# Patient Record
Sex: FEMALE | Race: BLACK OR AFRICAN AMERICAN | NOT HISPANIC OR LATINO | Employment: OTHER | RURAL
[De-identification: names, ages, dates, MRNs, and addresses within clinical notes are randomized per-mention and may not be internally consistent; named-entity substitution may affect disease eponyms.]

---

## 2017-01-10 ENCOUNTER — HISTORICAL (OUTPATIENT)
Dept: ADMINISTRATIVE | Facility: HOSPITAL | Age: 57
End: 2017-01-10

## 2017-01-16 LAB
LAB AP CLINICAL INFORMATION: NORMAL
LAB AP DIAGNOSIS - HISTORICAL: NORMAL
LAB AP GROSS PATHOLOGY - HISTORICAL: NORMAL
LAB AP SPECIMEN SUBMITTED - HISTORICAL: NORMAL

## 2017-07-02 ENCOUNTER — HISTORICAL (OUTPATIENT)
Dept: ADMINISTRATIVE | Facility: HOSPITAL | Age: 57
End: 2017-07-02

## 2017-11-02 ENCOUNTER — HISTORICAL (OUTPATIENT)
Dept: ADMINISTRATIVE | Facility: HOSPITAL | Age: 57
End: 2017-11-02

## 2019-07-15 ENCOUNTER — HISTORICAL (OUTPATIENT)
Dept: ADMINISTRATIVE | Facility: HOSPITAL | Age: 59
End: 2019-07-15

## 2020-08-04 ENCOUNTER — HISTORICAL (OUTPATIENT)
Dept: ADMINISTRATIVE | Facility: HOSPITAL | Age: 60
End: 2020-08-04

## 2020-08-12 ENCOUNTER — HISTORICAL (OUTPATIENT)
Dept: ADMINISTRATIVE | Facility: HOSPITAL | Age: 60
End: 2020-08-12

## 2020-08-20 ENCOUNTER — HISTORICAL (OUTPATIENT)
Dept: ADMINISTRATIVE | Facility: HOSPITAL | Age: 60
End: 2020-08-20

## 2020-11-17 ENCOUNTER — HISTORICAL (OUTPATIENT)
Dept: ADMINISTRATIVE | Facility: HOSPITAL | Age: 60
End: 2020-11-17

## 2020-11-19 LAB — ANA SER QL: NEGATIVE

## 2020-11-30 ENCOUNTER — HISTORICAL (OUTPATIENT)
Dept: ADMINISTRATIVE | Facility: HOSPITAL | Age: 60
End: 2020-11-30

## 2020-12-05 ENCOUNTER — HISTORICAL (OUTPATIENT)
Dept: ADMINISTRATIVE | Facility: HOSPITAL | Age: 60
End: 2020-12-05

## 2020-12-05 LAB
ANION GAP SERPL CALCULATED.3IONS-SCNC: 16 MMOL/L
ANISOCYTOSIS BLD QL SMEAR: ABNORMAL
BACTERIA #/AREA URNS HPF: ABNORMAL /HPF
BASOPHILS # BLD AUTO: 0.02 X10E3/UL (ref 0–0.2)
BASOPHILS NFR BLD AUTO: 0.2 % (ref 0–1)
BILIRUB UR QL STRIP: NEGATIVE MG/DL
BUN SERPL-MCNC: 29 MG/DL (ref 7–18)
CALCIUM SERPL-MCNC: 8.7 MG/DL (ref 8.5–10.1)
CHLORIDE SERPL-SCNC: 101 MMOL/L (ref 98–107)
CLARITY UR: ABNORMAL
CLARITY UR: ABNORMAL
CO2 SERPL-SCNC: 22 MMOL/L (ref 21–32)
COLOR UR: ABNORMAL
COLOR UR: ABNORMAL
CREAT SERPL-MCNC: 2.16 MG/DL (ref 0.55–1.02)
EOSINOPHIL # BLD AUTO: 0.07 X10E3/UL (ref 0–0.5)
EOSINOPHIL NFR BLD AUTO: 0.8 % (ref 1–4)
ERYTHROCYTE [DISTWIDTH] IN BLOOD BY AUTOMATED COUNT: 14.8 % (ref 11.5–14.5)
FLUAV AG UPPER RESP QL IA.RAPID: NEGATIVE
FLUBV AG UPPER RESP QL IA.RAPID: NEGATIVE
GLUCOSE SERPL-MCNC: 401 MG/DL (ref 74–106)
GLUCOSE SERPL-MCNC: 416 MG/DL (ref 70–105)
GLUCOSE SERPL-MCNC: 426 MG/DL (ref 70–105)
GLUCOSE UR STRIP-MCNC: 500 MG/DL
HCT VFR BLD AUTO: 30.8 % (ref 38–47)
HGB BLD-MCNC: 10.8 G/DL (ref 12–16)
HYALINE CASTS #/AREA URNS LPF: ABNORMAL /LPF (ref 0–2)
IMM GRANULOCYTES # BLD AUTO: 0.06 X10E3/UL (ref 0–0.04)
IMM GRANULOCYTES NFR BLD: 0.7 % (ref 0–0.4)
KETONES UR STRIP-SCNC: NEGATIVE MG/DL
LACTATE SERPL-SCNC: 1.5 MMOL/L (ref 0.4–2)
LACTATE SERPL-SCNC: 2.4 MMOL/L (ref 0.4–2)
LEUKOCYTE ESTERASE UR QL STRIP: ABNORMAL LEU/UL
LYMPHOCYTES # BLD AUTO: 0.91 X10E3/UL (ref 1–4.8)
LYMPHOCYTES NFR BLD AUTO: 10.9 % (ref 27–41)
MCH RBC QN AUTO: 25.9 PG (ref 27–31)
MCHC RBC AUTO-ENTMCNC: 35.1 G/DL (ref 32–36)
MCV RBC AUTO: 73.9 FL (ref 80–96)
MICROCYTES BLD QL SMEAR: ABNORMAL
MONOCYTES # BLD AUTO: 0.33 X10E3/UL (ref 0–0.8)
MONOCYTES NFR BLD AUTO: 3.9 % (ref 2–6)
MPC BLD CALC-MCNC: 10.9 FL (ref 9.4–12.4)
MUCOUS THREADS #/AREA URNS HPF: ABNORMAL /HPF
NEUTROPHILS # BLD AUTO: 6.97 X10E3/UL (ref 1.8–7.7)
NEUTROPHILS NFR BLD AUTO: 83.5 % (ref 53–65)
NITRITE UR QL STRIP: NEGATIVE
NRBC # BLD AUTO: 0 X10E3/UL (ref 0–0)
NRBC, AUTO (.00): 0 /100 (ref 0–0)
OVALOCYTES BLD QL SMEAR: ABNORMAL
PH UR STRIP: 5.5 PH UNITS (ref 5–8)
PLATELET # BLD AUTO: 134 X10E3/UL (ref 150–400)
PLATELET MORPHOLOGY: ABNORMAL
POLYCHROMASIA BLD QL SMEAR: ABNORMAL
POTASSIUM SERPL-SCNC: 4.6 MMOL/L (ref 3.5–5.1)
PROT UR QL STRIP: 100 MG/DL
RBC # BLD AUTO: 4.17 X10E6/UL (ref 4.2–5.4)
RBC # UR STRIP: ABNORMAL ERY/UL
RBC #/AREA URNS HPF: ABNORMAL /HPF (ref 0–3)
SARS-COV+SARS-COV-2 AG RESP QL IA.RAPID: NEGATIVE
SODIUM SERPL-SCNC: 134 MMOL/L (ref 136–145)
SP GR UR STRIP: 1.02 (ref 1–1.03)
SQUAMOUS #/AREA URNS LPF: ABNORMAL /LPF
TRICHOMONAS #/AREA URNS HPF: ABNORMAL /HPF
UROBILINOGEN UR STRIP-ACNC: 0.2 EU/DL
WBC # BLD AUTO: 8.36 X10E3/UL (ref 4.5–11)
WBC #/AREA URNS HPF: ABNORMAL /HPF (ref 0–5)
YEAST #/AREA URNS HPF: ABNORMAL /HPF

## 2020-12-07 LAB
REPORT: 38
REPORT: NORMAL

## 2020-12-08 LAB
REPORT: 38
REPORT: NORMAL

## 2020-12-11 LAB
REPORT: NORMAL

## 2020-12-15 ENCOUNTER — HISTORICAL (OUTPATIENT)
Dept: ADMINISTRATIVE | Facility: HOSPITAL | Age: 60
End: 2020-12-15

## 2020-12-15 LAB
CRC RECOMMENDATION EXT: NORMAL
GLUCOSE SERPL-MCNC: 388 MG/DL (ref 70–105)

## 2021-02-12 ENCOUNTER — HISTORICAL (OUTPATIENT)
Dept: ADMINISTRATIVE | Facility: HOSPITAL | Age: 61
End: 2021-02-12

## 2021-02-12 ENCOUNTER — HOSPITAL ENCOUNTER (OUTPATIENT)
Dept: TELEMEDICINE | Facility: HOSPITAL | Age: 61
Discharge: HOME | End: 2021-02-12

## 2021-02-12 DIAGNOSIS — I63.412 EMBOLIC STROKE INVOLVING LEFT MIDDLE CEREBRAL ARTERY: ICD-10-CM

## 2021-02-12 LAB
AMPHET UR QL SCN: NEGATIVE NG/ML
ANION GAP SERPL CALCULATED.3IONS-SCNC: 13 MMOL/L
ANISOCYTOSIS BLD QL SMEAR: ABNORMAL
APTT PPP: 26.1 SECONDS (ref 25.2–37.3)
BARBITURATES UR QL SCN: NEGATIVE NG/ML
BASOPHILS # BLD AUTO: 0.02 X10E3/UL (ref 0–0.2)
BASOPHILS NFR BLD AUTO: 0.4 % (ref 0–1)
BENZODIAZ METAB UR QL SCN: NEGATIVE NG/ML
BILIRUB UR QL STRIP: NEGATIVE MG/DL
BUN SERPL-MCNC: 28 MG/DL (ref 7–18)
CALCIUM SERPL-MCNC: 9.1 MG/DL (ref 8.5–10.1)
CANNABINOIDS UR QL SCN: NEGATIVE NG/ML
CHLORIDE SERPL-SCNC: 104 MMOL/L (ref 98–107)
CK MB SERPL-MCNC: 1 NG/ML (ref 1–3.6)
CK SERPL-CCNC: 94 U/L (ref 26–192)
CLARITY UR: CLEAR
CO2 SERPL-SCNC: 25 MMOL/L (ref 21–32)
COCAINE UR QL SCN: POSITIVE NG/ML
COLOR UR: ABNORMAL
CREAT SERPL-MCNC: 2.02 MG/DL (ref 0.55–1.02)
EOSINOPHIL # BLD AUTO: 0.22 X10E3/UL (ref 0–0.5)
EOSINOPHIL NFR BLD AUTO: 4.6 % (ref 1–4)
ERYTHROCYTE [DISTWIDTH] IN BLOOD BY AUTOMATED COUNT: 14.8 % (ref 11.5–14.5)
ETHANOL SERPL-MCNC: NORMAL MG/DL (ref 0–10)
FLUAV AG UPPER RESP QL IA.RAPID: NEGATIVE
FLUBV AG UPPER RESP QL IA.RAPID: NEGATIVE
GLUCOSE SERPL-MCNC: 285 MG/DL (ref 74–106)
GLUCOSE UR STRIP-MCNC: 500 MG/DL
HCT VFR BLD AUTO: 40.5 % (ref 38–47)
HGB BLD-MCNC: 13.8 G/DL (ref 12–16)
HYPOCHROMIA BLD QL SMEAR: SLIGHT
IMM GRANULOCYTES # BLD AUTO: 0.01 X10E3/UL (ref 0–0.04)
IMM GRANULOCYTES NFR BLD: 0.2 % (ref 0–0.4)
INR BLD: 1.11 (ref 0–3.3)
KETONES UR STRIP-SCNC: NEGATIVE MG/DL
LEUKOCYTE ESTERASE UR QL STRIP: NEGATIVE LEU/UL
LYMPHOCYTES # BLD AUTO: 1.71 X10E3/UL (ref 1–4.8)
LYMPHOCYTES NFR BLD AUTO: 35.4 % (ref 27–41)
MAGNESIUM SERPL-MCNC: 1.6 MG/DL (ref 1.7–2.3)
MCH RBC QN AUTO: 25.5 PG (ref 27–31)
MCHC RBC AUTO-ENTMCNC: 34.1 G/DL (ref 32–36)
MCV RBC AUTO: 74.9 FL (ref 80–96)
MICROCYTES BLD QL SMEAR: ABNORMAL
MONOCYTES # BLD AUTO: 0.34 X10E3/UL (ref 0–0.8)
MONOCYTES NFR BLD AUTO: 7 % (ref 2–6)
MPC BLD CALC-MCNC: 10.8 FL (ref 9.4–12.4)
MYOGLOBIN SERPL-MCNC: 103 NG/ML (ref 13–71)
NEUTROPHILS # BLD AUTO: 2.53 X10E3/UL (ref 1.8–7.7)
NEUTROPHILS NFR BLD AUTO: 52.4 % (ref 53–65)
NITRITE UR QL STRIP: NEGATIVE
NRBC # BLD AUTO: 0 X10E3/UL (ref 0–0)
NRBC, AUTO (.00): 0 /100 (ref 0–0)
OPIATES UR QL SCN: NEGATIVE NG/ML
PCP UR QL SCN: NEGATIVE NG/ML
PH UR STRIP: 6 PH UNITS (ref 5–8)
PLATELET # BLD AUTO: 153 X10E3/UL (ref 150–400)
PLATELET MORPHOLOGY: ABNORMAL
POLYCHROMASIA BLD QL SMEAR: ABNORMAL
POTASSIUM SERPL-SCNC: 5.1 MMOL/L (ref 3.5–5.1)
PROT UR QL STRIP: NEGATIVE MG/DL
PROTHROMBIN TIME: 13.8 SECONDS (ref 11.7–14.7)
RBC # BLD AUTO: 5.41 X10E6/UL (ref 4.2–5.4)
RBC # UR STRIP: NEGATIVE ERY/UL
SARS-COV+SARS-COV-2 AG RESP QL IA.RAPID: NEGATIVE
SODIUM SERPL-SCNC: 137 MMOL/L (ref 136–145)
SP GR UR STRIP: 1.02 (ref 1–1.03)
TROPONIN I SERPL-MCNC: <0.017 NG/ML (ref 0–0.06)
UROBILINOGEN UR STRIP-ACNC: 0.2 EU/DL
WBC # BLD AUTO: 4.83 X10E3/UL (ref 4.5–11)

## 2021-02-12 PROCEDURE — G0508 CRIT CARE TELEHEA CONSULT 60: HCPCS | Mod: GT,,, | Performed by: PSYCHIATRY & NEUROLOGY

## 2021-02-12 PROCEDURE — G0508 PR CRITICAL CARE TELEHLTH INITIAL CONSULT 60MIN: ICD-10-PCS | Mod: GT,,, | Performed by: PSYCHIATRY & NEUROLOGY

## 2021-02-23 ENCOUNTER — HISTORICAL (OUTPATIENT)
Dept: ADMINISTRATIVE | Facility: HOSPITAL | Age: 61
End: 2021-02-23

## 2021-03-01 LAB
LAB AP CLINICAL INFORMATION: NORMAL
LAB AP GENERAL CAT - HISTORICAL: NORMAL
LAB AP INTERPRETATION/RESULT - HISTORICAL: NEGATIVE
LAB AP SPECIMEN ADEQUACY - HISTORICAL: NORMAL
LAB AP SPECIMEN SUBMITTED - HISTORICAL: NORMAL

## 2021-03-02 LAB
INSULIN SERPL-ACNC: NORMAL U[IU]/ML

## 2021-03-04 ENCOUNTER — HISTORICAL (OUTPATIENT)
Dept: ADMINISTRATIVE | Facility: HOSPITAL | Age: 61
End: 2021-03-04

## 2021-03-18 ENCOUNTER — OFFICE VISIT (OUTPATIENT)
Dept: WOUND CARE | Facility: HOSPITAL | Age: 61
End: 2021-03-18
Attending: NURSE PRACTITIONER
Payer: MEDICAID

## 2021-03-18 VITALS
HEIGHT: 58 IN | WEIGHT: 280 LBS | DIASTOLIC BLOOD PRESSURE: 69 MMHG | HEART RATE: 95 BPM | BODY MASS INDEX: 58.78 KG/M2 | SYSTOLIC BLOOD PRESSURE: 100 MMHG | TEMPERATURE: 97 F

## 2021-03-18 DIAGNOSIS — E11.622 DIABETES WITH SKIN ULCER: ICD-10-CM

## 2021-03-18 DIAGNOSIS — I10 BENIGN HYPERTENSION: ICD-10-CM

## 2021-03-18 DIAGNOSIS — L97.513 ULCER OF RIGHT FOOT WITH NECROSIS OF MUSCLE: Primary | ICD-10-CM

## 2021-03-18 DIAGNOSIS — E78.49 OTHER HYPERLIPIDEMIA: ICD-10-CM

## 2021-03-18 DIAGNOSIS — R60.0 LOWER EXTREMITY EDEMA: ICD-10-CM

## 2021-03-18 DIAGNOSIS — I63.412 EMBOLIC STROKE INVOLVING LEFT MIDDLE CEREBRAL ARTERY: ICD-10-CM

## 2021-03-18 DIAGNOSIS — L98.499 DIABETES WITH SKIN ULCER: ICD-10-CM

## 2021-03-18 DIAGNOSIS — L97.525 ULCER OF LEFT FOOT WITH MUSCLE INVOLVEMENT WITHOUT EVIDENCE OF NECROSIS: ICD-10-CM

## 2021-03-18 PROCEDURE — 11042 DBRDMT SUBQ TIS 1ST 20SQCM/<: CPT

## 2021-03-18 PROCEDURE — 99214 OFFICE O/P EST MOD 30 MIN: CPT

## 2021-03-18 PROCEDURE — 99214 OFFICE O/P EST MOD 30 MIN: CPT | Mod: ,,, | Performed by: NURSE PRACTITIONER

## 2021-03-18 PROCEDURE — 99214 PR OFFICE/OUTPT VISIT, EST, LEVL IV, 30-39 MIN: ICD-10-PCS | Mod: ,,, | Performed by: NURSE PRACTITIONER

## 2021-03-19 PROBLEM — I10 BENIGN HYPERTENSION: Status: ACTIVE | Noted: 2021-03-19

## 2021-03-19 PROBLEM — R60.0 LOWER EXTREMITY EDEMA: Status: ACTIVE | Noted: 2021-03-19

## 2021-03-19 PROBLEM — E11.622 DIABETES WITH SKIN ULCER: Status: ACTIVE | Noted: 2021-03-19

## 2021-03-19 PROBLEM — E78.49 OTHER HYPERLIPIDEMIA: Status: ACTIVE | Noted: 2021-03-19

## 2021-03-19 PROBLEM — L97.525 ULCER OF LEFT FOOT WITH MUSCLE INVOLVEMENT WITHOUT EVIDENCE OF NECROSIS: Status: ACTIVE | Noted: 2021-03-19

## 2021-03-19 PROBLEM — L98.499 DIABETES WITH SKIN ULCER: Status: ACTIVE | Noted: 2021-03-19

## 2021-05-18 ENCOUNTER — OFFICE VISIT (OUTPATIENT)
Dept: WOUND CARE | Facility: CLINIC | Age: 61
End: 2021-05-18
Attending: NURSE PRACTITIONER
Payer: MEDICAID

## 2021-05-18 VITALS
WEIGHT: 280 LBS | RESPIRATION RATE: 20 BRPM | TEMPERATURE: 97 F | SYSTOLIC BLOOD PRESSURE: 148 MMHG | DIASTOLIC BLOOD PRESSURE: 80 MMHG | HEIGHT: 68 IN | BODY MASS INDEX: 42.44 KG/M2 | HEART RATE: 97 BPM

## 2021-05-18 DIAGNOSIS — L97.512 ULCER OF RIGHT FOOT WITH FAT LAYER EXPOSED: Primary | ICD-10-CM

## 2021-05-18 DIAGNOSIS — I10 BENIGN HYPERTENSION: ICD-10-CM

## 2021-05-18 DIAGNOSIS — E11.622 DIABETES WITH SKIN ULCER: ICD-10-CM

## 2021-05-18 DIAGNOSIS — L98.499 DIABETES WITH SKIN ULCER: ICD-10-CM

## 2021-05-18 DIAGNOSIS — E78.49 OTHER HYPERLIPIDEMIA: ICD-10-CM

## 2021-05-18 DIAGNOSIS — R60.0 LOWER EXTREMITY EDEMA: ICD-10-CM

## 2021-05-18 PROCEDURE — 11042 DBRDMT SUBQ TIS 1ST 20SQCM/<: CPT

## 2021-05-18 PROCEDURE — 99215 OFFICE O/P EST HI 40 MIN: CPT | Mod: PBBFAC,25 | Performed by: NURSE PRACTITIONER

## 2021-05-18 PROCEDURE — 11042 PR DEBRIDEMENT, SKIN, SUB-Q TISSUE,=<20 SQ CM: ICD-10-PCS | Mod: S$PBB,,, | Performed by: NURSE PRACTITIONER

## 2021-05-18 PROCEDURE — 11042 DBRDMT SUBQ TIS 1ST 20SQCM/<: CPT | Mod: PBBFAC | Performed by: NURSE PRACTITIONER

## 2021-05-18 PROCEDURE — 11042 DBRDMT SUBQ TIS 1ST 20SQCM/<: CPT | Mod: S$PBB,,, | Performed by: NURSE PRACTITIONER

## 2021-05-18 RX ORDER — AMLODIPINE BESYLATE 5 MG/1
5 TABLET ORAL DAILY PRN
COMMUNITY
Start: 2021-04-23 | End: 2021-11-09 | Stop reason: SDUPTHER

## 2021-05-18 RX ORDER — LOSARTAN POTASSIUM 100 MG/1
100 TABLET ORAL NIGHTLY
COMMUNITY
Start: 2021-04-26 | End: 2021-11-09 | Stop reason: SDUPTHER

## 2021-06-23 ENCOUNTER — OFFICE VISIT (OUTPATIENT)
Dept: WOUND CARE | Facility: CLINIC | Age: 61
End: 2021-06-23
Attending: NURSE PRACTITIONER
Payer: MEDICAID

## 2021-06-23 VITALS
DIASTOLIC BLOOD PRESSURE: 80 MMHG | HEIGHT: 68 IN | SYSTOLIC BLOOD PRESSURE: 140 MMHG | TEMPERATURE: 97 F | HEART RATE: 78 BPM | BODY MASS INDEX: 42.44 KG/M2 | WEIGHT: 280 LBS | RESPIRATION RATE: 20 BRPM

## 2021-06-23 DIAGNOSIS — L97.512 ULCER OF RIGHT FOOT WITH FAT LAYER EXPOSED: Primary | ICD-10-CM

## 2021-06-23 DIAGNOSIS — E11.622 DIABETES WITH SKIN ULCER: ICD-10-CM

## 2021-06-23 DIAGNOSIS — L98.499 DIABETES WITH SKIN ULCER: ICD-10-CM

## 2021-06-23 DIAGNOSIS — R60.0 LOWER EXTREMITY EDEMA: ICD-10-CM

## 2021-06-23 PROCEDURE — 99214 OFFICE O/P EST MOD 30 MIN: CPT | Mod: PBBFAC | Performed by: NURSE PRACTITIONER

## 2021-06-23 PROCEDURE — 99214 OFFICE O/P EST MOD 30 MIN: CPT | Mod: S$PBB,,, | Performed by: NURSE PRACTITIONER

## 2021-06-23 PROCEDURE — 99214 PR OFFICE/OUTPT VISIT, EST, LEVL IV, 30-39 MIN: ICD-10-PCS | Mod: S$PBB,,, | Performed by: NURSE PRACTITIONER

## 2021-06-28 PROBLEM — L97.512 ULCER OF RIGHT FOOT WITH FAT LAYER EXPOSED: Status: ACTIVE | Noted: 2021-06-28

## 2021-07-23 ENCOUNTER — OFFICE VISIT (OUTPATIENT)
Dept: WOUND CARE | Facility: CLINIC | Age: 61
End: 2021-07-23
Attending: FAMILY MEDICINE
Payer: MEDICAID

## 2021-07-23 VITALS
BODY MASS INDEX: 42.44 KG/M2 | HEART RATE: 86 BPM | WEIGHT: 280 LBS | RESPIRATION RATE: 20 BRPM | HEIGHT: 68 IN | DIASTOLIC BLOOD PRESSURE: 87 MMHG | SYSTOLIC BLOOD PRESSURE: 154 MMHG

## 2021-07-23 DIAGNOSIS — L97.512 ULCER OF RIGHT FOOT WITH FAT LAYER EXPOSED: Primary | ICD-10-CM

## 2021-07-23 DIAGNOSIS — L97.525 ULCER OF LEFT FOOT WITH MUSCLE INVOLVEMENT WITHOUT EVIDENCE OF NECROSIS: ICD-10-CM

## 2021-07-23 DIAGNOSIS — R60.0 LOWER EXTREMITY EDEMA: ICD-10-CM

## 2021-07-23 DIAGNOSIS — I10 BENIGN HYPERTENSION: ICD-10-CM

## 2021-07-23 DIAGNOSIS — L98.499 DIABETES WITH SKIN ULCER: ICD-10-CM

## 2021-07-23 DIAGNOSIS — E11.622 DIABETES WITH SKIN ULCER: ICD-10-CM

## 2021-07-23 DIAGNOSIS — E78.49 OTHER HYPERLIPIDEMIA: ICD-10-CM

## 2021-07-23 DIAGNOSIS — L97.513 ULCER OF RIGHT FOOT WITH NECROSIS OF MUSCLE: ICD-10-CM

## 2021-07-23 DIAGNOSIS — I63.412 EMBOLIC STROKE INVOLVING LEFT MIDDLE CEREBRAL ARTERY: ICD-10-CM

## 2021-07-23 PROCEDURE — 99213 OFFICE O/P EST LOW 20 MIN: CPT | Mod: PBBFAC | Performed by: FAMILY MEDICINE

## 2021-07-23 PROCEDURE — 99214 OFFICE O/P EST MOD 30 MIN: CPT | Mod: S$PBB,,, | Performed by: FAMILY MEDICINE

## 2021-07-23 PROCEDURE — 99214 PR OFFICE/OUTPT VISIT, EST, LEVL IV, 30-39 MIN: ICD-10-PCS | Mod: S$PBB,,, | Performed by: FAMILY MEDICINE

## 2021-08-13 ENCOUNTER — OFFICE VISIT (OUTPATIENT)
Dept: WOUND CARE | Facility: CLINIC | Age: 61
End: 2021-08-13
Attending: SURGERY
Payer: MEDICAID

## 2021-08-13 VITALS — SYSTOLIC BLOOD PRESSURE: 130 MMHG | RESPIRATION RATE: 18 BRPM | HEART RATE: 95 BPM | DIASTOLIC BLOOD PRESSURE: 87 MMHG

## 2021-08-13 DIAGNOSIS — E11.622 DIABETES WITH SKIN ULCER: ICD-10-CM

## 2021-08-13 DIAGNOSIS — I63.412 EMBOLIC STROKE INVOLVING LEFT MIDDLE CEREBRAL ARTERY: ICD-10-CM

## 2021-08-13 DIAGNOSIS — L97.512 ULCER OF RIGHT FOOT WITH FAT LAYER EXPOSED: Primary | ICD-10-CM

## 2021-08-13 DIAGNOSIS — I10 BENIGN HYPERTENSION: ICD-10-CM

## 2021-08-13 DIAGNOSIS — E78.49 OTHER HYPERLIPIDEMIA: ICD-10-CM

## 2021-08-13 DIAGNOSIS — L97.525 ULCER OF LEFT FOOT WITH MUSCLE INVOLVEMENT WITHOUT EVIDENCE OF NECROSIS: ICD-10-CM

## 2021-08-13 DIAGNOSIS — R60.0 LOWER EXTREMITY EDEMA: ICD-10-CM

## 2021-08-13 DIAGNOSIS — L98.499 DIABETES WITH SKIN ULCER: ICD-10-CM

## 2021-08-13 DIAGNOSIS — L97.513 ULCER OF RIGHT FOOT WITH NECROSIS OF MUSCLE: ICD-10-CM

## 2021-08-13 PROCEDURE — 99214 OFFICE O/P EST MOD 30 MIN: CPT | Mod: PBBFAC | Performed by: SURGERY

## 2021-08-13 PROCEDURE — 11042 DBRDMT SUBQ TIS 1ST 20SQCM/<: CPT | Mod: S$PBB,,, | Performed by: SURGERY

## 2021-08-13 PROCEDURE — 97597 DBRDMT OPN WND 1ST 20 CM/<: CPT | Mod: PBBFAC | Performed by: SURGERY

## 2021-08-13 PROCEDURE — 11042 PR DEBRIDEMENT, SKIN, SUB-Q TISSUE,=<20 SQ CM: ICD-10-PCS | Mod: S$PBB,,, | Performed by: SURGERY

## 2021-09-30 ENCOUNTER — CLINICAL SUPPORT (OUTPATIENT)
Dept: WOUND CARE | Facility: CLINIC | Age: 61
End: 2021-09-30
Attending: FAMILY MEDICINE
Payer: MEDICAID

## 2021-09-30 VITALS
DIASTOLIC BLOOD PRESSURE: 77 MMHG | TEMPERATURE: 98 F | SYSTOLIC BLOOD PRESSURE: 123 MMHG | RESPIRATION RATE: 20 BRPM | HEART RATE: 105 BPM

## 2021-09-30 DIAGNOSIS — L97.512 ULCER OF RIGHT FOOT WITH FAT LAYER EXPOSED: Primary | ICD-10-CM

## 2021-09-30 DIAGNOSIS — E78.49 OTHER HYPERLIPIDEMIA: ICD-10-CM

## 2021-09-30 DIAGNOSIS — I10 BENIGN HYPERTENSION: ICD-10-CM

## 2021-09-30 DIAGNOSIS — L98.499 DIABETES WITH SKIN ULCER: ICD-10-CM

## 2021-09-30 DIAGNOSIS — R60.0 LOWER EXTREMITY EDEMA: ICD-10-CM

## 2021-09-30 DIAGNOSIS — I63.412 EMBOLIC STROKE INVOLVING LEFT MIDDLE CEREBRAL ARTERY: ICD-10-CM

## 2021-09-30 DIAGNOSIS — L97.525 ULCER OF LEFT FOOT WITH MUSCLE INVOLVEMENT WITHOUT EVIDENCE OF NECROSIS: ICD-10-CM

## 2021-09-30 DIAGNOSIS — L97.513 ULCER OF RIGHT FOOT WITH NECROSIS OF MUSCLE: ICD-10-CM

## 2021-09-30 DIAGNOSIS — E11.622 DIABETES WITH SKIN ULCER: ICD-10-CM

## 2021-09-30 PROCEDURE — 99214 OFFICE O/P EST MOD 30 MIN: CPT | Mod: PBBFAC | Performed by: FAMILY MEDICINE

## 2021-10-14 ENCOUNTER — CLINICAL SUPPORT (OUTPATIENT)
Dept: WOUND CARE | Facility: CLINIC | Age: 61
End: 2021-10-14
Attending: FAMILY MEDICINE
Payer: MEDICAID

## 2021-10-14 VITALS
HEART RATE: 85 BPM | TEMPERATURE: 97 F | RESPIRATION RATE: 18 BRPM | SYSTOLIC BLOOD PRESSURE: 142 MMHG | DIASTOLIC BLOOD PRESSURE: 78 MMHG

## 2021-10-14 DIAGNOSIS — L97.512 ULCER OF RIGHT FOOT WITH FAT LAYER EXPOSED: Primary | ICD-10-CM

## 2021-10-14 DIAGNOSIS — E11.622 DIABETES WITH SKIN ULCER: ICD-10-CM

## 2021-10-14 DIAGNOSIS — I10 BENIGN HYPERTENSION: ICD-10-CM

## 2021-10-14 DIAGNOSIS — E78.49 OTHER HYPERLIPIDEMIA: ICD-10-CM

## 2021-10-14 DIAGNOSIS — L98.499 DIABETES WITH SKIN ULCER: ICD-10-CM

## 2021-10-14 DIAGNOSIS — L97.513 ULCER OF RIGHT FOOT WITH NECROSIS OF MUSCLE: ICD-10-CM

## 2021-10-14 DIAGNOSIS — R60.0 LOWER EXTREMITY EDEMA: ICD-10-CM

## 2021-10-14 DIAGNOSIS — I63.412 EMBOLIC STROKE INVOLVING LEFT MIDDLE CEREBRAL ARTERY: ICD-10-CM

## 2021-10-14 PROCEDURE — 99214 OFFICE O/P EST MOD 30 MIN: CPT | Mod: S$PBB,,, | Performed by: FAMILY MEDICINE

## 2021-10-14 PROCEDURE — 99214 PR OFFICE/OUTPT VISIT, EST, LEVL IV, 30-39 MIN: ICD-10-PCS | Mod: S$PBB,,, | Performed by: FAMILY MEDICINE

## 2021-10-14 PROCEDURE — 99214 OFFICE O/P EST MOD 30 MIN: CPT | Mod: PBBFAC | Performed by: FAMILY MEDICINE

## 2021-11-04 ENCOUNTER — OFFICE VISIT (OUTPATIENT)
Dept: WOUND CARE | Facility: CLINIC | Age: 61
End: 2021-11-04
Attending: FAMILY MEDICINE
Payer: MEDICAID

## 2021-11-04 VITALS
RESPIRATION RATE: 20 BRPM | DIASTOLIC BLOOD PRESSURE: 91 MMHG | HEART RATE: 95 BPM | SYSTOLIC BLOOD PRESSURE: 149 MMHG | TEMPERATURE: 98 F

## 2021-11-04 DIAGNOSIS — L97.512 ULCER OF RIGHT FOOT WITH FAT LAYER EXPOSED: Primary | ICD-10-CM

## 2021-11-04 DIAGNOSIS — R60.0 LOWER EXTREMITY EDEMA: ICD-10-CM

## 2021-11-04 DIAGNOSIS — L97.513 ULCER OF RIGHT FOOT WITH NECROSIS OF MUSCLE: ICD-10-CM

## 2021-11-04 DIAGNOSIS — L98.499 DIABETES WITH SKIN ULCER: ICD-10-CM

## 2021-11-04 DIAGNOSIS — I63.412 EMBOLIC STROKE INVOLVING LEFT MIDDLE CEREBRAL ARTERY: ICD-10-CM

## 2021-11-04 DIAGNOSIS — E11.622 DIABETES WITH SKIN ULCER: ICD-10-CM

## 2021-11-04 DIAGNOSIS — E78.49 OTHER HYPERLIPIDEMIA: ICD-10-CM

## 2021-11-04 DIAGNOSIS — L97.525 ULCER OF LEFT FOOT WITH MUSCLE INVOLVEMENT WITHOUT EVIDENCE OF NECROSIS: ICD-10-CM

## 2021-11-04 DIAGNOSIS — I10 BENIGN HYPERTENSION: ICD-10-CM

## 2021-11-04 PROCEDURE — 99214 OFFICE O/P EST MOD 30 MIN: CPT | Mod: PBBFAC | Performed by: FAMILY MEDICINE

## 2021-11-04 PROCEDURE — 99214 OFFICE O/P EST MOD 30 MIN: CPT | Mod: S$PBB,,, | Performed by: FAMILY MEDICINE

## 2021-11-04 PROCEDURE — 99214 PR OFFICE/OUTPT VISIT, EST, LEVL IV, 30-39 MIN: ICD-10-PCS | Mod: S$PBB,,, | Performed by: FAMILY MEDICINE

## 2021-11-09 ENCOUNTER — OFFICE VISIT (OUTPATIENT)
Dept: FAMILY MEDICINE | Facility: CLINIC | Age: 61
End: 2021-11-09
Payer: MEDICAID

## 2021-11-09 VITALS
HEART RATE: 101 BPM | DIASTOLIC BLOOD PRESSURE: 82 MMHG | OXYGEN SATURATION: 93 % | WEIGHT: 293 LBS | SYSTOLIC BLOOD PRESSURE: 134 MMHG | TEMPERATURE: 98 F | BODY MASS INDEX: 46.47 KG/M2

## 2021-11-09 DIAGNOSIS — Z23 NEEDS FLU SHOT: ICD-10-CM

## 2021-11-09 DIAGNOSIS — Z79.4 TYPE 2 DIABETES MELLITUS WITH OTHER NEUROLOGIC COMPLICATION, WITH LONG-TERM CURRENT USE OF INSULIN: ICD-10-CM

## 2021-11-09 DIAGNOSIS — Z13.29 SCREENING FOR THYROID DISORDER: ICD-10-CM

## 2021-11-09 DIAGNOSIS — I10 HYPERTENSION, UNSPECIFIED TYPE: ICD-10-CM

## 2021-11-09 DIAGNOSIS — K21.9 GASTROESOPHAGEAL REFLUX DISEASE, UNSPECIFIED WHETHER ESOPHAGITIS PRESENT: ICD-10-CM

## 2021-11-09 DIAGNOSIS — E78.5 HYPERLIPIDEMIA, UNSPECIFIED HYPERLIPIDEMIA TYPE: Primary | ICD-10-CM

## 2021-11-09 DIAGNOSIS — E11.49 TYPE 2 DIABETES MELLITUS WITH OTHER NEUROLOGIC COMPLICATION, WITH LONG-TERM CURRENT USE OF INSULIN: ICD-10-CM

## 2021-11-09 DIAGNOSIS — Z12.39 ENCOUNTER FOR SCREENING FOR MALIGNANT NEOPLASM OF BREAST, UNSPECIFIED SCREENING MODALITY: ICD-10-CM

## 2021-11-09 LAB
ALBUMIN SERPL BCP-MCNC: 3.1 G/DL (ref 3.5–5)
ALBUMIN/GLOB SERPL: 0.7 {RATIO}
ALP SERPL-CCNC: 139 U/L (ref 50–130)
ALT SERPL W P-5'-P-CCNC: 21 U/L (ref 13–56)
ANION GAP SERPL CALCULATED.3IONS-SCNC: 13 MMOL/L (ref 7–16)
ANISOCYTOSIS BLD QL SMEAR: NORMAL
AST SERPL W P-5'-P-CCNC: 13 U/L (ref 15–37)
BASOPHILS # BLD AUTO: 0.04 K/UL (ref 0–0.2)
BASOPHILS NFR BLD AUTO: 0.6 % (ref 0–1)
BILIRUB SERPL-MCNC: 0.4 MG/DL (ref 0–1.2)
BUN SERPL-MCNC: 18 MG/DL (ref 7–18)
BUN/CREAT SERPL: 15 (ref 6–20)
CALCIUM SERPL-MCNC: 9.4 MG/DL (ref 8.5–10.1)
CHLORIDE SERPL-SCNC: 109 MMOL/L (ref 98–107)
CHOLEST SERPL-MCNC: 173 MG/DL (ref 0–200)
CHOLEST/HDLC SERPL: 3.9 {RATIO}
CO2 SERPL-SCNC: 23 MMOL/L (ref 21–32)
CREAT SERPL-MCNC: 1.18 MG/DL (ref 0.55–1.02)
DIFFERENTIAL METHOD BLD: ABNORMAL
EOSINOPHIL # BLD AUTO: 0.36 K/UL (ref 0–0.5)
EOSINOPHIL NFR BLD AUTO: 5.2 % (ref 1–4)
ERYTHROCYTE [DISTWIDTH] IN BLOOD BY AUTOMATED COUNT: 15.1 % (ref 11.5–14.5)
EST. AVERAGE GLUCOSE BLD GHB EST-MCNC: 264 MG/DL
GLOBULIN SER-MCNC: 4.5 G/DL (ref 2–4)
GLUCOSE SERPL-MCNC: 204 MG/DL (ref 74–106)
HBA1C MFR BLD HPLC: 10.5 % (ref 4.5–6.6)
HCT VFR BLD AUTO: 33.6 % (ref 38–47)
HDLC SERPL-MCNC: 44 MG/DL (ref 40–60)
HGB BLD-MCNC: 11.4 G/DL (ref 12–16)
IMM GRANULOCYTES # BLD AUTO: 0.03 K/UL (ref 0–0.04)
IMM GRANULOCYTES NFR BLD: 0.4 % (ref 0–0.4)
LDLC SERPL CALC-MCNC: 107 MG/DL
LDLC/HDLC SERPL: 2.4 {RATIO}
LYMPHOCYTES # BLD AUTO: 1.92 K/UL (ref 1–4.8)
LYMPHOCYTES NFR BLD AUTO: 27.7 % (ref 27–41)
MCH RBC QN AUTO: 25.3 PG (ref 27–31)
MCHC RBC AUTO-ENTMCNC: 33.9 G/DL (ref 32–36)
MCV RBC AUTO: 74.7 FL (ref 80–96)
MICROCYTES BLD QL SMEAR: NORMAL
MONOCYTES # BLD AUTO: 0.46 K/UL (ref 0–0.8)
MONOCYTES NFR BLD AUTO: 6.6 % (ref 2–6)
MPC BLD CALC-MCNC: 12.1 FL (ref 9.4–12.4)
NEUTROPHILS # BLD AUTO: 4.13 K/UL (ref 1.8–7.7)
NEUTROPHILS NFR BLD AUTO: 59.5 % (ref 53–65)
NONHDLC SERPL-MCNC: 129 MG/DL
NRBC # BLD AUTO: 0 X10E3/UL
NRBC, AUTO (.00): 0 %
PLATELET # BLD AUTO: 208 K/UL (ref 150–400)
PLATELET MORPHOLOGY: NORMAL
POTASSIUM SERPL-SCNC: 4.7 MMOL/L (ref 3.5–5.1)
PROT SERPL-MCNC: 7.6 G/DL (ref 6.4–8.2)
RBC # BLD AUTO: 4.5 M/UL (ref 4.2–5.4)
SODIUM SERPL-SCNC: 140 MMOL/L (ref 136–145)
TRIGL SERPL-MCNC: 111 MG/DL (ref 35–150)
TSH SERPL DL<=0.005 MIU/L-ACNC: 1.96 UIU/ML (ref 0.36–3.74)
VLDLC SERPL-MCNC: 22 MG/DL
WBC # BLD AUTO: 6.94 K/UL (ref 4.5–11)

## 2021-11-09 PROCEDURE — 84443 ASSAY THYROID STIM HORMONE: CPT | Mod: ,,, | Performed by: CLINICAL MEDICAL LABORATORY

## 2021-11-09 PROCEDURE — 80053 COMPREHENSIVE METABOLIC PANEL: ICD-10-PCS | Mod: ,,, | Performed by: CLINICAL MEDICAL LABORATORY

## 2021-11-09 PROCEDURE — 80061 LIPID PANEL: CPT | Mod: ,,, | Performed by: CLINICAL MEDICAL LABORATORY

## 2021-11-09 PROCEDURE — 83036 HEMOGLOBIN A1C: ICD-10-PCS | Mod: ,,, | Performed by: CLINICAL MEDICAL LABORATORY

## 2021-11-09 PROCEDURE — 99214 PR OFFICE/OUTPT VISIT, EST, LEVL IV, 30-39 MIN: ICD-10-PCS | Mod: 25,GC,, | Performed by: FAMILY MEDICINE

## 2021-11-09 PROCEDURE — 84443 TSH: ICD-10-PCS | Mod: ,,, | Performed by: CLINICAL MEDICAL LABORATORY

## 2021-11-09 PROCEDURE — 80061 LIPID PANEL: ICD-10-PCS | Mod: ,,, | Performed by: CLINICAL MEDICAL LABORATORY

## 2021-11-09 PROCEDURE — 85025 CBC WITH DIFFERENTIAL: ICD-10-PCS | Mod: ,,, | Performed by: CLINICAL MEDICAL LABORATORY

## 2021-11-09 PROCEDURE — 85025 COMPLETE CBC W/AUTO DIFF WBC: CPT | Mod: ,,, | Performed by: CLINICAL MEDICAL LABORATORY

## 2021-11-09 PROCEDURE — 80053 COMPREHEN METABOLIC PANEL: CPT | Mod: ,,, | Performed by: CLINICAL MEDICAL LABORATORY

## 2021-11-09 PROCEDURE — 83036 HEMOGLOBIN GLYCOSYLATED A1C: CPT | Mod: ,,, | Performed by: CLINICAL MEDICAL LABORATORY

## 2021-11-09 PROCEDURE — 99214 OFFICE O/P EST MOD 30 MIN: CPT | Mod: 25,GC,, | Performed by: FAMILY MEDICINE

## 2021-11-09 RX ORDER — INSULIN ASPART 100 [IU]/ML
INJECTION, SUSPENSION SUBCUTANEOUS
Qty: 30 ML | Refills: 5 | Status: SHIPPED | OUTPATIENT
Start: 2021-11-09 | End: 2022-06-07 | Stop reason: SDUPTHER

## 2021-11-09 RX ORDER — LOSARTAN POTASSIUM 100 MG/1
100 TABLET ORAL NIGHTLY
Qty: 30 TABLET | Refills: 2 | Status: SHIPPED | OUTPATIENT
Start: 2021-11-09 | End: 2022-06-07 | Stop reason: SDUPTHER

## 2021-11-09 RX ORDER — ATORVASTATIN CALCIUM 80 MG/1
TABLET, FILM COATED ORAL
Qty: 30 TABLET | Refills: 2 | Status: SHIPPED | OUTPATIENT
Start: 2021-11-09 | End: 2022-04-13

## 2021-11-09 RX ORDER — OMEPRAZOLE 20 MG/1
20 CAPSULE, DELAYED RELEASE ORAL DAILY
Qty: 30 CAPSULE | Refills: 2 | Status: SHIPPED | OUTPATIENT
Start: 2021-11-09 | End: 2022-06-07 | Stop reason: SDUPTHER

## 2021-11-09 RX ORDER — AMLODIPINE BESYLATE 5 MG/1
5 TABLET ORAL DAILY
Qty: 30 TABLET | Refills: 2 | Status: SHIPPED | OUTPATIENT
Start: 2021-11-09 | End: 2022-06-07 | Stop reason: SDUPTHER

## 2021-11-09 RX ORDER — INSULIN GLARGINE 100 [IU]/ML
20 INJECTION, SOLUTION SUBCUTANEOUS DAILY
Qty: 6 ML | Refills: 2 | Status: SHIPPED | OUTPATIENT
Start: 2021-11-09 | End: 2022-06-07 | Stop reason: SDUPTHER

## 2021-11-10 PROCEDURE — 90686 FLU VACCINE (QUAD) GREATER THAN OR EQUAL TO 3YO PRESERVATIVE FREE IM: ICD-10-PCS | Mod: GC,,, | Performed by: FAMILY MEDICINE

## 2021-11-10 PROCEDURE — 90471 FLU VACCINE (QUAD) GREATER THAN OR EQUAL TO 3YO PRESERVATIVE FREE IM: ICD-10-PCS | Mod: GC,,, | Performed by: FAMILY MEDICINE

## 2021-11-10 PROCEDURE — 90471 IMMUNIZATION ADMIN: CPT | Mod: GC,,, | Performed by: FAMILY MEDICINE

## 2021-11-10 PROCEDURE — 90686 IIV4 VACC NO PRSV 0.5 ML IM: CPT | Mod: GC,,, | Performed by: FAMILY MEDICINE

## 2021-12-02 ENCOUNTER — OFFICE VISIT (OUTPATIENT)
Dept: WOUND CARE | Facility: CLINIC | Age: 61
End: 2021-12-02
Attending: FAMILY MEDICINE
Payer: MEDICAID

## 2021-12-02 DIAGNOSIS — E11.44 TYPE 2 DIABETES MELLITUS WITH DIABETIC AMYOTROPHY, WITH LONG-TERM CURRENT USE OF INSULIN: ICD-10-CM

## 2021-12-02 DIAGNOSIS — E78.5 HYPERLIPIDEMIA, UNSPECIFIED HYPERLIPIDEMIA TYPE: ICD-10-CM

## 2021-12-02 DIAGNOSIS — L97.512 ULCER OF RIGHT FOOT WITH FAT LAYER EXPOSED: ICD-10-CM

## 2021-12-02 DIAGNOSIS — I10 HYPERTENSION, UNSPECIFIED TYPE: ICD-10-CM

## 2021-12-02 DIAGNOSIS — Z23 NEEDS FLU SHOT: ICD-10-CM

## 2021-12-02 DIAGNOSIS — Z79.4 TYPE 2 DIABETES MELLITUS WITH DIABETIC AMYOTROPHY, WITH LONG-TERM CURRENT USE OF INSULIN: ICD-10-CM

## 2021-12-02 DIAGNOSIS — I63.412 EMBOLIC STROKE INVOLVING LEFT MIDDLE CEREBRAL ARTERY: Primary | ICD-10-CM

## 2021-12-02 PROCEDURE — 4010F ACE/ARB THERAPY RXD/TAKEN: CPT | Mod: CPTII,,, | Performed by: FAMILY MEDICINE

## 2021-12-02 PROCEDURE — 99214 OFFICE O/P EST MOD 30 MIN: CPT | Mod: S$PBB,,, | Performed by: FAMILY MEDICINE

## 2021-12-02 PROCEDURE — 99213 OFFICE O/P EST LOW 20 MIN: CPT | Mod: PBBFAC | Performed by: FAMILY MEDICINE

## 2021-12-02 PROCEDURE — 4010F PR ACE/ARB THEARPY RXD/TAKEN: ICD-10-PCS | Mod: CPTII,,, | Performed by: FAMILY MEDICINE

## 2021-12-02 PROCEDURE — 99214 PR OFFICE/OUTPT VISIT, EST, LEVL IV, 30-39 MIN: ICD-10-PCS | Mod: S$PBB,,, | Performed by: FAMILY MEDICINE

## 2021-12-06 ENCOUNTER — HOSPITAL ENCOUNTER (OUTPATIENT)
Dept: RADIOLOGY | Facility: HOSPITAL | Age: 61
Discharge: HOME | End: 2021-12-06
Payer: MEDICAID

## 2021-12-06 DIAGNOSIS — Z12.39 ENCOUNTER FOR SCREENING FOR MALIGNANT NEOPLASM OF BREAST, UNSPECIFIED SCREENING MODALITY: ICD-10-CM

## 2021-12-06 PROCEDURE — 77067 SCR MAMMO BI INCL CAD: CPT | Mod: TC

## 2022-01-07 ENCOUNTER — OFFICE VISIT (OUTPATIENT)
Dept: WOUND CARE | Facility: CLINIC | Age: 62
End: 2022-01-07
Attending: FAMILY MEDICINE
Payer: MEDICAID

## 2022-01-07 VITALS
DIASTOLIC BLOOD PRESSURE: 79 MMHG | TEMPERATURE: 98 F | SYSTOLIC BLOOD PRESSURE: 140 MMHG | HEART RATE: 78 BPM | RESPIRATION RATE: 20 BRPM

## 2022-01-07 DIAGNOSIS — I10 BENIGN HYPERTENSION: ICD-10-CM

## 2022-01-07 DIAGNOSIS — Z23 NEEDS FLU SHOT: ICD-10-CM

## 2022-01-07 DIAGNOSIS — R60.0 LOWER EXTREMITY EDEMA: ICD-10-CM

## 2022-01-07 DIAGNOSIS — L97.513 ULCER OF RIGHT FOOT WITH NECROSIS OF MUSCLE: ICD-10-CM

## 2022-01-07 DIAGNOSIS — E11.44 TYPE 2 DIABETES MELLITUS WITH DIABETIC AMYOTROPHY, WITH LONG-TERM CURRENT USE OF INSULIN: ICD-10-CM

## 2022-01-07 DIAGNOSIS — E11.622 DIABETES WITH SKIN ULCER: ICD-10-CM

## 2022-01-07 DIAGNOSIS — L98.499 DIABETES WITH SKIN ULCER: ICD-10-CM

## 2022-01-07 DIAGNOSIS — I10 HYPERTENSION, UNSPECIFIED TYPE: Primary | ICD-10-CM

## 2022-01-07 DIAGNOSIS — Z79.4 TYPE 2 DIABETES MELLITUS WITH DIABETIC AMYOTROPHY, WITH LONG-TERM CURRENT USE OF INSULIN: ICD-10-CM

## 2022-01-07 PROCEDURE — 99499 NO LOS: ICD-10-PCS | Mod: S$PBB,,, | Performed by: FAMILY MEDICINE

## 2022-01-07 PROCEDURE — 11042 DBRDMT SUBQ TIS 1ST 20SQCM/<: CPT | Mod: PBBFAC | Performed by: FAMILY MEDICINE

## 2022-01-07 PROCEDURE — 99213 OFFICE O/P EST LOW 20 MIN: CPT | Mod: PBBFAC | Performed by: FAMILY MEDICINE

## 2022-01-07 PROCEDURE — 11042 DBRDMT SUBQ TIS 1ST 20SQCM/<: CPT | Mod: S$PBB,,, | Performed by: FAMILY MEDICINE

## 2022-01-07 PROCEDURE — 11042 PR DEBRIDEMENT, SKIN, SUB-Q TISSUE,=<20 SQ CM: ICD-10-PCS | Mod: S$PBB,,, | Performed by: FAMILY MEDICINE

## 2022-01-07 PROCEDURE — 99499 UNLISTED E&M SERVICE: CPT | Mod: S$PBB,,, | Performed by: FAMILY MEDICINE

## 2022-01-07 NOTE — PROGRESS NOTES
Debridement Performed for Assessment: Wound# 2  Performed By: Provider; Dr. Tinajero,III  Assistant:    Debridement: Surgical    Photo taken post procedure:    Time-Out Taken: Yes  Level: Skin/Subcutaneous Tissue  Post Debridement Measurements  Length: (cm) 1.7   Width: (cm) 1.6   Depth: (cm) 0.2      Area: (cm²) 2.72  Percent Debrided: 100  Total Area Debrided: (sq cm)     Tissue and other material debrided:  Adipose, Dermis, Epidermis, Subcutaneous  Devitalized Tissue Debrided:Biofilm, callus  Instrument: Scissors and nippers  Bleeding: Moderate  Hemostasis Achieved: Pressure  Procedural Pain: Insensate  Post Procedural Pain: Insensate  Response to Treatment: Procedure was tolerated well    Devitalized materials/tissue Removed  the following was removed during debridement  subcutaneous      Post Debridement Diagnosis  Right diabetic foot ulcer  Post debridement diagnosis  Same as Pre-operative debridement diagnosis, No Complications noted.      Grafts or implants applied  Was a graft or implant applied?  No      Procedure assistant  Cici Duvall LPN  Procedure assisted by:  Assistant is the same as nurse listed above      Complications related to procedure  Did any complication occure during procedure?  No complications noted during or after procedure.      Specimen  Specimen collect during procedure?  No specimen collected      Anaesthesia:  Anesthesia used  None      Blood Loss:  Blood loss during procedure  less than 5 cc

## 2022-01-07 NOTE — PATIENT INSTRUCTIONS
Clean with baby shampoo and water    Apply gentamicicn cream to wound    Cover with dry gauze,wrap with meng,paper tape    Change daily and as needed    Elevate feet on pillows    No prolong standing    Wear offloading shoe    cipro 500 mg po 2 x a day    Cleocin 300 mg by mouth 2 x a day

## 2022-01-07 NOTE — PROGRESS NOTES
See wound assessment. See debridement note. Gentamicin cream/dry dressing daily. RTC 3 weeks. Start cipro 500 mg po BID,#60 and cleocin 300 mg po BID,#60 called into [facility].

## 2022-01-10 NOTE — PROGRESS NOTES
Subjective:      Patient ID: Deborah Sotelo is a 61 y.o. female.    Chief Complaint: Diabetic Foot Ulcer (Right foot)    Deborah Sotelo a 61 y.o. female presents for follow up on all regular problems which are reviewed and discussed.     Problem List Items Addressed This Visit        Cardiac/Vascular    Hypertension - Primary       ID    Needs flu shot       Endocrine    Type 2 diabetes mellitus with neurologic complication, with long-term current use of insulin       Other    Lower extremity edema      Other Visit Diagnoses     Diabetes with skin ulcer        Ulcer of right foot with necrosis of muscle        Benign hypertension              Past Medical History:  Past Medical History:   Diagnosis Date    Diabetes mellitus, type 2     Hyperlipidemia     Hypertension     Obesity     Stroke      Past Surgical History:   Procedure Laterality Date    AMPUTATION      APPENDECTOMY      EYE SURGERY       Review of patient's allergies indicates:   Allergen Reactions    Aspirin      Other reaction(s): UPSET STOMACH     Bactrim ds [sulfamethoxazole-trimethoprim] Itching     Current Outpatient Medications on File Prior to Visit   Medication Sig Dispense Refill    amLODIPine (NORVASC) 5 MG tablet Take 1 tablet (5 mg total) by mouth once daily. 30 tablet 2    atorvastatin (LIPITOR) 80 MG tablet TAKE 1 TABLET BY MOUTH AT BEDTIME ~~DO NOT EAT GRAPEFRUIT OR DRINK GRAPEFRUIT JUICE WHILE TAKING THIS MEDICATION~~ 30 tablet 2    insulin asp prt-insulin aspart, NovoLOG 70/30, (NOVOLOG 70/30) 100 unit/mL (70-30) Soln INJECT 50 UNITS SUB-Q EACH MORNING AND 30 UNITS SUB-Q EACH EVENING 30 mL 5    LANTUS U-100 INSULIN 100 unit/mL injection Inject 20 Units into the skin once daily. Take at night 6 mL 2    losartan (COZAAR) 100 MG tablet Take 1 tablet (100 mg total) by mouth every evening. 30 tablet 2    omeprazole (PRILOSEC) 20 MG capsule Take 1 capsule (20 mg total) by mouth once daily. 30 capsule 2    silver sulfADIAZINE 1%  (SILVADENE) 1 % cream Apply 1 application topically once daily. Right foot       No current facility-administered medications on file prior to visit.     Social History     Socioeconomic History    Marital status:    Occupational History    Occupation: disabled   Tobacco Use    Smoking status: Never Smoker    Smokeless tobacco: Never Used   Substance and Sexual Activity    Alcohol use: Yes    Drug use: Never    Sexual activity: Never     Family History   Problem Relation Age of Onset    Cancer Mother     Appendicitis Father     Asthma Sister     Diabetes Brother        Review of Systems    Objective:     BP (!) 140/79   Pulse 78   Temp 97.8 °F (36.6 °C)   Resp 20     Physical Exam  Assessment:     1. Hypertension, unspecified type    2. Needs flu shot    3. Diabetes with skin ulcer    4. Ulcer of right foot with necrosis of muscle    5. Benign hypertension    6. 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 Lower extremity edema        Plan:     Problem List Items Addressed This Visit        Cardiac/Vascular    Hypertension - Primary       ID    Needs flu shot       Endocrine    Type 2 diabetes mellitus with neurologic complication, with long-term current use of insulin       Other    Lower extremity edema      Other Visit Diagnoses     Diabetes with skin ulcer        Ulcer of right foot with necrosis of muscle        Benign hypertension            No follow-ups on file.  Ed. On dm given    I have discontinued Deborah Sotelo's sulfamethoxazole-trimethoprim 800-160mg. I am also having her maintain her silver sulfADIAZINE 1%, amLODIPine, atorvastatin, insulin asp prt-insulin aspart (NovoLOG 70/30), LANTUS U-100 INSULIN, losartan, and omeprazole.    Deborah was seen today for diabetic foot ulcer.    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

## 2022-01-10 NOTE — PROGRESS NOTES
Subjective:      Patient ID: Deborah Sotelo is a 61 y.o. female.    Chief Complaint: Diabetic Foot Ulcer (Right foot)    Deborah Sotelo a 61 y.o. female presents for follow up on all regular problems which are reviewed and discussed.     Problem List Items Addressed This Visit        Cardiac/Vascular    Hypertension - Primary       ID    Needs flu shot       Endocrine    Type 2 diabetes mellitus with neurologic complication, with long-term current use of insulin       Other    Lower extremity edema      Other Visit Diagnoses     Diabetes with skin ulcer        Ulcer of right foot with necrosis of muscle        Benign hypertension              Past Medical History:  Past Medical History:   Diagnosis Date    Diabetes mellitus, type 2     Hyperlipidemia     Hypertension     Obesity     Stroke      Past Surgical History:   Procedure Laterality Date    AMPUTATION      APPENDECTOMY      EYE SURGERY       Review of patient's allergies indicates:   Allergen Reactions    Aspirin      Other reaction(s): UPSET STOMACH     Bactrim ds [sulfamethoxazole-trimethoprim] Itching     Current Outpatient Medications on File Prior to Visit   Medication Sig Dispense Refill    amLODIPine (NORVASC) 5 MG tablet Take 1 tablet (5 mg total) by mouth once daily. 30 tablet 2    atorvastatin (LIPITOR) 80 MG tablet TAKE 1 TABLET BY MOUTH AT BEDTIME ~~DO NOT EAT GRAPEFRUIT OR DRINK GRAPEFRUIT JUICE WHILE TAKING THIS MEDICATION~~ 30 tablet 2    insulin asp prt-insulin aspart, NovoLOG 70/30, (NOVOLOG 70/30) 100 unit/mL (70-30) Soln INJECT 50 UNITS SUB-Q EACH MORNING AND 30 UNITS SUB-Q EACH EVENING 30 mL 5    LANTUS U-100 INSULIN 100 unit/mL injection Inject 20 Units into the skin once daily. Take at night 6 mL 2    losartan (COZAAR) 100 MG tablet Take 1 tablet (100 mg total) by mouth every evening. 30 tablet 2    omeprazole (PRILOSEC) 20 MG capsule Take 1 capsule (20 mg total) by mouth once daily. 30 capsule 2    silver sulfADIAZINE 1%  (SILVADENE) 1 % cream Apply 1 application topically once daily. Right foot       No current facility-administered medications on file prior to visit.     Social History     Socioeconomic History    Marital status:    Occupational History    Occupation: disabled   Tobacco Use    Smoking status: Never Smoker    Smokeless tobacco: Never Used   Substance and Sexual Activity    Alcohol use: Yes    Drug use: Never    Sexual activity: Never     Family History   Problem Relation Age of Onset    Cancer Mother     Appendicitis Father     Asthma Sister     Diabetes Brother        Review of Systems   Constitutional: Negative.  Negative for activity change, appetite change, chills and diaphoresis.   HENT: Negative.  Negative for congestion, ear pain, hearing loss and postnasal drip.    Eyes: Negative for itching.   Respiratory: Negative for chest tightness and shortness of breath.    Cardiovascular: Negative for chest pain.   Gastrointestinal: Negative for abdominal pain.   Endocrine: Negative for polydipsia.   Genitourinary: Negative for frequency.   Musculoskeletal: Negative for back pain.   Skin: Positive for wound.   Neurological: Negative for headaches.       Objective:     BP (!) 140/79   Pulse 78   Temp 97.8 °F (36.6 °C)   Resp 20     Physical Exam  Constitutional:       Appearance: Normal appearance. She is obese.   HENT:      Head: Normocephalic and atraumatic.      Right Ear: External ear normal.      Left Ear: External ear normal.      Nose: Nose normal.      Mouth/Throat:      Mouth: Mucous membranes are moist.      Pharynx: Oropharynx is clear.   Eyes:      Pupils: Pupils are equal, round, and reactive to light.   Cardiovascular:      Rate and Rhythm: Normal rate and regular rhythm.      Heart sounds: Normal heart sounds.   Pulmonary:      Effort: Pulmonary effort is normal.      Breath sounds: Normal breath sounds.   Abdominal:      Palpations: Abdomen is soft.   Musculoskeletal:      Cervical  back: Normal range of motion and neck supple.   Skin:     General: Skin is warm and dry.      Findings: Lesion present.   Neurological:      General: No focal deficit present.      Mental Status: She is alert and oriented to person, place, and time. Mental status is at baseline.   Psychiatric:         Mood and Affect: Mood normal.         Behavior: Behavior normal.         Thought Content: Thought content normal.         Judgment: Judgment normal.       Assessment:     1. Hypertension, unspecified type    2. Needs flu shot    3. Diabetes with skin ulcer    4. Ulcer of right foot with necrosis of muscle    5. Benign hypertension    6. 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 Lower extremity edema        Plan:     Problem List Items Addressed This Visit        Cardiac/Vascular    Hypertension - Primary       ID    Needs flu shot       Endocrine    Type 2 diabetes mellitus with neurologic complication, with long-term current use of insulin       Other    Lower extremity edema      Other Visit Diagnoses     Diabetes with skin ulcer        Ulcer of right foot with necrosis of muscle        Benign hypertension            No follow-ups on file.      I have discontinued Deborah Sotelo's sulfamethoxazole-trimethoprim 800-160mg. I am also having her maintain her silver sulfADIAZINE 1%, amLODIPine, atorvastatin, insulin asp prt-insulin aspart (NovoLOG 70/30), LANTUS U-100 INSULIN, losartan, and omeprazole.    Deborah was seen today for diabetic foot ulcer.    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

## 2022-01-18 ENCOUNTER — OFFICE VISIT (OUTPATIENT)
Dept: FAMILY MEDICINE | Facility: CLINIC | Age: 62
End: 2022-01-18
Payer: MEDICAID

## 2022-01-18 VITALS
TEMPERATURE: 98 F | BODY MASS INDEX: 44.41 KG/M2 | HEIGHT: 68 IN | DIASTOLIC BLOOD PRESSURE: 82 MMHG | HEART RATE: 106 BPM | WEIGHT: 293 LBS | RESPIRATION RATE: 20 BRPM | OXYGEN SATURATION: 94 % | SYSTOLIC BLOOD PRESSURE: 152 MMHG

## 2022-01-18 DIAGNOSIS — I10 HYPERTENSION, UNSPECIFIED TYPE: ICD-10-CM

## 2022-01-18 DIAGNOSIS — Z79.4 TYPE 2 DIABETES MELLITUS WITH DIABETIC AMYOTROPHY, WITH LONG-TERM CURRENT USE OF INSULIN: ICD-10-CM

## 2022-01-18 DIAGNOSIS — M62.838 MUSCLE SPASM OF LEFT SHOULDER: Primary | ICD-10-CM

## 2022-01-18 DIAGNOSIS — L97.512 ULCER OF RIGHT FOOT WITH FAT LAYER EXPOSED: ICD-10-CM

## 2022-01-18 DIAGNOSIS — E11.65 UNCONTROLLED TYPE 2 DIABETES MELLITUS WITH HYPERGLYCEMIA: ICD-10-CM

## 2022-01-18 DIAGNOSIS — E11.44 TYPE 2 DIABETES MELLITUS WITH DIABETIC AMYOTROPHY, WITH LONG-TERM CURRENT USE OF INSULIN: ICD-10-CM

## 2022-01-18 PROCEDURE — 96372 THER/PROPH/DIAG INJ SC/IM: CPT | Mod: GC,,, | Performed by: FAMILY MEDICINE

## 2022-01-18 PROCEDURE — 96372 PR INJECTION,THERAP/PROPH/DIAG2ST, IM OR SUBCUT: ICD-10-PCS | Mod: GC,,, | Performed by: FAMILY MEDICINE

## 2022-01-18 PROCEDURE — 99213 PR OFFICE/OUTPT VISIT, EST, LEVL III, 20-29 MIN: ICD-10-PCS | Mod: 25,GC,, | Performed by: FAMILY MEDICINE

## 2022-01-18 PROCEDURE — 99213 OFFICE O/P EST LOW 20 MIN: CPT | Mod: 25,GC,, | Performed by: FAMILY MEDICINE

## 2022-01-18 RX ORDER — KETOROLAC TROMETHAMINE 30 MG/ML
30 INJECTION, SOLUTION INTRAMUSCULAR; INTRAVENOUS
Status: COMPLETED | OUTPATIENT
Start: 2022-01-18 | End: 2022-01-18

## 2022-01-18 RX ADMIN — KETOROLAC TROMETHAMINE 30 MG: 30 INJECTION, SOLUTION INTRAMUSCULAR; INTRAVENOUS at 10:01

## 2022-01-18 NOTE — PROGRESS NOTES
Subjective:       Patient ID: Deborah Sotelo is a 61 y.o. female.    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 She presents today for 1 week of LT shoulder muscle spasm and pain. She states that she has been unable to lay on her LT side to sleep, due to contraction of the muscle. The patient states that she has been taking Tylenol and Ibuprofen with no alleviation of symptoms. The patient states that muscle relaxers have not worked for her historically in relieving muscle spasms.             Current Outpatient Medications:     amLODIPine (NORVASC) 5 MG tablet, Take 1 tablet (5 mg total) by mouth once daily., Disp: 30 tablet, Rfl: 2    atorvastatin (LIPITOR) 80 MG tablet, TAKE 1 TABLET BY MOUTH AT BEDTIME ~~DO NOT EAT GRAPEFRUIT OR DRINK GRAPEFRUIT JUICE WHILE TAKING THIS MEDICATION~~, Disp: 30 tablet, Rfl: 2    insulin asp prt-insulin aspart, NovoLOG 70/30, (NOVOLOG 70/30) 100 unit/mL (70-30) Soln, INJECT 50 UNITS SUB-Q EACH MORNING AND 30 UNITS SUB-Q EACH EVENING, Disp: 30 mL, Rfl: 5    LANTUS U-100 INSULIN 100 unit/mL injection, Inject 20 Units into the skin once daily. Take at night, Disp: 6 mL, Rfl: 2    losartan (COZAAR) 100 MG tablet, Take 1 tablet (100 mg total) by mouth every evening., Disp: 30 tablet, Rfl: 2    omeprazole (PRILOSEC) 20 MG capsule, Take 1 capsule (20 mg total) by mouth once daily., Disp: 30 capsule, Rfl: 2    silver sulfADIAZINE 1% (SILVADENE) 1 % cream, Apply 1 application topically once daily. Right foot, Disp: , Rfl:   No current facility-administered medications for this visit.    Review of patient's allergies indicates:   Allergen Reactions    Aspirin      Other reaction(s): UPSET STOMACH     Bactrim ds [sulfamethoxazole-trimethoprim] Itching       Past Medical History:   Diagnosis Date    Diabetes mellitus,  "type 2     Hyperlipidemia     Hypertension     Obesity     Stroke        Past Surgical History:   Procedure Laterality Date    AMPUTATION      APPENDECTOMY      EYE SURGERY         Family History   Problem Relation Age of Onset    Cancer Mother     Appendicitis Father     Asthma Sister     Diabetes Brother        Social History     Tobacco Use    Smoking status: Never Smoker    Smokeless tobacco: Never Used   Substance Use Topics    Alcohol use: Yes    Drug use: Never       Review of Systems   Constitutional: Negative for activity change, chills, fatigue and fever.   HENT: Negative for nasal congestion, ear pain, sinus pressure/congestion and sore throat.    Respiratory: Negative for cough, chest tightness and wheezing.    Cardiovascular: Negative for chest pain and palpitations.   Gastrointestinal: Negative for abdominal pain, diarrhea, nausea and vomiting.   Musculoskeletal: Positive for myalgias (Stiffness of LT neck/shoulder) and neck stiffness.   Integumentary:  Positive for wound (Plantar aspect of RT foot).   Neurological: Negative for weakness and headaches.           Objective:      Vitals:    01/18/22 0901   BP: (!) 152/82   BP Location: Right arm   Patient Position: Sitting   Pulse: 106   Resp: 20   Temp: 98 °F (36.7 °C)   TempSrc: Temporal   SpO2: (!) 94%   Weight: (!) 140.6 kg (310 lb)   Height: 5' 8" (1.727 m)     Physical Exam  Constitutional:       Appearance: Normal appearance.   HENT:      Head: Normocephalic and atraumatic.      Right Ear: External ear normal.      Left Ear: External ear normal.      Nose: Nose normal.      Mouth/Throat:      Mouth: Mucous membranes are moist.   Eyes:      Extraocular Movements: Extraocular movements intact.      Conjunctiva/sclera: Conjunctivae normal.      Pupils: Pupils are equal, round, and reactive to light.   Cardiovascular:      Rate and Rhythm: Normal rate and regular rhythm.      Pulses: Normal pulses.      Heart sounds: Normal heart " sounds.   Pulmonary:      Effort: Pulmonary effort is normal.      Breath sounds: Normal breath sounds.   Abdominal:      General: Bowel sounds are normal.      Palpations: Abdomen is soft.   Musculoskeletal:         General: Deformity (All toes of RT foot amputated; LT 5th and 4th toes amputated) present. Normal range of motion.      Cervical back: Rigidity and tenderness (Spasm of the LT sternocleidomastoid and trapezius ) present.   Skin:     General: Skin is warm.      Findings: Lesion (Quarter sized diabetic ulcer on RT plantar foot) present.   Neurological:      General: No focal deficit present.      Mental Status: She is alert and oriented to person, place, and time.   Psychiatric:         Mood and Affect: Mood normal.         Behavior: Behavior normal.         Thought Content: Thought content normal.         Judgment: Judgment normal.           Lab Results   Component Value Date    WBC 6.94 11/09/2021    HGB 11.4 (L) 11/09/2021    HCT 33.6 (L) 11/09/2021     11/09/2021    CHOL 173 11/09/2021    TRIG 111 11/09/2021    HDL 44 11/09/2021    ALT 21 11/09/2021    AST 13 (L) 11/09/2021     11/09/2021    K 4.7 11/09/2021     (H) 11/09/2021    CREATININE 1.18 (H) 11/09/2021    BUN 18 11/09/2021    CO2 23 11/09/2021    TSH 1.960 11/09/2021    INR 1.11 02/12/2021    HGBA1C 10.5 (H) 11/09/2021      Assessment:       1. Muscle spasm of left shoulder    2. Uncontrolled type 2 diabetes mellitus with hyperglycemia    3. Ulcer of right foot with fat layer exposed    4. 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 Hypertension, unspecified type        Plan:         Problem List Items Addressed This Visit        Derm    Ulcer of right foot with fat layer exposed     - Wound care clinic appointment on Friday (1/21/2022)            Cardiac/Vascular    Hypertension     - BP today was 152/82. Patient states that she did not take her BP medication today  -  Patient was told to measure her BP TID and write it in a log to bring to next appointment  - Importance of taking BP medications daily was emphasized; patient to continue taking amlodipine 5mg and losartan 100mg              Endocrine    Type 2 diabetes mellitus with neurologic complication, with long-term current use of insulin     - Patient's DMII is poorly controlled; largely from non-compliance  - Patient's most recent HBA1c was 10.5%  - Patient continues to take Novolog 70/30 (30 units qam and 25 units qpm) and Lantus 20 units qpm  - Patient told to take log of blood glucose levels at breakfast, lunch, and dinner and bring to next appointment   - Adjust medications based on glucose log at next appointment  - Patient counseled regarding diet choices; patient continues to eat several sugary foods              Orthopedic    Muscle spasm of left shoulder - Primary     - Toradol 30mg IM given   - Referral to physical therapy   - Patient declined muscle relaxant  - Patient declined topical lidocaine patch or anti-inflammatory topical creams            Relevant Medications    ketorolac injection 30 mg (Completed)    Other Relevant Orders    Ambulatory referral/consult to Physical/Occupational Therapy      Other Visit Diagnoses     Uncontrolled type 2 diabetes mellitus with hyperglycemia                No follow-ups on file.    Antonia Baer MD

## 2022-01-19 RX ORDER — CIPROFLOXACIN 500 MG/1
1 TABLET ORAL 2 TIMES DAILY
COMMUNITY
Start: 2022-01-07 | End: 2022-02-17 | Stop reason: ALTCHOICE

## 2022-01-19 RX ORDER — GENTAMICIN SULFATE 1 MG/G
1 CREAM TOPICAL DAILY
COMMUNITY
Start: 2022-01-07 | End: 2022-06-07 | Stop reason: SDUPTHER

## 2022-01-19 RX ORDER — CLINDAMYCIN HYDROCHLORIDE 300 MG/1
1 CAPSULE ORAL 2 TIMES DAILY
COMMUNITY
Start: 2022-01-07 | End: 2022-02-17 | Stop reason: ALTCHOICE

## 2022-01-19 NOTE — ASSESSMENT & PLAN NOTE
- BP today was 152/82. Patient states that she did not take her BP medication today  - Patient was told to measure her BP TID and write it in a log to bring to next appointment  - Importance of taking BP medications daily was emphasized; patient to continue taking amlodipine 5mg and losartan 100mg

## 2022-01-19 NOTE — PROGRESS NOTES
See wound assessment. Cont gentamicin cream/dry dressing daily. RTC 3 weeks. Encourage to offload foot as much as possible

## 2022-01-19 NOTE — ASSESSMENT & PLAN NOTE
- Patient's DMII is poorly controlled; largely from non-compliance  - Patient's most recent HBA1c was 10.5%  - Patient continues to take Novolog 70/30 (30 units qam and 25 units qpm) and Lantus 20 units qpm  - Patient told to take log of blood glucose levels at breakfast, lunch, and dinner and bring to next appointment   - Adjust medications based on glucose log at next appointment  - Patient counseled regarding diet choices; patient continues to eat several sugary foods

## 2022-01-19 NOTE — PATIENT INSTRUCTIONS
Clean with baby shampoo and water    Apply skin barrier to wound edges    Apply gentamicin cream to wound    Cover with dry gauze,wrap with meng,paper tape    Change daily and as needed    Elevate feet as much as possible    No prolong standing

## 2022-01-19 NOTE — ASSESSMENT & PLAN NOTE
- Toradol 30mg IM given   - Referral to physical therapy   - Patient declined muscle relaxant  - Patient declined topical lidocaine patch or anti-inflammatory topical creams

## 2022-01-21 ENCOUNTER — OFFICE VISIT (OUTPATIENT)
Dept: WOUND CARE | Facility: CLINIC | Age: 62
End: 2022-01-21
Attending: SURGERY
Payer: MEDICAID

## 2022-01-21 VITALS
HEART RATE: 94 BPM | DIASTOLIC BLOOD PRESSURE: 92 MMHG | RESPIRATION RATE: 20 BRPM | TEMPERATURE: 98 F | SYSTOLIC BLOOD PRESSURE: 148 MMHG

## 2022-01-21 DIAGNOSIS — L97.513 ULCER OF RIGHT FOOT WITH NECROSIS OF MUSCLE: ICD-10-CM

## 2022-01-21 DIAGNOSIS — L98.499 DIABETES WITH SKIN ULCER: ICD-10-CM

## 2022-01-21 DIAGNOSIS — Z79.4 TYPE 2 DIABETES MELLITUS WITH DIABETIC AMYOTROPHY, WITH LONG-TERM CURRENT USE OF INSULIN: ICD-10-CM

## 2022-01-21 DIAGNOSIS — I10 BENIGN HYPERTENSION: ICD-10-CM

## 2022-01-21 DIAGNOSIS — E11.44 TYPE 2 DIABETES MELLITUS WITH DIABETIC AMYOTROPHY, WITH LONG-TERM CURRENT USE OF INSULIN: ICD-10-CM

## 2022-01-21 DIAGNOSIS — I10 HYPERTENSION, UNSPECIFIED TYPE: Primary | ICD-10-CM

## 2022-01-21 DIAGNOSIS — E78.49 OTHER HYPERLIPIDEMIA: ICD-10-CM

## 2022-01-21 DIAGNOSIS — E11.622 DIABETES WITH SKIN ULCER: ICD-10-CM

## 2022-01-21 DIAGNOSIS — R60.0 LOWER EXTREMITY EDEMA: ICD-10-CM

## 2022-01-21 DIAGNOSIS — E78.5 HYPERLIPIDEMIA, UNSPECIFIED HYPERLIPIDEMIA TYPE: ICD-10-CM

## 2022-01-21 DIAGNOSIS — I63.412 EMBOLIC STROKE INVOLVING LEFT MIDDLE CEREBRAL ARTERY: ICD-10-CM

## 2022-01-21 DIAGNOSIS — L97.512 ULCER OF RIGHT FOOT WITH FAT LAYER EXPOSED: ICD-10-CM

## 2022-01-21 PROCEDURE — 99214 OFFICE O/P EST MOD 30 MIN: CPT | Mod: PBBFAC,25 | Performed by: SURGERY

## 2022-01-21 PROCEDURE — 11042 DBRDMT SUBQ TIS 1ST 20SQCM/<: CPT | Mod: S$PBB,,, | Performed by: SURGERY

## 2022-01-21 PROCEDURE — 11042 PR DEBRIDEMENT, SKIN, SUB-Q TISSUE,=<20 SQ CM: ICD-10-PCS | Mod: S$PBB,,, | Performed by: SURGERY

## 2022-01-21 PROCEDURE — 11042 DBRDMT SUBQ TIS 1ST 20SQCM/<: CPT | Mod: PBBFAC | Performed by: SURGERY

## 2022-01-21 NOTE — PROGRESS NOTES
Debridement Performed for Assessment: Wound# 1  Performed By: Provider: Dr. Pizano  Assistant: MARCO Ngo RN    Debridement: Surgical    Photo taken post procedure: Yes    Time-Out Taken: Yes  Level: Skin/Subcutaneous Tissue  Post Debridement Measurements  Length: (cm) 1.1  Width: (cm) 0.6  Depth: (cm) 0.8      Area: (cm²) 0.66  Percent Debrided: 100%  Total Area Debrided: (sq cm) 0.66    Tissue and other material debrided:  Adipose, Dermis, Epidermis, Subcutaneous  Devitalized Tissue Debrided:Biofilm, Slough, Fibrin, Callus  Instrument: Curette  Bleeding: Moderate  Hemostasis Achieved: Pressure  Procedural Pain: Insensate  Post Procedural Pain: Insensate  Response to Treatment: Procedure was tolerated well    Devitalized materials/tissue Removed  the following was removed during debridement  subcutaneous      Post Debridement Diagnosis  Post debridement diagnosis  Same as Pre-operative debridement diagnosis, No Complications noted.      Grafts or implants applied  Was a graft or implant applied?  No      Procedure assistant  Procedure assisted by:  Assistant is the same as nurse listed above      Complications related to procedure  Did any complication occure during procedure?  No complications noted during or after procedure.      Specimen  Specimen collect during procedure?  No specimen collected      Anaesthesia:  Anesthesia used  None      Blood Loss:  Blood loss during procedure  less than 5 cc

## 2022-01-21 NOTE — PROGRESS NOTES
General Surgery Progress Note    Subjective:      Patient ID: Deborah Sotelo is a 61 y.o. female.    Chief Complaint: Non-healing Wound Follow Up and Diabetic Foot Ulcer (Right foot)      HPI:  Patient being seen wound care for a status post right TMA with a lateral ulcer on her plantar aspect of the foot.  About a 1 x 1 cm area that has required debridement in the past.  Currently has some very mild amount of drainage.  Serous in nature.  No fever chills no erythema no drainage of significance.  She has been placing some gentamicin ointment to the area.    Past Medical History:   Diagnosis Date    Diabetes mellitus, type 2     Hyperlipidemia     Hypertension     Obesity     Stroke      Past Surgical History:   Procedure Laterality Date    AMPUTATION      APPENDECTOMY      EYE SURGERY       Social History     Socioeconomic History    Marital status:    Occupational History    Occupation: disabled   Tobacco Use    Smoking status: Never Smoker    Smokeless tobacco: Never Used   Substance and Sexual Activity    Alcohol use: Yes    Drug use: Never    Sexual activity: Never         Current Outpatient Medications:     amLODIPine (NORVASC) 5 MG tablet, Take 1 tablet (5 mg total) by mouth once daily., Disp: 30 tablet, Rfl: 2    atorvastatin (LIPITOR) 80 MG tablet, TAKE 1 TABLET BY MOUTH AT BEDTIME ~~DO NOT EAT GRAPEFRUIT OR DRINK GRAPEFRUIT JUICE WHILE TAKING THIS MEDICATION~~, Disp: 30 tablet, Rfl: 2    ciprofloxacin HCl (CIPRO) 500 MG tablet, Take 1 tablet by mouth 2 (two) times a day., Disp: , Rfl:     clindamycin (CLEOCIN) 300 MG capsule, Take 1 capsule by mouth 2 (two) times daily., Disp: , Rfl:     gentamicin (GARAMYCIN) 0.1 % cream, Apply 1 application topically once daily., Disp: , Rfl:     insulin asp prt-insulin aspart, NovoLOG 70/30, (NOVOLOG 70/30) 100 unit/mL (70-30) Soln, INJECT 50 UNITS SUB-Q EACH MORNING AND 30 UNITS  SUB-Q EACH EVENING, Disp: 30 mL, Rfl: 5    LANTUS U-100 INSULIN 100 unit/mL injection, Inject 20 Units into the skin once daily. Take at night, Disp: 6 mL, Rfl: 2    losartan (COZAAR) 100 MG tablet, Take 1 tablet (100 mg total) by mouth every evening., Disp: 30 tablet, Rfl: 2    omeprazole (PRILOSEC) 20 MG capsule, Take 1 capsule (20 mg total) by mouth once daily., Disp: 30 capsule, Rfl: 2    silver sulfADIAZINE 1% (SILVADENE) 1 % cream, Apply 1 application topically once daily. Right foot, Disp: , Rfl:   Review of patient's allergies indicates:   Allergen Reactions    Aspirin      Other reaction(s): UPSET STOMACH     Bactrim ds [sulfamethoxazole-trimethoprim] Itching       BP (!) 148/92   Pulse 94   Temp 97.7 °F (36.5 °C)   Resp 20     Review of Systems   Constitutional: Negative for chills, fever, weight gain and weight loss.   Eyes: Negative for blurred vision.   Cardiovascular: Negative for chest pain, claudication and palpitations.   Respiratory: Negative for cough and shortness of breath.    Skin: Positive for poor wound healing.   Musculoskeletal: Negative for muscle weakness.   Gastrointestinal: Negative for abdominal pain, diarrhea, nausea and vomiting.   Neurological: Negative for numbness and paresthesias.         Objective:     Constitutional: Well, No apparent distress  Mental Status: Alert and oriented  x 3  Eyes: Normal sclera, normal eyelid  Neck: Trachea midline, no masses on neck exam  Respiratory: Clear to auscultation bilaterally with no wheezes/crackles/cough  Cardiac: Regular rate and rhythm, no murmur, rub, or gallops  Abdominal: Soft, non-tender, non-distented  Hernia: No appreciated hernias  Musculoskeletal: 5/5 strength no weakess no decreased range of montion  Neurologic: Cranial nerves II - XII intact  Skin:  1 x 1 cm plantar aspect ulcer with some callus rind around it.  Labs/ Imaging:   CBC:   Lab Results   Component Value Date/Time    WBC 6.94 11/09/2021 02:47 PM    RBC 4.50  11/09/2021 02:47 PM    HGB 11.4 (L) 11/09/2021 02:47 PM    HCT 33.6 (L) 11/09/2021 02:47 PM     11/09/2021 02:47 PM    MCV 74.7 (L) 11/09/2021 02:47 PM    MCH 25.3 (L) 11/09/2021 02:47 PM    MCHC 33.9 11/09/2021 02:47 PM     BMP:   Lab Results   Component Value Date/Time     (H) 11/09/2021 02:47 PM    CO2 23 11/09/2021 02:47 PM    BUN 18 11/09/2021 02:47 PM    CREATININE 1.18 (H) 11/09/2021 02:47 PM    CALCIUM 9.4 11/09/2021 02:47 PM    MG 1.6 (L) 02/12/2021 12:30 AM           Assessment:             1. Hypertension, unspecified type    2. Diabetes with skin ulcer    3. Ulcer of right foot with necrosis of muscle    4. Benign hypertension    5. Hyperlipidemia, unspecified hyperlipidemia type    6. Embolic stroke involving left middle cerebral artery    7. Ulcer of right foot with fat layer exposed    8. Lower extremity edema    9. 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 Other hyperlipidemia          Plan:          Follow up in about 3 weeks (around 2/11/2022).      will debride the area today and have patient come back in a few weeks.  Continue gentamicin ointment to the area

## 2022-01-26 ENCOUNTER — CLINICAL SUPPORT (OUTPATIENT)
Dept: REHABILITATION | Facility: HOSPITAL | Age: 62
End: 2022-01-26
Payer: MEDICAID

## 2022-01-26 DIAGNOSIS — M62.838 MUSCLE SPASM OF LEFT SHOULDER: ICD-10-CM

## 2022-01-26 DIAGNOSIS — R29.898 WEAKNESS OF LEFT ARM: ICD-10-CM

## 2022-01-26 DIAGNOSIS — M25.512 ACUTE PAIN OF LEFT SHOULDER: Primary | ICD-10-CM

## 2022-01-26 DIAGNOSIS — M25.612 DECREASED RANGE OF MOTION OF LEFT SHOULDER: ICD-10-CM

## 2022-01-26 PROCEDURE — 97161 PT EVAL LOW COMPLEX 20 MIN: CPT

## 2022-01-26 NOTE — PROGRESS NOTES
See Plan of Care     Sup Visit performed today with CRIS Brown.  All goals and treatment plan reviewed. Will work toward completion of all new goals set.    First Treatment May Include :     Shoulder Exercises       UBE    Pulleys    Corner Stretch     Cane Flexion on Wall     Cane Flexion off Wall     Cybex Rows - Close     Cybex Rows - Wide    Cybex Shoulder Press    Cybex Lat Pull Down                            Shoulder Int Rot and Ext Rot    Shoulder Flex and Abduction    Shoulder Ext/Scap Retraction    Horizontal Abduction    Bilateral External Rotation                                Shoulder Manual     PROM with End Range Stretch    Capsular Mobilizations     Scapular PNF    Deep Tissue/Myofascial            Shoulder Modalities

## 2022-01-26 NOTE — PLAN OF CARE
[facility]   Physical Therapy Initial Evaluation    Date: 1/26/2022   Name: Deborah Sotelo  Clinic Number: [id number]    Therapy Diagnosis:   Encounter Diagnosis   Name Primary?    Muscle spasm of left shoulder      Physician: Antonia Baer MD    Physician Orders: PT Eval and Treat   Medical Diagnosis from Referral: Left Shoulder Pain  Evaluation Date: 1/26/2022  Updated Plan of Care Due : 2/25/2022  Authorization Period Expiration: 1/18/2023  Plan of Care Expiration: 3/11/2022  Visit # / Visits authorized: 1/ 30    Time In: 11:00 am  Time Out: 11:55 am  Total Appointment Time (timed & untimed codes): 55 minutes    Precautions: Standard    Subjective   Date of onset: 1/1/2022  History of current condition - Deborah reports: She fell approx 1 year ago and hurt her Left shoulder. The shoulder got better, but now it is hurting again and she is unable to use the Left arm. She is unable to  anything with that hand and is unable to lift the Left arm.      Medical History:   Past Medical History:   Diagnosis Date    Diabetes mellitus, type 2     Hyperlipidemia     Hypertension     Obesity     Stroke        Surgical History:   Deborah Sotelo  has a past surgical history that includes Amputation; Appendectomy; and Eye surgery.    Medications:   Deborah has a current medication list which includes the following prescription(s): amlodipine, atorvastatin, ciprofloxacin hcl, clindamycin, gentamicin, insulin asp prt-insulin aspart (novolog 70/30), lantus u-100 insulin, losartan, omeprazole, and silver sulfadiazine 1%.    Allergies:   Review of patient's allergies indicates:   Allergen Reactions    Aspirin      Other reaction(s): UPSET STOMACH     Bactrim ds [sulfamethoxazole-trimethoprim] Itching         Imaging, None for the Shoulder     Prior Therapy: None   Social History:  lives alone  Occupation: Disability   Prior Level of Function: Able to use Left Upper Extremity without difficulty  Current Level of  "Function: Unable to use the Left arm     Pain:  Current 7/10, worst 10/10, best 3/10   Location: Left upper trap and Left shoulder  Description: Aching, Throbbing and Tight  Aggravating Factors: Moving the Left arm   Easing Factors: relaxation, pain medication and rest    Patients goals: "I have to be able to use my Left arm."    Objective       Observation :     Forward Head/Rounded Shoulders :  [x] Yes   [] No   Scapular Winging :  [x] Yes   [] No      Comments : She reports pain and difficulty moving her Left arm    Cervical Spine Clearing : Within Normal Limits       Range of Motion/Strength :                  Left Extremity                                                                        Right Extremity     AROM   PROM  Strength                  Location   AROM    PROM   Strength    85   110  3-/5   Shoulder    Flexion  130   140   4/5    75  80   3-/5                      Abduction   120  130  4/5                           Scaption       30   3-/5                      ER in Adduction  60   4/5                           IR in Adduction       PSIS   3-/5                       Functional IR  L2   4/5                           ER in 90 Scaption                              IR in 90 Scaption       Normal    3+/5   Elbow         Flexion  Normal    4+/5                           Extension                              Pron/Supination          Functional Impairments :       Clinical Tests:   Apprehension test: negative   Neer's test: positive   Scour test: negative   O'jane's test: positive   Drop arm test: negative   Empty can test: positive   Hawkin's jer test: positive    Ligamentous Stability : Appears Intact     Rotator Cuff : Patient unable to properly elevate the Left Upper Extremity. She is substituting with the Upper Trap and Deltoid. Possible Supraspinatus injury    Labrum : Unable to determine, but appears intact     Elbow : Functional     Palpation :  Tender to palpation along the upper " traps and the shoulder area. Tender over the Supraspinatus insertion.       Limitation/Restriction for FOTO Intake Shoulder Survey    Therapist reviewed FOTO scores for Deborah Sotelo on 1/26/2022.   FOTO documents entered into [employer] - see Media section.    Limitation Score: 62%         TREATMENT   Treatment Time In: 11:40 am   Treatment Time Out: 11:53 am   Total Treatment time (time-based codes) separate from Evaluation: 13 minutes    Deborah received the treatments listed below:  THERAPEUTIC EXERCISES to develop ROM and flexibility for 13 minutes including :  Initiated the Home Stretching Program       Home Exercises and Patient Education Provided    Education provided:   - Initiated the Home Stretching Program     Written Home Exercises Provided: yes.  Exercises were reviewed and Deborah was able to demonstrate them prior to the end of the session.  Deborah demonstrated good  understanding of the education provided.     See EMR under Patient Instructions for exercises provided 1/26/2022.    Assessment   Deborah is a 61 y.o. female referred to outpatient Physical Therapy with a medical diagnosis of Left Shoulder Pain. Patient fell approx 1 year ago and injured her Left arm. She was able to tolerate the pain initially, but recently her Left shoulder pain has increased and she has had increasing difficulty being able to use that arm. Patient is unable to lift the arm to 90 degrees and hikes her shoulder to get close to 90 degrees. She has been having increased pain in the upper trap due to over-activation of the upper trap. Special tests indicate possible supraspinatus tear. Feel patient will benefit from referral to Ortho MD and possible MRI in the future if her pain and dysfunction persist following Therapy intervention.     Patient prognosis is Good.   Patientt will benefit from skilled outpatient Physical Therapy to address the deficits stated above and in the chart below, provide patient /family education, and to  maximize patientt's level of independence.     Plan of care discussed with patient: Yes  Patient's spiritual, cultural and educational needs considered and patient is agreeable to the plan of care and goals as stated below:     Anticipated Barriers for therapy: Possible Rotator Cuff Injury     Goals :   Short Term Goals   1. Independent with Home Exercise Program   2. Increase Left Shoulder Flexion and Abduction Active Range of Motion to 110 Degrees  3. Increase Left Shoulder Internal Rotation and External Rotation Range of Motion to 45 Degrees  4. Increase Left Shoulder Strength to 3/5   5. Decrease complaints of Left Shoulder Pain to 5/10 with Activity     Long Term Goals :  1.Patient will perform 30 minutes of Activity with use of Left Shoulder with Pain Less than or Equal to 4/10  2. Increase Active Range of Motion to Left Shoulder to 120 Degrees for Flexion and Abduction   3. Increase Left Shoulder and Scapular Strength to 3+/5  4. Referral to Ortho MD if pain and shoulder dysfunction persist      Plan   Plan of care Certification: 1/26/2022 to 3/11/2022.    Outpatient Physical Therapy 2 times weekly for 6 weeks to include the following interventions: Electrical Stimulation to decrease pain and inflammation as needed, Manual Therapy, Moist Heat/ Ice, Neuromuscular Re-ed, Patient Education and Therapeutic Exercise.     CHELY JACKSON, PT, DPT

## 2022-02-03 ENCOUNTER — CLINICAL SUPPORT (OUTPATIENT)
Dept: REHABILITATION | Facility: HOSPITAL | Age: 62
End: 2022-02-03
Payer: MEDICAID

## 2022-02-03 DIAGNOSIS — R29.898 WEAKNESS OF LEFT ARM: ICD-10-CM

## 2022-02-03 DIAGNOSIS — M25.512 ACUTE PAIN OF LEFT SHOULDER: ICD-10-CM

## 2022-02-03 DIAGNOSIS — M25.612 DECREASED RANGE OF MOTION OF LEFT SHOULDER: ICD-10-CM

## 2022-02-03 PROCEDURE — 97140 MANUAL THERAPY 1/> REGIONS: CPT | Mod: CQ

## 2022-02-03 PROCEDURE — 97110 THERAPEUTIC EXERCISES: CPT | Mod: CQ

## 2022-02-03 NOTE — PROGRESS NOTES
Physical Therapy Treatment Note     Name: Deborah Sotelo  Clinic Number: [id number]    Therapy Diagnosis:        Encounter Diagnosis   Name Primary?    Muscle spasm of left shoulder        Physician: Antonia Baer MD     Physician Orders: PT Eval and Treat   Medical Diagnosis from Referral: Left Shoulder Pain  Evaluation Date: 1/26/2022  Updated Plan of Care Due : 2/25/2022  Authorization Period Expiration: 1/18/2023  Plan of Care Expiration: 3/11/2022  Visit # / Visits authorized: 2 / 31 (including eval)       PTA Visit #: 1    Time In: 10:45 am  Time Out: 11:30am  Total Billable Time: 45 minutes    Precautions: Standard  Functional Level Prior to Evaluation: n/a    Subjective     Pt reports: she is sore in her L shoulder and had trouble putting her shirt on this morning due to the pain in her shoulder.  She was compliant with home exercise program.  Response to previous treatment: first visit since eval  Functional change: n/a    Pain: 8/10 upon arrival today  Location: left shoulder      Objective     Deborah received therapeutic exercises to develop strength, endurance, ROM, flexibility and posture for 30 minutes including:  Shoulder Exercises     UBE 10 min (5 fwd and 5 bckwd)   Pulleys  5 min   Ball rolls into flexion and abd 20x ea   Corner Stretch   --   Cane Flexion on Wall   20x   Cane Flexion off Wall   --   Cybex Rows - Close   --   Cybex Rows - Wide  --   Cybex Shoulder Press  --   Cybex Lat Pull Down  --    seated flexion with red tband  20x                                Shoulder Int Rot and Ext Rot  --   Shoulder Flex and Abduction  --   Shoulder Ext/Scap Retraction  LUE only (seated) 30x   Horizontal Abduction  --   Bilateral External Rotation  --                                             Shoulder Manual x 9 minutes     PROM with End Range Stretch  -   Capsular Mobilizations   -   Scapular PNF  -   Deep Tissue/Myofascial   bilateral upper trap and levator stretch with overpressure 4x15s ea     Manual scap retractions with 3SH  20x         Home Exercises Provided and Patient Education Provided     Education provided: postural awareness during exercises for scapular stabilization and mobility to avoid shoulder hike.    Written Home Exercises Provided: Patient instructed to cont prior HEP.  Exercises were reviewed and Deborah was able to demonstrate them prior to the end of the session.  Deborah demonstrated good  understanding of the education provided.     See EMR under Patient Instructions for exercises provided prior visit.    Assessment     Case conference with Paulino Lu PT prior to initial PTA visit.     Today is first visit since eval. Pt unable to tolerate long periods of standing with ex's, so seated ex's were performed today to focus on scapular mobility/stability. Pt very slow with transitions between exercises and requires assistance to come from sit<>stand due to neuropathy and balance deficits. Pt responded well to tx and demonstrated decrease in p! Post tx. Pt reported p! 8/10 upon arrival and 6/10 at end of tx. Pt to cont with HEP over the weekend and return to therapy next week where we will further progress therex to focus on improving strength and mobility of L shoulder at pts tolerance.       Deborah is a 61 y.o. female referred to outpatient Physical Therapy with a medical diagnosis of Left Shoulder Pain. Patient fell approx 1 year ago and injured her Left arm. She was able to tolerate the pain initially, but recently her Left shoulder pain has increased and she has had increasing difficulty being able to use that arm. Patient is unable to lift the arm to 90 degrees and hikes her shoulder to get close to 90 degrees. She has been having increased pain in the upper trap due to over-activation of the upper trap. Special tests indicate possible supraspinatus tear. Feel patient will benefit from referral to Ortho MD and possible MRI in the future if her pain and dysfunction persist following  Therapy intervention.        Deborah Is progressing well towards her goals.   Pt prognosis is Good.     Pt will continue to benefit from skilled outpatient physical therapy to address the deficits listed in the problem list box on initial evaluation, provide pt/family education and to maximize pt's level of independence in the home and community environment.     Pt's spiritual, cultural and educational needs considered and pt agreeable to plan of care and goals.     Anticipated barriers to physical therapy: balance deficits prohibit pt from tolerating long periods of standing for ex's. Pt has to sit down to complete.     Goals:    Short Term Goals   1. Independent with Home Exercise Program   2. Increase Left Shoulder Flexion and Abduction Active Range of Motion to 110 Degrees  3. Increase Left Shoulder Internal Rotation and External Rotation Range of Motion to 45 Degrees  4. Increase Left Shoulder Strength to 3/5   5. Decrease complaints of Left Shoulder Pain to 5/10 with Activity      Long Term Goals :  1.Patient will perform 30 minutes of Activity with use of Left Shoulder with Pain Less than or Equal to 4/10  2. Increase Active Range of Motion to Left Shoulder to 120 Degrees for Flexion and Abduction   3. Increase Left Shoulder and Scapular Strength to 3+/5  4. Referral to Ortho MD if pain and shoulder dysfunction persist    Plan   Plan of care Certification: 1/26/2022 to 3/11/2022.     Outpatient Physical Therapy 2 times weekly for 6 weeks to include the following interventions: Electrical Stimulation to decrease pain and inflammation as needed, Manual Therapy, Moist Heat/ Ice, Neuromuscular Re-ed, Patient Education and Therapeutic Exercise.       Trevor Caal, PTA  2/3/2022

## 2022-02-08 ENCOUNTER — CLINICAL SUPPORT (OUTPATIENT)
Dept: REHABILITATION | Facility: HOSPITAL | Age: 62
End: 2022-02-08
Payer: MEDICAID

## 2022-02-08 DIAGNOSIS — R29.898 WEAKNESS OF LEFT ARM: ICD-10-CM

## 2022-02-08 DIAGNOSIS — M25.612 DECREASED RANGE OF MOTION OF LEFT SHOULDER: ICD-10-CM

## 2022-02-08 DIAGNOSIS — M25.512 ACUTE PAIN OF LEFT SHOULDER: ICD-10-CM

## 2022-02-08 PROCEDURE — 97110 THERAPEUTIC EXERCISES: CPT | Mod: CQ

## 2022-02-08 NOTE — PROGRESS NOTES
Physical Therapy Treatment Note     Name: Deborah Sotelo  Clinic Number: [id number]    Therapy Diagnosis:        Encounter Diagnosis   Name Primary?    Muscle spasm of left shoulder        Physician: Antonia Baer MD     Physician Orders: PT Eval and Treat   Medical Diagnosis from Referral: Left Shoulder Pain  Evaluation Date: 1/26/2022  Updated Plan of Care Due : 2/25/2022  Authorization Period Expiration: 1/18/2023  Plan of Care Expiration: 3/11/2022  Visit # / Visits authorized: 3 / 31 (including eval)       PTA Visit #: 2    Time In: 10:49 am  Time Out: 11:30am  Total Billable Time: 41 minutes    Precautions: Standard  Functional Level Prior to Evaluation: n/a    Subjective     Pt reports: she is sore in her L shoulder and has trouble brushing her hair this morning.   She was compliant with home exercise program.  Response to previous treatment: first visit since eval  Functional change:     Pain: 5-6/10 upon arrival today  Location: left shoulder      Objective     Deborah received therapeutic exercises to develop strength, endurance, ROM, flexibility and posture for 40 minutes including:  Shoulder Exercises     UBE 10 min (5 fwd and 5 bckwd)   Bilateral upper trap stretch  4x15s   Pulleys  5 min   Table slides into flexion and abd 20x ea with 5SH   Seated scapular retractions  2x10 with 5SH   supine cane flexion 20x with 2#   Supine chest press 20x with 2#   Seated scapular depressions With handles 20x     --   Cybex Rows - Wide  --   Cybex Shoulder Press  --   supine Lat Pull Down  with blue theraband (handles)    supine serratus punch  20x 2#   Upper quarter ER stretch in supine B  30s x 2                           Shoulder Int Rot and Ext Rot  --   Shoulder Flex and Abduction  --   Shoulder Ext/Scap Retraction  --   Horizontal Abduction  --   Bilateral External Rotation  --                                             Shoulder Manual x 0 minutes     PROM with End Range Stretch  -   Capsular Mobilizations    -   Scapular PNF  -   Deep Tissue/Myofascial   bilateral upper trap and levator stretch with overpressure 4x15s ea    Manual scap retractions with 3SH  20x         Home Exercises Provided and Patient Education Provided     Education provided: postural awareness during exercises for scapular stabilization and mobility to avoid shoulder hike.    Written Home Exercises Provided: Patient instructed to cont prior HEP.  Exercises were reviewed and Deborah was able to demonstrate them prior to the end of the session.  Deborah demonstrated good  understanding of the education provided.     See EMR under Patient Instructions for exercises provided prior visit.    Assessment     Pt arrived today with 5-6/10 p! In L shoulder. Pt tolerated given UE therex today with minimal c/o's p! Just fatigue noted at end of reps. Pt required verbal and manual cues to perform ex's correctly. Pt unable to perform standing ex's due to neuropathy. Only seated and supine ex's performed due to this functional limitation. Pt 's level of p! Decreased from 5-6/10 to 4-5 /10 post tx today with less stiffness noted in LUE. Pt to return 2/10 where we will progress at pt's tolerance.      Deborah is a 61 y.o. female referred to outpatient Physical Therapy with a medical diagnosis of Left Shoulder Pain. Patient fell approx 1 year ago and injured her Left arm. She was able to tolerate the pain initially, but recently her Left shoulder pain has increased and she has had increasing difficulty being able to use that arm. Patient is unable to lift the arm to 90 degrees and hikes her shoulder to get close to 90 degrees. She has been having increased pain in the upper trap due to over-activation of the upper trap. Special tests indicate possible supraspinatus tear. Feel patient will benefit from referral to Ortho MD and possible MRI in the future if her pain and dysfunction persist following Therapy intervention.        Deborah Is progressing well towards her  goals.   Pt prognosis is Good.     Pt will continue to benefit from skilled outpatient physical therapy to address the deficits listed in the problem list box on initial evaluation, provide pt/family education and to maximize pt's level of independence in the home and community environment.     Pt's spiritual, cultural and educational needs considered and pt agreeable to plan of care and goals.     Anticipated barriers to physical therapy: balance deficits prohibit pt from tolerating long periods of standing for ex's. Pt has to sit down to complete.     Goals:    Short Term Goals   1. Independent with Home Exercise Program   2. Increase Left Shoulder Flexion and Abduction Active Range of Motion to 110 Degrees  3. Increase Left Shoulder Internal Rotation and External Rotation Range of Motion to 45 Degrees  4. Increase Left Shoulder Strength to 3/5   5. Decrease complaints of Left Shoulder Pain to 5/10 with Activity      Long Term Goals :  1.Patient will perform 30 minutes of Activity with use of Left Shoulder with Pain Less than or Equal to 4/10  2. Increase Active Range of Motion to Left Shoulder to 120 Degrees for Flexion and Abduction   3. Increase Left Shoulder and Scapular Strength to 3+/5  4. Referral to Ortho MD if pain and shoulder dysfunction persist    Plan   Plan of care Certification: 1/26/2022 to 3/11/2022.     Outpatient Physical Therapy 2 times weekly for 6 weeks to include the following interventions: Electrical Stimulation to decrease pain and inflammation as needed, Manual Therapy, Moist Heat/ Ice, Neuromuscular Re-ed, Patient Education and Therapeutic Exercise.       Trevor Caal, PTA  2/8/2022

## 2022-02-10 NOTE — PATIENT INSTRUCTIONS
Clean wound with baby shampoo and water    Apply dakin's moist gauze to wound    Cover with dry gauze,wrap with meng,paper tape    Change daily and as needed    X-ray today of right foot[facility]    Elevate feet as much as possible    No prolong standing    Patient Education       Diabetic Foot Ulcer Discharge Instructions   About this topic   Diabetes is an illness that makes your blood sugar too high. If your blood sugar is not controlled, you may have problems with your nerves and blood vessels. This can cause loss of feeling or numbness in your feet. You may not feel if you have a cut, blister, or sore on your foot. You may also have poor blood flow to your feet. This makes it harder for a wound to heal. Diabetes can also cause the skin on your feet to become very dry, leading to peeling and cracking of the skin. These are all reasons that people with diabetes are more likely to get sores on their feet. If the sore is not taken care of, it can become infected. This may lead to serious problems.     What care is needed at home?   · Ask your doctor what you need to do when you go home. Make sure you ask questions if you do not understand what the doctor says. This way you will know what you need to do.  · Talk to your doctor about how to care for your sores. Ask your doctor about:  ? When you should change your bandages  ? When you may take a bath or shower  ? If you need to use special creams or bandages on your sore  ? If you need to be careful with putting pressure on the sore. Your doctor may want you to use a wheelchair, cane, or crutches.  ? When you may go back to your normal activities like work or driving.  · Be sure to wash your hands before and after touching your wound or dressing.  · Raise your feet while in bed or when sitting in a chair to stop swelling and help the sore get better faster.  What follow-up care is needed?   · Your doctor may ask you to make visits to the office to check  on your progress. Be sure to keep these visits.  · Your doctor may have you go see a specialist. You may need to see a foot doctor called a podiatrist or a blood vessel doctor called a vascular specialist.  What drugs may be needed?   The doctor may order drugs to:  · Help with pain  · Lower swelling  · Fight an infection  · Help increase blood flow  Will physical activity be limited?   You may have to limit your activity. Talk to your doctor about the right amount of activity for you.  What changes to diet are needed?   Eating a healthy diet is important during this time. Work to keep your blood sugar within your goal ranges. Talk to your doctor if you are not sure of your blood sugar goals. This means:  · Eat whole grain foods and foods high in fiber.  · Choose many different fruits and vegetables. Fresh or frozen is best.  · Cut back on solid fats like butter or margarine. Eat less fatty or processed foods.  · Eat more low-fat or lean meats like chicken, fish, or turkey. Eat less red meat.  · Limit beer, wine, and mixed drinks (alcohol).  · Limit caffeine.  · If you need help, ask to see a dietitian.  What problems could happen?   · Infection  · The skin in and around the sore can die  · Amputation  What can be done to prevent this health problem?   · Take care of your diabetes and keep your blood sugar controlled.  · Know your blood sugar goals.  · Stop smoking. Smoking damages all the blood vessels in your body, including those in your feet. Many people who need amputations are smokers. If you need help with quitting, talk to your doctor.  · Take care of your feet.  ? Wash your feet each day with soap and warm water. Dry them carefully and check for any signs of sores or wounds. If you are unable to see the bottom of your feet, use a mirror or ask someone else to look at them. It is important to check the bottom of your feet and toes, so you can catch any changes.  ? Put on lotion or moisturizer. Avoid lotion  in the areas between your toes, as the extra moisture can cause infection.  ? Do not soak your feet, as soaking can cause dry skin.  ? Trim your nails straight across to avoid harming the skin.  · Protect your feet.  ? Do not walk barefoot. Wear shoes at all times, even in the house.  ? Wear shoes and socks that fit properly. Ask about special shoes that may be covered by insurance with a prescription.  ? Before putting on your shoes, check the inside for small items such as a pebble. If you have decreased feeling in your feet, walking on such items could cause injury.  ? If your feet are cold, wear warm socks. Do not use heating pads, any kind of heater, or soak your feet to get them warm.  ? Talk to your doctor about using a pumice stone to prevent calluses from forming.  · See your doctor if:  ? You need to have a corn or callus taken off. Do not attempt to remove corns and calluses yourself by cutting them or using chemicals.  ? You have sores or blisters on your feet.  ? You step on an object that cuts or penetrates the sole of your foot  When do I need to call the doctor?   · Signs of infection. These include a fever of 100.4°F (38°C) or higher, chills, or sore that will not heal.  · Signs of wound infection. These include swelling, redness, warmth around the wound; too much pain when touched; yellowish, greenish, or bloody discharge; foul smell coming from the wound; wound opens up.  · New blisters, cuts, or sores on your foot  · Feet or legs are numb  · Black or dead tissue in or around your ulcer  · Sore is not better or is getting worse  Teach Back: Helping You Understand   The Teach Back Method helps you understand the information we are giving you. After you talk with the staff, tell them in your own words what you learned. This helps to make sure the staff has described each thing clearly. It also helps to explain things that may have been confusing. Before going home, make sure you can do these:  · I  can tell you about my condition.  · I can tell you how I will take care of my sore and my feet.  · I can tell you what I will do if I have a new sore or signs of a wound infection.  Where can I learn more?   Centers for Disease Control and Prevention  https://www.cdc.gov/diabetes/library/features/healthy-feet.html   Last Reviewed Date   2021-04-13  Consumer Information Use and Disclaimer   This information is not specific medical advice and does not replace information you receive from your health care provider. This is only a brief summary of general information. It does NOT include all information about conditions, illnesses, injuries, tests, procedures, treatments, therapies, discharge instructions or life-style choices that may apply to you. You must talk with your health care provider for complete information about your health and treatment options. This information should not be used to decide whether or not to accept your health care providers advice, instructions or recommendations. Only your health care provider has the knowledge and training to provide advice that is right for you.  Copyright   Copyright © 2021 [employer] and its affiliates and/or licensors. All rights reserved.  Patient Education       Wound Care Discharge Instructions   About this topic   A wound is an injury to the skin that breaks the barrier to the outside. The skin protects the inside of the body from the outside world. When the skin is damaged or broken open, the wound can become infected or bleed.  Cuts and scrapes are one type of wound. You may also hear these called abrasions. Scrapes on the surface leave deeper skin layers in place. These are often caused by friction or rubbing against a rough surface.  Another kind is a puncture wound. This comes from something like a bite or stepping on a nail. Puncture wounds are caused by a pointed or sharp object, such as a nail, needle, or tooth entering the skin. Bleeding can be  very little, and the wound may be barely obvious. Bites from a human or an animal always have germs in them and need extra care.  A laceration is a cut on your skin. It is most often caused by a sharp object like a knife blade, glass, or from other things with sharp edges. If the cut is shallow, it does not need stitches.     What care is needed at home?   · Ask your doctor what you need to do when you go home. Make sure you ask questions if you do not understand what the doctor says.  · The doctor may want you to keep your wound covered as it heals. You may want to use a thin layer of antibiotic ointment to help keep the wound moist. This will also keep the dressing from sticking to the wound.  · After 24 hours, you can gently wash the wound with soap and water. Pat dry and put on a clean dressing. A telfa pad will not stick to the wound.  · Change your dressing once a day or every other day.  · Always wash your hands before and after touching the wound.  · Each time you change the dressing, look closely at the wound to be sure it is healing the right way. The wound may have a yellowish discharge, and this is normal.  · Avoid picking the scab or scratching the site which may cause more irritation.  · Do not soak in water or swim with an open wound.  · Do not rub the wound or take off any small strips of tape.  What follow-up care is needed?   Your doctor may ask you to make visits to the office to check on your progress. Be sure to keep these visits.  What drugs may be needed?   The doctor may order drugs to:  · Help with pain  · Prevent or fight an infection  You may need to have a tetanus shot.  Will physical activity be limited?   You may need to limit your activity until your wound is fully healed. Talk to your doctor about the right amount of activity for you or when it is safe to return to sports.  What problems could happen?   · Bleeding  · Infection  · Scarring  · Poor healing  When do I need to call the  doctor?   · Signs of infection. These include a fever of 100.4°F (38°C) or higher, chills, or wound that will not heal.  · The pain in and around the area gets much worse.  · There is a bad smell or pus (thick yellow, green, or gray fluid) coming from your wound.  · You notice a crunchy feeling or blisters in the skin around the wound.  · The redness around your wound gets bigger or is spreading up your arm or leg.  · Fluid that is not pus drains from your wound.  · Your swelling doesnt improve or gets worse.  Teach Back: Helping You Understand   The Teach Back Method helps you understand the information we are giving you. After you talk with the staff, tell them in your own words what you learned. This helps to make sure the staff has described each thing clearly. It also helps to explain things that may have been confusing. Before going home, make sure you can do these:  · I can tell you about my wound.  · I can tell you how to care for my wound.  · I can tell you what I will do if I have swelling, redness, or warmth around my wound.  Where can I learn more?   American Academy of Pediatrics  https://www.healthychildren.org/English/health-issues/injuries-emergencies/Pages/Treating-Cuts.aspx   [employer]  https://www.betterhealth.[place].gov.au/health/conditionsandtreatments/skin-cuts-and-abrasions   Kids Health  http://kidshealth.org/en/teens/wounds.html?ref=search   Last Reviewed Date   2021-10-08  Consumer Information Use and Disclaimer   This information is not specific medical advice and does not replace information you receive from your health care provider. This is only a brief summary of general information. It does NOT include all information about conditions, illnesses, injuries, tests, procedures, treatments, therapies, discharge instructions or life-style choices that may apply to you. You must talk with your health care provider for complete information about your health and treatment options.  This information should not be used to decide whether or not to accept your health care providers advice, instructions or recommendations. Only your health care provider has the knowledge and training to provide advice that is right for you.  Copyright   Copyright © 2021 [employer] and its affiliates and/or licensors. All rights reserved.

## 2022-02-10 NOTE — PROGRESS NOTES
See wound assessment. Debridement and cultures done. Dakin's moist gauze daily. Pt requested home health evaluation. Xray of right foot done. RTC 1 week

## 2022-02-11 ENCOUNTER — OFFICE VISIT (OUTPATIENT)
Dept: WOUND CARE | Facility: CLINIC | Age: 62
End: 2022-02-11
Payer: MEDICAID

## 2022-02-11 ENCOUNTER — HOSPITAL ENCOUNTER (OUTPATIENT)
Dept: RADIOLOGY | Facility: HOSPITAL | Age: 62
Discharge: HOME | End: 2022-02-11
Attending: NURSE PRACTITIONER
Payer: MEDICAID

## 2022-02-11 VITALS
RESPIRATION RATE: 20 BRPM | SYSTOLIC BLOOD PRESSURE: 123 MMHG | HEART RATE: 85 BPM | DIASTOLIC BLOOD PRESSURE: 77 MMHG | TEMPERATURE: 97 F

## 2022-02-11 DIAGNOSIS — L97.512 ULCER OF RIGHT FOOT WITH FAT LAYER EXPOSED: ICD-10-CM

## 2022-02-11 DIAGNOSIS — L97.512 ULCER OF RIGHT FOOT WITH FAT LAYER EXPOSED: Primary | ICD-10-CM

## 2022-02-11 PROCEDURE — 73630 X-RAY EXAM OF FOOT: CPT | Mod: 26,RT,, | Performed by: RADIOLOGY

## 2022-02-11 PROCEDURE — 87075 CULTR BACTERIA EXCEPT BLOOD: CPT | Mod: ,,, | Performed by: CLINICAL MEDICAL LABORATORY

## 2022-02-11 PROCEDURE — 99214 OFFICE O/P EST MOD 30 MIN: CPT | Mod: PBBFAC,25 | Performed by: NURSE PRACTITIONER

## 2022-02-11 PROCEDURE — 1160F PR REVIEW ALL MEDS BY PRESCRIBER/CLIN PHARMACIST DOCUMENTED: ICD-10-PCS | Mod: CPTII,,, | Performed by: NURSE PRACTITIONER

## 2022-02-11 PROCEDURE — 99499 NO LOS: ICD-10-PCS | Mod: S$PBB,,, | Performed by: NURSE PRACTITIONER

## 2022-02-11 PROCEDURE — 3078F DIAST BP <80 MM HG: CPT | Mod: CPTII,,, | Performed by: NURSE PRACTITIONER

## 2022-02-11 PROCEDURE — 73630 XR FOOT COMPLETE 3 VIEW RIGHT: ICD-10-PCS | Mod: 26,RT,, | Performed by: RADIOLOGY

## 2022-02-11 PROCEDURE — 3074F SYST BP LT 130 MM HG: CPT | Mod: CPTII,,, | Performed by: NURSE PRACTITIONER

## 2022-02-11 PROCEDURE — 87070 CULTURE OTHR SPECIMN AEROBIC: CPT | Mod: ,,, | Performed by: CLINICAL MEDICAL LABORATORY

## 2022-02-11 PROCEDURE — 1159F PR MEDICATION LIST DOCUMENTED IN MEDICAL RECORD: ICD-10-PCS | Mod: CPTII,,, | Performed by: NURSE PRACTITIONER

## 2022-02-11 PROCEDURE — 97597 DBRDMT OPN WND 1ST 20 CM/<: CPT | Mod: PBBFAC | Performed by: NURSE PRACTITIONER

## 2022-02-11 PROCEDURE — 3078F PR MOST RECENT DIASTOLIC BLOOD PRESSURE < 80 MM HG: ICD-10-PCS | Mod: CPTII,,, | Performed by: NURSE PRACTITIONER

## 2022-02-11 PROCEDURE — 73630 X-RAY EXAM OF FOOT: CPT | Mod: TC,RT

## 2022-02-11 PROCEDURE — 11043 DBRDMT MUSC&/FSCA 1ST 20/<: CPT | Mod: S$PBB,,, | Performed by: NURSE PRACTITIONER

## 2022-02-11 PROCEDURE — 1159F MED LIST DOCD IN RCRD: CPT | Mod: CPTII,,, | Performed by: NURSE PRACTITIONER

## 2022-02-11 PROCEDURE — 99499 UNLISTED E&M SERVICE: CPT | Mod: S$PBB,,, | Performed by: NURSE PRACTITIONER

## 2022-02-11 PROCEDURE — 1160F RVW MEDS BY RX/DR IN RCRD: CPT | Mod: CPTII,,, | Performed by: NURSE PRACTITIONER

## 2022-02-11 PROCEDURE — 87075 CULTURE, ANAEROBE: ICD-10-PCS | Mod: ,,, | Performed by: CLINICAL MEDICAL LABORATORY

## 2022-02-11 PROCEDURE — 11043 PR DEBRIDEMENT, SKIN, SUB-Q TISSUE,MUSCLE,=<20 SQ CM: ICD-10-PCS | Mod: S$PBB,,, | Performed by: NURSE PRACTITIONER

## 2022-02-11 PROCEDURE — 3074F PR MOST RECENT SYSTOLIC BLOOD PRESSURE < 130 MM HG: ICD-10-PCS | Mod: CPTII,,, | Performed by: NURSE PRACTITIONER

## 2022-02-11 PROCEDURE — 87070 CULTURE, WOUND: ICD-10-PCS | Mod: ,,, | Performed by: CLINICAL MEDICAL LABORATORY

## 2022-02-11 RX ORDER — SODIUM HYPOCHLORITE 5 MG/ML
SOLUTION TOPICAL DAILY
Qty: 473 ML | Refills: 0 | Status: SHIPPED | OUTPATIENT
Start: 2022-02-11 | End: 2022-06-07 | Stop reason: SDUPTHER

## 2022-02-11 NOTE — PROGRESS NOTES
Debridement Performed for Assessment: Wound# 2  Performed By: Provider; Felisha Love NP  Assistant:    Debridement: Surgical    Photo taken post procedure:    Time-Out Taken: Yes  Level: Skin/Subcutaneous Tissue  Post Debridement Measurements  Length: (cm) 1.2  Width: (cm) 1.3   Depth: (cm) 1.4      Area: (cm²) 1.56  Percent Debrided: 100  Total Area Debrided: (sq cm)     Tissue and other material debrided:  Adipose, Dermis, Epidermis, Subcutaneous  Devitalized Tissue Debrided:callus  Instrument: Curette  Bleeding: Moderate  Hemostasis Achieved: Pressure  Procedural Pain: Insensate  Post Procedural Pain: Insensate  Response to Treatment: Procedure was tolerated well    Devitalized materials/tissue Removed  the following was removed during debridement  subcutaneous, muscle      Post Debridement Diagnosis  Right diabetic foot ulcer  Post debridement diagnosis  Same as Pre-operative debridement diagnosis, No Complications noted.      Grafts or implants applied  Was a graft or implant applied?  No      Procedure assistant  Procedure assisted by:  Cici Duvall LPN  Assistant is the same as nurse listed above      Complications related to procedure  Did any complication occure during procedure?  No complications noted during or after procedure.      Specimen  Specimen collect during procedure?  No specimen collected      Anaesthesia:  Anesthesia used  None      Blood Loss:  Blood loss during procedure  less than 5 cc

## 2022-02-12 NOTE — PROGRESS NOTES
TOMASA Maldonado   [facility] - WOUND CARE  [address] [facility] [address]  [phone]      PATIENT NAME: Deborah Sotelo  : 1960  DATE: 22  MRN: [id number]      Billing Provider: TOMASA Maldonado  Level of Service:   Patient PCP Information     Provider PCP Type    Primary Doctor No General          Reason for Visit / Chief Complaint: Non-healing Wound Follow Up (Right foot ) and Diabetic Foot Ulcer       History of Present Illness / Problem Focused Workflow     Deborah Sotelo is a 61 y.o. female who presents to clinic for follow up on chronic-non healing wound on the right foot. Patient is compliant with current treatment plan. Wound has moderate amount of callus around wound bed. Pertinent factors that delay wound healing include multiple co-morbidities, poor vascular supply, diabetes, dry wound bed, undermining, advanced age, fragile skin, no protective sensation and infection. Past interventions include debridements, antibiotics, and daily dressing changes. Denies fever, chills, malaise, fatigue and sweats. Wound is macerated and has notable odor.          Review of Systems     Review of Systems   Constitutional: Negative for activity change, chills and fever.   Respiratory: Negative for chest tightness and shortness of breath.    Cardiovascular: Positive for leg swelling. Negative for chest pain and palpitations.   Musculoskeletal: Positive for arthralgias and joint swelling.   Skin: Positive for wound.        See wound assessment   Neurological: Positive for weakness and numbness.   Psychiatric/Behavioral: Negative for agitation, behavioral problems, confusion and self-injury.       Medical / Social / Family History     Past Medical History:   Diagnosis Date    Diabetes mellitus, type 2     Hyperlipidemia     Hypertension     Obesity     Stroke        Past Surgical History:   Procedure Laterality Date    AMPUTATION      APPENDECTOMY      EYE  SURGERY         Social History  Ms. Deborah Sotelo  reports that she has never smoked. She has never used smokeless tobacco. She reports current alcohol use. She reports that she does not use drugs.    Family History  Ms.'srinath Sotelo family history includes Appendicitis in her father; Asthma in her sister; Cancer in her mother; Diabetes in her brother.    Medications and Allergies     Medications  Outpatient Medications Marked as Taking for the 2/11/22 encounter (Office Visit) with TOMASA Maldonado   Medication Sig Dispense Refill    amLODIPine (NORVASC) 5 MG tablet Take 1 tablet (5 mg total) by mouth once daily. 30 tablet 2    atorvastatin (LIPITOR) 80 MG tablet TAKE 1 TABLET BY MOUTH AT BEDTIME ~~DO NOT EAT GRAPEFRUIT OR DRINK GRAPEFRUIT JUICE WHILE TAKING THIS MEDICATION~~ 30 tablet 2    ciprofloxacin HCl (CIPRO) 500 MG tablet Take 1 tablet by mouth 2 (two) times a day.      clindamycin (CLEOCIN) 300 MG capsule Take 1 capsule by mouth 2 (two) times daily.      gentamicin (GARAMYCIN) 0.1 % cream Apply 1 application topically once daily.      insulin asp prt-insulin aspart, NovoLOG 70/30, (NOVOLOG 70/30) 100 unit/mL (70-30) Soln INJECT 50 UNITS SUB-Q EACH MORNING AND 30 UNITS SUB-Q EACH EVENING 30 mL 5       Allergies  Review of patient's allergies indicates:   Allergen Reactions    Aspirin      Other reaction(s): UPSET STOMACH     Bactrim ds [sulfamethoxazole-trimethoprim] Itching       Physical Examination     Vitals:    02/11/22 0854   BP: 123/77   Pulse: 85   Resp: 20   Temp: 97 °F (36.1 °C)     Physical Exam  Vitals and nursing note reviewed.   Constitutional:       Appearance: Normal appearance.   HENT:      Head: Normocephalic.   Cardiovascular:      Rate and Rhythm: Normal rate and regular rhythm.      Pulses: Normal pulses.      Heart sounds: Normal heart sounds.   Pulmonary:      Effort: Pulmonary effort is normal. No respiratory distress.   Chest:      Chest wall: No tenderness.    Musculoskeletal:         General: Swelling present.      Right lower leg: Edema present.   Skin:     General: Skin is warm and dry.      Capillary Refill: Capillary refill takes more than 3 seconds.      Comments: See wound assessment    Neurological:      General: No focal deficit present.      Mental Status: She is alert and oriented to person, place, and time. Mental status is at baseline.   Psychiatric:         Mood and Affect: Mood normal.         Behavior: Behavior normal.         Thought Content: Thought content normal.         Judgment: Judgment normal.         Assessment and Plan      1. Ulcer of right foot with fat layer exposed           Wound 01/18/21 1051 Diabetic Ulcer Right lateral Plantar #1 (Active)   01/18/21 1051    Pre-existing: Yes   Primary Wound Type: Diabetic ulc   Side: Right   Orientation: lateral   Location: Plantar   Wound Number: #1   Ankle-Brachial Index:    Pulses: normal   Removal Indication and Assessment:    Wound Outcome:    (Retired) Wound Type:    (Retired) Wound Length (cm):    (Retired) Wound Width (cm):    (Retired) Depth (cm):    Wound Description (Comments):    Removal Indications:    Wound Image      02/11/22 0909   Dressing Appearance Dry;Intact;Moist drainage 02/11/22 0909   Drainage Amount Small 02/11/22 0909   Drainage Characteristics/Odor Serosanguineous 02/11/22 0909   Appearance Pink;Moist;Granulating 02/11/22 0909   Tissue loss description Full thickness 02/11/22 0909   Black (%), Wound Tissue Color 0 % 02/11/22 0909   Red (%), Wound Tissue Color 100 % 02/11/22 0909   Yellow (%), Wound Tissue Color 0 % 02/11/22 0909   Periwound Area Macerated 02/11/22 0909   Wound Edges Callused 02/11/22 0909   Johnson Classification (diabetic foot ulcers only) Grade 2 02/11/22 0909   Wound Length (cm) 0.2 cm 02/11/22 0909   Wound Width (cm) 0.3 cm 02/11/22 0909   Wound Depth (cm) 1.4 cm 02/11/22 0909   Wound Volume (cm^3) 0.084 cm^3 02/11/22 0909   Wound Surface Area (cm^2) 0.06  cm^2 02/11/22 0909   Care Cleansed with:;Antimicrobial agent;Applied:;Skin Barrier;Debrided 02/11/22 0909   Dressing Applied;Silver;Gauze;Rolled gauze 02/11/22 0909   Dressing Change Due 02/12/22 02/11/22 0909         Active Problem List with Overview Notes    Diagnosis Date Noted    Acute pain of left shoulder 01/26/2022    Decreased range of motion of left shoulder 01/26/2022    Weakness of left arm 01/26/2022    Muscle spasm of left shoulder 01/18/2022    Gastroesophageal reflux disease 11/09/2021    Screening for thyroid disorder 11/09/2021    Needs flu shot 11/09/2021    Ulcer of right foot with fat layer exposed 06/28/2021    Type 2 diabetes mellitus with neurologic complication, with long-term current use of insulin 03/19/2021    Lower extremity edema 03/19/2021    Hypertension 03/19/2021    Hyperlipidemia 03/19/2021    Embolic stroke involving left middle cerebral artery 02/12/2021         :    Plan:   Wound Treatment: Start packing with half strength dakins   You may remove all bandages and shower prior to dressing changes. Bathe with a mild soap such as Dove. Do not sure antibacterial soaps such as Dial. Irrigate the wound with tepid water for 5 minutes. Dry thoroughly.  Do not take tub baths or soak in a Jacuzzi or swimming pool.  If you are unable to shower, you may use wound  or saline wash to cleanse the wound.  Elevate your leg above your heart for 30 to 45 minutes twice daily.  Elevated glucose will interfere with the healing process. Carbohydrates should be consumed sparingly and avoid anything with white flour, white sugar, white rice, white pasta, and white bread. Use whole wheat whole grain products instead.  Monitor for signs and symptoms of infection including fever, chills, purulent drainage, increased pain, swelling, or redness and notify clinic at [phone]  Lifestyle Modifications: begin progressive daily aerobic exercise program, follow a low fat, low cholesterol  diet, attempt to lose weight, decrease or avoid alcohol intake, reduce salt in diet and cooking, improve dietary compliance, use calcium 1 gram daily with Vit D and continue current medications  Follow up in 2 weeks      Problem List Items Addressed This Visit        Derm    Ulcer of right foot with fat layer exposed - Primary    Relevant Medications    sodium hypochlorite 0.5 % (DAKIN'S SOLUTION) external solution    Other Relevant Orders    X-Ray Foot Complete 3 view Right (Completed)    Culture, Anaerobe    Culture, Wound          Future Appointments   Date Time Provider Department Center   2/15/2022 10:45 AM Trevor Caal, PTA [facility]   2/17/2022 10:45 AM Trevor Caal, PTA [facility]   2/18/2022  9:00 AM TOMASA Maldonado [facility]   2/22/2022 10:45 AM Trevor Caal, PTA [facility]   2/24/2022 10:45 AM Trevor Caal, PTA [facility]   3/1/2022 10:00 AM Araceli Zhu, PT [facility]   3/3/2022 10:00 AM Dinora H Eaves, PT [facility]   3/8/2022 10:00 AM Dinora H Eaves, PT [facility]   3/10/2022 10:00 AM Dinora H Eaves, PT [facility]            Signature:  TOMASA Maldonado  [facility] - WOUND CARE  [address]  [place] [address]  [phone]    Date of encounter: 2/11/22

## 2022-02-13 LAB — MICROORGANISM SPEC CULT: NORMAL

## 2022-02-14 PROBLEM — Z13.29 SCREENING FOR THYROID DISORDER: Status: RESOLVED | Noted: 2021-11-09 | Resolved: 2022-02-14

## 2022-02-15 LAB — BACTERIA SPEC ANAEROBE CULT: NORMAL

## 2022-02-17 ENCOUNTER — OFFICE VISIT (OUTPATIENT)
Dept: WOUND CARE | Facility: CLINIC | Age: 62
End: 2022-02-17
Attending: NURSE PRACTITIONER
Payer: MEDICAID

## 2022-02-17 VITALS
HEART RATE: 98 BPM | RESPIRATION RATE: 20 BRPM | DIASTOLIC BLOOD PRESSURE: 69 MMHG | TEMPERATURE: 97 F | SYSTOLIC BLOOD PRESSURE: 111 MMHG

## 2022-02-17 DIAGNOSIS — L97.512 ULCER OF RIGHT FOOT WITH FAT LAYER EXPOSED: Primary | ICD-10-CM

## 2022-02-17 DIAGNOSIS — I10 HYPERTENSION, UNSPECIFIED TYPE: ICD-10-CM

## 2022-02-17 PROCEDURE — 99214 OFFICE O/P EST MOD 30 MIN: CPT | Mod: PBBFAC | Performed by: NURSE PRACTITIONER

## 2022-02-17 PROCEDURE — 99499 NO LOS: ICD-10-PCS | Mod: S$PBB,,, | Performed by: NURSE PRACTITIONER

## 2022-02-17 PROCEDURE — 11043 DBRDMT MUSC&/FSCA 1ST 20/<: CPT | Mod: PBBFAC | Performed by: NURSE PRACTITIONER

## 2022-02-17 PROCEDURE — 99499 UNLISTED E&M SERVICE: CPT | Mod: S$PBB,,, | Performed by: NURSE PRACTITIONER

## 2022-02-17 PROCEDURE — 11042 DBRDMT SUBQ TIS 1ST 20SQCM/<: CPT | Mod: S$PBB,,, | Performed by: NURSE PRACTITIONER

## 2022-02-17 PROCEDURE — 11042 PR DEBRIDEMENT, SKIN, SUB-Q TISSUE,=<20 SQ CM: ICD-10-PCS | Mod: S$PBB,,, | Performed by: NURSE PRACTITIONER

## 2022-02-17 NOTE — PROGRESS NOTES
See wound assessment. See debridement note. Hydrofera blue/dry dressing daily. Schedule for MRI. Sent to lab for CMP.

## 2022-02-17 NOTE — PROGRESS NOTES
Debridement Performed for Assessment: Right medial plantar foot  Performed By: Provider; Felisha Love NP  Assistant:    Debridement: Surgical    Photo taken post procedure:    Time-Out Taken: Yes  Level: Skin/Subcutaneous Tissue  Post Debridement Measurements  Length: (cm) 0.5  Width: (cm) 0.7  Depth: (cm) 0.5      Area: (cm²)  3.5   Percent Debrided: 100  Total Area Debrided: (sq cm)     Tissue and other material debrided:  Adipose, Dermis, Epidermis, Subcutaneous  Devitalized Tissue Debrided:Biofilm, callus  Instrument: Curette  Bleeding: Moderate  Hemostasis Achieved: Pressure  Procedural Pain: Insensate  Post Procedural Pain: Insensate  Response to Treatment: Procedure was tolerated well    Devitalized materials/tissue Removed  the following was removed during debridement  subcutaneous      Post Debridement Diagnosis  Right diabetic foot ulcer  Post debridement diagnosis  Same as Pre-operative debridement diagnosis, No Complications noted.      Grafts or implants applied  Was a graft or implant applied?  No      Procedure assistant  Procedure assisted by: Cici Duvall Lpn  Assistant is the same as nurse listed above      Complications related to procedure  Did any complication occure during procedure?  No complications noted during or after procedure.      Specimen  Specimen collect during procedure?  No specimen collected      Anaesthesia:  Anesthesia used  None      Blood Loss:  Blood loss during procedure  less than 5 cc

## 2022-02-17 NOTE — PATIENT INSTRUCTIONS
Clean wound with baby shampoo and water or Dakin solution    Pack wound with hydrofera blue    Cover with dry gauze,wrap with meng,paper tape,change daily and as needed    Elevate feet as much as possible    No prolong standing    Patient Education       Diabetic Foot Ulcer Discharge Instructions   About this topic   Diabetes is an illness that makes your blood sugar too high. If your blood sugar is not controlled, you may have problems with your nerves and blood vessels. This can cause loss of feeling or numbness in your feet. You may not feel if you have a cut, blister, or sore on your foot. You may also have poor blood flow to your feet. This makes it harder for a wound to heal. Diabetes can also cause the skin on your feet to become very dry, leading to peeling and cracking of the skin. These are all reasons that people with diabetes are more likely to get sores on their feet. If the sore is not taken care of, it can become infected. This may lead to serious problems.     What care is needed at home?   · Ask your doctor what you need to do when you go home. Make sure you ask questions if you do not understand what the doctor says. This way you will know what you need to do.  · Talk to your doctor about how to care for your sores. Ask your doctor about:  ? When you should change your bandages  ? When you may take a bath or shower  ? If you need to use special creams or bandages on your sore  ? If you need to be careful with putting pressure on the sore. Your doctor may want you to use a wheelchair, cane, or crutches.  ? When you may go back to your normal activities like work or driving.  · Be sure to wash your hands before and after touching your wound or dressing.  · Raise your feet while in bed or when sitting in a chair to stop swelling and help the sore get better faster.  What follow-up care is needed?   · Your doctor may ask you to make visits to the office to check on your progress. Be sure to keep these  visits.  · Your doctor may have you go see a specialist. You may need to see a foot doctor called a podiatrist or a blood vessel doctor called a vascular specialist.  What drugs may be needed?   The doctor may order drugs to:  · Help with pain  · Lower swelling  · Fight an infection  · Help increase blood flow  Will physical activity be limited?   You may have to limit your activity. Talk to your doctor about the right amount of activity for you.  What changes to diet are needed?   Eating a healthy diet is important during this time. Work to keep your blood sugar within your goal ranges. Talk to your doctor if you are not sure of your blood sugar goals. This means:  · Eat whole grain foods and foods high in fiber.  · Choose many different fruits and vegetables. Fresh or frozen is best.  · Cut back on solid fats like butter or margarine. Eat less fatty or processed foods.  · Eat more low-fat or lean meats like chicken, fish, or turkey. Eat less red meat.  · Limit beer, wine, and mixed drinks (alcohol).  · Limit caffeine.  · If you need help, ask to see a dietitian.  What problems could happen?   · Infection  · The skin in and around the sore can die  · Amputation  What can be done to prevent this health problem?   · Take care of your diabetes and keep your blood sugar controlled.  · Know your blood sugar goals.  · Stop smoking. Smoking damages all the blood vessels in your body, including those in your feet. Many people who need amputations are smokers. If you need help with quitting, talk to your doctor.  · Take care of your feet.  ? Wash your feet each day with soap and warm water. Dry them carefully and check for any signs of sores or wounds. If you are unable to see the bottom of your feet, use a mirror or ask someone else to look at them. It is important to check the bottom of your feet and toes, so you can catch any changes.  ? Put on lotion or moisturizer. Avoid lotion in the areas between your toes, as the  extra moisture can cause infection.  ? Do not soak your feet, as soaking can cause dry skin.  ? Trim your nails straight across to avoid harming the skin.  · Protect your feet.  ? Do not walk barefoot. Wear shoes at all times, even in the house.  ? Wear shoes and socks that fit properly. Ask about special shoes that may be covered by insurance with a prescription.  ? Before putting on your shoes, check the inside for small items such as a pebble. If you have decreased feeling in your feet, walking on such items could cause injury.  ? If your feet are cold, wear warm socks. Do not use heating pads, any kind of heater, or soak your feet to get them warm.  ? Talk to your doctor about using a pumice stone to prevent calluses from forming.  · See your doctor if:  ? You need to have a corn or callus taken off. Do not attempt to remove corns and calluses yourself by cutting them or using chemicals.  ? You have sores or blisters on your feet.  ? You step on an object that cuts or penetrates the sole of your foot  When do I need to call the doctor?   · Signs of infection. These include a fever of 100.4°F (38°C) or higher, chills, or sore that will not heal.  · Signs of wound infection. These include swelling, redness, warmth around the wound; too much pain when touched; yellowish, greenish, or bloody discharge; foul smell coming from the wound; wound opens up.  · New blisters, cuts, or sores on your foot  · Feet or legs are numb  · Black or dead tissue in or around your ulcer  · Sore is not better or is getting worse  Teach Back: Helping You Understand   The Teach Back Method helps you understand the information we are giving you. After you talk with the staff, tell them in your own words what you learned. This helps to make sure the staff has described each thing clearly. It also helps to explain things that may have been confusing. Before going home, make sure you can do these:  · I can tell you about my condition.  · I  can tell you how I will take care of my sore and my feet.  · I can tell you what I will do if I have a new sore or signs of a wound infection.  Where can I learn more?   Centers for Disease Control and Prevention  https://www.cdc.gov/diabetes/library/features/healthy-feet.html   Last Reviewed Date   2021-04-13  Consumer Information Use and Disclaimer   This information is not specific medical advice and does not replace information you receive from your health care provider. This is only a brief summary of general information. It does NOT include all information about conditions, illnesses, injuries, tests, procedures, treatments, therapies, discharge instructions or life-style choices that may apply to you. You must talk with your health care provider for complete information about your health and treatment options. This information should not be used to decide whether or not to accept your health care providers advice, instructions or recommendations. Only your health care provider has the knowledge and training to provide advice that is right for you.  Copyright   Copyright © 2021 [employer] and its affiliates and/or licensors. All rights reserved.  Patient Education       Wound Care Discharge Instructions   About this topic   A wound is an injury to the skin that breaks the barrier to the outside. The skin protects the inside of the body from the outside world. When the skin is damaged or broken open, the wound can become infected or bleed.  Cuts and scrapes are one type of wound. You may also hear these called abrasions. Scrapes on the surface leave deeper skin layers in place. These are often caused by friction or rubbing against a rough surface.  Another kind is a puncture wound. This comes from something like a bite or stepping on a nail. Puncture wounds are caused by a pointed or sharp object, such as a nail, needle, or tooth entering the skin. Bleeding can be very little, and the wound may be barely  obvious. Bites from a human or an animal always have germs in them and need extra care.  A laceration is a cut on your skin. It is most often caused by a sharp object like a knife blade, glass, or from other things with sharp edges. If the cut is shallow, it does not need stitches.     What care is needed at home?   · Ask your doctor what you need to do when you go home. Make sure you ask questions if you do not understand what the doctor says.  · The doctor may want you to keep your wound covered as it heals. You may want to use a thin layer of antibiotic ointment to help keep the wound moist. This will also keep the dressing from sticking to the wound.  · After 24 hours, you can gently wash the wound with soap and water. Pat dry and put on a clean dressing. A telfa pad will not stick to the wound.  · Change your dressing once a day or every other day.  · Always wash your hands before and after touching the wound.  · Each time you change the dressing, look closely at the wound to be sure it is healing the right way. The wound may have a yellowish discharge, and this is normal.  · Avoid picking the scab or scratching the site which may cause more irritation.  · Do not soak in water or swim with an open wound.  · Do not rub the wound or take off any small strips of tape.  What follow-up care is needed?   Your doctor may ask you to make visits to the office to check on your progress. Be sure to keep these visits.  What drugs may be needed?   The doctor may order drugs to:  · Help with pain  · Prevent or fight an infection  You may need to have a tetanus shot.  Will physical activity be limited?   You may need to limit your activity until your wound is fully healed. Talk to your doctor about the right amount of activity for you or when it is safe to return to sports.  What problems could happen?   · Bleeding  · Infection  · Scarring  · Poor healing  When do I need to call the doctor?   · Signs of infection. These  include a fever of 100.4°F (38°C) or higher, chills, or wound that will not heal.  · The pain in and around the area gets much worse.  · There is a bad smell or pus (thick yellow, green, or gray fluid) coming from your wound.  · You notice a crunchy feeling or blisters in the skin around the wound.  · The redness around your wound gets bigger or is spreading up your arm or leg.  · Fluid that is not pus drains from your wound.  · Your swelling doesnt improve or gets worse.  Teach Back: Helping You Understand   The Teach Back Method helps you understand the information we are giving you. After you talk with the staff, tell them in your own words what you learned. This helps to make sure the staff has described each thing clearly. It also helps to explain things that may have been confusing. Before going home, make sure you can do these:  · I can tell you about my wound.  · I can tell you how to care for my wound.  · I can tell you what I will do if I have swelling, redness, or warmth around my wound.  Where can I learn more?   American Academy of Pediatrics  https://www.healthychildren.org/English/health-issues/injuries-emergencies/Pages/Treating-Cuts.aspx   Better Health Channel  https://www.betterhealth.[place].gov.au/health/conditionsandtreatments/skin-cuts-and-abrasions   Kids Health  http://kidshealth.org/en/teens/wounds.html?ref=search   Last Reviewed Date   2021-10-08  Consumer Information Use and Disclaimer   This information is not specific medical advice and does not replace information you receive from your health care provider. This is only a brief summary of general information. It does NOT include all information about conditions, illnesses, injuries, tests, procedures, treatments, therapies, discharge instructions or life-style choices that may apply to you. You must talk with your health care provider for complete information about your health and treatment options. This information should not be used to  decide whether or not to accept your health care providers advice, instructions or recommendations. Only your health care provider has the knowledge and training to provide advice that is right for you.  Copyright   Copyright © 2021 [employer] and its affiliates and/or licensors. All rights reserved.

## 2022-02-21 RX ORDER — AMOXICILLIN AND CLAVULANATE POTASSIUM 875; 125 MG/1; MG/1
1 TABLET, FILM COATED ORAL EVERY 12 HOURS
Qty: 20 TABLET | Refills: 0 | Status: SHIPPED | OUTPATIENT
Start: 2022-02-21 | End: 2022-03-03 | Stop reason: SDUPTHER

## 2022-02-21 NOTE — PROGRESS NOTES
TOMASA Maldonado   [facility] - WOUND CARE  [address] [facility] [place] [address]  [phone]      PATIENT NAME: Deborah Sotelo  : 1960  DATE: 22  MRN: [id number]      Billing Provider: TOMASA Maldonado  Level of Service:   Patient PCP Information     Provider PCP Type    Primary Doctor No General          Reason for Visit / Chief Complaint: Non-healing Wound Follow Up (Right foot)       History of Present Illness / Problem Focused Workflow     Deborah Sotelo is a 61 y.o. female who presents to clinic for follow up on chronic-non healing wound on the right great toe and review results of culture. Wound has increased in depth since last visit and continues to have callus around wound. Patient is compliant with current treatment plan.  Pertinent factors that delay wound healing include multiple co-morbidities, poor vascular supply, diabetes, decreased granulation tissue, undermining and no protective sensation. Past interventions include debridements, antibiotics, and daily dressing changes. Denies fever and chills.             Review of Systems     Review of Systems   Constitutional: Negative for activity change, chills and fever.   Respiratory: Negative for chest tightness and shortness of breath.    Cardiovascular: Positive for leg swelling. Negative for chest pain and palpitations.   Musculoskeletal: Positive for arthralgias and joint swelling.   Skin: Positive for wound.        See wound assessment   Neurological: Positive for weakness and numbness.   Psychiatric/Behavioral: Negative for agitation, behavioral problems, confusion and self-injury.       Medical / Social / Family History     Past Medical History:   Diagnosis Date    Diabetes mellitus, type 2     Hyperlipidemia     Hypertension     Obesity     Stroke        Past Surgical History:   Procedure Laterality Date    AMPUTATION      APPENDECTOMY      EYE SURGERY         Social History  Ms. Cabrera  Deepak  reports that she has never smoked. She has never used smokeless tobacco. She reports current alcohol use. She reports that she does not use drugs.    Family History  Ms.'s Deborah Sotelo family history includes Appendicitis in her father; Asthma in her sister; Cancer in her mother; Diabetes in her brother.    Medications and Allergies     Medications  Outpatient Medications Marked as Taking for the 2/17/22 encounter (Office Visit) with TOMASA Maldonado   Medication Sig Dispense Refill    amLODIPine (NORVASC) 5 MG tablet Take 1 tablet (5 mg total) by mouth once daily. 30 tablet 2    atorvastatin (LIPITOR) 80 MG tablet TAKE 1 TABLET BY MOUTH AT BEDTIME ~~DO NOT EAT GRAPEFRUIT OR DRINK GRAPEFRUIT JUICE WHILE TAKING THIS MEDICATION~~ 30 tablet 2    insulin asp prt-insulin aspart, NovoLOG 70/30, (NOVOLOG 70/30) 100 unit/mL (70-30) Soln INJECT 50 UNITS SUB-Q EACH MORNING AND 30 UNITS SUB-Q EACH EVENING 30 mL 5    LANTUS U-100 INSULIN 100 unit/mL injection Inject 20 Units into the skin once daily. Take at night 6 mL 2    losartan (COZAAR) 100 MG tablet Take 1 tablet (100 mg total) by mouth every evening. 30 tablet 2    omeprazole (PRILOSEC) 20 MG capsule Take 1 capsule (20 mg total) by mouth once daily. 30 capsule 2       Allergies  Review of patient's allergies indicates:   Allergen Reactions    Aspirin      Other reaction(s): UPSET STOMACH     Bactrim ds [sulfamethoxazole-trimethoprim] Itching       Physical Examination     Vitals:    02/17/22 1030   BP: 111/69   Pulse: 98   Resp: 20   Temp: 97.3 °F (36.3 °C)     Physical Exam  Vitals and nursing note reviewed.   Constitutional:       Appearance: Normal appearance.   HENT:      Head: Normocephalic.   Cardiovascular:      Rate and Rhythm: Normal rate and regular rhythm.      Pulses: Normal pulses.      Heart sounds: Normal heart sounds.   Pulmonary:      Effort: Pulmonary effort is normal. No respiratory distress.   Chest:      Chest wall: No  tenderness.   Musculoskeletal:         General: Swelling present.      Right lower leg: Edema present.   Skin:     General: Skin is warm and dry.      Capillary Refill: Capillary refill takes 2 to 3 seconds.      Comments: See wound assessment    Neurological:      General: No focal deficit present.      Mental Status: She is alert and oriented to person, place, and time. Mental status is at baseline.   Psychiatric:         Mood and Affect: Mood normal.         Behavior: Behavior normal.         Thought Content: Thought content normal.         Judgment: Judgment normal.         Assessment and Plan      1. Ulcer of right foot with fat layer exposed    2. Hypertension, unspecified type           Wound 01/18/21 1051 Diabetic Ulcer Right lateral Plantar #1 (Active)   01/18/21 1051    Pre-existing: Yes   Primary Wound Type: Diabetic ulc   Side: Right   Orientation: lateral   Location: Plantar   Wound Number: #1   Ankle-Brachial Index:    Pulses: normal   Removal Indication and Assessment:    Wound Outcome:    (Retired) Wound Type:    (Retired) Wound Length (cm):    (Retired) Wound Width (cm):    (Retired) Depth (cm):    Wound Description (Comments):    Removal Indications:    Wound Image      02/17/22 1033   Dressing Appearance Dry;Intact;Moist drainage 02/17/22 1033   Drainage Amount Moderate 02/17/22 1033   Drainage Characteristics/Odor Serosanguineous 02/17/22 1033   Appearance Pink;Tan;Moist;Granulating 02/17/22 1033   Tissue loss description Full thickness 02/17/22 1033   Black (%), Wound Tissue Color 0 % 02/17/22 1033   Red (%), Wound Tissue Color 90 % 02/17/22 1033   Yellow (%), Wound Tissue Color 10 % 02/17/22 1033   Periwound Area Moist 02/17/22 1033   Wound Edges Callused 02/17/22 1033   Johnson Classification (diabetic foot ulcers only) Grade 2 02/17/22 1033   Wound Length (cm) 0.3 cm 02/17/22 1033   Wound Width (cm) 0.5 cm 02/17/22 1033   Wound Depth (cm) 0.5 cm 02/17/22 1033   Wound Volume (cm^3) 0.075 cm^3  02/17/22 1033   Wound Surface Area (cm^2) 0.15 cm^2 02/17/22 1033   Care Cleansed with:;Antimicrobial agent;Debrided 02/17/22 1033   Dressing Applied;Methylene blue/gentian violet;Gauze;Rolled gauze 02/17/22 1033   Dressing Change Due 02/18/22 02/17/22 1033         Active Problem List with Overview Notes    Diagnosis Date Noted    Acute pain of left shoulder 01/26/2022    Decreased range of motion of left shoulder 01/26/2022    Weakness of left arm 01/26/2022    Muscle spasm of left shoulder 01/18/2022    Gastroesophageal reflux disease 11/09/2021    Needs flu shot 11/09/2021    Ulcer of right foot with fat layer exposed 06/28/2021    Type 2 diabetes mellitus with neurologic complication, with long-term current use of insulin 03/19/2021    Lower extremity edema 03/19/2021    Hypertension 03/19/2021    Hyperlipidemia 03/19/2021    Embolic stroke involving left middle cerebral artery 02/12/2021         :    Plan:   Wound Treatment: Pack wound with hydrofera blue. Set up HH to assist with dressing changes.  You may remove all bandages and shower prior to dressing changes. Bathe with a mild soap such as Dove. Do not sure antibacterial soaps such as Dial. Irrigate the wound with tepid water for 5 minutes. Dry thoroughly.  Do not take tub baths or soak in a Jacuzzi or swimming pool.  If you are unable to shower, you may use wound  or saline wash to cleanse the wound.  Elevate your leg above your heart for 30 to 45 minutes twice daily.  Elevated glucose will interfere with the healing process. Carbohydrates should be consumed sparingly and avoid anything with white flour, white sugar, white rice, white pasta, and white bread. Use whole wheat whole grain products instead.  If you are on steroids or immunosuppressants, understand that this will delay the healing process.  Monitor for signs and symptoms of infection including fever, chills, purulent drainage, increased pain, swelling, or redness and notify  clinic at [phone]  Lifestyle Modifications: follow a low fat, low cholesterol diet, attempt to lose weight, decrease or avoid alcohol intake, reduce salt in diet and cooking, improve dietary compliance and continue current medications  Follow up in 3 weeks, MRI pending.  Start augmentin as prescribed.       Problem List Items Addressed This Visit        Derm    Ulcer of right foot with fat layer exposed - Primary    Relevant Orders    MRI Foot (Midfoot) Right W W/O Contrast    Comprehensive Metabolic Panel       Cardiac/Vascular    Hypertension    Relevant Orders    Comprehensive Metabolic Panel          Future Appointments   Date Time Provider Department Center   2/22/2022 10:45 AM Trevor Caal, PTA [facility]    2/24/2022 10:45 AM Trevor Caal, PTA [facility]    3/1/2022 10:00 AM Araceli Zhu, PT [facility]    3/2/2022  1:00 PM ROOM 1, [facility] WOUND [facility]    3/2/2022  2:30 PM [facility] MRI1 [facility]    3/3/2022 10:00 AM Dinora H Eaves, PT [facility]    3/8/2022 10:00 AM Dinora H Eaves, PT [facility]   3/10/2022 10:00 AM Dinora H Eaves, PT [facility]             Signature:  MARIO MaldonadoP  [facility] - WOUND CARE  [address]  [place] [address]  [phone]    Date of encounter: 2/17/22

## 2022-02-22 ENCOUNTER — CLINICAL SUPPORT (OUTPATIENT)
Dept: REHABILITATION | Facility: HOSPITAL | Age: 62
End: 2022-02-22
Payer: MEDICAID

## 2022-02-22 DIAGNOSIS — R29.898 WEAKNESS OF LEFT ARM: ICD-10-CM

## 2022-02-22 DIAGNOSIS — M25.612 DECREASED RANGE OF MOTION OF LEFT SHOULDER: ICD-10-CM

## 2022-02-22 DIAGNOSIS — M25.512 ACUTE PAIN OF LEFT SHOULDER: Primary | ICD-10-CM

## 2022-02-22 PROCEDURE — 97110 THERAPEUTIC EXERCISES: CPT | Mod: CQ

## 2022-02-22 NOTE — PROGRESS NOTES
Physical Therapy Treatment Note     Name: Deborah Sotelo  Clinic Number: [id number]    Therapy Diagnosis:        Encounter Diagnosis   Name Primary?    Muscle spasm of left shoulder        Physician: Antonia Baer MD     Physician Orders: PT Eval and Treat   Medical Diagnosis from Referral: Left Shoulder Pain  Evaluation Date: 1/26/2022  Updated Plan of Care Due : 2/25/2022  Authorization Period Expiration: 1/18/2023  Plan of Care Expiration: 3/11/2022  Visit # / Visits authorized: 5/ 31 (including eval)       PTA Visit #: 4    Time In: 10:45 am  Time Out: 11:35 am  Total Billable Time: 50 minutes    Precautions: Standard  Functional Level Prior to Evaluation: n/a    Subjective     Pt reports: she is sore in her L shoulder. Pt was able to wash her hair and brush it this morning. Pt reports her neuropathy in her feet prevents her from performing standing exercises.   She was compliant with home exercise program.  Response to previous treatment: sore  Functional change: n/a    Pain: 6/10 upon arrival today  Location: left shoulder      Objective     Deborah received therapeutic exercises to develop strength, endurance, ROM, flexibility and posture for 45 minutes including:  Shoulder Exercises     UBE 10 min (5 fwd and 5 bckwd)   Bilateral upper trap stretch  4x15s   Pulleys  5 minutes flexion and abduction    Table slides into flexion and abd 20x ea with 5SH   Seated scapular retractions  2x10 with 5SH   supine cane flexion 30x with 2#   Supine chest press 30x with 2#   Seated scapular depressions With handles 30x   Seated row  Blue 30x   Cybex Rows - Wide  --   Cybex Shoulder Press  --   supine Lat Pull Down  with blue theraband (handles) 30x    supine serratus punch  20x 2#   Upper quarter ER stretch in supine B  30s x 2                           Shoulder Int Rot and Ext Rot  --   Shoulder Flex and Abduction  --   Shoulder Ext/Scap Retraction  --   Horizontal Abduction  --   Bilateral External Rotation  --                                              Shoulder Manual x 0 minutes     PROM with End Range Stretch  -   Capsular Mobilizations   -   Scapular PNF  -   Deep Tissue/Myofascial   bilateral upper trap and levator stretch with overpressure 4x15s ea    Manual scap retractions with 3SH  20x         Home Exercises Provided and Patient Education Provided     Education provided: postural awareness during exercises for scapular stabilization and mobility to avoid shoulder hike.    Written Home Exercises Provided: Patient instructed to cont prior HEP.  Exercises were reviewed and Deborah was able to demonstrate them prior to the end of the session.  Deborah demonstrated good  understanding of the education provided.     See EMR under Patient Instructions for exercises provided prior visit.    Assessment     Pt arrived today with 5-6/10 p! In L shoulder. Pt tolerated given UE therex today with minimal c/o's p! Pt unable to tolerate any standing exercises for upper extremity due to severity of neuropathy in bilateral feet that as caused her to experience balance deficits. Pt only performed exercises seated and supine. Pt is able to reach overhead better now but struggles past 90 degrees due to p!  fatigue noted at end of repetitions today. Pt required verbal and manual cues to perform ex's correctly. Pt to return 2/26 where we will progress at pt's tolerance.      Deborah is a 61 y.o. female referred to outpatient Physical Therapy with a medical diagnosis of Left Shoulder Pain. Patient fell approx 1 year ago and injured her Left arm. She was able to tolerate the pain initially, but recently her Left shoulder pain has increased and she has had increasing difficulty being able to use that arm. Patient is unable to lift the arm to 90 degrees and hikes her shoulder to get close to 90 degrees. She has been having increased pain in the upper trap due to over-activation of the upper trap. Special tests indicate possible supraspinatus tear.  Feel patient will benefit from referral to Ortho MD and possible MRI in the future if her pain and dysfunction persist following Therapy intervention.        Deborah Is progressing well towards her goals.   Pt prognosis is Good.     Pt will continue to benefit from skilled outpatient physical therapy to address the deficits listed in the problem list box on initial evaluation, provide pt/family education and to maximize pt's level of independence in the home and community environment.     Pt's spiritual, cultural and educational needs considered and pt agreeable to plan of care and goals.     Anticipated barriers to physical therapy: balance deficits prohibit pt from tolerating long periods of standing for ex's. Pt has to sit down to complete.     Goals:    Short Term Goals   1. Independent with Home Exercise Program   2. Increase Left Shoulder Flexion and Abduction Active Range of Motion to 110 Degrees  3. Increase Left Shoulder Internal Rotation and External Rotation Range of Motion to 45 Degrees  4. Increase Left Shoulder Strength to 3/5   5. Decrease complaints of Left Shoulder Pain to 5/10 with Activity      Long Term Goals :  1.Patient will perform 30 minutes of Activity with use of Left Shoulder with Pain Less than or Equal to 4/10  2. Increase Active Range of Motion to Left Shoulder to 120 Degrees for Flexion and Abduction   3. Increase Left Shoulder and Scapular Strength to 3+/5  4. Referral to Ortho MD if pain and shoulder dysfunction persist    Plan   Plan of care Certification: 1/26/2022 to 3/11/2022.     Outpatient Physical Therapy 2 times weekly for 6 weeks to include the following interventions: Electrical Stimulation to decrease pain and inflammation as needed, Manual Therapy, Moist Heat/ Ice, Neuromuscular Re-ed, Patient Education and Therapeutic Exercise.       Trevor Caal, PTA  2/22/2022

## 2022-03-01 ENCOUNTER — DOCUMENTATION ONLY (OUTPATIENT)
Dept: REHABILITATION | Facility: HOSPITAL | Age: 62
End: 2022-03-01
Payer: MEDICAID

## 2022-03-01 DIAGNOSIS — M25.512 ACUTE PAIN OF LEFT SHOULDER: ICD-10-CM

## 2022-03-03 ENCOUNTER — OFFICE VISIT (OUTPATIENT)
Dept: WOUND CARE | Facility: CLINIC | Age: 62
End: 2022-03-03
Payer: MEDICAID

## 2022-03-03 VITALS
HEART RATE: 97 BPM | RESPIRATION RATE: 20 BRPM | DIASTOLIC BLOOD PRESSURE: 81 MMHG | SYSTOLIC BLOOD PRESSURE: 135 MMHG | TEMPERATURE: 98 F

## 2022-03-03 DIAGNOSIS — R60.0 LOWER EXTREMITY EDEMA: Primary | ICD-10-CM

## 2022-03-03 DIAGNOSIS — L97.512 ULCER OF RIGHT FOOT WITH FAT LAYER EXPOSED: ICD-10-CM

## 2022-03-03 PROCEDURE — 11043 PR DEBRIDEMENT, SKIN, SUB-Q TISSUE,MUSCLE,=<20 SQ CM: ICD-10-PCS | Mod: S$PBB,,, | Performed by: NURSE PRACTITIONER

## 2022-03-03 PROCEDURE — 99499 UNLISTED E&M SERVICE: CPT | Mod: S$PBB,,, | Performed by: NURSE PRACTITIONER

## 2022-03-03 PROCEDURE — 99214 OFFICE O/P EST MOD 30 MIN: CPT | Mod: PBBFAC,25 | Performed by: NURSE PRACTITIONER

## 2022-03-03 PROCEDURE — 11043 DBRDMT MUSC&/FSCA 1ST 20/<: CPT | Mod: PBBFAC | Performed by: NURSE PRACTITIONER

## 2022-03-03 PROCEDURE — 11043 DBRDMT MUSC&/FSCA 1ST 20/<: CPT | Mod: S$PBB,,, | Performed by: NURSE PRACTITIONER

## 2022-03-03 PROCEDURE — 99499 NO LOS: ICD-10-PCS | Mod: S$PBB,,, | Performed by: NURSE PRACTITIONER

## 2022-03-03 RX ORDER — AMOXICILLIN AND CLAVULANATE POTASSIUM 875; 125 MG/1; MG/1
1 TABLET, FILM COATED ORAL EVERY 12 HOURS
Qty: 20 TABLET | Refills: 0 | Status: SHIPPED | OUTPATIENT
Start: 2022-03-03 | End: 2022-03-13

## 2022-03-03 NOTE — PROGRESS NOTES
Debridement Performed for Assessment: Wound# 1  Performed By: Provider: Felisha Love NP  Assistant:    Debridement: Surgical    Photo taken post procedure:    Time-Out Taken: Yes  Level: Skin/Subcutaneous Tissue/ Muscle  Post Debridement Measurements  Length: (cm) 1.2  Width: (cm) 1.4  Depth: (cm) 0.3      Area: (cm²) 1.68  Percent Debrided: 100%  Total Area Debrided: (sq cm)     Tissue and other material debrided:  Adipose, Dermis, Epidermis, Subcutaneous  Devitalized Tissue Debrided:Biofilm, callus  Instrument: Curette  Bleeding: Moderate  Hemostasis Achieved: Pressure  Procedural Pain: Insensate  Post Procedural Pain: Insensate  Response to Treatment: Procedure was tolerated well    Devitalized materials/tissue Removed  the following was removed during debridement  subcutaneous      Post Debridement Diagnosis chronic wound  Post debridement diagnosis  Same as Pre-operative debridement diagnosis, No Complications noted.      Grafts or implants applied  Was a graft or implant applied?  No      Procedure assistant  Procedure assisted by:  Cici Duvall LPN  Assistant is the same as nurse listed above      Complications related to procedure  Did any complication occure during procedure?  No complications noted during or after procedure.      Specimen  Specimen collect during procedure?  No specimen collected      Anaesthesia:  Anesthesia used  None      Blood Loss:  Blood loss during procedure  less than 5 cc

## 2022-03-03 NOTE — PROGRESS NOTES
TOMASA Maldonado   [facility] - WOUND CARE  [address] [facility] [address]  [phone]      PATIENT NAME: Deborah Sotelo  : 1960  DATE: 3/3/22  MRN: [id number]      Billing Provider: TOMASA Maldonado  Level of Service:   Patient PCP Information     Provider PCP Type    Primary Doctor No General          Reason for Visit / Chief Complaint: Non-healing Wound Follow Up and Diabetic Foot Ulcer (Right foot)       History of Present Illness / Problem Focused Workflow     Deborah Sotelo is a 61 y.o. female who presents to clinic for follow up on chronic-non healing wound on right TMA. Patient is non-compliant with off-loading. She also reports difficulty in getting supplies for dressing changes. She had MRI yesterday that was suspicious for phlegomn. Patient's wound and surrounding skin remain consistent with previous exams. Foot has some swelling and minimal redness. No change since last appointment. Pertinent factors that delay wound healing include multiple co-morbidities, poor vascular supply, diabetes, edema, heavy drainage, decreased granulation tissue, tract(s), undermining and no protective sensation. Denies fever and chills.          Review of Systems     Review of Systems   Constitutional: Negative for activity change, chills and fever.   Respiratory: Negative for chest tightness and shortness of breath.    Cardiovascular: Positive for leg swelling. Negative for chest pain and palpitations.   Musculoskeletal: Positive for arthralgias and joint swelling.   Skin: Positive for wound.        See wound assessment   Neurological: Positive for weakness and numbness.   Psychiatric/Behavioral: Negative for agitation, behavioral problems, confusion and self-injury.       Medical / Social / Family History     Past Medical History:   Diagnosis Date    Diabetes mellitus, type 2     Hyperlipidemia     Hypertension     Obesity     Stroke        Past Surgical History:    Procedure Laterality Date    AMPUTATION      APPENDECTOMY      EYE SURGERY         Social History  Ms. Deborah Sotelo  reports that she has never smoked. She has never used smokeless tobacco. She reports current alcohol use. She reports that she does not use drugs.    Family History  Ms.'srinath Sotelo family history includes Appendicitis in her father; Asthma in her sister; Cancer in her mother; Diabetes in her brother.    Medications and Allergies     Medications  Outpatient Medications Marked as Taking for the 3/3/22 encounter (Office Visit) with TOMASA Maldonado   Medication Sig Dispense Refill    amLODIPine (NORVASC) 5 MG tablet Take 1 tablet (5 mg total) by mouth once daily. 30 tablet 2    atorvastatin (LIPITOR) 80 MG tablet TAKE 1 TABLET BY MOUTH AT BEDTIME ~~DO NOT EAT GRAPEFRUIT OR DRINK GRAPEFRUIT JUICE WHILE TAKING THIS MEDICATION~~ 30 tablet 2    insulin asp prt-insulin aspart, NovoLOG 70/30, (NOVOLOG 70/30) 100 unit/mL (70-30) Soln INJECT 50 UNITS SUB-Q EACH MORNING AND 30 UNITS SUB-Q EACH EVENING 30 mL 5    sodium hypochlorite 0.5 % (DAKIN'S SOLUTION) external solution Apply topically once daily. Pack wound with dakin's moistened nuguaze daily 473 mL 0       Allergies  Review of patient's allergies indicates:   Allergen Reactions    Aspirin      Other reaction(s): UPSET STOMACH     Bactrim ds [sulfamethoxazole-trimethoprim] Itching       Physical Examination     Vitals:    03/03/22 1324   BP: 135/81   Pulse: 97   Resp: 20   Temp: 98 °F (36.7 °C)     Physical Exam  Vitals and nursing note reviewed.   Constitutional:       Appearance: Normal appearance.   HENT:      Head: Normocephalic.   Cardiovascular:      Rate and Rhythm: Normal rate and regular rhythm.      Pulses: Normal pulses.      Heart sounds: Normal heart sounds.   Pulmonary:      Effort: Pulmonary effort is normal. No respiratory distress.   Chest:      Chest wall: No tenderness.   Musculoskeletal:         General: Swelling  and deformity present.      Right lower leg: Edema present.   Skin:     General: Skin is warm and dry.      Capillary Refill: Capillary refill takes more than 3 seconds.      Comments: See wound assessment    Neurological:      General: No focal deficit present.      Mental Status: She is alert and oriented to person, place, and time. Mental status is at baseline.   Psychiatric:         Mood and Affect: Mood normal.         Behavior: Behavior normal.         Thought Content: Thought content normal.         Judgment: Judgment normal.         Assessment and Plan      1. Lower extremity edema    2. Ulcer of right foot with fat layer exposed           Wound 01/18/21 1051 Diabetic Ulcer Right lateral Plantar #1 (Active)   01/18/21 1051    Pre-existing: Yes   Primary Wound Type: Diabetic ulc   Side: Right   Orientation: lateral   Location: Plantar   Wound Number: #1   Ankle-Brachial Index:    Pulses: normal   Removal Indication and Assessment:    Wound Outcome:    (Retired) Wound Type:    (Retired) Wound Length (cm):    (Retired) Wound Width (cm):    (Retired) Depth (cm):    Wound Description (Comments):    Removal Indications:    Wound Image    03/03/22 1613   Dressing Appearance Moist drainage;Intact 03/03/22 1613   Drainage Amount Small 03/03/22 1613   Drainage Characteristics/Odor Green 03/03/22 1613   Appearance Pink;Moist;Granulating;Tan;Fibrin 03/03/22 1613   Tissue loss description Full thickness 03/03/22 1613   Black (%), Wound Tissue Color 0 % 03/03/22 1613   Red (%), Wound Tissue Color 90 % 03/03/22 1613   Yellow (%), Wound Tissue Color 10 % 03/03/22 1613   Periwound Area Moist;Pale white 03/03/22 1613   Wound Edges Callused 03/03/22 1613   Johnson Classification (diabetic foot ulcers only) Grade 2 03/03/22 1613   Wound Length (cm) 0.4 cm 03/03/22 1613   Wound Width (cm) 0.6 cm 03/03/22 1613   Wound Depth (cm) 0.3 cm 03/03/22 1613   Wound Volume (cm^3) 0.072 cm^3 03/03/22 1613   Wound Surface Area (cm^2) 0.24  cm^2 03/03/22 1613   Tunneling (depth (cm)/location) 1400 1.3 cm 03/03/22 1613   Care Cleansed with:;Antimicrobial agent;Debrided 03/03/22 1613   Dressing Applied;Methylene blue/gentian violet;Gauze;Rolled gauze 03/03/22 1613   Dressing Change Due 03/04/22 03/03/22 1613         Active Problem List with Overview Notes    Diagnosis Date Noted    Acute pain of left shoulder 01/26/2022     Patient arrived for 1000 appt at around 0930. At 1005 the patient was placed on the pulleys for warm-up and stretching of left shoulder.  assist couple minutes later the patient was seen leaving her appt /s any word to staff.  Patient was found sitting in the lobby awaiting her ride home.  She would not express her reasons for leaving only that she was going home.  Patient's regular therapist was notified.      Decreased range of motion of left shoulder 01/26/2022    Weakness of left arm 01/26/2022    Muscle spasm of left shoulder 01/18/2022    Gastroesophageal reflux disease 11/09/2021    Needs flu shot 11/09/2021    Ulcer of right foot with fat layer exposed 06/28/2021    Type 2 diabetes mellitus with neurologic complication, with long-term current use of insulin 03/19/2021    Lower extremity edema 03/19/2021    Hypertension 03/19/2021    Hyperlipidemia 03/19/2021    Embolic stroke involving left middle cerebral artery 02/12/2021         :    Plan:    MRI reviewed today.   Foot and wound thoroughly examined, augmentin as previously discussed.   Elevate leg.   Avoid prolonged standing.   Off-loading shoes.                                Discussed s/s of infection in detail with patient and instructed patient to contact clinic if she develops any s/s of infection for immediate f/u.   RTC in 2 weeks.     Problem List Items Addressed This Visit        Derm    Ulcer of right foot with fat layer exposed       Other    Lower extremity edema - Primary          Future Appointments   Date Time Provider Department Center   3/8/2022  10:00 AM [facility]   3/8/2022  1:00 PM Don Fox DO [facility]   3/10/2022 10:00 AM [facility]   3/17/2022 10:00 AM TOMASA Maldonado [facility]            Signature:  TOMASA Maldonado  [facility] - WOUND CARE  [address]  [place] [address]  [phone]    Date of encounter: 3/3/22

## 2022-03-03 NOTE — PATIENT INSTRUCTIONS
Clean with dakin's solution or baby shampoo and water    Pack wound with hydrofera blue,leave enough out to remove,cover with dry gauze,wrap with meng and paper tape    Change daily and as needed    Elevate feet as much as possible    No prolong standing

## 2022-03-08 ENCOUNTER — OFFICE VISIT (OUTPATIENT)
Dept: FAMILY MEDICINE | Facility: CLINIC | Age: 62
End: 2022-03-08
Payer: MEDICAID

## 2022-03-08 VITALS
TEMPERATURE: 98 F | OXYGEN SATURATION: 92 % | HEART RATE: 101 BPM | WEIGHT: 293 LBS | HEIGHT: 68 IN | DIASTOLIC BLOOD PRESSURE: 76 MMHG | SYSTOLIC BLOOD PRESSURE: 123 MMHG | RESPIRATION RATE: 20 BRPM | BODY MASS INDEX: 44.41 KG/M2

## 2022-03-08 DIAGNOSIS — Z79.4 TYPE 2 DIABETES MELLITUS WITH FOOT ULCER, WITH LONG-TERM CURRENT USE OF INSULIN: ICD-10-CM

## 2022-03-08 DIAGNOSIS — L97.509 TYPE 2 DIABETES MELLITUS WITH FOOT ULCER, WITH LONG-TERM CURRENT USE OF INSULIN: ICD-10-CM

## 2022-03-08 DIAGNOSIS — E11.621 TYPE 2 DIABETES MELLITUS WITH FOOT ULCER, WITH LONG-TERM CURRENT USE OF INSULIN: ICD-10-CM

## 2022-03-08 DIAGNOSIS — N30.00 ACUTE CYSTITIS WITHOUT HEMATURIA: Primary | ICD-10-CM

## 2022-03-08 DIAGNOSIS — Z91.09 ALLERGY TO ENVIRONMENTAL FACTORS: ICD-10-CM

## 2022-03-08 DIAGNOSIS — S46.812A TRAUMATIC TEAR OF SUPRASPINATUS TENDON OF LEFT SHOULDER, INITIAL ENCOUNTER: ICD-10-CM

## 2022-03-08 DIAGNOSIS — I10 HYPERTENSION, UNSPECIFIED TYPE: ICD-10-CM

## 2022-03-08 DIAGNOSIS — R74.8 ELEVATED ALKALINE PHOSPHATASE LEVEL: ICD-10-CM

## 2022-03-08 DIAGNOSIS — M25.512 ACUTE PAIN OF LEFT SHOULDER: ICD-10-CM

## 2022-03-08 LAB
BACTERIA #/AREA URNS HPF: ABNORMAL /HPF
BILIRUB UR QL STRIP: NEGATIVE
CLARITY UR: ABNORMAL
COLOR UR: YELLOW
EST. AVERAGE GLUCOSE BLD GHB EST-MCNC: 170 MG/DL
GLUCOSE UR STRIP-MCNC: 100 MG/DL
HBA1C MFR BLD HPLC: 7.7 % (ref 4.5–6.6)
KETONES UR STRIP-SCNC: NEGATIVE MG/DL
LEUKOCYTE ESTERASE UR QL STRIP: ABNORMAL
NITRITE UR QL STRIP: NEGATIVE
PH UR STRIP: 6 PH UNITS
PROT UR QL STRIP: ABNORMAL
RBC # UR STRIP: ABNORMAL /UL
RBC #/AREA URNS HPF: ABNORMAL /HPF
SP GR UR STRIP: >=1.03
SQUAMOUS #/AREA URNS LPF: ABNORMAL /LPF
UROBILINOGEN UR STRIP-ACNC: 0.2 MG/DL
WBC #/AREA URNS HPF: ABNORMAL /HPF

## 2022-03-08 PROCEDURE — 87077 CULTURE AEROBIC IDENTIFY: CPT | Mod: ,,, | Performed by: CLINICAL MEDICAL LABORATORY

## 2022-03-08 PROCEDURE — 81001 URINALYSIS, REFLEX TO URINE CULTURE: ICD-10-PCS | Mod: ,,, | Performed by: CLINICAL MEDICAL LABORATORY

## 2022-03-08 PROCEDURE — 81001 URINALYSIS AUTO W/SCOPE: CPT | Mod: ,,, | Performed by: CLINICAL MEDICAL LABORATORY

## 2022-03-08 PROCEDURE — 87086 URINE CULTURE/COLONY COUNT: CPT | Mod: ,,, | Performed by: CLINICAL MEDICAL LABORATORY

## 2022-03-08 PROCEDURE — 99213 OFFICE O/P EST LOW 20 MIN: CPT | Mod: GC,,, | Performed by: FAMILY MEDICINE

## 2022-03-08 PROCEDURE — 99213 PR OFFICE/OUTPT VISIT, EST, LEVL III, 20-29 MIN: ICD-10-PCS | Mod: GC,,, | Performed by: FAMILY MEDICINE

## 2022-03-08 PROCEDURE — 83036 HEMOGLOBIN GLYCOSYLATED A1C: CPT | Mod: ,,, | Performed by: CLINICAL MEDICAL LABORATORY

## 2022-03-08 PROCEDURE — 87086 CULTURE, URINE: ICD-10-PCS | Mod: ,,, | Performed by: CLINICAL MEDICAL LABORATORY

## 2022-03-08 PROCEDURE — 87186 CULTURE, URINE: ICD-10-PCS | Mod: ,,, | Performed by: CLINICAL MEDICAL LABORATORY

## 2022-03-08 PROCEDURE — 83036 HEMOGLOBIN A1C: ICD-10-PCS | Mod: ,,, | Performed by: CLINICAL MEDICAL LABORATORY

## 2022-03-08 PROCEDURE — 87186 SC STD MICRODIL/AGAR DIL: CPT | Mod: ,,, | Performed by: CLINICAL MEDICAL LABORATORY

## 2022-03-08 PROCEDURE — 87077 CULTURE, URINE: ICD-10-PCS | Mod: ,,, | Performed by: CLINICAL MEDICAL LABORATORY

## 2022-03-08 RX ORDER — LORATADINE 10 MG/1
10 TABLET ORAL DAILY
Qty: 90 TABLET | Refills: 0 | Status: SHIPPED | OUTPATIENT
Start: 2022-03-08 | End: 2022-06-07 | Stop reason: SDUPTHER

## 2022-03-08 RX ORDER — CIPROFLOXACIN 500 MG/1
500 TABLET ORAL DAILY
Qty: 5 TABLET | Refills: 0 | Status: SHIPPED | OUTPATIENT
Start: 2022-03-08 | End: 2022-03-13

## 2022-03-08 NOTE — ASSESSMENT & PLAN NOTE
Patient was advised to come back  Currently 142  On 11/21 it was 139  This could be due to an old age, from bone vs liver- will monitor on the next visit in 3 months and do a CMP in order to follow the trend. If necessary will order GGT

## 2022-03-08 NOTE — PROGRESS NOTES
Subjective:       Patient ID: Deborah Sotelo is a 61 y.o. female.    Chief Complaint: Follow-up, Right Eye Redness (Presented about 4-5 days ago. ), Urine Odor (Presented about 2 weeks ago. ), and Body Itching (All over. Presented about 2 weeks ago. )    60 yo female with PMHx HTN and DM2 with chronic foot ulcer came in for a follow-up visit. Patient reports keeping up with her wound care clinic appointment. She reports her blood pressure is controlled with the current regimen averaging around 120/70 at home and on her wound care visits weekly. She reports compliance with her diabetes medications. Last A1C was done in 11/2021 and showed an A1c of 10.5. Patient reports having strong smelling urine, with mild discomfort in the genital. She reports mild left flank pain and discomfort of the lower abdominal area which seems to be bladder discomfort. She denies N, V, fever,  discharge, pain or burning with urination, blood or pus in urine. These symptoms has been going on for 2 weeks. Her last UTI was in 12/2020 and cultures came back for E. Coli. She also complains of red eyes, sniffing and itching of skin once in a while which seem to be a seasonal allergy. Upon reviewing labs it was determined that alk phos was elevated at 139 back in 11/21 and it was elevated to 142 on most recent CMP 2 weeks ago.     Review of Systems   Constitutional: Negative for activity change, appetite change, chills, fatigue and fever.   HENT: Positive for sneezing. Negative for nasal congestion, postnasal drip, rhinorrhea, sinus pressure/congestion and sore throat.    Respiratory: Negative for cough, choking, chest tightness and shortness of breath.    Cardiovascular: Negative for chest pain, palpitations, leg swelling and claudication.   Gastrointestinal: Negative for abdominal distention, abdominal pain, blood in stool, constipation, diarrhea, nausea and vomiting.   Genitourinary: Positive for flank pain. Negative for bladder incontinence,  decreased urine volume, difficulty urinating, dyspareunia, dysuria, genital sores, hematuria, nocturia, urgency, vaginal bleeding, vaginal discharge, vaginal pain and vaginal dryness.   Integumentary:  Negative for breast mass and breast discharge.   Neurological: Negative for seizures, light-headedness, numbness, headaches, disturbances in coordination and coordination difficulties.   Hematological: Negative for adenopathy.   Psychiatric/Behavioral: Negative for agitation.   All other systems reviewed and are negative.  Breast: Negative for mass        Objective:      Physical Exam  Vitals and nursing note reviewed.   Constitutional:       General: She is not in acute distress.     Appearance: Normal appearance. She is normal weight. She is not ill-appearing or toxic-appearing.   HENT:      Head: Normocephalic.      Right Ear: External ear normal.      Left Ear: External ear normal.      Nose: Nose normal. No congestion or rhinorrhea.      Mouth/Throat:      Mouth: Mucous membranes are moist.      Pharynx: Oropharynx is clear. No oropharyngeal exudate or posterior oropharyngeal erythema.   Eyes:      General:         Right eye: No discharge.         Left eye: No discharge.      Conjunctiva/sclera: Conjunctivae normal.   Cardiovascular:      Rate and Rhythm: Normal rate and regular rhythm.      Pulses: Normal pulses.      Heart sounds: Normal heart sounds. No murmur heard.  Pulmonary:      Effort: Pulmonary effort is normal. No respiratory distress.      Breath sounds: Normal breath sounds.   Abdominal:      General: Bowel sounds are normal. There is no distension.      Tenderness: There is no abdominal tenderness.   Skin:     General: Skin is warm.   Neurological:      Mental Status: She is alert and oriented to person, place, and time.   Psychiatric:         Behavior: Behavior normal.         Assessment:       Problem List Items Addressed This Visit        Cardiac/Vascular    Hypertension     Currently  123/76  Continue the current regimen              Renal/    Acute cystitis without hematuria - Primary     UA  Urine culture  Cipro 500 mg daily x5 days  Patient was advised to go to ED if condition worsened, if she started having fever, sever back pain and blood in the urine           Relevant Orders    Urinalysis, Reflex to Urine Culture Urine, Clean Catch (Completed)    Urinalysis, Microscopic (Completed)    Urine culture (Completed)       Endocrine    Type 2 diabetes mellitus with foot ulcer, with long-term current use of insulin     Patient was educated regarding the importance of compliance with her treatment and eating a diabetic healthy diet.  A1C ordered           Relevant Orders    Hemoglobin A1C (Completed)       Orthopedic    Acute pain of left shoulder     PT note: Deborah is a 61 y.o. female referred to outpatient Physical Therapy with a medical diagnosis of Left Shoulder Pain. Patient fell approx 1 year ago and injured her Left arm. She was able to tolerate the pain initially, but recently her Left shoulder pain has increased and she has had increasing difficulty being able to use that arm. Patient is unable to lift the arm to 90 degrees and hikes her shoulder to get close to 90 degrees. She has been having increased pain in the upper trap due to over-activation of the upper trap. Special tests indicate possible supraspinatus tear. Feel patient will benefit from referral to Ortho MD and possible MRI in the future if her pain and dysfunction persist following Therapy intervention.     3/15/2022: Referral to Orthopedics - appreciate recommendations                 Other    Allergy to environmental factors     Loratadine 10 mg Daily PRN           Relevant Medications    loratadine (CLARITIN) 10 mg tablet    Elevated alkaline phosphatase level     Patient was advised to come back  Currently 142  On 11/21 it was 139  This could be due to an old age, from bone vs liver- will monitor on the next visit in 3  months and do a CMP in order to follow the trend. If necessary will order GGT              Other Visit Diagnoses     Traumatic tear of supraspinatus tendon of left shoulder, initial encounter              Plan:       Acute cystitis without hematuria  -     Urinalysis, Reflex to Urine Culture Urine, Clean Catch  -     ciprofloxacin HCl (CIPRO) 500 MG tablet; Take 1 tablet (500 mg total) by mouth once daily. for 5 days  Dispense: 5 tablet; Refill: 0  -     Urinalysis, Microscopic  -     Urine culture    Type 2 diabetes mellitus with foot ulcer, with long-term current use of insulin  -     Hemoglobin A1C; Future; Expected date: 03/08/2022    Allergy to environmental factors  -     loratadine (CLARITIN) 10 mg tablet; Take 1 tablet (10 mg total) by mouth once daily.  Dispense: 90 tablet; Refill: 0    Hypertension, unspecified type    Elevated alkaline phosphatase level    Traumatic tear of supraspinatus tendon of left shoulder, initial encounter    Acute pain of left shoulder

## 2022-03-08 NOTE — ASSESSMENT & PLAN NOTE
UA  Urine culture  Cipro 500 mg daily x5 days  Patient was advised to go to ED if condition worsened, if she started having fever, sever back pain and blood in the urine

## 2022-03-08 NOTE — ASSESSMENT & PLAN NOTE
Patient was educated regarding the importance of compliance with her treatment and eating a diabetic healthy diet.  A1C ordered

## 2022-03-10 LAB — UA COMPLETE W REFLEX CULTURE PNL UR: ABNORMAL

## 2022-03-12 DIAGNOSIS — N30.00 ACUTE CYSTITIS WITHOUT HEMATURIA: Primary | ICD-10-CM

## 2022-03-12 RX ORDER — NITROFURANTOIN 25; 75 MG/1; MG/1
100 CAPSULE ORAL 2 TIMES DAILY
Qty: 10 CAPSULE | Refills: 0 | Status: SHIPPED | OUTPATIENT
Start: 2022-03-12 | End: 2022-03-17 | Stop reason: SDUPTHER

## 2022-03-15 NOTE — ASSESSMENT & PLAN NOTE
PT note: Deborah is a 61 y.o. female referred to outpatient Physical Therapy with a medical diagnosis of Left Shoulder Pain. Patient fell approx 1 year ago and injured her Left arm. She was able to tolerate the pain initially, but recently her Left shoulder pain has increased and she has had increasing difficulty being able to use that arm. Patient is unable to lift the arm to 90 degrees and hikes her shoulder to get close to 90 degrees. She has been having increased pain in the upper trap due to over-activation of the upper trap. Special tests indicate possible supraspinatus tear. Feel patient will benefit from referral to Ortho MD and possible MRI in the future if her pain and dysfunction persist following Therapy intervention.     3/15/2022: Referral to Orthopedics - appreciate recommendations

## 2022-03-16 ENCOUNTER — TELEPHONE (OUTPATIENT)
Dept: OBSTETRICS AND GYNECOLOGY | Facility: CLINIC | Age: 62
End: 2022-03-16
Payer: MEDICAID

## 2022-03-16 NOTE — TELEPHONE ENCOUNTER
----- Message from Lourdes LITTLE Ramirez sent at 3/16/2022 11:51 AM CDT -----  [phone]  PT WANTS TO KNOW WHEN HER LAST ANNUAL WAS

## 2022-03-17 ENCOUNTER — OFFICE VISIT (OUTPATIENT)
Dept: WOUND CARE | Facility: CLINIC | Age: 62
End: 2022-03-17
Attending: FAMILY MEDICINE
Payer: MEDICAID

## 2022-03-17 VITALS
SYSTOLIC BLOOD PRESSURE: 136 MMHG | TEMPERATURE: 98 F | DIASTOLIC BLOOD PRESSURE: 89 MMHG | HEART RATE: 79 BPM | RESPIRATION RATE: 20 BRPM

## 2022-03-17 DIAGNOSIS — N30.00 ACUTE CYSTITIS WITHOUT HEMATURIA: ICD-10-CM

## 2022-03-17 DIAGNOSIS — L97.512 ULCER OF RIGHT FOOT WITH FAT LAYER EXPOSED: Primary | ICD-10-CM

## 2022-03-17 PROCEDURE — 87075 CULTR BACTERIA EXCEPT BLOOD: CPT | Mod: ,,, | Performed by: CLINICAL MEDICAL LABORATORY

## 2022-03-17 PROCEDURE — 87070 CULTURE, WOUND: ICD-10-PCS | Mod: ,,, | Performed by: CLINICAL MEDICAL LABORATORY

## 2022-03-17 PROCEDURE — 11043 DBRDMT MUSC&/FSCA 1ST 20/<: CPT | Mod: S$PBB,,, | Performed by: NURSE PRACTITIONER

## 2022-03-17 PROCEDURE — 87075 CULTURE, ANAEROBE: ICD-10-PCS | Mod: ,,, | Performed by: CLINICAL MEDICAL LABORATORY

## 2022-03-17 PROCEDURE — 11043 PR DEBRIDEMENT, SKIN, SUB-Q TISSUE,MUSCLE,=<20 SQ CM: ICD-10-PCS | Mod: S$PBB,,, | Performed by: NURSE PRACTITIONER

## 2022-03-17 PROCEDURE — 99499 UNLISTED E&M SERVICE: CPT | Mod: S$PBB,,, | Performed by: NURSE PRACTITIONER

## 2022-03-17 PROCEDURE — 99214 OFFICE O/P EST MOD 30 MIN: CPT | Mod: PBBFAC,25 | Performed by: NURSE PRACTITIONER

## 2022-03-17 PROCEDURE — 11043 DBRDMT MUSC&/FSCA 1ST 20/<: CPT | Mod: PBBFAC | Performed by: NURSE PRACTITIONER

## 2022-03-17 PROCEDURE — 99499 NO LOS: ICD-10-PCS | Mod: S$PBB,,, | Performed by: NURSE PRACTITIONER

## 2022-03-17 PROCEDURE — 87070 CULTURE OTHR SPECIMN AEROBIC: CPT | Mod: ,,, | Performed by: CLINICAL MEDICAL LABORATORY

## 2022-03-17 RX ORDER — NITROFURANTOIN 25; 75 MG/1; MG/1
100 CAPSULE ORAL 2 TIMES DAILY
Qty: 10 CAPSULE | Refills: 1 | Status: SHIPPED | OUTPATIENT
Start: 2022-03-17 | End: 2022-03-22

## 2022-03-17 NOTE — PATIENT INSTRUCTIONS
Clean with dakin's solution or baby shampoo and water     Pack wound with dakin's moist nu gauze,leave enough out to remove,cover with dry gauze,wrap with meng and paper tape     Change daily and as needed     Elevate feet as much as possible     No prolong standing

## 2022-03-17 NOTE — PROGRESS NOTES
Debridement Performed for Assessment: Wound# 1  Performed By: Provider: Felisha Sun NP  Assistant:    Debridement: Surgical    Photo taken post procedure:    Time-Out Taken: Yes  Level: Skin/Subcutaneous Tissue/ Muscle  Post Debridement Measurements  Length: (cm) 0.7   Width: (cm) 0.5  Depth: (cm) 2.0      Area: (cm²) 3.5  Percent Debrided: 100%  Total Area Debrided: (sq cm)     Tissue and other material debrided:  Adipose, Dermis, Epidermis, Subcutaneous  Devitalized Tissue Debrided:Biofilm, callus  Instrument: Curette  Bleeding: Moderate  Hemostasis Achieved: Pressure  Procedural Pain: Insensate  Post Procedural Pain: Insensate  Response to Treatment: Procedure was tolerated well    Devitalized materials/tissue Removed  the following was removed during debridement  subcutaneous      Post Debridement Diagnosis  Chronic wound  Post debridement diagnosis  Same as Pre-operative debridement diagnosis, No Complications noted.      Grafts or implants applied  Was a graft or implant applied?  No      Procedure assistant  Procedure assisted by: sandy Duvall LPN  Assistant is the same as nurse listed above      Complications related to procedure  Did any complication occure during procedure?  No complications noted during or after procedure.      Specimen  Specimen collect during procedure?  No specimen collected      Anaesthesia:  Anesthesia used  None      Blood Loss:  Blood loss during procedure  less than 5 cc

## 2022-03-19 LAB — MICROORGANISM SPEC CULT: NORMAL

## 2022-03-20 LAB — BACTERIA SPEC ANAEROBE CULT: NORMAL

## 2022-03-22 NOTE — PROGRESS NOTES
TOMASA Maldonado   [facility] - WOUND CARE  [address] [facility] [address]  [phone]      PATIENT NAME: Deborah Sotelo  : 1960  DATE: 3/17/22  MRN: [id number]      Billing Provider: TOMASA Maldonado  Level of Service:   Patient PCP Information     Provider PCP Type    Primary Doctor No General          Reason for Visit / Chief Complaint: Diabetic Foot Ulcer (Right foot)       History of Present Illness / Problem Focused Workflow     Deborah Sotelo is a 61 y.o. female who presents to clinic for follow up on chronic-non healing wound on right TMA. Patient reports increased pain and swelling in right foot since last visit. She also reports wound has increased drainage. Her foot is tender to palpation today. Pertinent factors that delay wound healing include multiple co-morbidities, poor vascular supply, diabetes, edema, heavy drainage, decreased granulation tissue, tract(s), undermining and no protective sensation. Denies fever and chills.          Review of Systems     Review of Systems   Constitutional: Negative for activity change, chills and fever.   Respiratory: Negative for chest tightness and shortness of breath.    Cardiovascular: Positive for leg swelling. Negative for chest pain and palpitations.   Musculoskeletal: Positive for arthralgias and joint swelling.   Skin: Positive for color change and wound.        See wound assessment   Neurological: Positive for weakness and numbness.   Psychiatric/Behavioral: Negative for agitation, behavioral problems, confusion and self-injury.       Medical / Social / Family History     Past Medical History:   Diagnosis Date    Diabetes mellitus, type 2     Hyperlipidemia     Hypertension     Obesity     Stroke        Past Surgical History:   Procedure Laterality Date    AMPUTATION      APPENDECTOMY      EYE SURGERY         Social History  Ms. Deborah Sotelo  reports that she has never smoked. She has never used  smokeless tobacco. She reports current alcohol use. She reports that she does not use drugs.    Family History  Ms.'s Deborah Sotelo family history includes Appendicitis in her father; Asthma in her sister; Cancer in her mother; Diabetes in her brother.    Medications and Allergies     Medications  Outpatient Medications Marked as Taking for the 3/17/22 encounter (Office Visit) with TOMASA Maldonado   Medication Sig Dispense Refill    amLODIPine (NORVASC) 5 MG tablet Take 1 tablet (5 mg total) by mouth once daily. 30 tablet 2    atorvastatin (LIPITOR) 80 MG tablet TAKE 1 TABLET BY MOUTH AT BEDTIME ~~DO NOT EAT GRAPEFRUIT OR DRINK GRAPEFRUIT JUICE WHILE TAKING THIS MEDICATION~~ 30 tablet 2    insulin asp prt-insulin aspart, NovoLOG 70/30, (NOVOLOG 70/30) 100 unit/mL (70-30) Soln INJECT 50 UNITS SUB-Q EACH MORNING AND 30 UNITS SUB-Q EACH EVENING 30 mL 5    loratadine (CLARITIN) 10 mg tablet Take 1 tablet (10 mg total) by mouth once daily. 90 tablet 0    sodium hypochlorite 0.5 % (DAKIN'S SOLUTION) external solution Apply topically once daily. Pack wound with dakin's moistened nuguaze daily 473 mL 0    [DISCONTINUED] nitrofurantoin, macrocrystal-monohydrate, (MACROBID) 100 MG capsule Take 1 capsule (100 mg total) by mouth 2 (two) times daily. for 5 days 10 capsule 0       Allergies  Review of patient's allergies indicates:   Allergen Reactions    Aspirin      Other reaction(s): UPSET STOMACH     Bactrim ds [sulfamethoxazole-trimethoprim] Itching       Physical Examination     Vitals:    03/17/22 1020   BP: 136/89   Pulse: 79   Resp: 20   Temp: 97.5 °F (36.4 °C)     Physical Exam  Vitals and nursing note reviewed.   Constitutional:       Appearance: Normal appearance.   HENT:      Head: Normocephalic.   Cardiovascular:      Rate and Rhythm: Normal rate and regular rhythm.      Pulses: Normal pulses.      Heart sounds: Normal heart sounds.   Pulmonary:      Effort: Pulmonary effort is normal. No respiratory  distress.   Chest:      Chest wall: No tenderness.   Musculoskeletal:         General: Swelling and deformity present.      Right lower leg: Edema present.   Skin:     General: Skin is warm and dry.      Capillary Refill: Capillary refill takes more than 3 seconds.      Comments: See wound assessment    Neurological:      General: No focal deficit present.      Mental Status: She is alert and oriented to person, place, and time. Mental status is at baseline.   Psychiatric:         Mood and Affect: Mood normal.         Behavior: Behavior normal.         Thought Content: Thought content normal.         Judgment: Judgment normal.         Assessment and Plan      1. Ulcer of right foot with fat layer exposed    2. Acute cystitis without hematuria           Wound 01/18/21 1051 Diabetic Ulcer Right lateral Plantar #1 (Active)   01/18/21 1051    Pre-existing: Yes   Primary Wound Type: Diabetic ulc   Side: Right   Orientation: lateral   Location: Plantar   Wound Number: #1   Ankle-Brachial Index:    Pulses: normal   Removal Indication and Assessment:    Wound Outcome:    (Retired) Wound Type:    (Retired) Wound Length (cm):    (Retired) Wound Width (cm):    (Retired) Depth (cm):    Wound Description (Comments):    Removal Indications:    Wound Image     03/17/22 1023   Dressing Appearance Dry;Intact;Moist drainage 03/17/22 1023   Drainage Amount Moderate 03/17/22 1023   Drainage Characteristics/Odor Serous 03/17/22 1023   Appearance Pink;Tan;Moist;Granulating;Slough 03/17/22 1023   Tissue loss description Full thickness 03/17/22 1023   Black (%), Wound Tissue Color 0 % 03/17/22 1023   Red (%), Wound Tissue Color 50 % 03/17/22 1023   Yellow (%), Wound Tissue Color 50 % 03/17/22 1023   Periwound Area Moist 03/17/22 1023   Wound Edges Open 03/17/22 1023   Johnson Classification (diabetic foot ulcers only) Grade 2 03/17/22 1023   Wound Length (cm) 0.6 cm 03/17/22 1023   Wound Width (cm) 0.6 cm 03/17/22 1023   Wound Depth (cm) 2  cm 03/17/22 1023   Wound Volume (cm^3) 0.72 cm^3 03/17/22 1023   Wound Surface Area (cm^2) 0.36 cm^2 03/17/22 1023   Care Cleansed with:;Antimicrobial agent 03/17/22 1023   Dressing Applied;Gauze;Rolled gauze 03/17/22 1023   Dressing Change Due 03/18/22 03/17/22 1023         Active Problem List with Overview Notes    Diagnosis Date Noted    Acute cystitis without hematuria 03/08/2022    Allergy to environmental factors 03/08/2022    Elevated alkaline phosphatase level 03/08/2022    Acute pain of left shoulder 01/26/2022     Patient arrived for 1000 appt at around 0930. At 1005 the patient was placed on the pulleys for warm-up and stretching of left shoulder.  assist couple minutes later the patient was seen leaving her appt /s any word to staff.  Patient was found sitting in the lobby awaiting her ride home.  She would not express her reasons for leaving only that she was going home.  Patient's regular therapist was notified.      Decreased range of motion of left shoulder 01/26/2022    Weakness of left arm 01/26/2022    Muscle spasm of left shoulder 01/18/2022    Gastroesophageal reflux disease 11/09/2021    Needs flu shot 11/09/2021    Ulcer of right foot with fat layer exposed 06/28/2021    Type 2 diabetes mellitus with foot ulcer, with long-term current use of insulin 03/19/2021    Lower extremity edema 03/19/2021    Hypertension 03/19/2021    Hyperlipidemia 03/19/2021    Embolic stroke involving left middle cerebral artery 02/12/2021         :    Plan:   Clean with dakin's solution or baby shampoo and water  Pack wound with dakin's moist nu gauze,leave enough out to remove,cover with dry gauze,wrap with meng and paper tape  Change daily and as needed  Elevate feet as much as possible  Avoid prolonged standing in one place.   Culture today, fax to [facility] for IV antibiotics.   F/u in 1 week or sooner if needed.     Problem List Items Addressed This Visit        Derm    Ulcer of right foot with  fat layer exposed - Primary    Relevant Orders    Culture, Wound (Completed)    Culture, Anaerobe (Completed)       Renal/    Acute cystitis without hematuria    Relevant Medications    nitrofurantoin, macrocrystal-monohydrate, (MACROBID) 100 MG capsule          Future Appointments   Date Time Provider Department Center   3/24/2022 10:00 AM [facility] Maldonado [facility]   6/7/2022  1:00 PM Don Fox DO [facility]            Signature:  TOMASA Maldonado  [facility] - WOUND CARE  [address]  [place] [address]  [phone]    Date of encounter: 3/17/22

## 2022-03-24 ENCOUNTER — OFFICE VISIT (OUTPATIENT)
Dept: WOUND CARE | Facility: CLINIC | Age: 62
End: 2022-03-24
Attending: FAMILY MEDICINE
Payer: MEDICAID

## 2022-03-24 VITALS
SYSTOLIC BLOOD PRESSURE: 159 MMHG | DIASTOLIC BLOOD PRESSURE: 97 MMHG | TEMPERATURE: 98 F | HEART RATE: 87 BPM | RESPIRATION RATE: 20 BRPM

## 2022-03-24 DIAGNOSIS — L97.512 ULCER OF RIGHT FOOT WITH FAT LAYER EXPOSED: Primary | ICD-10-CM

## 2022-03-24 PROCEDURE — 11042 DBRDMT SUBQ TIS 1ST 20SQCM/<: CPT | Mod: PBBFAC | Performed by: NURSE PRACTITIONER

## 2022-03-24 PROCEDURE — 11042 PR DEBRIDEMENT, SKIN, SUB-Q TISSUE,=<20 SQ CM: ICD-10-PCS | Mod: S$PBB,,, | Performed by: NURSE PRACTITIONER

## 2022-03-24 PROCEDURE — 99214 OFFICE O/P EST MOD 30 MIN: CPT | Mod: PBBFAC,25 | Performed by: NURSE PRACTITIONER

## 2022-03-24 PROCEDURE — 99499 NO LOS: ICD-10-PCS | Mod: S$PBB,,, | Performed by: NURSE PRACTITIONER

## 2022-03-24 PROCEDURE — 99499 UNLISTED E&M SERVICE: CPT | Mod: S$PBB,,, | Performed by: NURSE PRACTITIONER

## 2022-03-24 PROCEDURE — 11042 DBRDMT SUBQ TIS 1ST 20SQCM/<: CPT | Mod: S$PBB,,, | Performed by: NURSE PRACTITIONER

## 2022-03-24 RX ORDER — CYANOCOBALAMIN 1000 UG/ML
1 INJECTION, SOLUTION INTRAMUSCULAR; SUBCUTANEOUS ONCE
COMMUNITY
Start: 2022-03-17 | End: 2022-06-16 | Stop reason: ALTCHOICE

## 2022-03-24 RX ORDER — CLOTRIMAZOLE 1 %
CREAM (GRAM) TOPICAL 2 TIMES DAILY
Qty: 60 G | Refills: 3 | Status: SHIPPED | OUTPATIENT
Start: 2022-03-24 | End: 2022-06-07 | Stop reason: SDUPTHER

## 2022-03-24 NOTE — PATIENT INSTRUCTIONS
Clean with vashe (do not substitute)  or baby shampoo and water     Pack wound with polymem gray( leave enough out to remove) then cover with polymem pink,cover with dry gauze,wrap with meng and paper tape     Change Monday,Friday and Tuesday     Elevate feet as much as possible     No prolong standing    Diabetic shoes with inserts    Cont IV antibiotics as prescribed    Apply ketaconazole cream daily to both feet

## 2022-03-24 NOTE — PROGRESS NOTES
Debridement Performed for Assessment: Wound# 1  Performed By: Provider: Felisha Love NP  Assistant:    Debridement: Surgical    Photo taken post procedure: yes    Time-Out Taken: Yes  Level: Skin/Subcutaneous Tissue  Post Debridement Measurements  Length: (cm) 1.3  Width: (cm) 1.4  Depth: (cm) 2.0      Area: (cm²) 1.82  Percent Debrided: 100%  Total Area Debrided: (sq cm)     Tissue and other material debrided:  Adipose, Dermis, Epidermis, Subcutaneous  Devitalized Tissue Debrided:callus  Instrument: Curette  Bleeding: Moderate  Hemostasis Achieved: Pressure  Procedural Pain: Insensate  Post Procedural Pain: Insensate  Response to Treatment: Procedure was tolerated well    Devitalized materials/tissue Removed  the following was removed during debridement  subcutaneous      Post Debridement Diagnosis  Chronic wound  Post debridement diagnosis  Same as Pre-operative debridement diagnosis, No Complications noted.      Grafts or implants applied  Was a graft or implant applied?  No      Procedure assistant  Procedure assisted by: Cici Duvall LPN  Assistant is the same as nurse listed above      Complications related to procedure  Did any complication occure during procedure?  No complications noted during or after procedure.      Specimen  Specimen collect during procedure?  No specimen collected      Anaesthesia:  Anesthesia used  None      Blood Loss:  Blood loss during procedure  less than 5 cc

## 2022-03-24 NOTE — PROGRESS NOTES
See wound assessment. See debridement note. Gray polymem and pink polymem applied. RTC in 2 weeks.

## 2022-03-28 NOTE — PROGRESS NOTES
TOMASA Maldonado   [facility] - WOUND CARE  [address] [facility] [address]  [phone]      PATIENT NAME: Deborah Sotelo  : 1960  DATE: 3/24/22  MRN: [id number]      Billing Provider: TOMASA Maldonado  Level of Service:   Patient PCP Information     Provider PCP Type    Primary Doctor No General          Reason for Visit / Chief Complaint: Non-healing Wound Follow Up (Right foot)       History of Present Illness / Problem Focused Workflow     Deborah Sotelo is a 61 y.o. female who presents to clinic for follow up on chronic-non healing wound on right TMA. She continues to report increased pain and swelling in right foot since last visit. She has copious amounts of drainage. Her foot is tender to palpation today. Pertinent factors that delay wound healing include multiple co-morbidities, poor vascular supply, diabetes, edema, heavy drainage, decreased granulation tissue, tract(s), undermining and no protective sensation. Denies fever and chills. She is scheduled to have PICC line placed tomorrow and start IV antibiotics.         Review of Systems     Review of Systems   Constitutional: Negative for activity change, chills and fever.   Respiratory: Negative for chest tightness and shortness of breath.    Cardiovascular: Positive for leg swelling. Negative for chest pain and palpitations.   Musculoskeletal: Positive for arthralgias and joint swelling.   Skin: Positive for color change and wound.        See wound assessment   Neurological: Positive for weakness and numbness.   Psychiatric/Behavioral: Negative for agitation, behavioral problems, confusion and self-injury.       Medical / Social / Family History     Past Medical History:   Diagnosis Date    Diabetes mellitus, type 2     Hyperlipidemia     Hypertension     Obesity     Stroke        Past Surgical History:   Procedure Laterality Date    AMPUTATION      APPENDECTOMY      EYE SURGERY         Social  History  Ms. Deborah Sotelo  reports that she has never smoked. She has never used smokeless tobacco. She reports current alcohol use. She reports that she does not use drugs.    Family History  Ms.'s Deborah Sotelo family history includes Appendicitis in her father; Asthma in her sister; Cancer in her mother; Diabetes in her brother.    Medications and Allergies     Medications  Outpatient Medications Marked as Taking for the 3/24/22 encounter (Office Visit) with TOMASA Maldonado   Medication Sig Dispense Refill    amLODIPine (NORVASC) 5 MG tablet Take 1 tablet (5 mg total) by mouth once daily. 30 tablet 2    atorvastatin (LIPITOR) 80 MG tablet TAKE 1 TABLET BY MOUTH AT BEDTIME ~~DO NOT EAT GRAPEFRUIT OR DRINK GRAPEFRUIT JUICE WHILE TAKING THIS MEDICATION~~ 30 tablet 2    clotrimazole (LOTRIMIN AF, CLOTRIMAZOLE,) 1 % cream Apply topically 2 (two) times daily. 60 g 3    cyanocobalamin 1,000 mcg/mL injection Inject 1 mL into the muscle once.      insulin asp prt-insulin aspart, NovoLOG 70/30, (NOVOLOG 70/30) 100 unit/mL (70-30) Soln INJECT 50 UNITS SUB-Q EACH MORNING AND 30 UNITS SUB-Q EACH EVENING 30 mL 5    loratadine (CLARITIN) 10 mg tablet Take 1 tablet (10 mg total) by mouth once daily. 90 tablet 0       Allergies  Review of patient's allergies indicates:   Allergen Reactions    Aspirin      Other reaction(s): UPSET STOMACH     Bactrim ds [sulfamethoxazole-trimethoprim] Itching       Physical Examination     Vitals:    03/24/22 0940   BP: (!) 159/97   Pulse: 87   Resp: 20   Temp: 97.7 °F (36.5 °C)     Physical Exam  Vitals and nursing note reviewed.   Constitutional:       Appearance: Normal appearance.   HENT:      Head: Normocephalic.   Cardiovascular:      Rate and Rhythm: Normal rate and regular rhythm.      Pulses: Normal pulses.      Heart sounds: Normal heart sounds.   Pulmonary:      Effort: Pulmonary effort is normal. No respiratory distress.   Chest:      Chest wall: No tenderness.    Musculoskeletal:         General: Swelling and deformity present.      Right lower leg: Edema present.   Skin:     General: Skin is warm and dry.      Capillary Refill: Capillary refill takes more than 3 seconds.      Comments: See wound assessment    Neurological:      General: No focal deficit present.      Mental Status: She is alert and oriented to person, place, and time. Mental status is at baseline.   Psychiatric:         Mood and Affect: Mood normal.         Behavior: Behavior normal.         Thought Content: Thought content normal.         Judgment: Judgment normal.         Assessment and Plan      1. Ulcer of right foot with fat layer exposed           Wound 01/21/22 Diabetic Ulcer Right medial Foot #5 (Active)   01/21/22     Pre-existing: Yes   Primary Wound Type: Diabetic ulc   Side: Right   Orientation: medial   Location: Foot   Wound Number: #5   Ankle-Brachial Index:    Pulses:    Removal Indication and Assessment:    Wound Outcome:    (Retired) Wound Type:    (Retired) Wound Length (cm):    (Retired) Wound Width (cm):    (Retired) Depth (cm):    Wound Description (Comments):    Removal Indications:    Wound Image      03/24/22 0941   Dressing Appearance Intact;Moist drainage 03/24/22 0941   Drainage Amount Moderate 03/24/22 0941   Drainage Characteristics/Odor Serous 03/24/22 0941   Appearance Pink;Moist;Granulating;Tan;Slough 03/24/22 0941   Tissue loss description Full thickness 03/24/22 0941   Black (%), Wound Tissue Color 0 % 03/24/22 0941   Red (%), Wound Tissue Color 80 % 03/24/22 0941   Yellow (%), Wound Tissue Color 20 % 03/24/22 0941   Periwound Area Moist 03/24/22 0941   Wound Edges Callused 03/24/22 0941   Johnson Classification (diabetic foot ulcers only) Grade 2 03/24/22 0941   Wound Length (cm) 0.5 cm 03/24/22 0941   Wound Width (cm) 0.5 cm 03/24/22 0941   Wound Depth (cm) 2 cm 03/24/22 0941   Wound Volume (cm^3) 0.5 cm^3 03/24/22 0941   Wound Surface Area (cm^2) 0.25 cm^2 03/24/22  0941   Care Cleansed with:;Antimicrobial agent 03/24/22 0941   Dressing Applied;Gauze;Rolled gauze 03/24/22 0941   Periwound Care Moisture barrier applied 03/24/22 0941   Off Loading Off loading shoe 03/24/22 0941   Dressing Change Due 03/25/22 03/24/22 0941         Active Problem List with Overview Notes    Diagnosis Date Noted    Acute cystitis without hematuria 03/08/2022    Allergy to environmental factors 03/08/2022    Elevated alkaline phosphatase level 03/08/2022    Acute pain of left shoulder 01/26/2022     Patient arrived for 1000 appt at around 0930. At 1005 the patient was placed on the pulleys for warm-up and stretching of left shoulder.  assist couple minutes later the patient was seen leaving her appt /s any word to staff.  Patient was found sitting in the lobby awaiting her ride home.  She would not express her reasons for leaving only that she was going home.  Patient's regular therapist was notified.      Decreased range of motion of left shoulder 01/26/2022    Weakness of left arm 01/26/2022    Muscle spasm of left shoulder 01/18/2022    Gastroesophageal reflux disease 11/09/2021    Needs flu shot 11/09/2021    Ulcer of right foot with fat layer exposed 06/28/2021    Type 2 diabetes mellitus with foot ulcer, with long-term current use of insulin 03/19/2021    Lower extremity edema 03/19/2021    Hypertension 03/19/2021    Hyperlipidemia 03/19/2021    Embolic stroke involving left middle cerebral artery 02/12/2021     Plan:   Clean with vashe (do not substitute)  or baby shampoo and water  Pack wound with polymem gray( leave enough out to remove) then cover with polymem pink,cover with dry gauze,wrap with meng and paper tape  Change Monday,Friday and Tuesday  Elevate feet as much as possible  No prolong standing  Diabetic shoes with inserts  IV antibiotics as prescribed  Apply ketaconazole cream daily to both feet  Monitor closely for s/s of infection including fever, chills, increase  in pain, odor from wound, and increased redness from foot. Go to ER if any complications develop.   Keep leg elevated and avoid pressure on wound.      Problem List Items Addressed This Visit        Derm    Ulcer of right foot with fat layer exposed - Primary    Relevant Medications    clotrimazole (LOTRIMIN AF, CLOTRIMAZOLE,) 1 % cream          Future Appointments   Date Time Provider Department Center   4/7/2022  9:00 AM [facility]don[facility]   6/7/2022  1:00 PM Don Fox DO [facility]            Signature:  TOMASA Maldonado  [facility] - WOUND CARE  [address]  [place] [address]  [phone]    Date of encounter: 3/24/22

## 2022-04-07 ENCOUNTER — OFFICE VISIT (OUTPATIENT)
Dept: WOUND CARE | Facility: CLINIC | Age: 62
End: 2022-04-07
Attending: FAMILY MEDICINE
Payer: MEDICAID

## 2022-04-07 VITALS
RESPIRATION RATE: 20 BRPM | DIASTOLIC BLOOD PRESSURE: 92 MMHG | TEMPERATURE: 98 F | SYSTOLIC BLOOD PRESSURE: 123 MMHG | HEART RATE: 102 BPM

## 2022-04-07 DIAGNOSIS — L97.512 ULCER OF RIGHT FOOT WITH FAT LAYER EXPOSED: ICD-10-CM

## 2022-04-07 DIAGNOSIS — E11.621 TYPE 2 DIABETES MELLITUS WITH FOOT ULCER, WITH LONG-TERM CURRENT USE OF INSULIN: Primary | ICD-10-CM

## 2022-04-07 DIAGNOSIS — Z79.4 TYPE 2 DIABETES MELLITUS WITH FOOT ULCER, WITH LONG-TERM CURRENT USE OF INSULIN: Primary | ICD-10-CM

## 2022-04-07 DIAGNOSIS — L97.509 TYPE 2 DIABETES MELLITUS WITH FOOT ULCER, WITH LONG-TERM CURRENT USE OF INSULIN: Primary | ICD-10-CM

## 2022-04-07 PROCEDURE — 11042 PR DEBRIDEMENT, SKIN, SUB-Q TISSUE,=<20 SQ CM: ICD-10-PCS | Mod: S$PBB,,, | Performed by: NURSE PRACTITIONER

## 2022-04-07 PROCEDURE — 99214 OFFICE O/P EST MOD 30 MIN: CPT | Mod: PBBFAC | Performed by: NURSE PRACTITIONER

## 2022-04-07 PROCEDURE — 11042 DBRDMT SUBQ TIS 1ST 20SQCM/<: CPT | Mod: S$PBB,,, | Performed by: NURSE PRACTITIONER

## 2022-04-07 PROCEDURE — 11042 DBRDMT SUBQ TIS 1ST 20SQCM/<: CPT | Mod: PBBFAC | Performed by: NURSE PRACTITIONER

## 2022-04-07 PROCEDURE — 99499 UNLISTED E&M SERVICE: CPT | Mod: S$PBB,,, | Performed by: NURSE PRACTITIONER

## 2022-04-07 PROCEDURE — 99499 NO LOS: ICD-10-PCS | Mod: S$PBB,,, | Performed by: NURSE PRACTITIONER

## 2022-04-07 NOTE — PROGRESS NOTES
See wound assessment. Cont gray poly mem/dry dressing daily. Start keystone wound gel when available. Orders sent to [facility] for compound antibiotics. RTC 2 weeks. See orders

## 2022-04-07 NOTE — PATIENT INSTRUCTIONS
Clean wounds with baby shampoo and water or vashe (no subs)    Pack wound with gray polymem,cover with pink polymem and dry gauze,wrap with meng and paper tape. Change daily and as needed    Elevate feet as much as possible    No standing    Diabetic shoes with insert (script given to [facility])    Cont IV antibiotics as prescribed    Apply keystone wound gel daily when available    Stop polymem afterwards    May use vashe moisten packing if polymem not available

## 2022-04-07 NOTE — PROGRESS NOTES
Changed DESHAWN PICC line dressing. Replaced caps. Flushed both ports. Both port flush w/o difficulty. Blood return noted in both ports. Pt tolerated well w/o complaints. Handed patient order to get her blood drawn at this time, as well.

## 2022-04-07 NOTE — PROGRESS NOTES
Debridement Performed for Assessment: Wound# 1  Performed By: Provider: Felisha Love NP  Assistant:  Cici Duvall LPN    Debridement: Surgical    Photo taken post procedure:    Time-Out Taken: Yes  Level: Skin/Subcutaneous Tissue  Post Debridement Measurements  Length: (cm) 2.5   Width: (cm) 3.1  Depth: (cm) 0.4      Area: (cm²) 7.75  Percent Debrided: 100%  Total Area Debrided: (sq cm)     Tissue and other material debrided:  Adipose, Dermis, Epidermis, Subcutaneous  Devitalized Tissue Debrided:Biofilm,callus  Instrument: Curette  Bleeding: Moderate  Hemostasis Achieved: Pressure  Procedural Pain: Insensate  Post Procedural Pain: Insensate  Response to Treatment: Procedure was tolerated well    Devitalized materials/tissue Removed  the following was removed during debridement  subcutaneous      Post Debridement Diagnosis chronic wound  Post debridement diagnosis  Same as Pre-operative debridement diagnosis, No Complications noted.      Grafts or implants applied  Was a graft or implant applied?  No      Procedure assistant  Procedure assisted by:  Cici Duvall LPN  Assistant is the same as nurse listed above      Complications related to procedure  Did any complication occure during procedure?  No complications noted during or after procedure.      Specimen  Specimen collect during procedure?  No specimen collected      Anaesthesia:  Anesthesia used  None      Blood Loss:  Blood loss during procedure  less than 5 cc       I have personally performed a face to face diagnostic evaluation on this patient. I have reviewed the ACP note and agree with the history, exam and plan of care, except as noted.

## 2022-04-08 NOTE — PROGRESS NOTES
TOMASA Maldonado   [facility] - WOUND CARE  [address] [facility] [address]  [phone]      PATIENT NAME: Deborah Sotelo  : 1960  DATE: 22  MRN: [id number]      Billing Provider: TOMASA Maldonado  Level of Service:   Patient PCP Information     Provider PCP Type    Primary Doctor No General          Reason for Visit / Chief Complaint: Diabetic Foot Ulcer (Right foot)       History of Present Illness / Problem Focused Workflow     Deborah Sotelo is a 61 y.o. female who presents to clinic for follow up on chronic-non healing wound on right TMA. She continues slight improvement in pain and swelling in right foot since last visit. She has moderate amounts of drainage. Pertinent factors that delay wound healing include multiple co-morbidities, poor vascular supply, diabetes, edema, heavy drainage, decreased granulation tissue, tract(s), undermining and no protective sensation. Denies fever and chills. She is currently on IV antibiotics.       Review of Systems     Review of Systems   Constitutional: Negative for activity change, chills and fever.   Respiratory: Negative for chest tightness and shortness of breath.    Cardiovascular: Positive for leg swelling. Negative for chest pain and palpitations.   Musculoskeletal: Positive for arthralgias and joint swelling.   Skin: Positive for color change and wound.        See wound assessment   Neurological: Positive for weakness and numbness.   Psychiatric/Behavioral: Negative for agitation, behavioral problems, confusion and self-injury.       Medical / Social / Family History     Past Medical History:   Diagnosis Date    Diabetes mellitus, type 2     Hyperlipidemia     Hypertension     Obesity     Stroke        Past Surgical History:   Procedure Laterality Date    AMPUTATION      APPENDECTOMY      EYE SURGERY         Social History  Ms. Deborah Sotelo  reports that she has never smoked. She has never used  smokeless tobacco. She reports current alcohol use. She reports that she does not use drugs.    Family History  Ms.'s Deborah Sotelo family history includes Appendicitis in her father; Asthma in her sister; Cancer in her mother; Diabetes in her brother.    Medications and Allergies     Medications  Outpatient Medications Marked as Taking for the 4/7/22 encounter (Office Visit) with TOMASA Maldonado   Medication Sig Dispense Refill    amLODIPine (NORVASC) 5 MG tablet Take 1 tablet (5 mg total) by mouth once daily. 30 tablet 2    atorvastatin (LIPITOR) 80 MG tablet TAKE 1 TABLET BY MOUTH AT BEDTIME ~~DO NOT EAT GRAPEFRUIT OR DRINK GRAPEFRUIT JUICE WHILE TAKING THIS MEDICATION~~ 30 tablet 2    clotrimazole (LOTRIMIN AF, CLOTRIMAZOLE,) 1 % cream Apply topically 2 (two) times daily. 60 g 3    cyanocobalamin 1,000 mcg/mL injection Inject 1 mL into the muscle once.      insulin asp prt-insulin aspart, NovoLOG 70/30, (NOVOLOG 70/30) 100 unit/mL (70-30) Soln INJECT 50 UNITS SUB-Q EACH MORNING AND 30 UNITS SUB-Q EACH EVENING 30 mL 5    loratadine (CLARITIN) 10 mg tablet Take 1 tablet (10 mg total) by mouth once daily. 90 tablet 0       Allergies  Review of patient's allergies indicates:   Allergen Reactions    Aspirin      Other reaction(s): UPSET STOMACH     Bactrim ds [sulfamethoxazole-trimethoprim] Itching       Physical Examination     Vitals:    04/07/22 1024   BP: (!) 123/92   Pulse: 102   Resp: 20   Temp: 97.7 °F (36.5 °C)     Physical Exam  Vitals and nursing note reviewed.   Constitutional:       Appearance: Normal appearance.   HENT:      Head: Normocephalic.   Cardiovascular:      Rate and Rhythm: Normal rate and regular rhythm.      Pulses: Normal pulses.      Heart sounds: Normal heart sounds.   Pulmonary:      Effort: Pulmonary effort is normal. No respiratory distress.   Chest:      Chest wall: No tenderness.   Musculoskeletal:         General: Swelling and deformity present.      Right lower leg:  Edema present.   Skin:     General: Skin is warm and dry.      Capillary Refill: Capillary refill takes more than 3 seconds.      Comments: See wound assessment    Neurological:      General: No focal deficit present.      Mental Status: She is alert and oriented to person, place, and time. Mental status is at baseline.   Psychiatric:         Mood and Affect: Mood normal.         Behavior: Behavior normal.         Thought Content: Thought content normal.         Judgment: Judgment normal.         Assessment and Plan      1. Type 2 diabetes mellitus with foot ulcer, with long-term current use of insulin    2. Ulcer of right foot with fat layer exposed           Wound 01/18/21 1051 Diabetic Ulcer Right lateral Plantar #1 (Active)   01/18/21 1051    Pre-existing: Yes   Primary Wound Type: Diabetic ulc   Side: Right   Orientation: lateral   Location: Plantar   Wound Number: #1   Ankle-Brachial Index:    Pulses: normal   Removal Indication and Assessment:    Wound Outcome:    (Retired) Wound Type:    (Retired) Wound Length (cm):    (Retired) Wound Width (cm):    (Retired) Depth (cm):    Wound Description (Comments):    Removal Indications:    Wound Image      04/07/22 1025   Dressing Appearance Moist drainage 04/07/22 1025   Drainage Amount Moderate 04/07/22 1025   Drainage Characteristics/Odor Serosanguineous 04/07/22 1025   Appearance Pink;Moist;Granulating 04/07/22 1025   Tissue loss description Full thickness 04/07/22 1025   Black (%), Wound Tissue Color 0 % 04/07/22 1025   Red (%), Wound Tissue Color 75 % 04/07/22 1025   Yellow (%), Wound Tissue Color 25 % 04/07/22 1025   Periwound Area Moist;Pale white 04/07/22 1025   Wound Edges Callused 04/07/22 1025   Johnson Classification (diabetic foot ulcers only) Grade 2 04/07/22 1025   Wound Length (cm) 0.7 cm 04/07/22 1025   Wound Width (cm) 0.4 cm 04/07/22 1025   Wound Depth (cm) 1 cm 04/07/22 1025   Wound Volume (cm^3) 0.28 cm^3 04/07/22 1025   Wound Surface Area (cm^2)  0.28 cm^2 04/07/22 1025   Tunneling (depth (cm)/location) 2.0 at 1100 04/07/22 1025   Care Cleansed with:;Antimicrobial agent 04/07/22 1025   Dressing Applied;Gauze, wet to dry;Gauze;Rolled gauze 04/07/22 1025   Periwound Care Moisture barrier applied 04/07/22 1025   Off Loading Off loading shoe 04/07/22 1025   Dressing Change Due 04/08/22 04/07/22 1025         Active Problem List with Overview Notes    Diagnosis Date Noted    Acute cystitis without hematuria 03/08/2022    Allergy to environmental factors 03/08/2022    Elevated alkaline phosphatase level 03/08/2022    Acute pain of left shoulder 01/26/2022     Patient arrived for 1000 appt at around 0930. At 1005 the patient was placed on the pulleys for warm-up and stretching of left shoulder.  assist couple minutes later the patient was seen leaving her appt /s any word to staff.  Patient was found sitting in the lobby awaiting her ride home.  She would not express her reasons for leaving only that she was going home.  Patient's regular therapist was notified.      Decreased range of motion of left shoulder 01/26/2022    Weakness of left arm 01/26/2022    Muscle spasm of left shoulder 01/18/2022    Gastroesophageal reflux disease 11/09/2021    Needs flu shot 11/09/2021    Ulcer of right foot with fat layer exposed 06/28/2021    Type 2 diabetes mellitus with foot ulcer, with long-term current use of insulin 03/19/2021    Lower extremity edema 03/19/2021    Hypertension 03/19/2021    Hyperlipidemia 03/19/2021    Embolic stroke involving left middle cerebral artery 02/12/2021      Plan:   Clean wounds with baby shampoo and water or vashe (no subs)  Pack wound with gray polymem,cover with pink polymem and dry gauze,wrap with meng and paper tape. Change daily and as needed  Avoid prolonged standing   [facility]loading [facility]  Cont IV antibiotics as prescribed  Apply keystone wound gel daily when  available  Stop polymem afterwards  May use vashe moisten packing if polymem not available  Monitor closely for s/s of infection including fever, chills, increase in pain, odor from wound, and increased redness from foot. Go to ER if any complications develop.   Keep leg elevated and avoid pressure on wound.      Problem List Items Addressed This Visit        Derm    Ulcer of right foot with fat layer exposed    Relevant Orders    CBC Auto Differential    Comprehensive Metabolic Panel    CK-MB    C-Reactive Protein    Sedimentation Rate       Endocrine    Type 2 diabetes mellitus with foot ulcer, with long-term current use of insulin - Primary    Relevant Orders    CBC Auto Differential    Comprehensive Metabolic Panel    CK-MB    C-Reactive Protein    Sedimentation Rate          Future Appointments   Date Time Provider Department Center   4/21/2022 10:00 AM [facility]ASA Maldonado [facility]   6/7/2022  1:00 PM Don Fox DO [facility]            Signature:  TOMASA Maldonado  [facility] - WOUND CARE  [address] [facility] [place] [address]  [phone]    Date of encounter: 4/7/22

## 2022-04-21 ENCOUNTER — OFFICE VISIT (OUTPATIENT)
Dept: WOUND CARE | Facility: CLINIC | Age: 62
End: 2022-04-21
Attending: FAMILY MEDICINE
Payer: MEDICAID

## 2022-04-21 VITALS
HEART RATE: 85 BPM | RESPIRATION RATE: 20 BRPM | SYSTOLIC BLOOD PRESSURE: 138 MMHG | DIASTOLIC BLOOD PRESSURE: 81 MMHG | TEMPERATURE: 98 F

## 2022-04-21 DIAGNOSIS — L97.509 TYPE 2 DIABETES MELLITUS WITH FOOT ULCER, WITH LONG-TERM CURRENT USE OF INSULIN: ICD-10-CM

## 2022-04-21 DIAGNOSIS — Z79.4 TYPE 2 DIABETES MELLITUS WITH FOOT ULCER, WITH LONG-TERM CURRENT USE OF INSULIN: ICD-10-CM

## 2022-04-21 DIAGNOSIS — E11.621 TYPE 2 DIABETES MELLITUS WITH FOOT ULCER, WITH LONG-TERM CURRENT USE OF INSULIN: ICD-10-CM

## 2022-04-21 DIAGNOSIS — L97.512 ULCER OF RIGHT FOOT WITH FAT LAYER EXPOSED: Primary | ICD-10-CM

## 2022-04-21 DIAGNOSIS — L92.9 HYPERGRANULATION: ICD-10-CM

## 2022-04-21 DIAGNOSIS — R60.0 LOWER EXTREMITY EDEMA: ICD-10-CM

## 2022-04-21 PROCEDURE — 1160F RVW MEDS BY RX/DR IN RCRD: CPT | Mod: CPTII,,, | Performed by: NURSE PRACTITIONER

## 2022-04-21 PROCEDURE — 3051F PR MOST RECENT HEMOGLOBIN A1C LEVEL 7.0 - < 8.0%: ICD-10-PCS | Mod: CPTII,,, | Performed by: NURSE PRACTITIONER

## 2022-04-21 PROCEDURE — 3051F HG A1C>EQUAL 7.0%<8.0%: CPT | Mod: CPTII,,, | Performed by: NURSE PRACTITIONER

## 2022-04-21 PROCEDURE — 3075F PR MOST RECENT SYSTOLIC BLOOD PRESS GE 130-139MM HG: ICD-10-PCS | Mod: CPTII,,, | Performed by: NURSE PRACTITIONER

## 2022-04-21 PROCEDURE — 4010F ACE/ARB THERAPY RXD/TAKEN: CPT | Mod: CPTII,,, | Performed by: NURSE PRACTITIONER

## 2022-04-21 PROCEDURE — 1159F MED LIST DOCD IN RCRD: CPT | Mod: CPTII,,, | Performed by: NURSE PRACTITIONER

## 2022-04-21 PROCEDURE — 99499 NO LOS: ICD-10-PCS | Mod: S$PBB,,, | Performed by: NURSE PRACTITIONER

## 2022-04-21 PROCEDURE — 1159F PR MEDICATION LIST DOCUMENTED IN MEDICAL RECORD: ICD-10-PCS | Mod: CPTII,,, | Performed by: NURSE PRACTITIONER

## 2022-04-21 PROCEDURE — 99499 UNLISTED E&M SERVICE: CPT | Mod: S$PBB,,, | Performed by: NURSE PRACTITIONER

## 2022-04-21 PROCEDURE — 17250 CHEM CAUT OF GRANLTJ TISSUE: CPT | Mod: S$PBB,,, | Performed by: NURSE PRACTITIONER

## 2022-04-21 PROCEDURE — 1160F PR REVIEW ALL MEDS BY PRESCRIBER/CLIN PHARMACIST DOCUMENTED: ICD-10-PCS | Mod: CPTII,,, | Performed by: NURSE PRACTITIONER

## 2022-04-21 PROCEDURE — 3075F SYST BP GE 130 - 139MM HG: CPT | Mod: CPTII,,, | Performed by: NURSE PRACTITIONER

## 2022-04-21 PROCEDURE — 3079F DIAST BP 80-89 MM HG: CPT | Mod: CPTII,,, | Performed by: NURSE PRACTITIONER

## 2022-04-21 PROCEDURE — 17250 CHEM CAUT OF GRANLTJ TISSUE: CPT | Mod: PBBFAC | Performed by: NURSE PRACTITIONER

## 2022-04-21 PROCEDURE — 4010F PR ACE/ARB THEARPY RXD/TAKEN: ICD-10-PCS | Mod: CPTII,,, | Performed by: NURSE PRACTITIONER

## 2022-04-21 PROCEDURE — 3079F PR MOST RECENT DIASTOLIC BLOOD PRESSURE 80-89 MM HG: ICD-10-PCS | Mod: CPTII,,, | Performed by: NURSE PRACTITIONER

## 2022-04-21 PROCEDURE — 17250 PR CHEM CAUTERY GRANULATN TISSUE: ICD-10-PCS | Mod: S$PBB,,, | Performed by: NURSE PRACTITIONER

## 2022-04-21 PROCEDURE — 99213 OFFICE O/P EST LOW 20 MIN: CPT | Mod: PBBFAC | Performed by: NURSE PRACTITIONER

## 2022-04-22 RX ORDER — SILVER NITRATE 38.21; 12.74 MG/1; MG/1
1 STICK TOPICAL ONCE
Status: DISCONTINUED | OUTPATIENT
Start: 2022-04-21 | End: 2022-04-22

## 2022-04-23 NOTE — PROGRESS NOTES
TOMASA Maldonado   [facility] - WOUND CARE  [address] [facility] [place] [address]  [phone]      PATIENT NAME: Deborah Sotelo  : 1960  DATE: 22  MRN: [id number]      Billing Provider: TOMASA Maldonado  Level of Service:   Patient PCP Information     Provider PCP Type    Primary Doctor No General          Reason for Visit / Chief Complaint: No chief complaint on file.       History of Present Illness / Problem Focused Workflow     Deborah Sotelo is a 61 y.o. female who presents to clinic for follow up on chronic-non healing wound on right TMA. She reports improvement in pain and swelling in right foot since last visit. Hypergranulation tissue noted. She has moderate amounts of drainage. Pertinent factors that delay wound healing include multiple co-morbidities, poor vascular supply, diabetes, edema, heavy drainage, decreased granulation tissue, tract(s), undermining and no protective sensation. Denies fever and chills. She has 2 weeks left of IV antibiotics.       Review of Systems     Review of Systems   Constitutional: Negative for activity change, chills and fever.   Respiratory: Negative for chest tightness and shortness of breath.    Cardiovascular: Positive for leg swelling. Negative for chest pain and palpitations.   Musculoskeletal: Positive for arthralgias and joint swelling.   Skin: Positive for color change and wound.        See wound assessment   Neurological: Positive for weakness and numbness.   Psychiatric/Behavioral: Negative for agitation, behavioral problems, confusion and self-injury.       Medical / Social / Family History     Past Medical History:   Diagnosis Date    Diabetes mellitus, type 2     Hyperlipidemia     Hypertension     Obesity     Stroke        Past Surgical History:   Procedure Laterality Date    AMPUTATION      APPENDECTOMY      EYE SURGERY         Social History  Ms. Deborah Sotelo  reports that she has never smoked. She  has never used smokeless tobacco. She reports current alcohol use. She reports that she does not use drugs.    Family History  Ms.'s Deborah Sotelo family history includes Appendicitis in her father; Asthma in her sister; Cancer in her mother; Diabetes in her brother.    Medications and Allergies     Medications  Outpatient Medications Marked as Taking for the 4/21/22 encounter (Office Visit) with TOMASA Maldonado   Medication Sig Dispense Refill    amLODIPine (NORVASC) 5 MG tablet Take 1 tablet (5 mg total) by mouth once daily. 30 tablet 2    atorvastatin (LIPITOR) 80 MG tablet TAKE 1 TABLET BY MOUTH EACH NIGHT AT BEDTIME FOR CHOLESTEROL ~~DO NOT EAT GRAPEFRUIT OR DRINK GRAPEFRUIT JUICE WHILE TAKING THIS MEDICATION~~ 30 tablet 2    clotrimazole (LOTRIMIN AF, CLOTRIMAZOLE,) 1 % cream Apply topically 2 (two) times daily. 60 g 3    cyanocobalamin 1,000 mcg/mL injection Inject 1 mL into the muscle once.      insulin asp prt-insulin aspart, NovoLOG 70/30, (NOVOLOG 70/30) 100 unit/mL (70-30) Soln INJECT 50 UNITS SUB-Q EACH MORNING AND 30 UNITS SUB-Q EACH EVENING 30 mL 5    loratadine (CLARITIN) 10 mg tablet Take 1 tablet (10 mg total) by mouth once daily. 90 tablet 0       Allergies  Review of patient's allergies indicates:   Allergen Reactions    Aspirin      Other reaction(s): UPSET STOMACH     Bactrim ds [sulfamethoxazole-trimethoprim] Itching       Physical Examination     Vitals:    04/21/22 1019   BP: 138/81   Pulse: 85   Resp: 20   Temp: 97.8 °F (36.6 °C)     Physical Exam  Vitals and nursing note reviewed.   Constitutional:       Appearance: Normal appearance.   HENT:      Head: Normocephalic.   Cardiovascular:      Rate and Rhythm: Normal rate and regular rhythm.      Pulses: Normal pulses.      Heart sounds: Normal heart sounds.   Pulmonary:      Effort: Pulmonary effort is normal. No respiratory distress.   Chest:      Chest wall: No tenderness.   Musculoskeletal:         General: Swelling and  deformity present.      Right lower leg: Edema present.   Skin:     General: Skin is warm and dry.      Comments: Wound, see LAD for measurements and picture   Neurological:      General: No focal deficit present.      Mental Status: She is alert and oriented to person, place, and time. Mental status is at baseline.   Psychiatric:         Mood and Affect: Mood normal.         Behavior: Behavior normal.         Thought Content: Thought content normal.         Judgment: Judgment normal.         Assessment and Plan      1. Ulcer of right foot with fat layer exposed    2. Type 2 diabetes mellitus with foot ulcer, with long-term current use of insulin    3. Lower extremity edema           Wound 01/21/22 Diabetic Ulcer Right medial Foot #5 (Active)   01/21/22     Pre-existing: Yes   Primary Wound Type: Diabetic ulc   Side: Right   Orientation: medial   Location: Foot   Wound Number: #5   Ankle-Brachial Index:    Pulses:    Removal Indication and Assessment:    Wound Outcome:    (Retired) Wound Type:    (Retired) Wound Length (cm):    (Retired) Wound Width (cm):    (Retired) Depth (cm):    Wound Description (Comments):    Removal Indications:    Wound Image     04/21/22 1025         Active Problem List with Overview Notes    Diagnosis Date Noted    Acute cystitis without hematuria 03/08/2022    Allergy to environmental factors 03/08/2022    Elevated alkaline phosphatase level 03/08/2022    Acute pain of left shoulder 01/26/2022     Patient arrived for 1000 appt at around 0930. At 1005 the patient was placed on the pulleys for warm-up and stretching of left shoulder.  assist couple minutes later the patient was seen leaving her appt /s any word to staff.  Patient was found sitting in the lobby awaiting her ride home.  She would not express her reasons for leaving only that she was going home.  Patient's regular therapist was notified.      Decreased range of motion of left shoulder 01/26/2022    Weakness of left arm  01/26/2022    Muscle spasm of left shoulder 01/18/2022    Gastroesophageal reflux disease 11/09/2021    Needs flu shot 11/09/2021    Ulcer of right foot with fat layer exposed 06/28/2021    Type 2 diabetes mellitus with foot ulcer, with long-term current use of insulin 03/19/2021    Lower extremity edema 03/19/2021    Hypertension 03/19/2021    Hyperlipidemia 03/19/2021    Embolic stroke involving left middle cerebral artery 02/12/2021        Plan:   Continue current POC  Diabetes:  Monitor glucose closely. Check fasting glucose and 2 hours after meals. HgA1C goal <7, fasting glucose , and 2 hours after meals <180  Hypertension:  Check blood pressure twice daily, goal <120/80  Diet:   Increase protein intake, avoid fried, fatty foods and foods high in simple carbs.   Vitamins:  Take vitamin C 1000 mg, zinc 50mg, vitamin d 5000 units, and a daily multivitamin. Dawson is a good source of protein and nutrients to aid in wound healing.   Monitor closely for s/s of infection including fever, chills, increase in pain, odor from wound, and increased redness from foot. Go to ER if any complications develop.   Keep leg elevated and avoid pressure on wound.    Problem List Items Addressed This Visit        Endocrine    Type 2 diabetes mellitus with foot ulcer, with long-term current use of insulin       Orthopedic    Ulcer of right foot with fat layer exposed - Primary       Other    Lower extremity edema          Future Appointments   Date Time Provider Department Center   5/12/2022 10:00 AM TOMASA Maldonado [facility]   6/7/2022  1:00 PM Don Fox DO [facility]            Signature:  TOMASA Maldonado  [facility] - WOUND CARE  [address]  [place] [address]  [phone]    Date of encounter: 4/21/22

## 2022-05-10 NOTE — PROGRESS NOTES
TOMASA Maldonado   [facility] - WOUND CARE  [address] [facility] [address]  [phone]      PATIENT NAME: Deborah Sotelo  : 1960  DATE: 22  MRN: [id number]      Billing Provider: TOMASA Maldonado  Level of Service: NO LOS  Patient PCP Information     Provider PCP Type    Primary Doctor No General          Reason for Visit / Chief Complaint: Non-healing Wound and Diabetic Foot Ulcer       History of Present Illness / Problem Focused Workflow     Deborah Sotelo is a 61 y.o. female who presents to clinic for follow up on chronic-non healing wound on right TMA. She reports improvement in pain and swelling in right foot since last visit. Callus noted jonathon wound. Bedside debridement today. She has completed course of IV antibiotics. She reports that  Pertinent factors that delay wound healing include multiple co-morbidities, poor vascular supply, diabetes, edema, heavy drainage, decreased granulation tissue, tract(s), undermining and no protective sensation. Denies fever and chills. She is complaining of pain to left shoulder. She reports she was going to physical therapy as prescribed by PCP but pain worsened. She has weakness in left arm and is unable to raise her arm to brush her hair or bend arm to scratch her back. She would like referral to ortho.      Review of Systems     Review of Systems   Constitutional: Positive for activity change. Negative for chills and fever.   Respiratory: Negative for chest tightness and shortness of breath.    Cardiovascular: Positive for leg swelling. Negative for chest pain and palpitations.   Musculoskeletal: Positive for arthralgias and joint swelling.   Skin: Positive for color change and wound.        See wound assessment   Neurological: Positive for weakness and numbness.   Psychiatric/Behavioral: Negative for agitation, behavioral problems, confusion and self-injury.       Medical / Social / Family History     Past  Medical History:   Diagnosis Date    Diabetes mellitus, type 2     Hyperlipidemia     Hypertension     Obesity     Stroke        Past Surgical History:   Procedure Laterality Date    AMPUTATION      APPENDECTOMY      EYE SURGERY         Social History  Ms. Deborah Sotelo  reports that she has never smoked. She has never used smokeless tobacco. She reports current alcohol use. She reports that she does not use drugs.    Family History  Ms.'s Deborah Sotelo family history includes Appendicitis in her father; Asthma in her sister; Cancer in her mother; Diabetes in her brother.    Medications and Allergies     Medications  No outpatient medications have been marked as taking for the 5/12/22 encounter (Office Visit) with TOMASA Maldonado.       Allergies  Review of patient's allergies indicates:   Allergen Reactions    Aspirin      Other reaction(s): UPSET STOMACH     Bactrim ds [sulfamethoxazole-trimethoprim] Itching       Physical Examination     Vitals:    05/12/22 0912   BP: 131/83   Pulse: 101   Resp: 20   Temp: 97.7 °F (36.5 °C)     Physical Exam  Vitals and nursing note reviewed.   Constitutional:       Appearance: Normal appearance.   HENT:      Head: Normocephalic.   Cardiovascular:      Rate and Rhythm: Normal rate and regular rhythm.      Pulses: Normal pulses.      Heart sounds: Normal heart sounds.   Pulmonary:      Effort: Pulmonary effort is normal. No respiratory distress.   Chest:      Chest wall: No tenderness.   Musculoskeletal:         General: Swelling, tenderness and deformity present.      Right lower leg: Edema present.      Comments: Left shoulder decreased ROM and weakness to left upper extremity   Skin:     General: Skin is warm and dry.      Comments: Wound, see LAD for measurements and picture   Neurological:      General: No focal deficit present.      Mental Status: She is alert and oriented to person, place, and time. Mental status is at baseline.   Psychiatric:         Mood  and Affect: Mood normal.         Behavior: Behavior normal.         Thought Content: Thought content normal.         Judgment: Judgment normal.         Assessment and Plan      1. Ulcer of right foot with fat layer exposed    2. Chronic left shoulder pain    3. Weakness           Wound 01/21/22 Diabetic Ulcer Right medial Foot #5 (Active)   01/21/22     Pre-existing: Yes   Primary Wound Type: Diabetic ulc   Side: Right   Orientation: medial   Location: Foot   Wound Number: #5   Ankle-Brachial Index:    Pulses:    Removal Indication and Assessment:    Wound Outcome:    (Retired) Wound Type:    (Retired) Wound Length (cm):    (Retired) Wound Width (cm):    (Retired) Depth (cm):    Wound Description (Comments):    Removal Indications:    Wound Image    05/12/22 0947   Dressing Appearance Dried drainage 05/12/22 0913   Drainage Amount Scant 05/12/22 0913   Drainage Characteristics/Odor Serous 05/12/22 0913   Appearance Pink;Red;Dry;Granulating 05/12/22 0913   Tissue loss description Full thickness 05/12/22 0913   Black (%), Wound Tissue Color 0 % 05/12/22 0913   Red (%), Wound Tissue Color 80 % 05/12/22 0913   Yellow (%), Wound Tissue Color 20 % 05/12/22 0913   Periwound Area Dry;Redness 05/12/22 0913   Wound Edges Open 05/12/22 0913   Wound Length (cm) 0.9 cm 05/12/22 0947   Wound Width (cm) 0.8 cm 05/12/22 0947   Wound Depth (cm) 0.9 cm 05/12/22 0947   Wound Volume (cm^3) 0.648 cm^3 05/12/22 0947   Wound Surface Area (cm^2) 0.72 cm^2 05/12/22 0947   Care Cleansed with:;Soap and water 05/12/22 0913   Dressing Applied;Silver;Gauze;Rolled gauze 05/12/22 0913   Packing packed with 05/12/22 0913   Periwound Care Moisture barrier applied 05/12/22 0913         Active Problem List with Overview Notes    Diagnosis Date Noted    Acute cystitis without hematuria 03/08/2022    Allergy to environmental factors 03/08/2022    Elevated alkaline phosphatase level 03/08/2022    Acute pain of left shoulder 01/26/2022     Patient  arrived for 1000 appt at around 0930. At 1005 the patient was placed on the pulleys for warm-up and stretching of left shoulder.  assist couple minutes later the patient was seen leaving her appt /s any word to staff.  Patient was found sitting in the lobby awaiting her ride home.  She would not express her reasons for leaving only that she was going home.  Patient's regular therapist was notified.      Decreased range of motion of left shoulder 01/26/2022    Weakness of left arm 01/26/2022    Muscle spasm of left shoulder 01/18/2022    Gastroesophageal reflux disease 11/09/2021    Needs flu shot 11/09/2021    Ulcer of right foot with fat layer exposed 06/28/2021    Type 2 diabetes mellitus with foot ulcer, with long-term current use of insulin 03/19/2021    Lower extremity edema 03/19/2021    Hypertension 03/19/2021    Hyperlipidemia 03/19/2021    Embolic stroke involving left middle cerebral artery 02/12/2021         :    Plan:   Clean wounds with baby shampoo and water or vashe (no subs)   Pack wound with gray polymem,cover with pink polymem and dry gauze,wrap with meng and paper tape. Change daily and as needed  Elevate feet as much as possible  Avoid prolonged standing  Diabetic shoes with insert (script given to [facility])  Cont IV antibiotics as prescribed  Apply keystone wound gel daily when available  Stop polymem afterwards   May use vashe moisten packing if polymem not available    Problem List Items Addressed This Visit        Orthopedic    Ulcer of right foot with fat layer exposed - Primary    Relevant Orders    Debridement      Other Visit Diagnoses     Chronic left shoulder pain        Relevant Orders    Ambulatory referral/consult to Orthopedics    Weakness        Relevant Orders    Ambulatory referral/consult to Orthopedics          Future Appointments   Date Time Provider Department Center   6/2/2022 10:00 AM [facility]   6/7/2022  1:00 PM  Don Fox DO [facility]            Signature:  TOMASA Maldonado  [facility] - WOUND CARE  [address] [facility] [place] [address]  [phone]    Date of encounter: 5/12/22

## 2022-05-12 ENCOUNTER — OFFICE VISIT (OUTPATIENT)
Dept: WOUND CARE | Facility: CLINIC | Age: 62
End: 2022-05-12
Attending: FAMILY MEDICINE
Payer: MEDICAID

## 2022-05-12 VITALS
RESPIRATION RATE: 20 BRPM | HEART RATE: 101 BPM | DIASTOLIC BLOOD PRESSURE: 83 MMHG | SYSTOLIC BLOOD PRESSURE: 131 MMHG | TEMPERATURE: 98 F

## 2022-05-12 DIAGNOSIS — L97.512 ULCER OF RIGHT FOOT WITH FAT LAYER EXPOSED: Primary | ICD-10-CM

## 2022-05-12 DIAGNOSIS — R53.1 WEAKNESS: ICD-10-CM

## 2022-05-12 DIAGNOSIS — M25.512 CHRONIC LEFT SHOULDER PAIN: ICD-10-CM

## 2022-05-12 DIAGNOSIS — G89.29 CHRONIC LEFT SHOULDER PAIN: ICD-10-CM

## 2022-05-12 PROCEDURE — 99499 NO LOS: ICD-10-PCS | Mod: S$PBB,,, | Performed by: NURSE PRACTITIONER

## 2022-05-12 PROCEDURE — 99215 OFFICE O/P EST HI 40 MIN: CPT | Mod: PBBFAC | Performed by: NURSE PRACTITIONER

## 2022-05-12 PROCEDURE — 11042 DEBRIDEMENT: ICD-10-PCS | Mod: S$PBB,,, | Performed by: NURSE PRACTITIONER

## 2022-05-12 PROCEDURE — 99499 UNLISTED E&M SERVICE: CPT | Mod: S$PBB,,, | Performed by: NURSE PRACTITIONER

## 2022-05-12 PROCEDURE — 11042 DBRDMT SUBQ TIS 1ST 20SQCM/<: CPT | Mod: PBBFAC | Performed by: NURSE PRACTITIONER

## 2022-05-12 NOTE — PATIENT INSTRUCTIONS
Clean wounds with baby shampoo and water or vashe (no subs)     Pack wound with gray polymem,cover with pink polymem and dry gauze,wrap with meng and paper tape. Change daily and as needed     Elevate feet as much as possible     No prolonged standing     Wear diabetic shoes with insert      Apply keystone wound gel daily when available     Stop polymem afterwards     May use vashe moisten packing if polymem not available    Monitor closely for s/s of infection including fever, chills, increase in pain, odor from wound, and increased redness from foot. Go to ER if any complications develop.   Keep leg elevated and avoid pressure on wound.     Monitor closely for s/s of infection including fever, chills, increase in pain, odor from wound, and increased redness from foot. Go to ER if any complications develop.   Keep leg elevated and avoid pressure on wound.

## 2022-05-12 NOTE — PROGRESS NOTES
Debridement Performed for Assessment: Wound#   Performed By: Provider: Felisha Sun NP  Assistant:    Debridement: Surgical    Photo taken post procedure:    Time-Out Taken: Yes  Level: Skin/Subcutaneous Tissue/ Muscle  Post Debridement Measurements  Length: (cm) 0.9  Width: (cm) 0.8  Depth: (cm) 0.9      Area: (cm²) 0.72  Percent Debrided: 100%  Total Area Debrided: (sq cm)     Tissue and other material debrided:  Adipose, Dermis, Epidermis, Subcutaneous  Devitalized Tissue Debrided:Biofilm, Slough  Instrument: Curette  Bleeding: Moderate  Hemostasis Achieved: Pressure  Procedural Pain: Insensate  Post Procedural Pain: Insensate  Response to Treatment: Procedure was tolerated well    Devitalized materials/tissue Removed  the following was removed during debridement  subcutaneous      Post Debridement Diagnosis chronic wound right foot  Post debridement diagnosis  Same as Pre-operative debridement diagnosis, No Complications noted.      Grafts or implants applied  Was a graft or implant applied?  No      Procedure assistant  Procedure assisted by:  Assistant is the same as nurse listed above      Complications related to procedure  Did any complication occure during procedure?  No complications noted during or after procedure.      Specimen  Specimen collect during procedure?  No specimen collected      Anaesthesia:  Anesthesia used  None      Blood Loss:  Blood loss during procedure  less than 5 cc

## 2022-05-12 NOTE — PROCEDURES
"Debridement    Date/Time: 5/12/2022 10:00 AM  Performed by: TOMASA Maldonado  Authorized by: TOMASA Maldonado     Time out: Immediately prior to procedure a "time out" was called to verify the correct patient, procedure, equipment, support staff and site/side marked as required.    Consent Done?:  Yes (Written)  Local anesthesia used?: No      Wound Details:    Location:  Right foot    Location:  Right Plantar    Type of Debridement:  Excisional       Length (cm):  0.9       Area (sq cm):  0.72       Width (cm):  0.8       Percent Debrided (%):  100       Depth (cm):  0.9       Total Area Debrided (sq cm):  0.72    Depth of debridement:  Subcutaneous tissue    Tissue debrided:  Subcutaneous    Devitalized tissue debrided:  Callus, Exudate and Fibrin    Instruments:  Curette    Bleeding:  None  Patient tolerance:  Patient tolerated the procedure well with no immediate complications     Assistant: Cassidy Gentile      "

## 2022-05-24 ENCOUNTER — HOSPITAL ENCOUNTER (OUTPATIENT)
Dept: RADIOLOGY | Facility: HOSPITAL | Age: 62
Discharge: HOME | End: 2022-05-24
Attending: NURSE PRACTITIONER
Payer: MEDICAID

## 2022-05-24 DIAGNOSIS — M25.512 PAIN AND SWELLING OF LEFT SHOULDER: ICD-10-CM

## 2022-05-24 DIAGNOSIS — M25.412 PAIN AND SWELLING OF LEFT SHOULDER: ICD-10-CM

## 2022-05-24 DIAGNOSIS — M25.412 PAIN AND SWELLING OF LEFT SHOULDER: Primary | ICD-10-CM

## 2022-05-24 DIAGNOSIS — M25.512 PAIN AND SWELLING OF LEFT SHOULDER: Primary | ICD-10-CM

## 2022-05-24 PROBLEM — M19.012 PRIMARY OSTEOARTHRITIS OF LEFT SHOULDER: Status: ACTIVE | Noted: 2022-05-24

## 2022-05-24 PROCEDURE — 73030 X-RAY EXAM OF SHOULDER: CPT | Mod: 26,LT,,

## 2022-05-24 PROCEDURE — 73030 XR SHOULDER COMPLETE 2 OR MORE VIEWS LEFT: ICD-10-PCS | Mod: 26,LT,,

## 2022-05-24 PROCEDURE — 73030 X-RAY EXAM OF SHOULDER: CPT | Mod: TC,LT

## 2022-06-01 NOTE — PATIENT INSTRUCTIONS
Clean wounds with baby shampoo and water or vashe (no subs)     Pack wound with gray polymem,cover with pink polymem and dry gauze,wrap with meng and paper tape. Change daily and as needed     Elevate feet as much as possible     No prolonged standing     Wear diabetic shoes with insert      Apply keystone wound gel daily when available     Stop polymem afterwards     May use vashe moisten packing if polymem not available     Monitor closely for s/s of infection including fever, chills, increase in pain, odor from wound, and increased redness from foot. Go to ER if any complications develop.   Keep leg elevated and avoid pressure on wound.

## 2022-06-01 NOTE — PROGRESS NOTES
TOMASA Maldonado   [facility] - WOUND CARE  [address] [facility] [address]  [phone]      PATIENT NAME: Deborah Sotelo  : 1960  DATE: 22  MRN: [id number]      Billing Provider: TOMASA Maldonado  Level of Service:   Patient PCP Information     Provider PCP Type    Primary Doctor No General          Reason for Visit / Chief Complaint: Non-healing Wound Follow Up (Ulcer or right foot)       History of Present Illness / Problem Focused Workflow     Deborah Sotelo is a 61 y.o. female who presents to clinic for follow up on chronic-non healing wound on right TMA. She reports improvement in pain and swelling in right foot since last visit.Pertinent factors that delay wound healing include multiple co-morbidities, poor vascular supply, diabetes, edema, heavy drainage, decreased granulation tissue, tract(s), undermining and no protective sensation. Denies fever and chills.       Review of Systems     Review of Systems   Constitutional: Positive for activity change. Negative for chills and fever.   Respiratory: Negative for chest tightness and shortness of breath.    Cardiovascular: Positive for leg swelling. Negative for chest pain and palpitations.   Musculoskeletal: Positive for arthralgias and joint swelling.   Skin: Positive for color change and wound.        See wound assessment   Neurological: Positive for weakness and numbness.   Psychiatric/Behavioral: Negative for agitation, behavioral problems, confusion and self-injury.       Medical / Social / Family History     Past Medical History:   Diagnosis Date    Diabetes mellitus, type 2     Hyperlipidemia     Hypertension     Obesity     Primary osteoarthritis of left shoulder 2022    Stroke        Past Surgical History:   Procedure Laterality Date    AMPUTATION      APPENDECTOMY      EYE SURGERY         Social History  Ms. Deborah Sotelo  reports that she has never smoked. She has never used  smokeless tobacco. She reports current alcohol use. She reports that she does not use drugs.    Family History  Ms.'s Deborah Sotelo family history includes Appendicitis in her father; Asthma in her sister; Cancer in her mother; Diabetes in her brother.    Medications and Allergies     Medications  No outpatient medications have been marked as taking for the 6/2/22 encounter (Office Visit) with TOMASA Maldonado.       Allergies  Review of patient's allergies indicates:   Allergen Reactions    Aspirin      Other reaction(s): UPSET STOMACH     Bactrim ds [sulfamethoxazole-trimethoprim] Itching       Physical Examination     Vitals:    06/02/22 0952   BP: (!) 160/90   Pulse: 100   Resp: 20   Temp: 98 °F (36.7 °C)     Physical Exam  Vitals and nursing note reviewed.   Constitutional:       Appearance: Normal appearance.   HENT:      Head: Normocephalic.   Cardiovascular:      Rate and Rhythm: Normal rate and regular rhythm.      Pulses: Normal pulses.      Heart sounds: Normal heart sounds.   Pulmonary:      Effort: Pulmonary effort is normal. No respiratory distress.   Chest:      Chest wall: No tenderness.   Musculoskeletal:         General: Swelling, tenderness and deformity present.      Right lower leg: Edema present.      Comments: Left shoulder decreased ROM and weakness to left upper extremity   Skin:     General: Skin is warm and dry.      Comments: Wound, see LAD for measurements and picture   Neurological:      General: No focal deficit present.      Mental Status: She is alert and oriented to person, place, and time. Mental status is at baseline.   Psychiatric:         Mood and Affect: Mood normal.         Behavior: Behavior normal.         Thought Content: Thought content normal.         Judgment: Judgment normal.         Assessment and Plan      1. Ulcer of right foot with fat layer exposed           Wound 01/21/22 Diabetic Ulcer Right medial Foot #5 (Active)   01/21/22     Pre-existing: Yes    Primary Wound Type: Diabetic ulc   Side: Right   Orientation: medial   Location: Foot   Wound Number: #5   Ankle-Brachial Index:    Pulses:    Removal Indication and Assessment:    Wound Outcome:    (Retired) Wound Type:    (Retired) Wound Length (cm):    (Retired) Wound Width (cm):    (Retired) Depth (cm):    Wound Description (Comments):    Removal Indications:    Wound Image    06/02/22 0958   Dressing Appearance Dried drainage 06/02/22 0958   Drainage Amount Small 06/02/22 0958   Drainage Characteristics/Odor Serosanguineous 06/02/22 0958   Appearance Pink;Red;White;Tan;Moist 06/02/22 0958   Tissue loss description Full thickness 06/02/22 0958   Black (%), Wound Tissue Color 0 % 06/02/22 0958   Red (%), Wound Tissue Color 90 % 06/02/22 0958   Yellow (%), Wound Tissue Color 10 % 06/02/22 0958   Periwound Area Dry;Macerated;Pale white 06/02/22 0958   Wound Edges Open 06/02/22 0958   Wound Length (cm) 1 cm 06/02/22 0958   Wound Width (cm) 0.8 cm 06/02/22 0958   Wound Depth (cm) 1 cm 06/02/22 0958   Wound Volume (cm^3) 0.8 cm^3 06/02/22 0958   Wound Surface Area (cm^2) 0.8 cm^2 06/02/22 0958   Care Cleansed with:;Soap and water;Applied:;Skin Barrier 06/02/22 0958   Dressing Applied;Silver;Foam;Gauze;Rolled gauze 06/02/22 0958   Periwound Care Moisturizer applied 06/02/22 0958   Off Loading Off loading shoe 06/02/22 0958         Active Problem List with Overview Notes    Diagnosis Date Noted    Primary osteoarthritis of left shoulder 05/24/2022    Acute cystitis without hematuria 03/08/2022    Allergy to environmental factors 03/08/2022    Elevated alkaline phosphatase level 03/08/2022    Acute pain of left shoulder 01/26/2022     Patient arrived for 1000 appt at around 0930. At 1005 the patient was placed on the pulleys for warm-up and stretching of left shoulder.  assist couple minutes later the patient was seen leaving her appt /s any word to staff.  Patient was found sitting in the lobby awaiting her ride  home.  She would not express her reasons for leaving only that she was going home.  Patient's regular therapist was notified.      Decreased range of motion of left shoulder 01/26/2022    Weakness of left arm 01/26/2022    Muscle spasm of left shoulder 01/18/2022    Gastroesophageal reflux disease 11/09/2021    Needs flu shot 11/09/2021    Ulcer of right foot with fat layer exposed 06/28/2021    Type 2 diabetes mellitus with foot ulcer, with long-term current use of insulin 03/19/2021    Lower extremity edema 03/19/2021    Hypertension 03/19/2021    Hyperlipidemia 03/19/2021    Embolic stroke involving left middle cerebral artery 02/12/2021        Plan:   Clean wounds with baby shampoo and water or vashe (no subs)  Pack wound with gray polymem,cover with pink polymem and dry gauze,wrap with meng and paper tape. Change daily and as needed  Elevate feet as much as possible  Avoid prolonged standing  Wear diabetic shoes with inserts  Monitor closely for s/s of infection including fever, chills, increase in pain, odor from wound, and increased redness from foot. Go to ER if any complications develop.   Keep leg elevated and avoid pressure on wound.        Problem List Items Addressed This Visit        Orthopedic    Ulcer of right foot with fat layer exposed - Primary          Future Appointments   Date Time Provider Department Center   6/7/2022  1:00 PM Don Fox DO [facility]   6/16/2022 10:00 AM [facility] Maldonado [facility]            Signature:  TOMASA Maldonado  [facility] - WOUND CARE  [address]  [facility] [place] [address]  [phone]    Date of encounter: 6/2/22

## 2022-06-02 ENCOUNTER — OFFICE VISIT (OUTPATIENT)
Dept: WOUND CARE | Facility: CLINIC | Age: 62
End: 2022-06-02
Attending: FAMILY MEDICINE
Payer: MEDICAID

## 2022-06-02 VITALS
SYSTOLIC BLOOD PRESSURE: 160 MMHG | HEART RATE: 100 BPM | DIASTOLIC BLOOD PRESSURE: 90 MMHG | TEMPERATURE: 98 F | RESPIRATION RATE: 20 BRPM

## 2022-06-02 DIAGNOSIS — L97.512 ULCER OF RIGHT FOOT WITH FAT LAYER EXPOSED: Primary | ICD-10-CM

## 2022-06-02 PROCEDURE — 3051F PR MOST RECENT HEMOGLOBIN A1C LEVEL 7.0 - < 8.0%: ICD-10-PCS | Mod: CPTII,,, | Performed by: NURSE PRACTITIONER

## 2022-06-02 PROCEDURE — 1159F MED LIST DOCD IN RCRD: CPT | Mod: CPTII,,, | Performed by: NURSE PRACTITIONER

## 2022-06-02 PROCEDURE — 3051F HG A1C>EQUAL 7.0%<8.0%: CPT | Mod: CPTII,,, | Performed by: NURSE PRACTITIONER

## 2022-06-02 PROCEDURE — 3077F PR MOST RECENT SYSTOLIC BLOOD PRESSURE >= 140 MM HG: ICD-10-PCS | Mod: CPTII,,, | Performed by: NURSE PRACTITIONER

## 2022-06-02 PROCEDURE — 1159F PR MEDICATION LIST DOCUMENTED IN MEDICAL RECORD: ICD-10-PCS | Mod: CPTII,,, | Performed by: NURSE PRACTITIONER

## 2022-06-02 PROCEDURE — 3080F PR MOST RECENT DIASTOLIC BLOOD PRESSURE >= 90 MM HG: ICD-10-PCS | Mod: CPTII,,, | Performed by: NURSE PRACTITIONER

## 2022-06-02 PROCEDURE — 3077F SYST BP >= 140 MM HG: CPT | Mod: CPTII,,, | Performed by: NURSE PRACTITIONER

## 2022-06-02 PROCEDURE — 1160F RVW MEDS BY RX/DR IN RCRD: CPT | Mod: CPTII,,, | Performed by: NURSE PRACTITIONER

## 2022-06-02 PROCEDURE — 4010F PR ACE/ARB THEARPY RXD/TAKEN: ICD-10-PCS | Mod: CPTII,,, | Performed by: NURSE PRACTITIONER

## 2022-06-02 PROCEDURE — 4010F ACE/ARB THERAPY RXD/TAKEN: CPT | Mod: CPTII,,, | Performed by: NURSE PRACTITIONER

## 2022-06-02 PROCEDURE — 99213 PR OFFICE/OUTPT VISIT, EST, LEVL III, 20-29 MIN: ICD-10-PCS | Mod: S$PBB,,, | Performed by: NURSE PRACTITIONER

## 2022-06-02 PROCEDURE — 1160F PR REVIEW ALL MEDS BY PRESCRIBER/CLIN PHARMACIST DOCUMENTED: ICD-10-PCS | Mod: CPTII,,, | Performed by: NURSE PRACTITIONER

## 2022-06-02 PROCEDURE — 3080F DIAST BP >= 90 MM HG: CPT | Mod: CPTII,,, | Performed by: NURSE PRACTITIONER

## 2022-06-02 PROCEDURE — 99214 OFFICE O/P EST MOD 30 MIN: CPT | Mod: PBBFAC | Performed by: NURSE PRACTITIONER

## 2022-06-02 PROCEDURE — 99213 OFFICE O/P EST LOW 20 MIN: CPT | Mod: S$PBB,,, | Performed by: NURSE PRACTITIONER

## 2022-06-07 ENCOUNTER — OFFICE VISIT (OUTPATIENT)
Dept: FAMILY MEDICINE | Facility: CLINIC | Age: 62
End: 2022-06-07
Payer: MEDICAID

## 2022-06-07 VITALS
BODY MASS INDEX: 44.41 KG/M2 | DIASTOLIC BLOOD PRESSURE: 93 MMHG | HEIGHT: 68 IN | HEART RATE: 97 BPM | SYSTOLIC BLOOD PRESSURE: 152 MMHG | TEMPERATURE: 98 F | RESPIRATION RATE: 18 BRPM | WEIGHT: 293 LBS | OXYGEN SATURATION: 95 %

## 2022-06-07 DIAGNOSIS — K21.9 GASTROESOPHAGEAL REFLUX DISEASE, UNSPECIFIED WHETHER ESOPHAGITIS PRESENT: ICD-10-CM

## 2022-06-07 DIAGNOSIS — E11.49 TYPE 2 DIABETES MELLITUS WITH OTHER NEUROLOGIC COMPLICATION, WITH LONG-TERM CURRENT USE OF INSULIN: ICD-10-CM

## 2022-06-07 DIAGNOSIS — Z91.09 ALLERGY TO ENVIRONMENTAL FACTORS: ICD-10-CM

## 2022-06-07 DIAGNOSIS — Z79.4 TYPE 2 DIABETES MELLITUS WITH OTHER NEUROLOGIC COMPLICATION, WITH LONG-TERM CURRENT USE OF INSULIN: ICD-10-CM

## 2022-06-07 DIAGNOSIS — M25.512 SEVERE SHOULDER PAIN, LEFT: Primary | ICD-10-CM

## 2022-06-07 DIAGNOSIS — L97.512 ULCER OF RIGHT FOOT WITH FAT LAYER EXPOSED: ICD-10-CM

## 2022-06-07 DIAGNOSIS — E78.5 HYPERLIPIDEMIA, UNSPECIFIED HYPERLIPIDEMIA TYPE: ICD-10-CM

## 2022-06-07 DIAGNOSIS — I10 HYPERTENSION, UNSPECIFIED TYPE: ICD-10-CM

## 2022-06-07 PROCEDURE — 96372 THER/PROPH/DIAG INJ SC/IM: CPT | Mod: GC,,, | Performed by: SPECIALIST

## 2022-06-07 PROCEDURE — 96372 PR INJECTION,THERAP/PROPH/DIAG2ST, IM OR SUBCUT: ICD-10-PCS | Mod: GC,,, | Performed by: SPECIALIST

## 2022-06-07 PROCEDURE — 99213 PR OFFICE/OUTPT VISIT, EST, LEVL III, 20-29 MIN: ICD-10-PCS | Mod: 25,GC,, | Performed by: SPECIALIST

## 2022-06-07 PROCEDURE — 3051F PR MOST RECENT HEMOGLOBIN A1C LEVEL 7.0 - < 8.0%: ICD-10-PCS | Mod: CPTII,,, | Performed by: SPECIALIST

## 2022-06-07 PROCEDURE — 99213 OFFICE O/P EST LOW 20 MIN: CPT | Mod: 25,GC,, | Performed by: SPECIALIST

## 2022-06-07 PROCEDURE — 3051F HG A1C>EQUAL 7.0%<8.0%: CPT | Mod: CPTII,,, | Performed by: SPECIALIST

## 2022-06-07 RX ORDER — ATORVASTATIN CALCIUM 80 MG/1
TABLET, FILM COATED ORAL
Qty: 30 TABLET | Refills: 2 | Status: SHIPPED | OUTPATIENT
Start: 2022-06-07 | End: 2022-09-13 | Stop reason: SDUPTHER

## 2022-06-07 RX ORDER — AMLODIPINE BESYLATE 5 MG/1
5 TABLET ORAL DAILY
Qty: 30 TABLET | Refills: 2 | Status: SHIPPED | OUTPATIENT
Start: 2022-06-07 | End: 2023-02-28 | Stop reason: SDUPTHER

## 2022-06-07 RX ORDER — KETOROLAC TROMETHAMINE 30 MG/ML
30 INJECTION, SOLUTION INTRAMUSCULAR; INTRAVENOUS
Status: DISCONTINUED | OUTPATIENT
Start: 2022-06-07 | End: 2022-06-07

## 2022-06-07 RX ORDER — INSULIN ASPART 100 [IU]/ML
INJECTION, SUSPENSION SUBCUTANEOUS
Qty: 30 ML | Refills: 5 | Status: SHIPPED | OUTPATIENT
Start: 2022-06-07 | End: 2022-08-17

## 2022-06-07 RX ORDER — LORATADINE 10 MG/1
10 TABLET ORAL DAILY
Qty: 90 TABLET | Refills: 0 | Status: SHIPPED | OUTPATIENT
Start: 2022-06-07 | End: 2023-05-04 | Stop reason: ALTCHOICE

## 2022-06-07 RX ORDER — GENTAMICIN SULFATE 1 MG/G
1 CREAM TOPICAL DAILY
Qty: 30 G | Refills: 1 | Status: SHIPPED | OUTPATIENT
Start: 2022-06-07 | End: 2022-06-14

## 2022-06-07 RX ORDER — LOSARTAN POTASSIUM 100 MG/1
100 TABLET ORAL NIGHTLY
Qty: 30 TABLET | Refills: 2 | Status: SHIPPED | OUTPATIENT
Start: 2022-06-07 | End: 2023-02-28 | Stop reason: SDUPTHER

## 2022-06-07 RX ORDER — KETOROLAC TROMETHAMINE 30 MG/ML
30 INJECTION, SOLUTION INTRAMUSCULAR; INTRAVENOUS
Status: COMPLETED | OUTPATIENT
Start: 2022-06-07 | End: 2022-06-07

## 2022-06-07 RX ORDER — SODIUM HYPOCHLORITE 5 MG/ML
SOLUTION TOPICAL DAILY
Qty: 473 ML | Refills: 0 | Status: SHIPPED | OUTPATIENT
Start: 2022-06-07

## 2022-06-07 RX ORDER — INSULIN GLARGINE 100 [IU]/ML
20 INJECTION, SOLUTION SUBCUTANEOUS DAILY
Qty: 6 ML | Refills: 2 | Status: SHIPPED | OUTPATIENT
Start: 2022-06-07 | End: 2023-02-28 | Stop reason: SDUPTHER

## 2022-06-07 RX ORDER — OMEPRAZOLE 20 MG/1
20 CAPSULE, DELAYED RELEASE ORAL DAILY
Qty: 30 CAPSULE | Refills: 2 | Status: SHIPPED | OUTPATIENT
Start: 2022-06-07 | End: 2023-02-28 | Stop reason: SDUPTHER

## 2022-06-07 RX ORDER — CLOTRIMAZOLE 1 %
CREAM (GRAM) TOPICAL 2 TIMES DAILY
Qty: 60 G | Refills: 3 | Status: SHIPPED | OUTPATIENT
Start: 2022-06-07 | End: 2022-06-14

## 2022-06-07 RX ADMIN — KETOROLAC TROMETHAMINE 30 MG: 30 INJECTION, SOLUTION INTRAMUSCULAR; INTRAVENOUS at 02:06

## 2022-06-07 NOTE — ASSESSMENT & PLAN NOTE
Ketorolac injection 30 mg  Norco 5, 20 pills were prescribed   Patient will see her orthopedic surgeon on 06/22

## 2022-06-07 NOTE — PROGRESS NOTES
Subjective:       Patient ID: Deborah Sotelo is a 61 y.o. female.    Chief Complaint: Shoulder Pain (Left shoulder) and Medication Refill (All medications)    61 year old female is follow up and asking for medication refill. She recently started having severe right shoulder pain that she saw Dr. Ghotra for. She has done the MRI and is going back to see him again on 06/22, in 2 weeks. Patient has severe pain and cannot sleep well. She is asking for anything that would help her feel better.     Shoulder Pain   The pain is present in the left shoulder. This is a chronic problem. The current episode started more than 1 month ago. There has been no history of extremity trauma. The problem occurs constantly. The problem has been unchanged. The quality of the pain is described as dull. The pain is at a severity of 8/10. The pain is severe. Pertinent negatives include no fever, headaches or numbness.   Medication Refill  Associated symptoms include arthralgias. Pertinent negatives include no abdominal pain, chest pain, chills, congestion, coughing, fatigue, fever, headaches, nausea, numbness, sore throat or vomiting.     Review of Systems   Constitutional: Negative for activity change, appetite change, chills, fatigue and fever.   HENT: Negative for nasal congestion, postnasal drip, rhinorrhea, sinus pressure/congestion, sneezing and sore throat.    Respiratory: Negative for cough, choking, chest tightness and shortness of breath.    Cardiovascular: Negative for chest pain, palpitations, leg swelling and claudication.   Gastrointestinal: Negative for abdominal distention, abdominal pain, blood in stool, constipation, diarrhea, nausea and vomiting.   Genitourinary: Negative for bladder incontinence, decreased urine volume, difficulty urinating, dyspareunia, dysuria, flank pain, genital sores, hematuria, nocturia, urgency, vaginal bleeding, vaginal discharge, vaginal pain and vaginal dryness.   Musculoskeletal: Positive for  arthralgias.   Integumentary:  Negative for breast mass and breast discharge.   Neurological: Negative for seizures, light-headedness, numbness, headaches, disturbances in coordination and coordination difficulties.   Hematological: Negative for adenopathy.   Psychiatric/Behavioral: Negative for agitation.   All other systems reviewed and are negative.  Breast: Negative for mass        Objective:      Physical Exam  Vitals and nursing note reviewed.   Constitutional:       General: She is not in acute distress.     Appearance: Normal appearance. She is obese. She is not ill-appearing or toxic-appearing.   HENT:      Head: Normocephalic.      Right Ear: External ear normal.      Left Ear: External ear normal.      Nose: Nose normal. No congestion or rhinorrhea.      Mouth/Throat:      Mouth: Mucous membranes are moist.   Eyes:      General:         Right eye: No discharge.         Left eye: No discharge.      Conjunctiva/sclera: Conjunctivae normal.   Cardiovascular:      Rate and Rhythm: Normal rate and regular rhythm.      Pulses: Normal pulses.      Heart sounds: Normal heart sounds. No murmur heard.  Pulmonary:      Effort: Pulmonary effort is normal. No respiratory distress.      Breath sounds: Normal breath sounds. No rhonchi.   Abdominal:      General: Bowel sounds are normal. There is no distension.      Palpations: Abdomen is soft.      Tenderness: There is no abdominal tenderness.   Musculoskeletal:      Left shoulder: Tenderness present. Decreased range of motion.      Cervical back: Neck supple.   Skin:     General: Skin is warm.   Neurological:      Mental Status: She is alert and oriented to person, place, and time.         Assessment:       Problem List Items Addressed This Visit        Cardiac/Vascular    Hypertension     meds refilled           Relevant Medications    amLODIPine (NORVASC) 5 MG tablet    losartan (COZAAR) 100 MG tablet    Hyperlipidemia     Medication refill           Relevant  Medications    atorvastatin (LIPITOR) 80 MG tablet       GI    Gastroesophageal reflux disease     Medication refill           Relevant Medications    omeprazole (PRILOSEC) 20 MG capsule       Orthopedic    Ulcer of right foot with fat layer exposed    Relevant Medications    clotrimazole (LOTRIMIN AF, CLOTRIMAZOLE,) 1 % cream    sodium hypochlorite 0.5 % (DAKIN'S SOLUTION) external solution    ketorolac injection 30 mg (Completed)    Severe shoulder pain, left - Primary     Ketorolac injection 30 mg  Norco 5, 20 pills were prescribed   Patient will see her orthopedic surgeon on 06/22           Relevant Medications    ketorolac injection 30 mg (Completed)       Other    Allergy to environmental factors    Relevant Medications    loratadine (CLARITIN) 10 mg tablet      Other Visit Diagnoses     Type 2 diabetes mellitus with other neurologic complication, with long-term current use of insulin        Relevant Medications    insulin asp prt-insulin aspart, NovoLOG 70/30, (NOVOLOG 70/30) 100 unit/mL (70-30) Soln    LANTUS U-100 INSULIN 100 unit/mL injection          Plan:       Severe shoulder pain, left  -     Discontinue: ketorolac injection 30 mg  -     ketorolac injection 30 mg    Hypertension, unspecified type  -     amLODIPine (NORVASC) 5 MG tablet; Take 1 tablet (5 mg total) by mouth once daily.  Dispense: 30 tablet; Refill: 2  -     losartan (COZAAR) 100 MG tablet; Take 1 tablet (100 mg total) by mouth every evening.  Dispense: 30 tablet; Refill: 2    Hyperlipidemia, unspecified hyperlipidemia type  -     atorvastatin (LIPITOR) 80 MG tablet; TAKE 1 TABLET BY MOUTH EACH NIGHT AT BEDTIME FOR CHOLESTEROL ~~DO NOT EAT GRAPEFRUIT OR DRINK GRAPEFRUIT JUICE WHILE TAKING THIS MEDICATION~~  Dispense: 30 tablet; Refill: 2    Ulcer of right foot with fat layer exposed  -     clotrimazole (LOTRIMIN AF, CLOTRIMAZOLE,) 1 % cream; Apply topically 2 (two) times daily.  Dispense: 60 g; Refill: 3  -     sodium hypochlorite 0.5 %  (DAKIN'S SOLUTION) external solution; Apply topically once daily. Pack wound with dakin's moistened nuguaze daily  Dispense: 473 mL; Refill: 0  -     ketorolac injection 30 mg    Type 2 diabetes mellitus with other neurologic complication, with long-term current use of insulin  -     insulin asp prt-insulin aspart, NovoLOG 70/30, (NOVOLOG 70/30) 100 unit/mL (70-30) Soln; INJECT 50 UNITS SUB-Q EACH MORNING AND 30 UNITS SUB-Q EACH EVENING  Dispense: 30 mL; Refill: 5  -     LANTUS U-100 INSULIN 100 unit/mL injection; Inject 20 Units into the skin once daily. Take at night  Dispense: 6 mL; Refill: 2    Allergy to environmental factors  -     loratadine (CLARITIN) 10 mg tablet; Take 1 tablet (10 mg total) by mouth once daily.  Dispense: 90 tablet; Refill: 0    Gastroesophageal reflux disease, unspecified whether esophagitis present  -     omeprazole (PRILOSEC) 20 MG capsule; Take 1 capsule (20 mg total) by mouth once daily.  Dispense: 30 capsule; Refill: 2    Other orders  -     gentamicin (GARAMYCIN) 0.1 % cream; Apply 1 application topically once daily.  Dispense: 30 g; Refill: 1

## 2022-06-14 ENCOUNTER — OFFICE VISIT (OUTPATIENT)
Dept: FAMILY MEDICINE | Facility: CLINIC | Age: 62
End: 2022-06-14
Payer: MEDICAID

## 2022-06-14 VITALS
OXYGEN SATURATION: 97 % | TEMPERATURE: 98 F | RESPIRATION RATE: 18 BRPM | HEART RATE: 98 BPM | WEIGHT: 293 LBS | HEIGHT: 68 IN | DIASTOLIC BLOOD PRESSURE: 79 MMHG | SYSTOLIC BLOOD PRESSURE: 125 MMHG | BODY MASS INDEX: 44.41 KG/M2

## 2022-06-14 DIAGNOSIS — M25.512 SEVERE SHOULDER PAIN, LEFT: Primary | ICD-10-CM

## 2022-06-14 PROCEDURE — 99212 OFFICE O/P EST SF 10 MIN: CPT | Mod: GC,,, | Performed by: FAMILY MEDICINE

## 2022-06-14 PROCEDURE — 99212 PR OFFICE/OUTPT VISIT, EST, LEVL II, 10-19 MIN: ICD-10-PCS | Mod: GC,,, | Performed by: FAMILY MEDICINE

## 2022-06-14 NOTE — PROGRESS NOTES
"  Subjective:       Patient ID: Deborah Sotelo is a 61 y.o. female.    Chief Complaint: Follow-up (Unable to fill pain med, "pharmacy doesn't have it")    61 year old female with PMHx of DM2, chronic LE wounds and HTN came back since her prescription was not refilled at the pharmacy. She has been having severe right shoulder pain with degenerative disease of the shoulder. She already was referred to Dr. Ghotra who did has ordered an MRI. Patient has an appointment with him in the next 10 days but pain is unbareable therefore Norco 5 mg BID was prescribed for total of 10 days. Medicaid only covers seven days therefore her prescription was not filled.      Follow-up  Associated symptoms include arthralgias. Pertinent negatives include no abdominal pain, chest pain, chills, congestion, coughing, fatigue, fever, headaches, nausea, numbness, sore throat or vomiting.     Review of Systems   Constitutional: Negative for activity change, appetite change, chills, fatigue and fever.   HENT: Negative for nasal congestion, postnasal drip, rhinorrhea, sinus pressure/congestion, sneezing and sore throat.    Respiratory: Negative for cough, choking, chest tightness and shortness of breath.    Cardiovascular: Negative for chest pain, palpitations, leg swelling and claudication.   Gastrointestinal: Negative for abdominal distention, abdominal pain, blood in stool, constipation, diarrhea, nausea and vomiting.   Genitourinary: Negative for bladder incontinence, decreased urine volume, difficulty urinating, dyspareunia, dysuria, flank pain, genital sores, hematuria, nocturia, urgency, vaginal bleeding, vaginal discharge, vaginal pain and vaginal dryness.   Musculoskeletal: Positive for arthralgias.   Integumentary:  Negative for breast mass and breast discharge.   Neurological: Negative for seizures, light-headedness, numbness, headaches, disturbances in coordination and coordination difficulties.   Hematological: Negative for " adenopathy.   Psychiatric/Behavioral: Negative for agitation.   All other systems reviewed and are negative.  Breast: Negative for mass        Objective:      Physical Exam  Vitals and nursing note reviewed.   Constitutional:       General: She is not in acute distress.     Appearance: Normal appearance. She is obese. She is not ill-appearing or toxic-appearing.   HENT:      Head: Normocephalic.      Right Ear: External ear normal.      Left Ear: External ear normal.      Nose: Nose normal. No congestion or rhinorrhea.      Mouth/Throat:      Mouth: Mucous membranes are moist.   Eyes:      General:         Right eye: No discharge.         Left eye: No discharge.      Conjunctiva/sclera: Conjunctivae normal.   Cardiovascular:      Rate and Rhythm: Normal rate and regular rhythm.      Pulses: Normal pulses.      Heart sounds: Normal heart sounds. No murmur heard.  Pulmonary:      Effort: Pulmonary effort is normal. No respiratory distress.      Breath sounds: Normal breath sounds. No rhonchi.   Abdominal:      General: Bowel sounds are normal. There is no distension.      Palpations: Abdomen is soft.      Tenderness: There is no abdominal tenderness.   Musculoskeletal:      Left shoulder: Tenderness present. Decreased range of motion.      Cervical back: Neck supple.   Skin:     General: Skin is warm.   Neurological:      Mental Status: She is alert and oriented to person, place, and time.         Assessment:       Problem List Items Addressed This Visit        Orthopedic    Severe shoulder pain, left - Primary     Norco 5 mg PO BID was written for 7 days. Total of 14 pills. Pharmacy was called and were notified patient will be going there.                  Plan:       Severe shoulder pain, left

## 2022-06-14 NOTE — ASSESSMENT & PLAN NOTE
Norco 5 mg PO BID was written for 7 days. Total of 14 pills. Pharmacy was called and were notified patient will be going there.

## 2022-06-15 NOTE — PROGRESS NOTES
TOMASA Maldonado   [facility] - WOUND CARE  [address] [facility] [address]  [phone]      PATIENT NAME: Deborah Sotelo  : 1960  DATE: 22  MRN: [id number]      Billing Provider: TOMASA Maldonado  Level of Service:   Patient PCP Information     Provider PCP Type    Ron Sanches MD General          Reason for Visit / Chief Complaint: Diabetic Foot Ulcer (Right foot)       History of Present Illness / Problem Focused Workflow     Deborah Sotelo is a 61 y.o. female who presents to clinic for follow up on chronic-non healing wound on right TMA. She reports improvement in pain and swelling in right foot since last visit. Reports she no longer has HH and does not have dressing supplies. Wound continues to have undermining. Pertinent factors that delay wound healing include multiple co-morbidities, poor vascular supply, diabetes, edema, heavy drainage, decreased granulation tissue, tract(s), undermining and no protective sensation. Denies fever and chills.       Review of Systems     Review of Systems   Constitutional: Positive for activity change. Negative for chills and fever.   Respiratory: Negative for chest tightness and shortness of breath.    Cardiovascular: Positive for leg swelling. Negative for chest pain and palpitations.   Musculoskeletal: Positive for arthralgias and joint swelling.   Skin: Positive for color change and wound.        See wound assessment   Neurological: Positive for weakness and numbness.   Psychiatric/Behavioral: Negative for agitation, behavioral problems, confusion and self-injury.       Medical / Social / Family History     Past Medical History:   Diagnosis Date    Diabetes mellitus, type 2     Hyperlipidemia     Hypertension     Obesity     Primary osteoarthritis of left shoulder 2022    Stroke        Past Surgical History:   Procedure Laterality Date    AMPUTATION      APPENDECTOMY      EYE SURGERY          Social History  Ms. Deborah Sotelo  reports that she has never smoked. She has never used smokeless tobacco. She reports current alcohol use. She reports that she does not use drugs.    Family History  Ms.'s Deborah Sotelo family history includes Appendicitis in her father; Asthma in her sister; Cancer in her mother; Diabetes in her brother.    Medications and Allergies     Medications  Outpatient Medications Marked as Taking for the 6/16/22 encounter (Office Visit) with TOMASA Maldonado   Medication Sig Dispense Refill    amLODIPine (NORVASC) 5 MG tablet Take 1 tablet (5 mg total) by mouth once daily. 30 tablet 2    atorvastatin (LIPITOR) 80 MG tablet TAKE 1 TABLET BY MOUTH EACH NIGHT AT BEDTIME FOR CHOLESTEROL ~~DO NOT EAT GRAPEFRUIT OR DRINK GRAPEFRUIT JUICE WHILE TAKING THIS MEDICATION~~ 30 tablet 2    HYDROcodone-acetaminophen (NORCO) 5-325 mg per tablet Take 1 tablet by mouth 2 (two) times daily as needed.      insulin asp prt-insulin aspart, NovoLOG 70/30, (NOVOLOG 70/30) 100 unit/mL (70-30) Soln INJECT 50 UNITS SUB-Q EACH MORNING AND 30 UNITS SUB-Q EACH EVENING 30 mL 5    LANTUS U-100 INSULIN 100 unit/mL injection Inject 20 Units into the skin once daily. Take at night 6 mL 2    loratadine (CLARITIN) 10 mg tablet Take 1 tablet (10 mg total) by mouth once daily. 90 tablet 0    losartan (COZAAR) 100 MG tablet Take 1 tablet (100 mg total) by mouth every evening. 30 tablet 2    omeprazole (PRILOSEC) 20 MG capsule Take 1 capsule (20 mg total) by mouth once daily. 30 capsule 2    sodium hypochlorite 0.5 % (DAKIN'S SOLUTION) external solution Apply topically once daily. Pack wound with dakin's moistened nuguaze daily 473 mL 0       Allergies  Review of patient's allergies indicates:   Allergen Reactions    Aspirin      Other reaction(s): UPSET STOMACH     Bactrim ds [sulfamethoxazole-trimethoprim] Itching       Physical Examination     Vitals:    06/16/22 1012   BP: (!) 142/93   Pulse: 105    Resp: 20   Temp: 97.3 °F (36.3 °C)     Physical Exam  Vitals and nursing note reviewed.   Constitutional:       Appearance: Normal appearance.   HENT:      Head: Normocephalic.   Cardiovascular:      Rate and Rhythm: Normal rate and regular rhythm.      Pulses: Normal pulses.      Heart sounds: Normal heart sounds.   Pulmonary:      Effort: Pulmonary effort is normal. No respiratory distress.   Chest:      Chest wall: No tenderness.   Musculoskeletal:         General: Swelling, tenderness and deformity present.      Right lower leg: Edema present.      Comments: Left shoulder decreased ROM and weakness to left upper extremity   Skin:     General: Skin is warm and dry.      Comments: Wound, see LDA for measurements and picture   Neurological:      General: No focal deficit present.      Mental Status: She is alert and oriented to person, place, and time. Mental status is at baseline.   Psychiatric:         Mood and Affect: Mood normal.         Behavior: Behavior normal.         Thought Content: Thought content normal.         Judgment: Judgment normal.         Assessment and Plan      1. Ulcer of right foot with fat layer exposed           Wound 01/18/21 1051 Diabetic Ulcer Right lateral Plantar #1 (Active)   01/18/21 1051    Pre-existing: Yes   Primary Wound Type: Diabetic ulc   Side: Right   Orientation: lateral   Location: Plantar   Wound Number: #1   Ankle-Brachial Index:    Pulses: normal   Removal Indication and Assessment:    Wound Outcome:    (Retired) Wound Type:    (Retired) Wound Length (cm):    (Retired) Wound Width (cm):    (Retired) Depth (cm):    Wound Description (Comments):    Removal Indications:    Wound Image   06/16/22 1030   Dressing Appearance Moist drainage 06/16/22 1030   Drainage Amount Moderate 06/16/22 1030   Drainage Characteristics/Odor Serosanguineous 06/16/22 1030   Appearance Pink;Moist;Granulating 06/16/22 1030   Tissue loss description Full thickness 06/16/22 1030   Black (%),  Wound Tissue Color 0 % 06/16/22 1030   Red (%), Wound Tissue Color 25 % 06/16/22 1030   Yellow (%), Wound Tissue Color 75 % 06/16/22 1030   Periwound Area Moist 06/16/22 1030   Wound Edges Callused 06/16/22 1030   Johnson Classification (diabetic foot ulcers only) Grade 1 06/16/22 1030   Wound Length (cm) 0.3 cm 06/16/22 1030   Wound Width (cm) 0.4 cm 06/16/22 1030   Wound Depth (cm) 2.2 cm 06/16/22 1030   Wound Volume (cm^3) 0.264 cm^3 06/16/22 1030   Wound Surface Area (cm^2) 0.12 cm^2 06/16/22 1030   Undermining (depth (cm)/location) 12:00-12:00 2.1 06/16/22 1030   Care Cleansed with:;Antimicrobial agent;Debrided 06/16/22 1030   Dressing Applied;Gauze, wet to dry;Gauze;Rolled gauze 06/16/22 1030   Periwound Care Moisture barrier applied 06/16/22 1030   Off Loading Off loading shoe 06/16/22 1030   Dressing Change Due 06/17/22 06/16/22 1030            Wound 01/21/22 Diabetic Ulcer Right medial Foot #5 (Active)   01/21/22     Pre-existing: Yes   Primary Wound Type: Diabetic ulc   Side: Right   Orientation: medial   Location: Foot   Wound Number: #5   Ankle-Brachial Index:    Pulses:    Removal Indication and Assessment:    Wound Outcome:    (Retired) Wound Type:    (Retired) Wound Length (cm):    (Retired) Wound Width (cm):    (Retired) Depth (cm):    Wound Description (Comments):    Removal Indications:    Wound Image    06/16/22 1027   Dressing Appearance Dry;Intact;Clean 06/16/22 1027   Drainage Amount None 06/16/22 1027   Appearance Intact;Pink 06/16/22 1027   Tissue loss description Not applicable 06/16/22 1027   Black (%), Wound Tissue Color 0 % 06/16/22 1027   Red (%), Wound Tissue Color 0 % 06/16/22 1027   Yellow (%), Wound Tissue Color 0 % 06/16/22 1027   Periwound Area Intact;Dry 06/16/22 1027   Johnson Classification (diabetic foot ulcers only) Grade 0 06/16/22 1027   Wound Length (cm) 0 cm 06/16/22 1027   Wound Width (cm) 0 cm 06/16/22 1027   Wound Depth (cm) 0 cm 06/16/22 1027   Wound Volume (cm^3) 0 cm^3  06/16/22 1027   Wound Surface Area (cm^2) 0 cm^2 06/16/22 1027   Care Cleansed with:;Antimicrobial agent 06/16/22 1027   Dressing Applied 06/16/22 1027   Periwound Care Moisturizer applied 06/16/22 1027   Dressing Change Due 06/17/22 06/16/22 1027         Active Problem List with Overview Notes    Diagnosis Date Noted    Primary osteoarthritis of left shoulder 05/24/2022    Acute cystitis without hematuria 03/08/2022    Allergy to environmental factors 03/08/2022    Elevated alkaline phosphatase level 03/08/2022    Severe shoulder pain, left 01/26/2022     Patient arrived for 1000 appt at around 0930. At 1005 the patient was placed on the pulleys for warm-up and stretching of left shoulder.  assist couple minutes later the patient was seen leaving her appt /s any word to staff.  Patient was found sitting in the lobby awaiting her ride home.  She would not express her reasons for leaving only that she was going home.  Patient's regular therapist was notified.      Decreased range of motion of left shoulder 01/26/2022    Weakness of left arm 01/26/2022    Muscle spasm of left shoulder 01/18/2022    Gastroesophageal reflux disease 11/09/2021    Needs flu shot 11/09/2021    Ulcer of right foot with fat layer exposed 06/28/2021    Type 2 diabetes mellitus with foot ulcer, with long-term current use of insulin 03/19/2021    Lower extremity edema 03/19/2021    Hypertension 03/19/2021    Hyperlipidemia 03/19/2021    Embolic stroke involving left middle cerebral artery 02/12/2021      Plan:   Clean wounds with baby shampoo and water or vashe   Pack wound with vashe moisten nu gauze and dry gauze,wrap with meng and paper tape. Change daily and as needed  Elevate feet as much as possible  Avoid prolonged standing  Wear diabetic shoes with insert   Monitor closely for s/s of infection including fever, chills, increase in pain, odor from wound, and increased redness from foot. Go to ER if any complications  develop.   Keep leg elevated and avoid pressure on wound.   Diabetes:  Monitor glucose closely. Check fasting glucose and 2 hours after meals. HgA1C goal <7, fasting glucose , and 2 hours after meals <180  Hypertension:  Check blood pressure twice daily, goal <120/80  Diet:   Increase protein intake, avoid fried, fatty foods and foods high in simple carbs.   Vitamins:  Take vitamin C 1000 mg, zinc 50mg, vitamin d 5000 units, and a daily multivitamin. Dawson is a good source of protein and nutrients to aid in wound healing.   .amw      Problem List Items Addressed This Visit        Orthopedic    Ulcer of right foot with fat layer exposed - Primary          Future Appointments   Date Time Provider Department Center   6/22/2022  9:10 AM Ricky Valdes MD [facility] JOSE RAUL [facility]   7[facility]2022  [facility]   7/7/2022 10:00 AM TOMASA Maldonado [facility]   9/6/2022  1:00 PM Kentrell Sanz MD [facility]            Signature:  TOMASA Maldonado  [facility] - WOUND CARE  [address]  [place] [address]  [phone]    Date of encounter: 6/16/22

## 2022-06-15 NOTE — PATIENT INSTRUCTIONS
Clean wounds with baby shampoo and water or vashe      Pack wound with vashe moisten nu gauze and dry gauze,wrap with meng and paper tape. Change daily and as needed     Elevate feet as much as possible     No prolonged standing     Wear diabetic shoes with insert        Monitor closely for s/s of infection including fever, chills, increase in pain, odor from wound, and increased redness from foot. Go to ER if any complications develop.   Keep leg elevated and avoid pressure on wound.

## 2022-06-16 ENCOUNTER — OFFICE VISIT (OUTPATIENT)
Dept: WOUND CARE | Facility: CLINIC | Age: 62
End: 2022-06-16
Attending: FAMILY MEDICINE
Payer: MEDICAID

## 2022-06-16 VITALS
TEMPERATURE: 97 F | RESPIRATION RATE: 20 BRPM | HEART RATE: 105 BPM | SYSTOLIC BLOOD PRESSURE: 142 MMHG | DIASTOLIC BLOOD PRESSURE: 93 MMHG

## 2022-06-16 DIAGNOSIS — L97.512 ULCER OF RIGHT FOOT WITH FAT LAYER EXPOSED: Primary | ICD-10-CM

## 2022-06-16 PROCEDURE — 4010F ACE/ARB THERAPY RXD/TAKEN: CPT | Mod: CPTII,,, | Performed by: NURSE PRACTITIONER

## 2022-06-16 PROCEDURE — 1160F RVW MEDS BY RX/DR IN RCRD: CPT | Mod: CPTII,,, | Performed by: NURSE PRACTITIONER

## 2022-06-16 PROCEDURE — 1159F PR MEDICATION LIST DOCUMENTED IN MEDICAL RECORD: ICD-10-PCS | Mod: CPTII,,, | Performed by: NURSE PRACTITIONER

## 2022-06-16 PROCEDURE — 3051F HG A1C>EQUAL 7.0%<8.0%: CPT | Mod: CPTII,,, | Performed by: NURSE PRACTITIONER

## 2022-06-16 PROCEDURE — 1159F MED LIST DOCD IN RCRD: CPT | Mod: CPTII,,, | Performed by: NURSE PRACTITIONER

## 2022-06-16 PROCEDURE — 99213 PR OFFICE/OUTPT VISIT, EST, LEVL III, 20-29 MIN: ICD-10-PCS | Mod: S$PBB,,, | Performed by: NURSE PRACTITIONER

## 2022-06-16 PROCEDURE — 99213 OFFICE O/P EST LOW 20 MIN: CPT | Mod: S$PBB,,, | Performed by: NURSE PRACTITIONER

## 2022-06-16 PROCEDURE — 3077F SYST BP >= 140 MM HG: CPT | Mod: CPTII,,, | Performed by: NURSE PRACTITIONER

## 2022-06-16 PROCEDURE — 1160F PR REVIEW ALL MEDS BY PRESCRIBER/CLIN PHARMACIST DOCUMENTED: ICD-10-PCS | Mod: CPTII,,, | Performed by: NURSE PRACTITIONER

## 2022-06-16 PROCEDURE — 3080F PR MOST RECENT DIASTOLIC BLOOD PRESSURE >= 90 MM HG: ICD-10-PCS | Mod: CPTII,,, | Performed by: NURSE PRACTITIONER

## 2022-06-16 PROCEDURE — 3051F PR MOST RECENT HEMOGLOBIN A1C LEVEL 7.0 - < 8.0%: ICD-10-PCS | Mod: CPTII,,, | Performed by: NURSE PRACTITIONER

## 2022-06-16 PROCEDURE — 3080F DIAST BP >= 90 MM HG: CPT | Mod: CPTII,,, | Performed by: NURSE PRACTITIONER

## 2022-06-16 PROCEDURE — 3077F PR MOST RECENT SYSTOLIC BLOOD PRESSURE >= 140 MM HG: ICD-10-PCS | Mod: CPTII,,, | Performed by: NURSE PRACTITIONER

## 2022-06-16 PROCEDURE — 99214 OFFICE O/P EST MOD 30 MIN: CPT | Mod: PBBFAC | Performed by: NURSE PRACTITIONER

## 2022-06-16 PROCEDURE — 4010F PR ACE/ARB THEARPY RXD/TAKEN: ICD-10-PCS | Mod: CPTII,,, | Performed by: NURSE PRACTITIONER

## 2022-06-16 RX ORDER — HYDROCODONE BITARTRATE AND ACETAMINOPHEN 5; 325 MG/1; MG/1
1 TABLET ORAL 2 TIMES DAILY PRN
COMMUNITY
Start: 2022-06-15 | End: 2022-07-06 | Stop reason: CLARIF

## 2022-06-16 NOTE — PROGRESS NOTES
See wound assessment. Pack wounds with vashe moisten nu gauze/dry dressing wrap with meng daily. RTC 3 weeks. Encourage to wear shoes with inserts. No standing and elevate feet as much as possible. Notify provider or go to ER if wound worsens.

## 2022-07-05 ENCOUNTER — OFFICE VISIT (OUTPATIENT)
Dept: FAMILY MEDICINE | Facility: CLINIC | Age: 62
End: 2022-07-05
Payer: MEDICAID

## 2022-07-05 VITALS
SYSTOLIC BLOOD PRESSURE: 106 MMHG | TEMPERATURE: 99 F | HEART RATE: 105 BPM | OXYGEN SATURATION: 95 % | BODY MASS INDEX: 44.92 KG/M2 | WEIGHT: 293 LBS | DIASTOLIC BLOOD PRESSURE: 71 MMHG

## 2022-07-05 DIAGNOSIS — Z79.4 TYPE 2 DIABETES MELLITUS WITH FOOT ULCER, WITH LONG-TERM CURRENT USE OF INSULIN: ICD-10-CM

## 2022-07-05 DIAGNOSIS — M25.519 SHOULDER PAIN, UNSPECIFIED CHRONICITY, UNSPECIFIED LATERALITY: ICD-10-CM

## 2022-07-05 DIAGNOSIS — L97.509 TYPE 2 DIABETES MELLITUS WITH FOOT ULCER, WITH LONG-TERM CURRENT USE OF INSULIN: ICD-10-CM

## 2022-07-05 DIAGNOSIS — E11.621 TYPE 2 DIABETES MELLITUS WITH FOOT ULCER, WITH LONG-TERM CURRENT USE OF INSULIN: ICD-10-CM

## 2022-07-05 DIAGNOSIS — D64.9 ANEMIA, UNSPECIFIED TYPE: Primary | ICD-10-CM

## 2022-07-05 LAB
ANISOCYTOSIS BLD QL SMEAR: NORMAL
BASOPHILS # BLD AUTO: 0.02 K/UL (ref 0–0.2)
BASOPHILS NFR BLD AUTO: 0.3 % (ref 0–1)
DIFFERENTIAL METHOD BLD: ABNORMAL
EOSINOPHIL # BLD AUTO: 0.19 K/UL (ref 0–0.5)
EOSINOPHIL NFR BLD AUTO: 2.5 % (ref 1–4)
ERYTHROCYTE [DISTWIDTH] IN BLOOD BY AUTOMATED COUNT: 15.9 % (ref 11.5–14.5)
EST. AVERAGE GLUCOSE BLD GHB EST-MCNC: 224 MG/DL
FERRITIN SERPL-MCNC: 169 NG/ML (ref 8–252)
HBA1C MFR BLD HPLC: 9.3 % (ref 4.5–6.6)
HCT VFR BLD AUTO: 31.9 % (ref 38–47)
HGB BLD-MCNC: 10.8 G/DL (ref 12–16)
IMM GRANULOCYTES # BLD AUTO: 0.03 K/UL (ref 0–0.04)
IMM GRANULOCYTES NFR BLD: 0.4 % (ref 0–0.4)
IRON SATN MFR SERPL: 15 % (ref 14–50)
IRON SERPL-MCNC: 42 ΜG/DL (ref 50–170)
LYMPHOCYTES # BLD AUTO: 1.85 K/UL (ref 1–4.8)
LYMPHOCYTES NFR BLD AUTO: 24.6 % (ref 27–41)
MCH RBC QN AUTO: 25.1 PG (ref 27–31)
MCHC RBC AUTO-ENTMCNC: 33.9 G/DL (ref 32–36)
MCV RBC AUTO: 74.2 FL (ref 80–96)
MICROCYTES BLD QL SMEAR: NORMAL
MONOCYTES # BLD AUTO: 0.5 K/UL (ref 0–0.8)
MONOCYTES NFR BLD AUTO: 6.6 % (ref 2–6)
MPC BLD CALC-MCNC: ABNORMAL G/DL
NEUTROPHILS # BLD AUTO: 4.94 K/UL (ref 1.8–7.7)
NEUTROPHILS NFR BLD AUTO: 65.6 % (ref 53–65)
NRBC # BLD AUTO: 0 X10E3/UL
NRBC, AUTO (.00): 0 %
PLATELET # BLD AUTO: 157 K/UL (ref 150–400)
PLATELET MORPHOLOGY: NORMAL
RBC # BLD AUTO: 4.3 M/UL (ref 4.2–5.4)
TIBC SERPL-MCNC: 273 ΜG/DL (ref 250–450)
WBC # BLD AUTO: 7.53 K/UL (ref 4.5–11)

## 2022-07-05 PROCEDURE — 83550 IRON BINDING TEST: CPT | Mod: ,,, | Performed by: CLINICAL MEDICAL LABORATORY

## 2022-07-05 PROCEDURE — 85025 COMPLETE CBC W/AUTO DIFF WBC: CPT | Mod: ,,, | Performed by: CLINICAL MEDICAL LABORATORY

## 2022-07-05 PROCEDURE — 83540 IRON AND TIBC: ICD-10-PCS | Mod: ,,, | Performed by: CLINICAL MEDICAL LABORATORY

## 2022-07-05 PROCEDURE — 83036 HEMOGLOBIN A1C: ICD-10-PCS | Mod: ,,, | Performed by: CLINICAL MEDICAL LABORATORY

## 2022-07-05 PROCEDURE — 99213 OFFICE O/P EST LOW 20 MIN: CPT | Mod: GC,,, | Performed by: FAMILY MEDICINE

## 2022-07-05 PROCEDURE — 83540 ASSAY OF IRON: CPT | Mod: ,,, | Performed by: CLINICAL MEDICAL LABORATORY

## 2022-07-05 PROCEDURE — 82728 FERRITIN: ICD-10-PCS | Mod: ,,, | Performed by: CLINICAL MEDICAL LABORATORY

## 2022-07-05 PROCEDURE — 99213 PR OFFICE/OUTPT VISIT, EST, LEVL III, 20-29 MIN: ICD-10-PCS | Mod: GC,,, | Performed by: FAMILY MEDICINE

## 2022-07-05 PROCEDURE — 82728 ASSAY OF FERRITIN: CPT | Mod: ,,, | Performed by: CLINICAL MEDICAL LABORATORY

## 2022-07-05 PROCEDURE — 83036 HEMOGLOBIN GLYCOSYLATED A1C: CPT | Mod: ,,, | Performed by: CLINICAL MEDICAL LABORATORY

## 2022-07-05 PROCEDURE — 85025 CBC WITH DIFFERENTIAL: ICD-10-PCS | Mod: ,,, | Performed by: CLINICAL MEDICAL LABORATORY

## 2022-07-05 PROCEDURE — 83550 IRON AND TIBC: ICD-10-PCS | Mod: ,,, | Performed by: CLINICAL MEDICAL LABORATORY

## 2022-07-05 NOTE — ASSESSMENT & PLAN NOTE
CBC and Iron studies pending  Patient has Hx of anemia with MCV of 70s  Will follow up with results and contact patient when results are available.

## 2022-07-05 NOTE — ASSESSMENT & PLAN NOTE
Dr. Minaya office was contacted and got an appointment for the patient for tomorrow at 2 pm.   Patient has failed injection trial and cannot take NSAIDS due to ulcers.  Voltaren gel was prescribed

## 2022-07-06 NOTE — PROGRESS NOTES
TOMASA Maldonado   [facility] - WOUND CARE  [address]  [place] [address]  [phone]      PATIENT NAME: Deborah Sotelo  : 1960  DATE: 22  MRN: [id number]      Billing Provider: TOMASA Maldonado  Level of Service:   Patient PCP Information     Provider PCP Type    Ron Sanches MD General          Reason for Visit / Chief Complaint: No chief complaint on file.       History of Present Illness / Problem Focused Workflow     Deborah Sotelo is a HPI    Review of Systems     Review of Systems    Medical / Social / Family History     Past Medical History:   Diagnosis Date    Anemia 2022    Diabetes mellitus, type 2     Hyperlipidemia     Hypertension     Obesity     Primary osteoarthritis of left shoulder 2022    Stroke        Past Surgical History:   Procedure Laterality Date    AMPUTATION      APPENDECTOMY      EYE SURGERY         Social History  Ms. Deborah Sotelo  reports that she has never smoked. She has never used smokeless tobacco. She reports current alcohol use. She reports that she does not use drugs.    Family History  Ms.'srinath Sotelo family history includes Appendicitis in her father; Asthma in her sister; Cancer in her mother; Diabetes in her brother.    Medications and Allergies     Medications  No outpatient medications have been marked as taking for the 22 encounter (Appointment) with TOMASA Maldonado.       Allergies  Review of patient's allergies indicates:   Allergen Reactions    Aspirin      Other reaction(s): UPSET STOMACH     Bactrim ds [sulfamethoxazole-trimethoprim] Itching       Physical Examination   There were no vitals filed for this visit.  Physical Exam    Assessment and Plan      No diagnosis found.         Active Problem List with Overview Notes    Diagnosis Date Noted    Anemia 2022    Primary osteoarthritis of left shoulder 2022    Acute cystitis without hematuria 2022     Allergy to environmental factors 03/08/2022    Elevated alkaline phosphatase level 03/08/2022    Shoulder pain 01/26/2022     Patient arrived for 1000 appt at around 0930. At 1005 the patient was placed on the pulleys for warm-up and stretching of left shoulder.  assist couple minutes later the patient was seen leaving her appt /s any word to staff.  Patient was found sitting in the lobby awaiting her ride home.  She would not express her reasons for leaving only that she was going home.  Patient's regular therapist was notified.      Decreased range of motion of left shoulder 01/26/2022    Weakness of left arm 01/26/2022    Muscle spasm of left shoulder 01/18/2022    Gastroesophageal reflux disease 11/09/2021    Needs flu shot 11/09/2021    Ulcer of right foot with fat layer exposed 06/28/2021    Type 2 diabetes mellitus with foot ulcer, with long-term current use of insulin 03/19/2021    Lower extremity edema 03/19/2021    Hypertension 03/19/2021    Hyperlipidemia 03/19/2021    Embolic stroke involving left middle cerebral artery 02/12/2021         :    Plan:     Problem List Items Addressed This Visit    None         Future Appointments   Date Time Provider Department Center   7/7/2022 10:00 AM TOMASA Maldonado [facility]   9/6/2022  1:00 PM Kentrell Sanz MD [facility]            Signature:  LALITO ALBARRAN LPN  [facility] - WOUND CARE  [address]  [place] [address]  [phone]    Date of encounter: 7/7/22

## 2022-07-06 NOTE — PATIENT INSTRUCTIONS
Clean with dakin's solution or baby shampoo and water     Apply Silvadene cream to open areas, cover with dry gauze,wrap with meng and paper tape     Change daily and as needed     Elevate feet as much as possible     Augmentin 875 mg my mouth twice a day for 30 days    No prolong standing  Monitor closely for s/s of infection including fever, chills, increase in pain, odor from wound, and increased redness from foot. Go to ER if any complications develop.   Keep leg elevated and avoid pressure on wound.  Diabetes:  Monitor glucose closely. Check fasting glucose and 2 hours after meals. HgA1C goal <7, fasting glucose , and 2 hours after meals <180  Hypertension:  Check blood pressure twice daily, goal <120/80  Diet:   Increase protein intake, avoid fried, fatty foods and foods high in simple carbs.   Vitamins:  Take vitamin C 1000 mg, zinc 50mg, vitamin d 5000 units, and a daily multivitamin. Dawson is a good source of protein and nutrients to aid in wound healing.

## 2022-07-07 ENCOUNTER — OFFICE VISIT (OUTPATIENT)
Dept: WOUND CARE | Facility: CLINIC | Age: 62
End: 2022-07-07
Attending: FAMILY MEDICINE
Payer: MEDICAID

## 2022-07-07 DIAGNOSIS — I63.412 EMBOLIC STROKE INVOLVING LEFT MIDDLE CEREBRAL ARTERY: ICD-10-CM

## 2022-07-07 DIAGNOSIS — L97.512 ULCER OF RIGHT FOOT WITH FAT LAYER EXPOSED: Primary | ICD-10-CM

## 2022-07-07 DIAGNOSIS — N30.00 ACUTE CYSTITIS WITHOUT HEMATURIA: ICD-10-CM

## 2022-07-07 DIAGNOSIS — E78.5 HYPERLIPIDEMIA, UNSPECIFIED HYPERLIPIDEMIA TYPE: ICD-10-CM

## 2022-07-07 DIAGNOSIS — I10 HYPERTENSION, UNSPECIFIED TYPE: ICD-10-CM

## 2022-07-07 PROCEDURE — 99214 PR OFFICE/OUTPT VISIT, EST, LEVL IV, 30-39 MIN: ICD-10-PCS | Mod: S$PBB,,, | Performed by: FAMILY MEDICINE

## 2022-07-07 PROCEDURE — 4010F ACE/ARB THERAPY RXD/TAKEN: CPT | Mod: CPTII,,, | Performed by: FAMILY MEDICINE

## 2022-07-07 PROCEDURE — 3046F PR MOST RECENT HEMOGLOBIN A1C LEVEL > 9.0%: ICD-10-PCS | Mod: CPTII,,, | Performed by: FAMILY MEDICINE

## 2022-07-07 PROCEDURE — 4010F PR ACE/ARB THEARPY RXD/TAKEN: ICD-10-PCS | Mod: CPTII,,, | Performed by: FAMILY MEDICINE

## 2022-07-07 PROCEDURE — 99214 OFFICE O/P EST MOD 30 MIN: CPT | Mod: S$PBB,,, | Performed by: FAMILY MEDICINE

## 2022-07-07 PROCEDURE — 3046F HEMOGLOBIN A1C LEVEL >9.0%: CPT | Mod: CPTII,,, | Performed by: FAMILY MEDICINE

## 2022-07-07 PROCEDURE — 99212 OFFICE O/P EST SF 10 MIN: CPT | Mod: PBBFAC | Performed by: FAMILY MEDICINE

## 2022-07-08 ENCOUNTER — HOSPITAL ENCOUNTER (INPATIENT)
Facility: HOSPITAL | Age: 62
LOS: 5 days | Discharge: HOME HEALTH | End: 2022-07-13
Attending: EMERGENCY MEDICINE | Admitting: FAMILY MEDICINE
Payer: MEDICAID

## 2022-07-08 DIAGNOSIS — L08.9 DIABETIC FOOT INFECTION: Primary | ICD-10-CM

## 2022-07-08 DIAGNOSIS — R07.9 CHEST PAIN: ICD-10-CM

## 2022-07-08 DIAGNOSIS — L08.9 INFECTED ULCER OF SKIN, UNSPECIFIED ULCER STAGE: ICD-10-CM

## 2022-07-08 DIAGNOSIS — L98.499 INFECTED ULCER OF SKIN, UNSPECIFIED ULCER STAGE: ICD-10-CM

## 2022-07-08 DIAGNOSIS — L08.9 INFECTED ULCER OF SKIN: ICD-10-CM

## 2022-07-08 DIAGNOSIS — E11.628 DIABETIC FOOT INFECTION: Primary | ICD-10-CM

## 2022-07-08 DIAGNOSIS — E11.621 TYPE 2 DIABETES MELLITUS WITH FOOT ULCER, WITH LONG-TERM CURRENT USE OF INSULIN: ICD-10-CM

## 2022-07-08 DIAGNOSIS — Z79.4 TYPE 2 DIABETES MELLITUS WITH FOOT ULCER, WITH LONG-TERM CURRENT USE OF INSULIN: ICD-10-CM

## 2022-07-08 DIAGNOSIS — L97.509 DIABETIC FOOT ULCER: ICD-10-CM

## 2022-07-08 DIAGNOSIS — L98.499 INFECTED ULCER OF SKIN: ICD-10-CM

## 2022-07-08 DIAGNOSIS — L97.509 TYPE 2 DIABETES MELLITUS WITH FOOT ULCER, WITH LONG-TERM CURRENT USE OF INSULIN: ICD-10-CM

## 2022-07-08 DIAGNOSIS — E11.621 DIABETIC FOOT ULCER: ICD-10-CM

## 2022-07-08 DIAGNOSIS — L97.512 ULCER OF RIGHT FOOT WITH FAT LAYER EXPOSED: ICD-10-CM

## 2022-07-08 DIAGNOSIS — K52.9 GASTROENTERITIS: ICD-10-CM

## 2022-07-08 DIAGNOSIS — R53.1 GENERAL WEAKNESS: ICD-10-CM
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
## 2022-07-08 DIAGNOSIS — M79.673 FOOT PAIN: ICD-10-CM

## 2022-07-08 PROBLEM — A41.9 SEPSIS: Status: ACTIVE | Noted: 2022-07-08

## 2022-07-08 LAB
ALBUMIN SERPL BCP-MCNC: 3.1 G/DL (ref 3.5–5)
ALBUMIN/GLOB SERPL: 0.7 {RATIO}
ALP SERPL-CCNC: 144 U/L (ref 50–130)
ALT SERPL W P-5'-P-CCNC: 22 U/L (ref 13–56)
ANION GAP SERPL CALCULATED.3IONS-SCNC: 13 MMOL/L (ref 7–16)
ANISOCYTOSIS BLD QL SMEAR: ABNORMAL
APTT PPP: 22.2 SECONDS (ref 25.2–37.3)
AST SERPL W P-5'-P-CCNC: 16 U/L (ref 15–37)
BACTERIA #/AREA URNS HPF: ABNORMAL /HPF
BASOPHILS # BLD AUTO: 0.03 K/UL (ref 0–0.2)
BASOPHILS NFR BLD AUTO: 0.3 % (ref 0–1)
BILIRUB SERPL-MCNC: 0.8 MG/DL (ref 0–1.2)
BILIRUB UR QL STRIP: NEGATIVE
BUN SERPL-MCNC: 22 MG/DL (ref 7–18)
BUN/CREAT SERPL: 15 (ref 6–20)
CALCIUM SERPL-MCNC: 8.8 MG/DL (ref 8.5–10.1)
CHLORIDE SERPL-SCNC: 107 MMOL/L (ref 98–107)
CLARITY UR: CLEAR
CO2 SERPL-SCNC: 23 MMOL/L (ref 21–32)
COLOR UR: YELLOW
CREAT SERPL-MCNC: 1.44 MG/DL (ref 0.55–1.02)
DIFFERENTIAL METHOD BLD: ABNORMAL
EOSINOPHIL # BLD AUTO: 0.08 K/UL (ref 0–0.5)
EOSINOPHIL NFR BLD AUTO: 0.8 % (ref 1–4)
ERYTHROCYTE [DISTWIDTH] IN BLOOD BY AUTOMATED COUNT: 15.4 % (ref 11.5–14.5)
FLUAV AG UPPER RESP QL IA.RAPID: NEGATIVE
FLUBV AG UPPER RESP QL IA.RAPID: NEGATIVE
GLOBULIN SER-MCNC: 4.3 G/DL (ref 2–4)
GLUCOSE SERPL-MCNC: 150 MG/DL (ref 74–106)
GLUCOSE SERPL-MCNC: 160 MG/DL (ref 70–105)
GLUCOSE SERPL-MCNC: 302 MG/DL (ref 70–105)
GLUCOSE SERPL-MCNC: 362 MG/DL (ref 70–105)
GLUCOSE UR STRIP-MCNC: 250 MG/DL
HCT VFR BLD AUTO: 30.4 % (ref 38–47)
HGB BLD-MCNC: 10.7 G/DL (ref 12–16)
HYPOCHROMIA BLD QL SMEAR: ABNORMAL
IMM GRANULOCYTES # BLD AUTO: 0.04 K/UL (ref 0–0.04)
IMM GRANULOCYTES NFR BLD: 0.4 % (ref 0–0.4)
INDIRECT COOMBS: NORMAL
INR BLD: 1.17 (ref 0.9–1.1)
KETONES UR STRIP-SCNC: NEGATIVE MG/DL
LACTATE SERPL-SCNC: 1.1 MMOL/L (ref 0.4–2)
LACTATE SERPL-SCNC: 1.6 MMOL/L (ref 0.4–2)
LEUKOCYTE ESTERASE UR QL STRIP: NEGATIVE
LYMPHOCYTES # BLD AUTO: 1.21 K/UL (ref 1–4.8)
LYMPHOCYTES NFR BLD AUTO: 12 % (ref 27–41)
MAGNESIUM SERPL-MCNC: 1.4 MG/DL (ref 1.7–2.3)
MCH RBC QN AUTO: 24.9 PG (ref 27–31)
MCHC RBC AUTO-ENTMCNC: 35.2 G/DL (ref 32–36)
MCV RBC AUTO: 70.7 FL (ref 80–96)
MICROCYTES BLD QL SMEAR: ABNORMAL
MONOCYTES # BLD AUTO: 0.75 K/UL (ref 0–0.8)
MONOCYTES NFR BLD AUTO: 7.4 % (ref 2–6)
MPC BLD CALC-MCNC: 11 FL (ref 9.4–12.4)
NEUTROPHILS # BLD AUTO: 7.99 K/UL (ref 1.8–7.7)
NEUTROPHILS NFR BLD AUTO: 79.1 % (ref 53–65)
NITRITE UR QL STRIP: NEGATIVE
NRBC # BLD AUTO: 0 X10E3/UL
NRBC, AUTO (.00): 0 %
OCCULT BLOOD: NEGATIVE
PH UR STRIP: 6 PH UNITS
PLATELET # BLD AUTO: 157 K/UL (ref 150–400)
PLATELET MORPHOLOGY: ABNORMAL
POLYCHROMASIA BLD QL SMEAR: ABNORMAL
POTASSIUM SERPL-SCNC: 4.9 MMOL/L (ref 3.5–5.1)
PROT SERPL-MCNC: 7.4 G/DL (ref 6.4–8.2)
PROT UR QL STRIP: NEGATIVE
PROTHROMBIN TIME: 14.4 SECONDS (ref 11.7–14.7)
RBC # BLD AUTO: 4.3 M/UL (ref 4.2–5.4)
RBC # UR STRIP: ABNORMAL /UL
RBC #/AREA URNS HPF: ABNORMAL /HPF
RH BLD: NORMAL
SARS-COV+SARS-COV-2 AG RESP QL IA.RAPID: NEGATIVE
SODIUM SERPL-SCNC: 138 MMOL/L (ref 136–145)
SP GR UR STRIP: 1.02
SQUAMOUS #/AREA URNS LPF: ABNORMAL /LPF
UROBILINOGEN UR STRIP-ACNC: 0.2 MG/DL
WBC # BLD AUTO: 10.1 K/UL (ref 4.5–11)
WBC #/AREA URNS HPF: ABNORMAL /HPF

## 2022-07-08 PROCEDURE — 87070 CULTURE OTHR SPECIMN AEROBIC: CPT | Performed by: INTERNAL MEDICINE

## 2022-07-08 PROCEDURE — 93010 ELECTROCARDIOGRAM REPORT: CPT | Mod: ,,, | Performed by: STUDENT IN AN ORGANIZED HEALTH CARE EDUCATION/TRAINING PROGRAM

## 2022-07-08 PROCEDURE — 63600175 PHARM REV CODE 636 W HCPCS

## 2022-07-08 PROCEDURE — 99223 PR INITIAL HOSPITAL CARE,LEVL III: ICD-10-PCS | Mod: GC,,, | Performed by: INTERNAL MEDICINE

## 2022-07-08 PROCEDURE — 93010 EKG 12-LEAD: ICD-10-PCS | Mod: ,,, | Performed by: STUDENT IN AN ORGANIZED HEALTH CARE EDUCATION/TRAINING PROGRAM

## 2022-07-08 PROCEDURE — 82962 GLUCOSE BLOOD TEST: CPT

## 2022-07-08 PROCEDURE — 87075 CULTR BACTERIA EXCEPT BLOOD: CPT | Performed by: INTERNAL MEDICINE

## 2022-07-08 PROCEDURE — 25000003 PHARM REV CODE 250

## 2022-07-08 PROCEDURE — 87040 BLOOD CULTURE FOR BACTERIA: CPT | Performed by: EMERGENCY MEDICINE

## 2022-07-08 PROCEDURE — 83735 ASSAY OF MAGNESIUM: CPT | Performed by: EMERGENCY MEDICINE

## 2022-07-08 PROCEDURE — 99223 1ST HOSP IP/OBS HIGH 75: CPT | Mod: GC,,, | Performed by: INTERNAL MEDICINE

## 2022-07-08 PROCEDURE — 85610 PROTHROMBIN TIME: CPT | Performed by: EMERGENCY MEDICINE

## 2022-07-08 PROCEDURE — 80053 COMPREHEN METABOLIC PANEL: CPT | Performed by: EMERGENCY MEDICINE

## 2022-07-08 PROCEDURE — 85730 THROMBOPLASTIN TIME PARTIAL: CPT | Performed by: EMERGENCY MEDICINE

## 2022-07-08 PROCEDURE — 25000003 PHARM REV CODE 250: Performed by: FAMILY MEDICINE

## 2022-07-08 PROCEDURE — 82272 OCCULT BLD FECES 1-3 TESTS: CPT

## 2022-07-08 PROCEDURE — 87428 SARSCOV & INF VIR A&B AG IA: CPT | Performed by: EMERGENCY MEDICINE

## 2022-07-08 PROCEDURE — 93005 ELECTROCARDIOGRAM TRACING: CPT

## 2022-07-08 PROCEDURE — 25000003 PHARM REV CODE 250: Performed by: EMERGENCY MEDICINE

## 2022-07-08 PROCEDURE — 99285 PR EMERGENCY DEPT VISIT,LEVEL V: ICD-10-PCS | Mod: ,,, | Performed by: EMERGENCY MEDICINE

## 2022-07-08 PROCEDURE — 25000003 PHARM REV CODE 250: Performed by: INTERNAL MEDICINE

## 2022-07-08 PROCEDURE — 81001 URINALYSIS AUTO W/SCOPE: CPT | Performed by: EMERGENCY MEDICINE

## 2022-07-08 PROCEDURE — 83605 ASSAY OF LACTIC ACID: CPT

## 2022-07-08 PROCEDURE — 85025 COMPLETE CBC W/AUTO DIFF WBC: CPT | Performed by: EMERGENCY MEDICINE

## 2022-07-08 PROCEDURE — 96361 HYDRATE IV INFUSION ADD-ON: CPT

## 2022-07-08 PROCEDURE — 96372 THER/PROPH/DIAG INJ SC/IM: CPT

## 2022-07-08 PROCEDURE — 96367 TX/PROPH/DG ADDL SEQ IV INF: CPT

## 2022-07-08 PROCEDURE — 63600175 PHARM REV CODE 636 W HCPCS: Performed by: EMERGENCY MEDICINE

## 2022-07-08 PROCEDURE — 86900 BLOOD TYPING SEROLOGIC ABO: CPT | Performed by: EMERGENCY MEDICINE

## 2022-07-08 PROCEDURE — 96376 TX/PRO/DX INJ SAME DRUG ADON: CPT

## 2022-07-08 PROCEDURE — 99285 EMERGENCY DEPT VISIT HI MDM: CPT | Mod: ,,, | Performed by: EMERGENCY MEDICINE

## 2022-07-08 PROCEDURE — C9399 UNCLASSIFIED DRUGS OR BIOLOG: HCPCS

## 2022-07-08 PROCEDURE — 36415 COLL VENOUS BLD VENIPUNCTURE: CPT

## 2022-07-08 PROCEDURE — 96375 TX/PRO/DX INJ NEW DRUG ADDON: CPT

## 2022-07-08 PROCEDURE — 99285 EMERGENCY DEPT VISIT HI MDM: CPT | Mod: 25

## 2022-07-08 PROCEDURE — 83605 ASSAY OF LACTIC ACID: CPT | Performed by: EMERGENCY MEDICINE

## 2022-07-08 PROCEDURE — 11000001 HC ACUTE MED/SURG PRIVATE ROOM

## 2022-07-08 PROCEDURE — 96365 THER/PROPH/DIAG IV INF INIT: CPT

## 2022-07-08 PROCEDURE — 36415 COLL VENOUS BLD VENIPUNCTURE: CPT | Performed by: EMERGENCY MEDICINE

## 2022-07-08 PROCEDURE — 86901 BLOOD TYPING SEROLOGIC RH(D): CPT | Performed by: EMERGENCY MEDICINE

## 2022-07-08 RX ORDER — GLUCAGON 1 MG
1 KIT INJECTION
Status: DISCONTINUED | OUTPATIENT
Start: 2022-07-08 | End: 2022-07-13 | Stop reason: HOSPADM

## 2022-07-08 RX ORDER — HYDROCODONE BITARTRATE AND ACETAMINOPHEN 5; 325 MG/1; MG/1
1 TABLET ORAL EVERY 6 HOURS PRN
Status: DISCONTINUED | OUTPATIENT
Start: 2022-07-08 | End: 2022-07-13 | Stop reason: HOSPADM

## 2022-07-08 RX ORDER — SODIUM CHLORIDE 0.9 % (FLUSH) 0.9 %
10 SYRINGE (ML) INJECTION EVERY 12 HOURS PRN
Status: DISCONTINUED | OUTPATIENT
Start: 2022-07-08 | End: 2022-07-13 | Stop reason: HOSPADM

## 2022-07-08 RX ORDER — CLINDAMYCIN PHOSPHATE 900 MG/50ML
900 INJECTION, SOLUTION INTRAVENOUS
Status: COMPLETED | OUTPATIENT
Start: 2022-07-08 | End: 2022-07-08

## 2022-07-08 RX ORDER — MORPHINE SULFATE 2 MG/ML
2 INJECTION, SOLUTION INTRAMUSCULAR; INTRAVENOUS
Status: COMPLETED | OUTPATIENT
Start: 2022-07-08 | End: 2022-07-08

## 2022-07-08 RX ORDER — PANTOPRAZOLE SODIUM 40 MG/1
40 TABLET, DELAYED RELEASE ORAL DAILY
Refills: 2 | Status: DISCONTINUED | OUTPATIENT
Start: 2022-07-08 | End: 2022-07-13 | Stop reason: HOSPADM

## 2022-07-08 RX ORDER — INSULIN ASPART 100 [IU]/ML
1-10 INJECTION, SOLUTION INTRAVENOUS; SUBCUTANEOUS
Status: DISCONTINUED | OUTPATIENT
Start: 2022-07-08 | End: 2022-07-13 | Stop reason: HOSPADM

## 2022-07-08 RX ORDER — NALOXONE HCL 0.4 MG/ML
0.02 VIAL (ML) INJECTION
Status: DISCONTINUED | OUTPATIENT
Start: 2022-07-08 | End: 2022-07-13 | Stop reason: HOSPADM

## 2022-07-08 RX ORDER — SODIUM CHLORIDE, SODIUM LACTATE, POTASSIUM CHLORIDE, CALCIUM CHLORIDE 600; 310; 30; 20 MG/100ML; MG/100ML; MG/100ML; MG/100ML
INJECTION, SOLUTION INTRAVENOUS CONTINUOUS
Status: DISCONTINUED | OUTPATIENT
Start: 2022-07-08 | End: 2022-07-08

## 2022-07-08 RX ORDER — ONDANSETRON 2 MG/ML
4 INJECTION INTRAMUSCULAR; INTRAVENOUS EVERY 8 HOURS PRN
Status: DISCONTINUED | OUTPATIENT
Start: 2022-07-08 | End: 2022-07-13 | Stop reason: HOSPADM

## 2022-07-08 RX ORDER — SODIUM CHLORIDE 9 MG/ML
INJECTION, SOLUTION INTRAVENOUS
Status: DISPENSED
Start: 2022-07-08 | End: 2022-07-08

## 2022-07-08 RX ORDER — ONDANSETRON 2 MG/ML
4 INJECTION INTRAMUSCULAR; INTRAVENOUS
Status: COMPLETED | OUTPATIENT
Start: 2022-07-08 | End: 2022-07-08

## 2022-07-08 RX ORDER — ENOXAPARIN SODIUM 100 MG/ML
40 INJECTION SUBCUTANEOUS EVERY 24 HOURS
Status: DISCONTINUED | OUTPATIENT
Start: 2022-07-08 | End: 2022-07-13 | Stop reason: HOSPADM

## 2022-07-08 RX ORDER — POLYETHYLENE GLYCOL 3350 17 G/17G
17 POWDER, FOR SOLUTION ORAL 2 TIMES DAILY PRN
Status: DISCONTINUED | OUTPATIENT
Start: 2022-07-08 | End: 2022-07-13 | Stop reason: HOSPADM

## 2022-07-08 RX ORDER — PROCHLORPERAZINE EDISYLATE 5 MG/ML
5 INJECTION INTRAMUSCULAR; INTRAVENOUS EVERY 6 HOURS PRN
Status: DISCONTINUED | OUTPATIENT
Start: 2022-07-08 | End: 2022-07-13 | Stop reason: HOSPADM

## 2022-07-08 RX ORDER — SIMETHICONE 80 MG
1 TABLET,CHEWABLE ORAL 4 TIMES DAILY PRN
Status: DISCONTINUED | OUTPATIENT
Start: 2022-07-08 | End: 2022-07-13 | Stop reason: HOSPADM

## 2022-07-08 RX ORDER — MAGNESIUM SULFATE 1 G/100ML
1 INJECTION INTRAVENOUS ONCE
Status: COMPLETED | OUTPATIENT
Start: 2022-07-08 | End: 2022-07-08

## 2022-07-08 RX ORDER — ATORVASTATIN CALCIUM 80 MG/1
80 TABLET, FILM COATED ORAL DAILY
Status: DISCONTINUED | OUTPATIENT
Start: 2022-07-08 | End: 2022-07-13 | Stop reason: HOSPADM

## 2022-07-08 RX ORDER — ACETAMINOPHEN 325 MG/1
650 TABLET ORAL EVERY 4 HOURS PRN
Status: DISCONTINUED | OUTPATIENT
Start: 2022-07-08 | End: 2022-07-13 | Stop reason: HOSPADM

## 2022-07-08 RX ORDER — ACETAMINOPHEN 325 MG/1
650 TABLET ORAL
Status: COMPLETED | OUTPATIENT
Start: 2022-07-08 | End: 2022-07-08

## 2022-07-08 RX ORDER — SODIUM CHLORIDE, SODIUM LACTATE, POTASSIUM CHLORIDE, CALCIUM CHLORIDE 600; 310; 30; 20 MG/100ML; MG/100ML; MG/100ML; MG/100ML
INJECTION, SOLUTION INTRAVENOUS CONTINUOUS
Status: DISCONTINUED | OUTPATIENT
Start: 2022-07-08 | End: 2022-07-11

## 2022-07-08 RX ADMIN — PIPERACILLIN SODIUM AND TAZOBACTAM SODIUM 4.5 G: 4; .5 INJECTION, POWDER, LYOPHILIZED, FOR SOLUTION INTRAVENOUS at 04:07

## 2022-07-08 RX ADMIN — SODIUM CHLORIDE, POTASSIUM CHLORIDE, SODIUM LACTATE AND CALCIUM CHLORIDE: 600; 310; 30; 20 INJECTION, SOLUTION INTRAVENOUS at 09:07

## 2022-07-08 RX ADMIN — HYDROCODONE BITARTRATE AND ACETAMINOPHEN 1 TABLET: 5; 325 TABLET ORAL at 11:07

## 2022-07-08 RX ADMIN — ENOXAPARIN SODIUM 40 MG: 40 INJECTION SUBCUTANEOUS at 05:07

## 2022-07-08 RX ADMIN — ACETAMINOPHEN 650 MG: 325 TABLET ORAL at 03:07

## 2022-07-08 RX ADMIN — MAGNESIUM SULFATE HEPTAHYDRATE 1 G: 10 INJECTION, SOLUTION INTRAVENOUS at 05:07

## 2022-07-08 RX ADMIN — SODIUM CHLORIDE 1000 ML: 9 INJECTION, SOLUTION INTRAVENOUS at 04:07

## 2022-07-08 RX ADMIN — ONDANSETRON 4 MG: 2 INJECTION INTRAMUSCULAR; INTRAVENOUS at 07:07

## 2022-07-08 RX ADMIN — ATORVASTATIN CALCIUM 80 MG: 80 TABLET, FILM COATED ORAL at 09:07

## 2022-07-08 RX ADMIN — SODIUM CHLORIDE 1000 ML: 9 INJECTION, SOLUTION INTRAVENOUS at 07:07

## 2022-07-08 RX ADMIN — PIPERACILLIN SODIUM AND TAZOBACTAM SODIUM 4.5 G: 4; .5 INJECTION, POWDER, LYOPHILIZED, FOR SOLUTION INTRAVENOUS at 11:07

## 2022-07-08 RX ADMIN — VANCOMYCIN HYDROCHLORIDE 2500 MG: 1 INJECTION, POWDER, LYOPHILIZED, FOR SOLUTION INTRAVENOUS at 05:07

## 2022-07-08 RX ADMIN — CLINDAMYCIN IN 5 PERCENT DEXTROSE 900 MG: 18 INJECTION, SOLUTION INTRAVENOUS at 03:07

## 2022-07-08 RX ADMIN — INSULIN DETEMIR 20 UNITS: 100 INJECTION, SOLUTION SUBCUTANEOUS at 09:07

## 2022-07-08 RX ADMIN — PANTOPRAZOLE SODIUM 40 MG: 40 TABLET, DELAYED RELEASE ORAL at 09:07

## 2022-07-08 RX ADMIN — MORPHINE SULFATE 2 MG: 2 INJECTION, SOLUTION INTRAMUSCULAR; INTRAVENOUS at 07:07

## 2022-07-08 RX ADMIN — PIPERACILLIN AND TAZOBACTAM 4.5 G: 4; .5 INJECTION, POWDER, FOR SOLUTION INTRAVENOUS at 04:07

## 2022-07-08 RX ADMIN — INSULIN ASPART 5 UNITS: 100 INJECTION, SOLUTION INTRAVENOUS; SUBCUTANEOUS at 09:07

## 2022-07-08 NOTE — ASSESSMENT & PLAN NOTE
BP 70s/30s in ED, pulse of 116, Temp of 101.1 F  Fluids ordered and given per sepsis protocol, 30 ml/kg for a total of 4L  First lactic acid wnl, second one is pending  Blood and wound cultures collected  WBC 10.10 on admission  Empiric IV Vancomycin and Zosyn started

## 2022-07-08 NOTE — PROGRESS NOTES
Pharmacy consulted to dose vancomycin for bone/joint. Based on patient's weight and kidney function, start vanc 2500 mg IV Q24H. Trough level ordered for 7/10/22 1630. Pharmacy will continue to follow and dose.

## 2022-07-08 NOTE — ASSESSMENT & PLAN NOTE
Diabetic foot ulcer on right plantar heel  Start IV vanc and zosyn  Wound and blood cx are pendng  Wound care consulted  Right foot xray without signs of osteomyelitis

## 2022-07-08 NOTE — ASSESSMENT & PLAN NOTE
Uses novolog and lantus at home  Start moderate SSI  Give 20 units levemir qhs, titrate up as needed  Last A1C 9.3  POCT glucose checks  Diabetic educator consulted

## 2022-07-08 NOTE — SUBJECTIVE & OBJECTIVE
Past Medical History:   Diagnosis Date    Anemia 7/5/2022    Diabetes mellitus, type 2     Hyperlipidemia     Hypertension     Obesity     Primary osteoarthritis of left shoulder 5/24/2022    Stroke        Past Surgical History:   Procedure Laterality Date    AMPUTATION      APPENDECTOMY      EYE SURGERY         Review of patient's allergies indicates:   Allergen Reactions    Aspirin      Other reaction(s): UPSET STOMACH     Bactrim ds [sulfamethoxazole-trimethoprim] Itching       No current facility-administered medications on file prior to encounter.     Current Outpatient Medications on File Prior to Encounter   Medication Sig    amLODIPine (NORVASC) 5 MG tablet Take 1 tablet (5 mg total) by mouth once daily.    atorvastatin (LIPITOR) 80 MG tablet TAKE 1 TABLET BY MOUTH EACH NIGHT AT BEDTIME FOR CHOLESTEROL ~~DO NOT EAT GRAPEFRUIT OR DRINK GRAPEFRUIT JUICE WHILE TAKING THIS MEDICATION~~    CMPD diclofenac 3%- cyclobenzaprine 2%- baclofen 2%- gabapentin 6%- LIDOcaine 2%- prilocaine 2% transdermal gel Apply 1 to 2 grams up to 4 times daily as directed for additional pain relief    HYDROcodone-acetaminophen (NORCO) 7.5-325 mg per tablet 1/2 to 1 po every 6 hours for left shoulder pain    insulin asp prt-insulin aspart, NovoLOG 70/30, (NOVOLOG 70/30) 100 unit/mL (70-30) Soln INJECT 50 UNITS SUB-Q EACH MORNING AND 30 UNITS SUB-Q EACH EVENING    LANTUS U-100 INSULIN 100 unit/mL injection Inject 20 Units into the skin once daily. Take at night    loratadine (CLARITIN) 10 mg tablet Take 1 tablet (10 mg total) by mouth once daily.    losartan (COZAAR) 100 MG tablet Take 1 tablet (100 mg total) by mouth every evening.    omeprazole (PRILOSEC) 20 MG capsule Take 1 capsule (20 mg total) by mouth once daily.    sodium hypochlorite 0.5 % (DAKIN'S SOLUTION) external solution Apply topically once daily. Pack wound with dakin's moistened nuguaze daily     Family History       Problem Relation (Age of Onset)    Appendicitis Father     Asthma Sister    Cancer Mother    Diabetes Brother          Tobacco Use    Smoking status: Never Smoker    Smokeless tobacco: Never Used   Substance and Sexual Activity    Alcohol use: Yes    Drug use: Never    Sexual activity: Never     Review of Systems   Constitutional:  Positive for activity change and fatigue. Negative for appetite change, chills and fever.   Respiratory:  Negative for shortness of breath.    Cardiovascular:  Negative for chest pain, palpitations and leg swelling.   Gastrointestinal:  Positive for diarrhea, nausea and vomiting. Negative for abdominal pain.   Genitourinary:  Negative for dysuria and hematuria.   Musculoskeletal:  Negative for arthralgias and back pain.   Skin:  Positive for wound.   Neurological:  Positive for weakness. Negative for dizziness, syncope, facial asymmetry, speech difficulty and numbness.   Psychiatric/Behavioral:  Negative for confusion.    Objective:     Vital Signs (Most Recent):  Temp: 99.5 °F (37.5 °C) (07/08/22 0440)  Pulse: 90 (07/08/22 0848)  Resp: (!) 22 (07/08/22 0848)  BP: 115/60 (07/08/22 0848)  SpO2: (!) 94 % (07/08/22 0848)   Vital Signs (24h Range):  Temp:  [99.5 °F (37.5 °C)-101.1 °F (38.4 °C)] 99.5 °F (37.5 °C)  Pulse:  [] 90  Resp:  [12-28] 22  SpO2:  [94 %-99 %] 94 %  BP: ()/(51-77) 115/60     Weight: 136.1 kg (300 lb)  Body mass index is 45.61 kg/m².    Physical Exam  Constitutional:       Appearance: Normal appearance. She is obese.   HENT:      Head: Normocephalic and atraumatic.      Mouth/Throat:      Mouth: Mucous membranes are moist.      Pharynx: Oropharynx is clear.   Eyes:      Extraocular Movements: Extraocular movements intact.      Conjunctiva/sclera: Conjunctivae normal.      Pupils: Pupils are equal, round, and reactive to light.   Cardiovascular:      Rate and Rhythm: Normal rate and regular rhythm.      Pulses: Normal pulses.      Heart sounds: Normal heart sounds.   Pulmonary:      Effort: Pulmonary effort is  normal.      Breath sounds: Normal breath sounds.   Abdominal:      General: Abdomen is flat. Bowel sounds are normal.      Palpations: Abdomen is soft.   Musculoskeletal:         General: Normal range of motion.   Feet:      Comments: Right foot ulcer on heel and ulcer on plantar base of big toe    Left foot amputation of 4th and 5th toes  Skin:     Capillary Refill: Capillary refill takes less than 2 seconds.   Neurological:      General: No focal deficit present.      Mental Status: She is alert and oriented to person, place, and time.      Motor: Weakness present.         CRANIAL NERVES     CN III, IV, VI   Pupils are equal, round, and reactive to light.     Significant Labs: All pertinent labs within the past 24 hours have been reviewed.    Significant Imaging: I have reviewed all pertinent imaging results/findings within the past 24 hours.

## 2022-07-08 NOTE — H&P
[facility] Emergency Department  Hospital Medicine  History & Physical    Patient Name: Deborah Sotelo  MRN: [id number]  Patient Class: IP- Inpatient  Admission Date: 7/8/2022  Attending Physician: Laurie Sanchez,*   Primary Care Provider: Ron Sanches MD         Patient information was obtained from patient, relative(s), past medical records and ER records.     Subjective:     Principal Problem:Infected ulcer of skin    Chief Complaint:   Chief Complaint   Patient presents with    Weakness     Going on several days    Fall     Ems called for fall -     Fever        HPI: The patient is a 60yo female with a medical history of DM2, diabetic foot wounds, amputation of the right lower foot and left 4th and 5th toes, CVA, HTN, anemia, HLD, Gerd and severe left shoulder arthritis who presents to the ED with c/o weakness and fatigue over the past 3 days. The patient has a chronic diabetic right foot ulcer on her heel, that was recently treated earlier this year with antibiotics through a PICC line. The patient says that she has noticed the area where the ulcer is becoming more and more painful over the past couple of months. She went to Dr. Tinajero yesterday with complaints of increasing pain in her right foot, and was given Augmentin to take outpatient. However, she came to the ED this morning after she fell at home and had to call her son to come pick her up from the floor. The patient says that she did not fall due to a seizure, pre-syncope or a LOC, but that she was feeling very weak when she got out of bed this morning. Additionally, the patient says that she has been feeling like feverish for the past couple of days, and has had an episode of watery diarrhea and vomiting earlier today. She denies any new chest pain, SOB, palpitations, change in appetite, headaches, dizziness, chills or night sweats.     In the ED, the patient was found to have a BP in the 70s/30s with a pulse of 116 and a  temperature of 101.1 F. Wound and blood cultures were collected and the patient was stated on empiric vancomycin and zosyn for her infected foot ulcer. A right foot xray did not show any acute changes or signs of osteomyelitis. A lactic acid was wnl, and a second one was drawn. The patient was given a 1L fluid bolus, with another 3L ordered to be given. She was found to have an MALIA, with a BUN/Crt ratio of 22/1.44. Her WBC and H&H were wnl.     The patient will be admitted to the [facility] under Dr. Infante for further management and treatment of her sepsis associated with an infected diabetic foot ulcer.       Past Medical History:   Diagnosis Date    Anemia 7/5/2022    Diabetes mellitus, type 2     Hyperlipidemia     Hypertension     Obesity     Primary osteoarthritis of left shoulder 5/24/2022    Stroke        Past Surgical History:   Procedure Laterality Date    AMPUTATION      APPENDECTOMY      EYE SURGERY         Review of patient's allergies indicates:   Allergen Reactions    Aspirin      Other reaction(s): UPSET STOMACH     Bactrim ds [sulfamethoxazole-trimethoprim] Itching       No current facility-administered medications on file prior to encounter.     Current Outpatient Medications on File Prior to Encounter   Medication Sig    amLODIPine (NORVASC) 5 MG tablet Take 1 tablet (5 mg total) by mouth once daily.    atorvastatin (LIPITOR) 80 MG tablet TAKE 1 TABLET BY MOUTH EACH NIGHT AT BEDTIME FOR CHOLESTEROL ~~DO NOT EAT GRAPEFRUIT OR DRINK GRAPEFRUIT JUICE WHILE TAKING THIS MEDICATION~~    CMPD diclofenac 3%- cyclobenzaprine 2%- baclofen 2%- gabapentin 6%- LIDOcaine 2%- prilocaine 2% transdermal gel Apply 1 to 2 grams up to 4 times daily as directed for additional pain relief    HYDROcodone-acetaminophen (NORCO) 7.5-325 mg per tablet 1/2 to 1 po every 6 hours for left shoulder pain    insulin asp prt-insulin aspart, NovoLOG 70/30, (NOVOLOG 70/30) 100 unit/mL (70-30) Soln INJECT 50 UNITS SUB-Q  EACH MORNING AND 30 UNITS SUB-Q EACH EVENING    LANTUS U-100 INSULIN 100 unit/mL injection Inject 20 Units into the skin once daily. Take at night    loratadine (CLARITIN) 10 mg tablet Take 1 tablet (10 mg total) by mouth once daily.    losartan (COZAAR) 100 MG tablet Take 1 tablet (100 mg total) by mouth every evening.    omeprazole (PRILOSEC) 20 MG capsule Take 1 capsule (20 mg total) by mouth once daily.    sodium hypochlorite 0.5 % (DAKIN'S SOLUTION) external solution Apply topically once daily. Pack wound with dakin's moistened nuguaze daily     Family History       Problem Relation (Age of Onset)    Appendicitis Father    Asthma Sister    Cancer Mother    Diabetes Brother          Tobacco Use    Smoking status: Never Smoker    Smokeless tobacco: Never Used   Substance and Sexual Activity    Alcohol use: Yes    Drug use: Never    Sexual activity: Never     Review of Systems   Constitutional:  Positive for activity change and fatigue. Negative for appetite change, chills and fever.   Respiratory:  Negative for shortness of breath.    Cardiovascular:  Negative for chest pain, palpitations and leg swelling.   Gastrointestinal:  Positive for diarrhea, nausea and vomiting. Negative for abdominal pain.   Genitourinary:  Negative for dysuria and hematuria.   Musculoskeletal:  Negative for arthralgias and back pain.   Skin:  Positive for wound.   Neurological:  Positive for weakness. Negative for dizziness, syncope, facial asymmetry, speech difficulty and numbness.   Psychiatric/Behavioral:  Negative for confusion.    Objective:     Vital Signs (Most Recent):  Temp: 99.5 °F (37.5 °C) (07/08/22 0440)  Pulse: 90 (07/08/22 0848)  Resp: (!) 22 (07/08/22 0848)  BP: 115/60 (07/08/22 0848)  SpO2: (!) 94 % (07/08/22 0848)   Vital Signs (24h Range):  Temp:  [99.5 °F (37.5 °C)-101.1 °F (38.4 °C)] 99.5 °F (37.5 °C)  Pulse:  [] 90  Resp:  [12-28] 22  SpO2:  [94 %-99 %] 94 %  BP: ()/(51-77) 115/60     Weight:  136.1 kg (300 lb)  Body mass index is 45.61 kg/m².    Physical Exam  Constitutional:       Appearance: Normal appearance. She is obese.   HENT:      Head: Normocephalic and atraumatic.      Mouth/Throat:      Mouth: Mucous membranes are moist.      Pharynx: Oropharynx is clear.   Eyes:      Extraocular Movements: Extraocular movements intact.      Conjunctiva/sclera: Conjunctivae normal.      Pupils: Pupils are equal, round, and reactive to light.   Cardiovascular:      Rate and Rhythm: Normal rate and regular rhythm.      Pulses: Normal pulses.      Heart sounds: Normal heart sounds.   Pulmonary:      Effort: Pulmonary effort is normal.      Breath sounds: Normal breath sounds.   Abdominal:      General: Abdomen is flat. Bowel sounds are normal.      Palpations: Abdomen is soft.   Musculoskeletal:         General: Normal range of motion.   Feet:      Comments: Right foot ulcer on heel and ulcer on plantar base of big toe    Left foot amputation of 4th and 5th toes  Skin:     Capillary Refill: Capillary refill takes less than 2 seconds.   Neurological:      General: No focal deficit present.      Mental Status: She is alert and oriented to person, place, and time.      Motor: Weakness present.         CRANIAL NERVES     CN III, IV, VI   Pupils are equal, round, and reactive to light.     Significant Labs: All pertinent labs within the past 24 hours have been reviewed.    Significant Imaging: I have reviewed all pertinent imaging results/findings within the past 24 hours.    Assessment/Plan:     * Infected ulcer of skin  Diabetic foot ulcer on right plantar heel  Start IV vanc and zosyn  Wound and blood cx are pendng  Wound care consulted  Right foot xray without signs of osteomyelitis      Sepsis  BP 70s/30s in ED, pulse of 116, Temp of 101.1 F  Fluids ordered and given per sepsis protocol, 30 ml/kg for a total of 4L  First lactic acid wnl, second one is pending  Blood and wound cultures collected  WBC 10.10 on  admission  Empiric IV Vancomycin and Zosyn started      Anemia  No signs of active bleeding  FOBT negative  Recent iron level 42, ferritin, vit b12 and folate wnl  H&H 10.7/30.4 on admission  Monitor with daily CBC      Gastroesophageal reflux disease  Pantoprazole 40mg qd      Hyperlipidemia  Continue home atorvastatin 80mg qd      Hypertension  Hold home amlodipine and losartan secondary to hypotension on admission      Type 2 diabetes mellitus with foot ulcer, with long-term current use of insulin  Uses novolog and lantus at home  Start moderate SSI  Give 20 units levemir qhs, titrate up as needed  Last A1C 9.3  POCT glucose checks  Diabetic educator consulted      Embolic stroke involving left middle cerebral artery  lovenox 40mg qd as prophylaxis   INR 1.17        VTE Risk Mitigation (From admission, onward)         Ordered     enoxaparin injection 40 mg  Daily         07/08/22 0918     IP VTE HIGH RISK PATIENT  Once         07/08/22 0918     Place sequential compression device  Until discontinued         07/08/22 0812                   Geneva Snow MD  Department of Hospital Medicine   [facility] - Emergency Department

## 2022-07-08 NOTE — ASSESSMENT & PLAN NOTE
No signs of active bleeding  FOBT negative  Recent iron level 42, ferritin, vit b12 and folate wnl  H&H 10.7/30.4 on admission  Monitor with daily CBC

## 2022-07-08 NOTE — HPI
The patient is a 62yo female with a medical history of DM2, diabetic foot wounds, amputation of the right lower foot and left 4th and 5th toes, CVA, HTN, anemia, HLD, Gerd and severe left shoulder arthritis who presents to the ED with c/o weakness and fatigue over the past 3 days. The patient has a chronic diabetic right foot ulcer on her heel, that was recently treated earlier this year with antibiotics through a PICC line. The patient says that she has noticed the area where the ulcer is becoming more and more painful over the past couple of months. She went to Dr. Tinajero yesterday with complaints of increasing pain in her right foot, and was given Augmentin to take outpatient. However, she came to the ED this morning after she fell at home and had to call her son to come pick her up from the floor. The patient says that she did not fall due to a seizure, pre-syncope or a LOC, but that she was feeling very weak when she got out of bed this morning. Additionally, the patient says that she has been feeling like feverish for the past couple of days, and has had an episode of watery diarrhea and vomiting earlier today. She denies any new chest pain, SOB, palpitations, change in appetite, headaches, dizziness, chills or night sweats.     In the ED, the patient was found to have a BP in the 70s/30s with a pulse of 116 and a temperature of 101.1 F. Wound and blood cultures were collected and the patient was stated on empiric vancomycin and zosyn for her infected foot ulcer. A right foot xray did not show any acute changes or signs of osteomyelitis. A lactic acid was wnl, and a second one was drawn. The patient was given a 1L fluid bolus, with another 3L ordered to be given. She was found to have an MALIA, with a BUN/Crt ratio of 22/1.44. Her WBC and H&H were wnl.     The patient will be admitted to the [facility] under Dr. Infante for further management and treatment of her sepsis associated with an infected diabetic foot  ulcer.
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
## 2022-07-08 NOTE — NURSING
1800 received patient via stretcher to room 453. Pt arrived in no acute distress. Two patent IV's infusing NS, vanc, zosyn, and mag without difficulty. Pt reports dizziness. fingerstick glucose at this time 302. See admission assessment for further details. Vitals stable. Oriented pt to room and unit routines. Left pt in bed with safety measures in place. Instructed to call with needs.

## 2022-07-09 LAB
ANION GAP SERPL CALCULATED.3IONS-SCNC: 15 MMOL/L (ref 7–16)
ANISOCYTOSIS BLD QL SMEAR: ABNORMAL
BASOPHILS # BLD AUTO: 0.01 K/UL (ref 0–0.2)
BASOPHILS NFR BLD AUTO: 0.1 % (ref 0–1)
BUN SERPL-MCNC: 20 MG/DL (ref 7–18)
BUN/CREAT SERPL: 13 (ref 6–20)
CALCIUM SERPL-MCNC: 8 MG/DL (ref 8.5–10.1)
CHLORIDE SERPL-SCNC: 106 MMOL/L (ref 98–107)
CO2 SERPL-SCNC: 20 MMOL/L (ref 21–32)
CREAT SERPL-MCNC: 1.59 MG/DL (ref 0.55–1.02)
DIFFERENTIAL METHOD BLD: ABNORMAL
EOSINOPHIL # BLD AUTO: 0.11 K/UL (ref 0–0.5)
EOSINOPHIL NFR BLD AUTO: 1.6 % (ref 1–4)
ERYTHROCYTE [DISTWIDTH] IN BLOOD BY AUTOMATED COUNT: 15.4 % (ref 11.5–14.5)
GLUCOSE SERPL-MCNC: 216 MG/DL (ref 70–105)
GLUCOSE SERPL-MCNC: 218 MG/DL (ref 70–105)
GLUCOSE SERPL-MCNC: 366 MG/DL (ref 74–106)
GLUCOSE SERPL-MCNC: 374 MG/DL (ref 70–105)
HCT VFR BLD AUTO: 25.3 % (ref 38–47)
HGB BLD-MCNC: 8.6 G/DL (ref 12–16)
HYPOCHROMIA BLD QL SMEAR: ABNORMAL
IMM GRANULOCYTES # BLD AUTO: 0.03 K/UL (ref 0–0.04)
IMM GRANULOCYTES NFR BLD: 0.4 % (ref 0–0.4)
LYMPHOCYTES # BLD AUTO: 1.32 K/UL (ref 1–4.8)
LYMPHOCYTES NFR BLD AUTO: 19.6 % (ref 27–41)
MCH RBC QN AUTO: 25.1 PG (ref 27–31)
MCHC RBC AUTO-ENTMCNC: 34 G/DL (ref 32–36)
MCV RBC AUTO: 73.8 FL (ref 80–96)
MICROCYTES BLD QL SMEAR: ABNORMAL
MONOCYTES # BLD AUTO: 0.6 K/UL (ref 0–0.8)
MONOCYTES NFR BLD AUTO: 8.9 % (ref 2–6)
MPC BLD CALC-MCNC: 12.1 FL (ref 9.4–12.4)
NEUTROPHILS # BLD AUTO: 4.66 K/UL (ref 1.8–7.7)
NEUTROPHILS NFR BLD AUTO: 69.4 % (ref 53–65)
NRBC # BLD AUTO: 0 X10E3/UL
NRBC, AUTO (.00): 0 %
PLATELET # BLD AUTO: 117 K/UL (ref 150–400)
PLATELET MORPHOLOGY: ABNORMAL
POLYCHROMASIA BLD QL SMEAR: ABNORMAL
POTASSIUM SERPL-SCNC: 4.8 MMOL/L (ref 3.5–5.1)
RBC # BLD AUTO: 3.43 M/UL (ref 4.2–5.4)
SODIUM SERPL-SCNC: 136 MMOL/L (ref 136–145)
WBC # BLD AUTO: 6.73 K/UL (ref 4.5–11)

## 2022-07-09 PROCEDURE — 99233 PR SUBSEQUENT HOSPITAL CARE,LEVL III: ICD-10-PCS | Mod: GC,,, | Performed by: INTERNAL MEDICINE

## 2022-07-09 PROCEDURE — 63600175 PHARM REV CODE 636 W HCPCS

## 2022-07-09 PROCEDURE — 85025 COMPLETE CBC W/AUTO DIFF WBC: CPT

## 2022-07-09 PROCEDURE — 63600175 PHARM REV CODE 636 W HCPCS: Performed by: INTERNAL MEDICINE

## 2022-07-09 PROCEDURE — 11000001 HC ACUTE MED/SURG PRIVATE ROOM

## 2022-07-09 PROCEDURE — 36415 COLL VENOUS BLD VENIPUNCTURE: CPT

## 2022-07-09 PROCEDURE — C9399 UNCLASSIFIED DRUGS OR BIOLOG: HCPCS

## 2022-07-09 PROCEDURE — 99233 SBSQ HOSP IP/OBS HIGH 50: CPT | Mod: GC,,, | Performed by: INTERNAL MEDICINE

## 2022-07-09 PROCEDURE — 25000003 PHARM REV CODE 250: Performed by: INTERNAL MEDICINE

## 2022-07-09 PROCEDURE — 80048 BASIC METABOLIC PNL TOTAL CA: CPT

## 2022-07-09 PROCEDURE — 82962 GLUCOSE BLOOD TEST: CPT

## 2022-07-09 PROCEDURE — 25000003 PHARM REV CODE 250

## 2022-07-09 RX ADMIN — SODIUM CHLORIDE, POTASSIUM CHLORIDE, SODIUM LACTATE AND CALCIUM CHLORIDE: 600; 310; 30; 20 INJECTION, SOLUTION INTRAVENOUS at 10:07

## 2022-07-09 RX ADMIN — SODIUM CHLORIDE, POTASSIUM CHLORIDE, SODIUM LACTATE AND CALCIUM CHLORIDE: 600; 310; 30; 20 INJECTION, SOLUTION INTRAVENOUS at 03:07

## 2022-07-09 RX ADMIN — VANCOMYCIN HYDROCHLORIDE 2000 MG: 1 INJECTION, POWDER, LYOPHILIZED, FOR SOLUTION INTRAVENOUS at 05:07

## 2022-07-09 RX ADMIN — INSULIN ASPART 10 UNITS: 100 INJECTION, SOLUTION INTRAVENOUS; SUBCUTANEOUS at 01:07

## 2022-07-09 RX ADMIN — PIPERACILLIN SODIUM AND TAZOBACTAM SODIUM 4.5 G: 4; .5 INJECTION, POWDER, LYOPHILIZED, FOR SOLUTION INTRAVENOUS at 08:07

## 2022-07-09 RX ADMIN — INSULIN ASPART 2 UNITS: 100 INJECTION, SOLUTION INTRAVENOUS; SUBCUTANEOUS at 10:07

## 2022-07-09 RX ADMIN — ATORVASTATIN CALCIUM 80 MG: 80 TABLET, FILM COATED ORAL at 08:07

## 2022-07-09 RX ADMIN — SODIUM CHLORIDE, POTASSIUM CHLORIDE, SODIUM LACTATE AND CALCIUM CHLORIDE: 600; 310; 30; 20 INJECTION, SOLUTION INTRAVENOUS at 01:07

## 2022-07-09 RX ADMIN — ENOXAPARIN SODIUM 40 MG: 40 INJECTION SUBCUTANEOUS at 04:07

## 2022-07-09 RX ADMIN — INSULIN DETEMIR 25 UNITS: 100 INJECTION, SOLUTION SUBCUTANEOUS at 10:07

## 2022-07-09 RX ADMIN — HYDROCODONE BITARTRATE AND ACETAMINOPHEN 1 TABLET: 5; 325 TABLET ORAL at 03:07

## 2022-07-09 RX ADMIN — HYDROCODONE BITARTRATE AND ACETAMINOPHEN 1 TABLET: 5; 325 TABLET ORAL at 10:07

## 2022-07-09 RX ADMIN — HYDROCODONE BITARTRATE AND ACETAMINOPHEN 1 TABLET: 5; 325 TABLET ORAL at 08:07

## 2022-07-09 RX ADMIN — PANTOPRAZOLE SODIUM 40 MG: 40 TABLET, DELAYED RELEASE ORAL at 08:07

## 2022-07-09 RX ADMIN — INSULIN ASPART 4 UNITS: 100 INJECTION, SOLUTION INTRAVENOUS; SUBCUTANEOUS at 04:07

## 2022-07-09 NOTE — SUBJECTIVE & OBJECTIVE
Interval History: Patient seen resting comfortably in bed with granddaughter bedside. She says that she feels better today, but that she still has quite a lot of right foot pain. Her H&H dropped from yesterday, so will need to keep an eye on that.     Review of Systems   Constitutional:  Positive for activity change and fatigue. Negative for appetite change, chills and fever.   Respiratory:  Negative for shortness of breath.    Cardiovascular:  Negative for chest pain, palpitations and leg swelling.   Gastrointestinal:  Negative for abdominal pain, diarrhea, nausea and vomiting.   Genitourinary:  Negative for dysuria and hematuria.   Musculoskeletal:  Negative for arthralgias and back pain.   Skin:  Positive for wound.   Neurological:  Positive for weakness. Negative for dizziness, syncope, facial asymmetry, speech difficulty and numbness.   Psychiatric/Behavioral:  Negative for confusion.    Objective:     Vital Signs (Most Recent):  Temp: 98.9 °F (37.2 °C) (07/09/22 0710)  Pulse: 89 (07/09/22 0710)  Resp: 18 (07/09/22 0851)  BP: 114/72 (07/09/22 0710)  SpO2: 98 % (07/09/22 0710) Vital Signs (24h Range):  Temp:  [97.8 °F (36.6 °C)-99.5 °F (37.5 °C)] 98.9 °F (37.2 °C)  Pulse:  [] 89  Resp:  [12-24] 18  SpO2:  [93 %-99 %] 98 %  BP: ()/(42-75) 114/72     Weight: 135.6 kg (299 lb)  Body mass index is 45.46 kg/m².  No intake or output data in the 24 hours ending 07/09/22 1257   Physical Exam  Constitutional:       Appearance: Normal appearance. She is obese.   HENT:      Head: Normocephalic and atraumatic.      Mouth/Throat:      Mouth: Mucous membranes are moist.      Pharynx: Oropharynx is clear.   Eyes:      Extraocular Movements: Extraocular movements intact.      Conjunctiva/sclera: Conjunctivae normal.      Pupils: Pupils are equal, round, and reactive to light.   Cardiovascular:      Rate and Rhythm: Normal rate and regular rhythm.      Pulses: Normal pulses.      Heart sounds: Normal heart sounds.    Pulmonary:      Effort: Pulmonary effort is normal.      Breath sounds: Normal breath sounds.   Abdominal:      General: Abdomen is flat. Bowel sounds are normal.      Palpations: Abdomen is soft.   Musculoskeletal:         General: Normal range of motion.   Feet:      Comments: Right foot ulcer on heel and ulcer on plantar base of big toe    Left foot amputation of 4th and 5th toes  Skin:     Capillary Refill: Capillary refill takes less than 2 seconds.   Neurological:      General: No focal deficit present.      Mental Status: She is alert and oriented to person, place, and time.      Motor: Weakness present.       Significant Labs: All pertinent labs within the past 24 hours have been reviewed.    Significant Imaging: I have reviewed all pertinent imaging results/findings within the past 24 hours.

## 2022-07-09 NOTE — PLAN OF CARE
Problem: Adult Inpatient Plan of Care  Goal: Plan of Care Review  Outcome: Ongoing, Progressing  Goal: Patient-Specific Goal (Individualized)  Outcome: Ongoing, Progressing  Goal: Absence of Hospital-Acquired Illness or Injury  Outcome: Ongoing, Progressing  Goal: Optimal Comfort and Wellbeing  Outcome: Ongoing, Progressing  Goal: Readiness for Transition of Care  Outcome: Ongoing, Progressing     Problem: Bariatric Environmental Safety  Goal: Safety Maintained with Care  Outcome: Ongoing, Progressing     Problem: Impaired Wound Healing  Goal: Optimal Wound Healing  Outcome: Ongoing, Progressing     Problem: Adjustment to Illness (Sepsis/Septic Shock)  Goal: Optimal Coping  Outcome: Ongoing, Progressing     Problem: Bleeding (Sepsis/Septic Shock)  Goal: Absence of Bleeding  Outcome: Ongoing, Progressing     Problem: Glycemic Control Impaired (Sepsis/Septic Shock)  Goal: Blood Glucose Level Within Desired Range  Outcome: Ongoing, Progressing     Problem: Infection Progression (Sepsis/Septic Shock)  Goal: Absence of Infection Signs and Symptoms  Outcome: Ongoing, Progressing     Problem: Nutrition Impaired (Sepsis/Septic Shock)  Goal: Optimal Nutrition Intake  Outcome: Ongoing, Progressing     Problem: Diabetes Comorbidity  Goal: Blood Glucose Level Within Targeted Range  Outcome: Ongoing, Progressing

## 2022-07-09 NOTE — ASSESSMENT & PLAN NOTE
Diabetic foot ulcer on right plantar heel  Empiric IV vanc and zosyn  Wound cultures prelimary result is gram negative bacilli, final results pending  Blood cx ng @24 hours  Wound care consulted  Right foot xray without signs of osteomyelitis

## 2022-07-09 NOTE — ASSESSMENT & PLAN NOTE
Stable for now: afebrile, normotensive and normal pulse  BP 70s/30s in ED, pulse of 116, Temp of 101.1 F  Fluids ordered and given per sepsis protocol, 30 ml/kg for a total of 4L  First lactic acid wnl, second one is pending  Blood and wound cultures collected  WBC 10.10 on admission  Empiric IV Vancomycin and Zosyn started

## 2022-07-09 NOTE — PROGRESS NOTES
[facility] Medicine  Progress Note    Patient Name: Deborah Sotelo  MRN: [id number]  Patient Class: IP- Inpatient   Admission Date: 7/8/2022  Length of Stay: 1 days  Attending Physician: Laurie Sanchez,*  Primary Care Provider: Ron Sanches MD        Subjective:     Principal Problem:Infected ulcer of skin        HPI:  The patient is a 60yo female with a medical history of DM2, diabetic foot wounds, amputation of the right lower foot and left 4th and 5th toes, CVA, HTN, anemia, HLD, Gerd and severe left shoulder arthritis who presents to the ED with c/o weakness and fatigue over the past 3 days. The patient has a chronic diabetic right foot ulcer on her heel, that was recently treated earlier this year with antibiotics through a PICC line. The patient says that she has noticed the area where the ulcer is becoming more and more painful over the past couple of months. She went to Dr. Tinajero yesterday with complaints of increasing pain in her right foot, and was given Augmentin to take outpatient. However, she came to the ED this morning after she fell at home and had to call her son to come pick her up from the floor. The patient says that she did not fall due to a seizure, pre-syncope or a LOC, but that she was feeling very weak when she got out of bed this morning. Additionally, the patient says that she has been feeling like feverish for the past couple of days, and has had an episode of watery diarrhea and vomiting earlier today. She denies any new chest pain, SOB, palpitations, change in appetite, headaches, dizziness, chills or night sweats.     In the ED, the patient was found to have a BP in the 70s/30s with a pulse of 116 and a temperature of 101.1 F. Wound and blood cultures were collected and the patient was stated on empiric vancomycin and zosyn for her infected foot ulcer. A right foot xray did not show any acute changes or signs of osteomyelitis. A lactic acid was  wnl, and a second one was drawn. The patient was given a 1L fluid bolus, with another 3L ordered to be given. She was found to have an MALIA, with a BUN/Crt ratio of 22/1.44. Her WBC and H&H were wnl.     The patient will be admitted to the [facility] under Dr. Infante for further management and treatment of her sepsis associated with an infected diabetic foot ulcer.       Overview/Hospital Course:  No notes on file    Interval History: Patient seen resting comfortably in bed with granddaughter bedside. She says that she feels better today, but that she still has quite a lot of right foot pain. Her H&H dropped from yesterday, so will need to keep an eye on that.     Review of Systems   Constitutional:  Positive for activity change and fatigue. Negative for appetite change, chills and fever.   Respiratory:  Negative for shortness of breath.    Cardiovascular:  Negative for chest pain, palpitations and leg swelling.   Gastrointestinal:  Negative for abdominal pain, diarrhea, nausea and vomiting.   Genitourinary:  Negative for dysuria and hematuria.   Musculoskeletal:  Negative for arthralgias and back pain.   Skin:  Positive for wound.   Neurological:  Positive for weakness. Negative for dizziness, syncope, facial asymmetry, speech difficulty and numbness.   Psychiatric/Behavioral:  Negative for confusion.    Objective:     Vital Signs (Most Recent):  Temp: 98.9 °F (37.2 °C) (07/09/22 0710)  Pulse: 89 (07/09/22 0710)  Resp: 18 (07/09/22 0851)  BP: 114/72 (07/09/22 0710)  SpO2: 98 % (07/09/22 0710) Vital Signs (24h Range):  Temp:  [97.8 °F (36.6 °C)-99.5 °F (37.5 °C)] 98.9 °F (37.2 °C)  Pulse:  [] 89  Resp:  [12-24] 18  SpO2:  [93 %-99 %] 98 %  BP: ()/(42-75) 114/72     Weight: 135.6 kg (299 lb)  Body mass index is 45.46 kg/m².  No intake or output data in the 24 hours ending 07/09/22 1257   Physical Exam  Constitutional:       Appearance: Normal appearance. She is obese.   HENT:      Head: Normocephalic and  atraumatic.      Mouth/Throat:      Mouth: Mucous membranes are moist.      Pharynx: Oropharynx is clear.   Eyes:      Extraocular Movements: Extraocular movements intact.      Conjunctiva/sclera: Conjunctivae normal.      Pupils: Pupils are equal, round, and reactive to light.   Cardiovascular:      Rate and Rhythm: Normal rate and regular rhythm.      Pulses: Normal pulses.      Heart sounds: Normal heart sounds.   Pulmonary:      Effort: Pulmonary effort is normal.      Breath sounds: Normal breath sounds.   Abdominal:      General: Abdomen is flat. Bowel sounds are normal.      Palpations: Abdomen is soft.   Musculoskeletal:         General: Normal range of motion.   Feet:      Comments: Right foot ulcer on heel and ulcer on plantar base of big toe    Left foot amputation of 4th and 5th toes  Skin:     Capillary Refill: Capillary refill takes less than 2 seconds.   Neurological:      General: No focal deficit present.      Mental Status: She is alert and oriented to person, place, and time.      Motor: Weakness present.       Significant Labs: All pertinent labs within the past 24 hours have been reviewed.    Significant Imaging: I have reviewed all pertinent imaging results/findings within the past 24 hours.      Assessment/Plan:      * Infected ulcer of skin  Diabetic foot ulcer on right plantar heel  Empiric IV vanc and zosyn  Wound cultures prelimary result is gram negative bacilli, final results pending  Blood cx ng @24 hours  Wound care consulted  Right foot xray without signs of osteomyelitis      Sepsis  Stable for now: afebrile, normotensive and normal pulse  BP 70s/30s in ED, pulse of 116, Temp of 101.1 F  Fluids ordered and given per sepsis protocol, 30 ml/kg for a total of 4L  First lactic acid wnl, second one is pending  Blood and wound cultures collected  WBC 10.10 on admission  Empiric IV Vancomycin and Zosyn started      Anemia  No signs of active bleeding  FOBT negative  Recent iron level 42,  ferritin, vit b12 and folate wnl  H&H 10.7/30.4 on admission, down to 8.6/25.3 today, monitor closely        Gastroesophageal reflux disease  Pantoprazole 40mg qd      Hyperlipidemia  Continue home atorvastatin 80mg qd      Hypertension  Hold home amlodipine and losartan secondary to hypotension     Type 2 diabetes mellitus with foot ulcer, with long-term current use of insulin  Uses novolog and lantus at home  Increase from 20 to 25 units levemir qhs due to high morning glucose (366)  Last A1C 9.3  POCT glucose checks  Diabetic educator consulted      Embolic stroke involving left middle cerebral artery  lovenox 40mg qd as prophylaxis   INR 1.17 on admission        VTE Risk Mitigation (From admission, onward)         Ordered     enoxaparin injection 40 mg  Daily         07/08/22 0918     IP VTE HIGH RISK PATIENT  Once         07/08/22 0918     Place sequential compression device  Until discontinued         07/08/22 0812                Discharge Planning   REYES:      Code Status: Full Code   Is the patient medically ready for discharge?:     Reason for patient still in hospital (select all that apply): Treatment                     Geneva Snow MD  Department of Hospital Medicine   [facility] [place] - Orthopedic

## 2022-07-09 NOTE — ASSESSMENT & PLAN NOTE
Uses novolog and lantus at home  Increase from 20 to 25 units levemir qhs due to high morning glucose (366)  Last A1C 9.3  POCT glucose checks  Diabetic educator consulted

## 2022-07-09 NOTE — ASSESSMENT & PLAN NOTE
No signs of active bleeding  FOBT negative  Recent iron level 42, ferritin, vit b12 and folate wnl  H&H 10.7/30.4 on admission, down to 8.6/25.3 today, monitor closely

## 2022-07-10 PROBLEM — E11.621 DIABETIC FOOT ULCER: Status: ACTIVE | Noted: 2022-07-08

## 2022-07-10 PROBLEM — R79.89 ELEVATED SERUM CREATININE: Status: ACTIVE | Noted: 2022-07-10

## 2022-07-10 PROBLEM — L97.509 DIABETIC FOOT ULCER: Status: ACTIVE | Noted: 2022-07-08

## 2022-07-10 LAB
ANION GAP SERPL CALCULATED.3IONS-SCNC: 12 MMOL/L (ref 7–16)
ANISOCYTOSIS BLD QL SMEAR: ABNORMAL
BASOPHILS # BLD AUTO: 0.01 K/UL (ref 0–0.2)
BASOPHILS NFR BLD AUTO: 0.2 % (ref 0–1)
BUN SERPL-MCNC: 14 MG/DL (ref 7–18)
BUN/CREAT SERPL: 11 (ref 6–20)
CALCIUM SERPL-MCNC: 8.6 MG/DL (ref 8.5–10.1)
CHLORIDE SERPL-SCNC: 108 MMOL/L (ref 98–107)
CO2 SERPL-SCNC: 23 MMOL/L (ref 21–32)
CREAT SERPL-MCNC: 1.23 MG/DL (ref 0.55–1.02)
DIFFERENTIAL METHOD BLD: ABNORMAL
EOSINOPHIL # BLD AUTO: 0.25 K/UL (ref 0–0.5)
EOSINOPHIL NFR BLD AUTO: 5.4 % (ref 1–4)
ERYTHROCYTE [DISTWIDTH] IN BLOOD BY AUTOMATED COUNT: 15.3 % (ref 11.5–14.5)
GLUCOSE SERPL-MCNC: 193 MG/DL (ref 70–105)
GLUCOSE SERPL-MCNC: 206 MG/DL (ref 70–105)
GLUCOSE SERPL-MCNC: 236 MG/DL (ref 70–105)
GLUCOSE SERPL-MCNC: 246 MG/DL (ref 70–105)
GLUCOSE SERPL-MCNC: 252 MG/DL (ref 74–106)
GLUCOSE SERPL-MCNC: 285 MG/DL (ref 70–105)
HCT VFR BLD AUTO: 27 % (ref 38–47)
HGB BLD-MCNC: 9.1 G/DL (ref 12–16)
IMM GRANULOCYTES # BLD AUTO: 0.01 K/UL (ref 0–0.04)
IMM GRANULOCYTES NFR BLD: 0.2 % (ref 0–0.4)
LYMPHOCYTES # BLD AUTO: 1.33 K/UL (ref 1–4.8)
LYMPHOCYTES NFR BLD AUTO: 28.5 % (ref 27–41)
MCH RBC QN AUTO: 24.5 PG (ref 27–31)
MCHC RBC AUTO-ENTMCNC: 33.7 G/DL (ref 32–36)
MCV RBC AUTO: 72.8 FL (ref 80–96)
MICROCYTES BLD QL SMEAR: ABNORMAL
MICROORGANISM SPEC CULT: ABNORMAL
MICROORGANISM SPEC CULT: ABNORMAL
MONOCYTES # BLD AUTO: 0.42 K/UL (ref 0–0.8)
MONOCYTES NFR BLD AUTO: 9 % (ref 2–6)
MPC BLD CALC-MCNC: 11.8 FL (ref 9.4–12.4)
NEUTROPHILS # BLD AUTO: 2.65 K/UL (ref 1.8–7.7)
NEUTROPHILS NFR BLD AUTO: 56.7 % (ref 53–65)
NRBC # BLD AUTO: 0 X10E3/UL
NRBC, AUTO (.00): 0 %
PLATELET # BLD AUTO: 135 K/UL (ref 150–400)
PLATELET MORPHOLOGY: ABNORMAL
POTASSIUM SERPL-SCNC: 4.5 MMOL/L (ref 3.5–5.1)
RBC # BLD AUTO: 3.71 M/UL (ref 4.2–5.4)
SODIUM SERPL-SCNC: 138 MMOL/L (ref 136–145)
WBC # BLD AUTO: 4.67 K/UL (ref 4.5–11)

## 2022-07-10 PROCEDURE — 82962 GLUCOSE BLOOD TEST: CPT

## 2022-07-10 PROCEDURE — 25000003 PHARM REV CODE 250: Performed by: INTERNAL MEDICINE

## 2022-07-10 PROCEDURE — 11000001 HC ACUTE MED/SURG PRIVATE ROOM

## 2022-07-10 PROCEDURE — 63600175 PHARM REV CODE 636 W HCPCS: Performed by: INTERNAL MEDICINE

## 2022-07-10 PROCEDURE — 85025 COMPLETE CBC W/AUTO DIFF WBC: CPT

## 2022-07-10 PROCEDURE — 80048 BASIC METABOLIC PNL TOTAL CA: CPT

## 2022-07-10 PROCEDURE — A4216 STERILE WATER/SALINE, 10 ML: HCPCS

## 2022-07-10 PROCEDURE — C9399 UNCLASSIFIED DRUGS OR BIOLOG: HCPCS | Performed by: INTERNAL MEDICINE

## 2022-07-10 PROCEDURE — 25000003 PHARM REV CODE 250

## 2022-07-10 PROCEDURE — 63600175 PHARM REV CODE 636 W HCPCS

## 2022-07-10 PROCEDURE — 99233 SBSQ HOSP IP/OBS HIGH 50: CPT | Mod: GC,,, | Performed by: INTERNAL MEDICINE

## 2022-07-10 PROCEDURE — 99233 PR SUBSEQUENT HOSPITAL CARE,LEVL III: ICD-10-PCS | Mod: GC,,, | Performed by: INTERNAL MEDICINE

## 2022-07-10 PROCEDURE — 36415 COLL VENOUS BLD VENIPUNCTURE: CPT

## 2022-07-10 RX ADMIN — INSULIN DETEMIR 40 UNITS: 100 INJECTION, SOLUTION SUBCUTANEOUS at 09:07

## 2022-07-10 RX ADMIN — PIPERACILLIN SODIUM AND TAZOBACTAM SODIUM 4.5 G: 4; .5 INJECTION, POWDER, LYOPHILIZED, FOR SOLUTION INTRAVENOUS at 08:07

## 2022-07-10 RX ADMIN — PANTOPRAZOLE SODIUM 40 MG: 40 TABLET, DELAYED RELEASE ORAL at 08:07

## 2022-07-10 RX ADMIN — PIPERACILLIN SODIUM AND TAZOBACTAM SODIUM 4.5 G: 4; .5 INJECTION, POWDER, LYOPHILIZED, FOR SOLUTION INTRAVENOUS at 11:07

## 2022-07-10 RX ADMIN — ATORVASTATIN CALCIUM 80 MG: 80 TABLET, FILM COATED ORAL at 08:07

## 2022-07-10 RX ADMIN — INSULIN ASPART 6 UNITS: 100 INJECTION, SOLUTION INTRAVENOUS; SUBCUTANEOUS at 06:07

## 2022-07-10 RX ADMIN — INSULIN ASPART 4 UNITS: 100 INJECTION, SOLUTION INTRAVENOUS; SUBCUTANEOUS at 04:07

## 2022-07-10 RX ADMIN — HYDROCODONE BITARTRATE AND ACETAMINOPHEN 1 TABLET: 5; 325 TABLET ORAL at 09:07

## 2022-07-10 RX ADMIN — SODIUM CHLORIDE, POTASSIUM CHLORIDE, SODIUM LACTATE AND CALCIUM CHLORIDE: 600; 310; 30; 20 INJECTION, SOLUTION INTRAVENOUS at 05:07

## 2022-07-10 RX ADMIN — SODIUM CHLORIDE, PRESERVATIVE FREE 10 ML: 5 INJECTION INTRAVENOUS at 12:07

## 2022-07-10 RX ADMIN — PIPERACILLIN SODIUM AND TAZOBACTAM SODIUM 4.5 G: 4; .5 INJECTION, POWDER, LYOPHILIZED, FOR SOLUTION INTRAVENOUS at 01:07

## 2022-07-10 RX ADMIN — HYDROCODONE BITARTRATE AND ACETAMINOPHEN 1 TABLET: 5; 325 TABLET ORAL at 08:07

## 2022-07-10 RX ADMIN — HYDROCODONE BITARTRATE AND ACETAMINOPHEN 1 TABLET: 5; 325 TABLET ORAL at 03:07

## 2022-07-10 RX ADMIN — INSULIN ASPART 1 UNITS: 100 INJECTION, SOLUTION INTRAVENOUS; SUBCUTANEOUS at 09:07

## 2022-07-10 RX ADMIN — SODIUM CHLORIDE, POTASSIUM CHLORIDE, SODIUM LACTATE AND CALCIUM CHLORIDE: 600; 310; 30; 20 INJECTION, SOLUTION INTRAVENOUS at 09:07

## 2022-07-10 RX ADMIN — PIPERACILLIN SODIUM AND TAZOBACTAM SODIUM 4.5 G: 4; .5 INJECTION, POWDER, LYOPHILIZED, FOR SOLUTION INTRAVENOUS at 03:07

## 2022-07-10 RX ADMIN — INSULIN ASPART 4 UNITS: 100 INJECTION, SOLUTION INTRAVENOUS; SUBCUTANEOUS at 12:07

## 2022-07-10 NOTE — ASSESSMENT & PLAN NOTE
Currently resolved.   VS stable, afebrile  Blood cx x 2 : NG@24hr  Wound cx: + enterobacter cloacae, MSSA  zosyn (7/8-)  D/maikol vanc(7/10)

## 2022-07-10 NOTE — ASSESSMENT & PLAN NOTE
No signs of active bleeding  FOBT negative  Recent iron level 42, ferritin, vit b12 and folate wnl  H&H 10.7/30.4 on admission  H &H 9.1/27 (7/10)

## 2022-07-10 NOTE — PROGRESS NOTES
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 The patient has a chronic diabetic right foot ulcer on her heel, that was recently treated earlier this year with antibiotics through a PICC line. The patient says that she has noticed the area where the ulcer is becoming more and more painful over the past couple of months. She went to Dr. Tinajero yesterday with complaints of increasing pain in her right foot, and was given Augmentin to take outpatient. However, she came to the ED this morning after she fell at home and had to call her son to come pick her up from the floor. The patient says that she did not fall due to a seizure, pre-syncope or a LOC, but that she was feeling very weak when she got out of bed this morning. Additionally, the patient says that she has been feeling like feverish for the past couple of days, and has had an episode of watery diarrhea and vomiting earlier today. She denies any new chest pain, SOB, palpitations, change in appetite, headaches, dizziness, chills or night sweats.     In the ED, the patient was found to have a BP in the 70s/30s with a pulse of 116 and a temperature of 101.1 F. Wound and blood cultures were collected and the patient was stated on empiric vancomycin and zosyn for her infected foot ulcer. A right foot xray did not show any acute changes or signs of osteomyelitis. A lactic acid was  wnl, and a second one was drawn. The patient was given a 1L fluid bolus, with another 3L ordered to be given. She was found to have an MALIA, with a BUN/Crt ratio of 22/1.44. Her WBC and H&H were wnl.     The patient will be admitted to the [facility] [place] under Dr. Infante for further management and treatment of her sepsis associated with an infected diabetic foot ulcer.       Overview/Hospital Course:  No notes on file    Interval History:     This morning, pt was resting in bed comfortably and denies any complaints. Glucose remains uncontrolled running at 200s-300s. Pt states she takes 70/30 novolog with 50 units AM and 25 units QHS in addition to lantus 20 QHS at home. Pt state she ate burrito last night. Will increase levemir to 40 units levemir QHS from 25. Cr improved to 1.23 from 1.59. Will D/C vanc as wond culture shows MSSA. Will continue zosyn to cover Enterobacter cloacae and MSSA.     Review of Systems   Constitutional:  Positive for activity change and fatigue. Negative for appetite change, chills and fever.   Respiratory:  Negative for shortness of breath.    Cardiovascular:  Negative for chest pain, palpitations and leg swelling.   Gastrointestinal:  Negative for abdominal pain, diarrhea, nausea and vomiting.   Genitourinary:  Negative for dysuria and hematuria.   Musculoskeletal:  Negative for arthralgias and back pain.   Skin:  Positive for wound.   Neurological:  Positive for weakness. Negative for dizziness, syncope, facial asymmetry, speech difficulty and numbness.   Psychiatric/Behavioral:  Negative for confusion.    Objective:     Vital Signs (Most Recent):  Temp: 98.9 °F (37.2 °C) (07/10/22 0701)  Pulse: 80 (07/10/22 0701)  Resp: 18 (07/10/22 0820)  BP: 136/77 (07/10/22 0701)  SpO2: 98 % (07/10/22 0701) Vital Signs (24h Range):  Temp:  [98.1 °F (36.7 °C)-98.9 °F (37.2 °C)] 98.9 °F (37.2 °C)  Pulse:  [80-96] 80  Resp:  [17-20] 18  SpO2:  [95 %-99 %] 98 %  BP: (123-136)/(63-77) 136/77     Weight: 135.6 kg  (299 lb)  Body mass index is 45.46 kg/m².    Intake/Output Summary (Last 24 hours) at 7/10/2022 1352  Last data filed at 7/10/2022 1242  Gross per 24 hour   Intake 10 ml   Output --   Net 10 ml      Physical Exam  Constitutional:       Appearance: Normal appearance. She is obese.   HENT:      Head: Normocephalic and atraumatic.      Mouth/Throat:      Mouth: Mucous membranes are moist.      Pharynx: Oropharynx is clear.   Eyes:      Extraocular Movements: Extraocular movements intact.      Conjunctiva/sclera: Conjunctivae normal.      Pupils: Pupils are equal, round, and reactive to light.   Cardiovascular:      Rate and Rhythm: Normal rate and regular rhythm.      Pulses: Normal pulses.      Heart sounds: Normal heart sounds.   Pulmonary:      Effort: Pulmonary effort is normal.      Breath sounds: Normal breath sounds.   Abdominal:      General: Abdomen is flat. Bowel sounds are normal.      Palpations: Abdomen is soft.   Musculoskeletal:         General: Normal range of motion.   Feet:      Comments: Right foot ulcer on heel and ulcer on plantar base of big toe.     Left foot amputation of 4th and 5th toes  Skin:     Capillary Refill: Capillary refill takes less than 2 seconds.   Neurological:      General: No focal deficit present.      Mental Status: She is alert and oriented to person, place, and time.       Significant Labs: All pertinent labs within the past 24 hours have been reviewed.  BMP:   Recent Labs   Lab 07/10/22  0325   *      K 4.5   *   CO2 23   BUN 14   CREATININE 1.23*   CALCIUM 8.6     CBC:   Recent Labs   Lab 07/09/22  0311 07/10/22  0325   WBC 6.73 4.67   HGB 8.6* 9.1*   HCT 25.3* 27.0*   * 135*       Significant Imaging: I have reviewed all pertinent imaging results/findings within the past 24 hours.      Assessment/Plan:      * Diabetic foot ulcer  Diabetic foot ulcer on right plantar heel. Healing well  Wound culture: moderate growth enterobacter cloacae,  Heavy  growth MSSA  D/maikol vanc(7/10)  Continue zosyn(7/8-) Will continue IV abx for a few more days   Blood cx ng @24 hours  Wound care consulted  Right foot xray without signs of osteomyelitis      Type 2 diabetes mellitus with foot ulcer, with long-term current use of insulin  Uncontrolled >200s-300s  Uses [id number] 50 units AM and 25 units QHS and lantus 20 units QHS at home  Increased to 40 units detemir QHS from 25 units  Last A1C 9.3  POCT glucose checks x 4 AC & HS  Diabetic educator consulted      Anemia  No signs of active bleeding  FOBT negative  Recent iron level 42, ferritin, vit b12 and folate wnl  H&H 10.7/30.4 on admission  H &H 9.1/27 (7/10)        Hypertension  Held home amlodipine and losartan on admission secondary to hypotension     BP improved 120-136/63/77. Will monitor for now consider adding home med in 1-2days.       Elevated serum creatinine  Cr 1.44 on admission  Cr 1.59(7/9)   Cr 1.23 improved (7/10)      Sepsis  Currently resolved.   VS stable, afebrile  Blood cx x 2 : NG@24hr  Wound cx: + enterobacter cloacae, MSSA  zosyn (7/8-)  D/maikol vanc(7/10)        Gastroesophageal reflux disease  Pantoprazole 40mg qd      Hyperlipidemia  Continue home atorvastatin 80mg qd      Embolic stroke involving left middle cerebral artery  lovenox 40mg qd as prophylaxis   INR 1.17 on admission        VTE Risk Mitigation (From admission, onward)         Ordered     enoxaparin injection 40 mg  Daily         07/08/22 0918     IP VTE HIGH RISK PATIENT  Once         07/08/22 0918     Place sequential compression device  Until discontinued         07/08/22 0812                Discharge Planning   REYES:      Code Status: Full Code   Is the patient medically ready for discharge?:     Reason for patient still in hospital (select all that apply): Patient trending condition                     Aurelio Johnston DO  Department of Hospital Medicine   [facility] - Orthopedic

## 2022-07-10 NOTE — ASSESSMENT & PLAN NOTE
Uncontrolled >200s-300s  Uses [id number]/30 50 units AM and 25 units QHS and lantus 20 units QHS at home  Increased to 40 units detemir QHS from 25 units  Last A1C 9.3  POCT glucose checks x 4 AC & HS  Diabetic educator consulted

## 2022-07-10 NOTE — ASSESSMENT & PLAN NOTE
Diabetic foot ulcer on right plantar heel. Healing well  Wound culture: moderate growth enterobacter cloacae,  Heavy growth MSSA  D/maikol vanc(7/10)  Continue zosyn(7/8-) Will continue IV abx for a few more days   Blood cx ng @24 hours  Wound care consulted  Right foot xray without signs of osteomyelitis

## 2022-07-10 NOTE — SUBJECTIVE & OBJECTIVE
Interval History:     This morning, pt was resting in bed comfortably and denies any complaints. Glucose remains uncontrolled running at 200s-300s. Pt states she takes 70/30 novolog with 50 units AM and 25 units QHS in addition to lantus 20 QHS at home. Pt state she ate burrito last night. Will increase levemir to 40 units levemir QHS from 25. Cr improved to 1.23 from 1.59. Will D/C vanc as wond culture shows MSSA. Will continue zosyn to cover Enterobacter cloacae and MSSA.     Review of Systems   Constitutional:  Positive for activity change and fatigue. Negative for appetite change, chills and fever.   Respiratory:  Negative for shortness of breath.    Cardiovascular:  Negative for chest pain, palpitations and leg swelling.   Gastrointestinal:  Negative for abdominal pain, diarrhea, nausea and vomiting.   Genitourinary:  Negative for dysuria and hematuria.   Musculoskeletal:  Negative for arthralgias and back pain.   Skin:  Positive for wound.   Neurological:  Positive for weakness. Negative for dizziness, syncope, facial asymmetry, speech difficulty and numbness.   Psychiatric/Behavioral:  Negative for confusion.    Objective:     Vital Signs (Most Recent):  Temp: 98.9 °F (37.2 °C) (07/10/22 0701)  Pulse: 80 (07/10/22 0701)  Resp: 18 (07/10/22 0820)  BP: 136/77 (07/10/22 0701)  SpO2: 98 % (07/10/22 0701) Vital Signs (24h Range):  Temp:  [98.1 °F (36.7 °C)-98.9 °F (37.2 °C)] 98.9 °F (37.2 °C)  Pulse:  [80-96] 80  Resp:  [17-20] 18  SpO2:  [95 %-99 %] 98 %  BP: (123-136)/(63-77) 136/77     Weight: 135.6 kg (299 lb)  Body mass index is 45.46 kg/m².    Intake/Output Summary (Last 24 hours) at 7/10/2022 1352  Last data filed at 7/10/2022 1242  Gross per 24 hour   Intake 10 ml   Output --   Net 10 ml      Physical Exam  Constitutional:       Appearance: Normal appearance. She is obese.   HENT:      Head: Normocephalic and atraumatic.      Mouth/Throat:      Mouth: Mucous membranes are moist.      Pharynx: Oropharynx is  clear.   Eyes:      Extraocular Movements: Extraocular movements intact.      Conjunctiva/sclera: Conjunctivae normal.      Pupils: Pupils are equal, round, and reactive to light.   Cardiovascular:      Rate and Rhythm: Normal rate and regular rhythm.      Pulses: Normal pulses.      Heart sounds: Normal heart sounds.   Pulmonary:      Effort: Pulmonary effort is normal.      Breath sounds: Normal breath sounds.   Abdominal:      General: Abdomen is flat. Bowel sounds are normal.      Palpations: Abdomen is soft.   Musculoskeletal:         General: Normal range of motion.   Feet:      Comments: Right foot ulcer on heel and ulcer on plantar base of big toe.     Left foot amputation of 4th and 5th toes  Skin:     Capillary Refill: Capillary refill takes less than 2 seconds.   Neurological:      General: No focal deficit present.      Mental Status: She is alert and oriented to person, place, and time.       Significant Labs: All pertinent labs within the past 24 hours have been reviewed.  BMP:   Recent Labs   Lab 07/10/22  0325   *      K 4.5   *   CO2 23   BUN 14   CREATININE 1.23*   CALCIUM 8.6     CBC:   Recent Labs   Lab 07/09/22  0311 07/10/22  0325   WBC 6.73 4.67   HGB 8.6* 9.1*   HCT 25.3* 27.0*   * 135*       Significant Imaging: I have reviewed all pertinent imaging results/findings within the past 24 hours.

## 2022-07-10 NOTE — ASSESSMENT & PLAN NOTE
Held home amlodipine and losartan on admission secondary to hypotension     BP improved 120-136/63/77. Will monitor for now consider adding home med in 1-2days.

## 2022-07-11 PROBLEM — A41.9 SEPSIS: Status: RESOLVED | Noted: 2022-07-08 | Resolved: 2022-07-11

## 2022-07-11 PROBLEM — R79.89 ELEVATED SERUM CREATININE: Status: RESOLVED | Noted: 2022-07-10 | Resolved: 2022-07-11

## 2022-07-11 LAB
ANION GAP SERPL CALCULATED.3IONS-SCNC: 7 MMOL/L (ref 7–16)
ANISOCYTOSIS BLD QL SMEAR: ABNORMAL
BACTERIA SPEC ANAEROBE CULT: NORMAL
BASOPHILS # BLD AUTO: 0.02 K/UL (ref 0–0.2)
BASOPHILS NFR BLD AUTO: 0.5 % (ref 0–1)
BUN SERPL-MCNC: 8 MG/DL (ref 7–18)
BUN/CREAT SERPL: 7 (ref 6–20)
CALCIUM SERPL-MCNC: 8.8 MG/DL (ref 8.5–10.1)
CHLORIDE SERPL-SCNC: 109 MMOL/L (ref 98–107)
CO2 SERPL-SCNC: 27 MMOL/L (ref 21–32)
CREAT SERPL-MCNC: 1.09 MG/DL (ref 0.55–1.02)
DIFFERENTIAL METHOD BLD: ABNORMAL
EOSINOPHIL # BLD AUTO: 0.22 K/UL (ref 0–0.5)
EOSINOPHIL NFR BLD AUTO: 5 % (ref 1–4)
ERYTHROCYTE [DISTWIDTH] IN BLOOD BY AUTOMATED COUNT: 15.4 % (ref 11.5–14.5)
GLUCOSE SERPL-MCNC: 173 MG/DL (ref 74–106)
GLUCOSE SERPL-MCNC: 176 MG/DL (ref 70–105)
GLUCOSE SERPL-MCNC: 215 MG/DL (ref 70–105)
GLUCOSE SERPL-MCNC: 220 MG/DL (ref 70–105)
GLUCOSE SERPL-MCNC: 251 MG/DL (ref 70–105)
HCT VFR BLD AUTO: 25.6 % (ref 38–47)
HGB BLD-MCNC: 9 G/DL (ref 12–16)
HYPOCHROMIA BLD QL SMEAR: ABNORMAL
IMM GRANULOCYTES # BLD AUTO: 0.02 K/UL (ref 0–0.04)
IMM GRANULOCYTES NFR BLD: 0.5 % (ref 0–0.4)
LYMPHOCYTES # BLD AUTO: 1.49 K/UL (ref 1–4.8)
LYMPHOCYTES NFR BLD AUTO: 33.7 % (ref 27–41)
MCH RBC QN AUTO: 25.2 PG (ref 27–31)
MCHC RBC AUTO-ENTMCNC: 35.2 G/DL (ref 32–36)
MCV RBC AUTO: 71.7 FL (ref 80–96)
MICROCYTES BLD QL SMEAR: ABNORMAL
MONOCYTES # BLD AUTO: 0.44 K/UL (ref 0–0.8)
MONOCYTES NFR BLD AUTO: 10 % (ref 2–6)
MPC BLD CALC-MCNC: 11.3 FL (ref 9.4–12.4)
NEUTROPHILS # BLD AUTO: 2.23 K/UL (ref 1.8–7.7)
NEUTROPHILS NFR BLD AUTO: 50.3 % (ref 53–65)
NRBC # BLD AUTO: 0 X10E3/UL
NRBC, AUTO (.00): 0 %
PLATELET # BLD AUTO: 150 K/UL (ref 150–400)
PLATELET MORPHOLOGY: ABNORMAL
POLYCHROMASIA BLD QL SMEAR: ABNORMAL
POTASSIUM SERPL-SCNC: 4.6 MMOL/L (ref 3.5–5.1)
RBC # BLD AUTO: 3.57 M/UL (ref 4.2–5.4)
SODIUM SERPL-SCNC: 138 MMOL/L (ref 136–145)
WBC # BLD AUTO: 4.42 K/UL (ref 4.5–11)

## 2022-07-11 PROCEDURE — 36415 COLL VENOUS BLD VENIPUNCTURE: CPT | Performed by: INTERNAL MEDICINE

## 2022-07-11 PROCEDURE — 85025 COMPLETE CBC W/AUTO DIFF WBC: CPT | Performed by: INTERNAL MEDICINE

## 2022-07-11 PROCEDURE — C9399 UNCLASSIFIED DRUGS OR BIOLOG: HCPCS | Performed by: INTERNAL MEDICINE

## 2022-07-11 PROCEDURE — 25000003 PHARM REV CODE 250: Performed by: INTERNAL MEDICINE

## 2022-07-11 PROCEDURE — 99232 PR SUBSEQUENT HOSPITAL CARE,LEVL II: ICD-10-PCS | Mod: GC,,, | Performed by: INTERNAL MEDICINE

## 2022-07-11 PROCEDURE — 80048 BASIC METABOLIC PNL TOTAL CA: CPT | Performed by: INTERNAL MEDICINE

## 2022-07-11 PROCEDURE — 25000003 PHARM REV CODE 250

## 2022-07-11 PROCEDURE — 99232 SBSQ HOSP IP/OBS MODERATE 35: CPT | Mod: GC,,, | Performed by: INTERNAL MEDICINE

## 2022-07-11 PROCEDURE — 82962 GLUCOSE BLOOD TEST: CPT

## 2022-07-11 PROCEDURE — 63600175 PHARM REV CODE 636 W HCPCS: Performed by: INTERNAL MEDICINE

## 2022-07-11 PROCEDURE — 11000001 HC ACUTE MED/SURG PRIVATE ROOM

## 2022-07-11 PROCEDURE — 63600175 PHARM REV CODE 636 W HCPCS

## 2022-07-11 RX ORDER — AMLODIPINE BESYLATE 5 MG/1
5 TABLET ORAL DAILY
Status: DISCONTINUED | OUTPATIENT
Start: 2022-07-11 | End: 2022-07-13 | Stop reason: HOSPADM

## 2022-07-11 RX ORDER — LOSARTAN POTASSIUM 100 MG/1
100 TABLET ORAL NIGHTLY
Status: DISCONTINUED | OUTPATIENT
Start: 2022-07-11 | End: 2022-07-13 | Stop reason: HOSPADM

## 2022-07-11 RX ORDER — SILVER SULFADIAZINE 10 G/1000G
CREAM TOPICAL DAILY
Status: DISCONTINUED | OUTPATIENT
Start: 2022-07-11 | End: 2022-07-13 | Stop reason: HOSPADM

## 2022-07-11 RX ADMIN — INSULIN DETEMIR 40 UNITS: 100 INJECTION, SOLUTION SUBCUTANEOUS at 08:07

## 2022-07-11 RX ADMIN — PIPERACILLIN SODIUM AND TAZOBACTAM SODIUM 4.5 G: 4; .5 INJECTION, POWDER, LYOPHILIZED, FOR SOLUTION INTRAVENOUS at 09:07

## 2022-07-11 RX ADMIN — PIPERACILLIN SODIUM AND TAZOBACTAM SODIUM 4.5 G: 4; .5 INJECTION, POWDER, LYOPHILIZED, FOR SOLUTION INTRAVENOUS at 03:07

## 2022-07-11 RX ADMIN — PANTOPRAZOLE SODIUM 40 MG: 40 TABLET, DELAYED RELEASE ORAL at 09:07

## 2022-07-11 RX ADMIN — ATORVASTATIN CALCIUM 80 MG: 80 TABLET, FILM COATED ORAL at 09:07

## 2022-07-11 RX ADMIN — LOSARTAN POTASSIUM 100 MG: 100 TABLET, FILM COATED ORAL at 08:07

## 2022-07-11 RX ADMIN — AMLODIPINE BESYLATE 5 MG: 5 TABLET ORAL at 01:07

## 2022-07-11 RX ADMIN — HYDROCODONE BITARTRATE AND ACETAMINOPHEN 1 TABLET: 5; 325 TABLET ORAL at 08:07

## 2022-07-11 RX ADMIN — ENOXAPARIN SODIUM 40 MG: 40 INJECTION SUBCUTANEOUS at 05:07

## 2022-07-11 RX ADMIN — SODIUM CHLORIDE, POTASSIUM CHLORIDE, SODIUM LACTATE AND CALCIUM CHLORIDE: 600; 310; 30; 20 INJECTION, SOLUTION INTRAVENOUS at 04:07

## 2022-07-11 RX ADMIN — INSULIN ASPART 2 UNITS: 100 INJECTION, SOLUTION INTRAVENOUS; SUBCUTANEOUS at 05:07

## 2022-07-11 NOTE — ASSESSMENT & PLAN NOTE
No signs of active bleeding, stable H&H  FOBT negative  Recent iron level 42, ferritin, vit b12 and folate wnl  H&H 10.7/30.4 on admission

## 2022-07-11 NOTE — SUBJECTIVE & OBJECTIVE
Interval History: Patient seen resting comfortably in bed with no complaints. Says she does not feel weak anymore like she did when she was admitted. Blood sugar is much better controlled today. Fluids will be stopped as creatinine is back to baseline and patient is eating and drinking. Patient will get two more full days of Zosyn to treat her infected foot wound.     Review of Systems   Constitutional:  Negative for activity change, appetite change, chills, fatigue and fever.   Respiratory:  Negative for shortness of breath.    Cardiovascular:  Negative for chest pain, palpitations and leg swelling.   Gastrointestinal:  Negative for abdominal pain, diarrhea, nausea and vomiting.   Genitourinary:  Negative for dysuria and hematuria.   Musculoskeletal:  Negative for arthralgias and back pain.   Skin:  Positive for wound.   Neurological:  Negative for dizziness, syncope, facial asymmetry, speech difficulty, weakness and numbness.   Psychiatric/Behavioral:  Negative for confusion.    Objective:     Vital Signs (Most Recent):  Temp: 97.9 °F (36.6 °C) (07/11/22 0748)  Pulse: 82 (07/11/22 0748)  Resp: 18 (07/11/22 0748)  BP: (!) 146/83 (07/11/22 0748)  SpO2: 98 % (07/11/22 0748)   Vital Signs (24h Range):  Temp:  [97.7 °F (36.5 °C)-98.5 °F (36.9 °C)] 97.9 °F (36.6 °C)  Pulse:  [74-89] 82  Resp:  [18-20] 18  SpO2:  [95 %-98 %] 98 %  BP: (116-146)/(62-84) 146/83     Weight: 135.6 kg (299 lb)  Body mass index is 45.46 kg/m².    Intake/Output Summary (Last 24 hours) at 7/11/2022 0936  Last data filed at 7/10/2022 1242  Gross per 24 hour   Intake 10 ml   Output --   Net 10 ml      Physical Exam  Constitutional:       Appearance: Normal appearance. She is obese.   HENT:      Head: Normocephalic and atraumatic.      Mouth/Throat:      Mouth: Mucous membranes are moist.      Pharynx: Oropharynx is clear.   Eyes:      Extraocular Movements: Extraocular movements intact.      Conjunctiva/sclera: Conjunctivae normal.      Pupils:  Pupils are equal, round, and reactive to light.   Cardiovascular:      Rate and Rhythm: Normal rate and regular rhythm.      Pulses: Normal pulses.      Heart sounds: Normal heart sounds.   Pulmonary:      Effort: Pulmonary effort is normal.      Breath sounds: Normal breath sounds.   Abdominal:      General: Abdomen is flat. Bowel sounds are normal.      Palpations: Abdomen is soft.   Musculoskeletal:         General: Normal range of motion.   Feet:      Comments: Right foot ulcer on heel and ulcer on plantar base of big toe.     Left foot amputation of 4th and 5th toes  Skin:     Capillary Refill: Capillary refill takes less than 2 seconds.   Neurological:      General: No focal deficit present.      Mental Status: She is alert and oriented to person, place, and time.       Significant Labs: All pertinent labs within the past 24 hours have been reviewed.    Significant Imaging: I have reviewed all pertinent imaging results/findings within the past 24 hours.

## 2022-07-11 NOTE — ASSESSMENT & PLAN NOTE
Improved today,  on morning BMP  Uses [id number]/30 50 units AM and 25 units QHS and lantus 20 units QHS at home  Continue 40 units detemir QHS   Last A1C 9.3  POCT glucose checks x 4 AC & HS  Diabetic educator consulted

## 2022-07-11 NOTE — PROGRESS NOTES
[facility] - Orthopedic  Wound Care    Patient Name:  Deborah Sotelo   MRN:  [id number]  Date: 7/11/2022  Diagnosis: Diabetic foot ulcer    History:     Past Medical History:   Diagnosis Date    Anemia 7/5/2022    Diabetes mellitus, type 2     Hyperlipidemia     Hypertension     Obesity     Primary osteoarthritis of left shoulder 5/24/2022    Stroke        Social History     Socioeconomic History    Marital status:    Occupational History    Occupation: disabled   Tobacco Use    Smoking status: Never Smoker    Smokeless tobacco: Never Used   Substance and Sexual Activity    Alcohol use: Yes    Drug use: Never    Sexual activity: Never       Precautions:     Allergies as of 07/08/2022 - Reviewed 07/08/2022   Allergen Reaction Noted    Aspirin  04/29/2020    Bactrim ds [sulfamethoxazole-trimethoprim] Itching 07/23/2021       WOC Assessment Details/Treatment        07/11/22 1200   WOCN Assessment   WOCN Total Time (mins) 60   Visit Date 07/11/22   Visit Time 1100   Consult Type New   WOCN Speciality Wound        Wound 01/21/22 Diabetic Ulcer Right medial Foot #5   Date First Assessed: 01/21/22   Pre-existing: Yes  Primary Wound Type: Diabetic Ulcer  Side: Right  Orientation: medial  Location: Foot  Wound Number: #5   Wound Image    Dressing Appearance Dry;Intact   Drainage Amount None   Tissue loss description Not applicable   Johnson Classification (diabetic foot ulcers only) Grade 0   Wound Length (cm) 1.2 cm   Wound Width (cm) 0.6 cm   Wound Depth (cm) 0 cm   Wound Volume (cm^3) 0 cm^3   Wound Surface Area (cm^2) 0.72 cm^2   Care Cleansed with:;Wound cleanser   Dressing Changed;Gauze, wet to dry        Wound 01/18/21 1051 Diabetic Ulcer Right lateral Plantar #1   Date First Assessed/Time First Assessed: 01/18/21 1051   Pre-existing: Yes  Primary Wound Type: Diabetic Ulcer  Side: Right  Orientation: lateral  Location: Plantar  Wound Number: #1  Pulses: normal   Wound Image   (Note -  incorrectly labeled L.)   Dressing Appearance Intact;Moist drainage   Drainage Amount Moderate   Drainage Characteristics/Odor Serosanguineous;Purulent   Appearance Red   Tissue loss description Full thickness   Red (%), Wound Tissue Color 95 %   Yellow (%), Wound Tissue Color 5 %   Periwound Area Intact;Macerated   Wound Edges Callused   Johnson Classification (diabetic foot ulcers only) Grade 1   Wound Length (cm) 1.5 cm   Wound Width (cm) 2 cm   Wound Depth (cm) 0.1 cm   Wound Volume (cm^3) 0.3 cm^3   Wound Surface Area (cm^2) 3 cm^2   Care Cleansed with:;Wound cleanser   Dressing Changed        Wound 08/13/21 0840 Diabetic Ulcer Left plantar Foot #2   Date First Assessed/Time First Assessed: 08/13/21 0840   Pre-existing: Yes  Primary Wound Type: Diabetic Ulcer  Side: Left  Orientation: plantar  Location: Foot  Wound Number: #2   Wound Image    Dressing Appearance Open to air   Drainage Amount None   Johnson Classification (diabetic foot ulcers only) Grade 0     Received pt returning to bed from using bedside commode. Alert and oriented.     Findings as noted above.    Orders placed.     07/11/2022

## 2022-07-11 NOTE — NURSING
Performed wound care on pt.'s right foot.  Wet to dry dressing with gauze wrapped around the foot.

## 2022-07-11 NOTE — PROGRESS NOTES
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 The patient has a chronic diabetic right foot ulcer on her heel, that was recently treated earlier this year with antibiotics through a PICC line. The patient says that she has noticed the area where the ulcer is becoming more and more painful over the past couple of months. She went to Dr. Tinajero yesterday with complaints of increasing pain in her right foot, and was given Augmentin to take outpatient. However, she came to the ED this morning after she fell at home and had to call her son to come pick her up from the floor. The patient says that she did not fall due to a seizure, pre-syncope or a LOC, but that she was feeling very weak when she got out of bed this morning. Additionally, the patient says that she has been feeling like feverish for the past couple of days, and has had an episode of watery diarrhea and vomiting earlier today. She denies any new chest pain, SOB, palpitations, change in appetite, headaches, dizziness, chills or night sweats.     In the ED, the patient was found to have a BP in the 70s/30s with a pulse of 116 and a temperature of 101.1 F. Wound and blood cultures were collected and the patient was stated on empiric vancomycin and zosyn for her infected foot ulcer. A right foot xray did not show any acute changes or signs of osteomyelitis. A lactic acid was  wnl, and a second one was drawn. The patient was given a 1L fluid bolus, with another 3L ordered to be given. She was found to have an MALIA, with a BUN/Crt ratio of 22/1.44. Her WBC and H&H were wnl.     The patient will be admitted to the [facility] under Dr. Infante for further management and treatment of her sepsis associated with an infected diabetic foot ulcer.       Overview/Hospital Course:  No notes on file    Interval History: Patient seen resting comfortably in bed with no complaints. Says she does not feel weak anymore like she did when she was admitted. Blood sugar is much better controlled today. Fluids will be stopped as creatinine is back to baseline and patient is eating and drinking. Patient will get two more full days of Zosyn to treat her infected foot wound.     Review of Systems   Constitutional:  Negative for activity change, appetite change, chills, fatigue and fever.   Respiratory:  Negative for shortness of breath.    Cardiovascular:  Negative for chest pain, palpitations and leg swelling.   Gastrointestinal:  Negative for abdominal pain, diarrhea, nausea and vomiting.   Genitourinary:  Negative for dysuria and hematuria.   Musculoskeletal:  Negative for arthralgias and back pain.   Skin:  Positive for wound.   Neurological:  Negative for dizziness, syncope, facial asymmetry, speech difficulty, weakness and numbness.   Psychiatric/Behavioral:  Negative for confusion.    Objective:     Vital Signs (Most Recent):  Temp: 97.9 °F (36.6 °C) (07/11/22 0748)  Pulse: 82 (07/11/22 0748)  Resp: 18 (07/11/22 0748)  BP: (!) 146/83 (07/11/22 0748)  SpO2: 98 % (07/11/22 0748)   Vital Signs (24h Range):  Temp:  [97.7 °F (36.5 °C)-98.5 °F (36.9 °C)] 97.9 °F (36.6 °C)  Pulse:  [74-89] 82  Resp:  [18-20] 18  SpO2:  [95 %-98 %] 98 %  BP: (116-146)/(62-84) 146/83     Weight: 135.6 kg (299 lb)  Body mass index is 45.46 kg/m².    Intake/Output Summary (Last 24 hours) at 7/11/2022 0936  Last data filed at 7/10/2022  1242  Gross per 24 hour   Intake 10 ml   Output --   Net 10 ml      Physical Exam  Constitutional:       Appearance: Normal appearance. She is obese.   HENT:      Head: Normocephalic and atraumatic.      Mouth/Throat:      Mouth: Mucous membranes are moist.      Pharynx: Oropharynx is clear.   Eyes:      Extraocular Movements: Extraocular movements intact.      Conjunctiva/sclera: Conjunctivae normal.      Pupils: Pupils are equal, round, and reactive to light.   Cardiovascular:      Rate and Rhythm: Normal rate and regular rhythm.      Pulses: Normal pulses.      Heart sounds: Normal heart sounds.   Pulmonary:      Effort: Pulmonary effort is normal.      Breath sounds: Normal breath sounds.   Abdominal:      General: Abdomen is flat. Bowel sounds are normal.      Palpations: Abdomen is soft.   Musculoskeletal:         General: Normal range of motion.   Feet:      Comments: Right foot ulcer on heel and ulcer on plantar base of big toe.     Left foot amputation of 4th and 5th toes  Skin:     Capillary Refill: Capillary refill takes less than 2 seconds.   Neurological:      General: No focal deficit present.      Mental Status: She is alert and oriented to person, place, and time.       Significant Labs: All pertinent labs within the past 24 hours have been reviewed.    Significant Imaging: I have reviewed all pertinent imaging results/findings within the past 24 hours.      Assessment/Plan:      * Diabetic foot ulcer  Diabetic foot ulcer on right plantar heel. Healing well  Wound culture: moderate growth enterobacter cloacae,  Heavy growth MSSA  D/maikol vanc(7/10)  Continue zosyn(7/8-) Will continue IV abx for two more days through 07/12  Blood cx ng @24 hours  Wound care consulted  Right foot xray without signs of osteomyelitis      Anemia  No signs of active bleeding, stable H&H  FOBT negative  Recent iron level 42, ferritin, vit b12 and folate wnl  H&H 10.7/30.4 on admission          Gastroesophageal reflux  disease  Pantoprazole 40mg qd      Hyperlipidemia  Continue home atorvastatin 80mg qd      Hypertension  Restart home amlodipine and losartan       Type 2 diabetes mellitus with foot ulcer, with long-term current use of insulin  Improved today,  on morning BMP  Uses uvjklzu17/30 50 units AM and 25 units QHS and lantus 20 units QHS at home  Continue 40 units detemir QHS   Last A1C 9.3  POCT glucose checks x 4 AC & HS  Diabetic educator consulted      Embolic stroke involving left middle cerebral artery  lovenox 40mg qd as prophylaxis   INR 1.17 on admission        VTE Risk Mitigation (From admission, onward)         Ordered     enoxaparin injection 40 mg  Daily         07/08/22 0918     IP VTE HIGH RISK PATIENT  Once         07/08/22 0918     Place sequential compression device  Until discontinued         07/08/22 0812                Discharge Planning   REYES:      Code Status: Full Code   Is the patient medically ready for discharge?:     Reason for patient still in hospital (select all that apply): Treatment                     Geneva Snow MD  Department of Hospital Medicine   [facility] - Orthopedic

## 2022-07-11 NOTE — PROGRESS NOTES
Subjective:      Patient ID: Deborah Sotelo is a 61 y.o. female.    Chief Complaint: No chief complaint on file.    Deborah Sotelo a 61 y.o. female presents for follow up on all regular problems which are reviewed and discussed.     Problem List Items Addressed This Visit        Neuro    Embolic stroke involving left middle cerebral artery       Cardiac/Vascular    Hypertension    Hyperlipidemia       Renal/    Acute cystitis without hematuria       Orthopedic    Ulcer of right foot with fat layer exposed - Primary          Past Medical History:  Past Medical History:   Diagnosis Date    Anemia 7/5/2022    Diabetes mellitus, type 2     Hyperlipidemia     Hypertension     Obesity     Primary osteoarthritis of left shoulder 5/24/2022    Stroke      Past Surgical History:   Procedure Laterality Date    AMPUTATION      APPENDECTOMY      EYE SURGERY       Review of patient's allergies indicates:   Allergen Reactions    Aspirin      Other reaction(s): UPSET STOMACH     Bactrim ds [sulfamethoxazole-trimethoprim] Itching     No current facility-administered medications on file prior to visit.     Current Outpatient Medications on File Prior to Visit   Medication Sig Dispense Refill    amLODIPine (NORVASC) 5 MG tablet Take 1 tablet (5 mg total) by mouth once daily. 30 tablet 2    atorvastatin (LIPITOR) 80 MG tablet TAKE 1 TABLET BY MOUTH EACH NIGHT AT BEDTIME FOR CHOLESTEROL ~~DO NOT EAT GRAPEFRUIT OR DRINK GRAPEFRUIT JUICE WHILE TAKING THIS MEDICATION~~ 30 tablet 2    CMPD diclofenac 3%- cyclobenzaprine 2%- baclofen 2%- gabapentin 6%- LIDOcaine 2%- prilocaine 2% transdermal gel Apply 1 to 2 grams up to 4 times daily as directed for additional pain relief 240 g 1    HYDROcodone-acetaminophen (NORCO) 7.5-325 mg per tablet 1/2 to 1 po every 6 hours for left shoulder pain 12 tablet 0    insulin asp prt-insulin aspart, NovoLOG 70/30, (NOVOLOG 70/30) 100 unit/mL (70-30) Soln INJECT 50 UNITS SUB-Q EACH MORNING AND  30 UNITS SUB-Q EACH EVENING 30 mL 5    LANTUS U-100 INSULIN 100 unit/mL injection Inject 20 Units into the skin once daily. Take at night 6 mL 2    loratadine (CLARITIN) 10 mg tablet Take 1 tablet (10 mg total) by mouth once daily. 90 tablet 0    losartan (COZAAR) 100 MG tablet Take 1 tablet (100 mg total) by mouth every evening. 30 tablet 2    omeprazole (PRILOSEC) 20 MG capsule Take 1 capsule (20 mg total) by mouth once daily. 30 capsule 2    sodium hypochlorite 0.5 % (DAKIN'S SOLUTION) external solution Apply topically once daily. Pack wound with dakin's moistened nuguaze daily 473 mL 0     Social History     Socioeconomic History    Marital status:    Occupational History    Occupation: disabled   Tobacco Use    Smoking status: Never Smoker    Smokeless tobacco: Never Used   Substance and Sexual Activity    Alcohol use: Yes    Drug use: Never    Sexual activity: Never     Family History   Problem Relation Age of Onset    Cancer Mother     Appendicitis Father     Asthma Sister     Diabetes Brother        Review of Systems   Constitutional: Negative.  Negative for activity change, appetite change, chills and diaphoresis.   HENT: Negative.  Negative for congestion, ear pain, hearing loss and postnasal drip.    Eyes: Negative for itching.   Respiratory: Negative for chest tightness and shortness of breath.    Cardiovascular: Negative for chest pain.   Gastrointestinal: Negative for abdominal pain.   Endocrine: Negative for polydipsia.   Genitourinary: Negative for frequency.   Musculoskeletal: Negative for back pain.   Neurological: Negative for headaches.       Objective:     There were no vitals taken for this visit.    Physical Exam  Constitutional:       Appearance: Normal appearance. She is obese.   HENT:      Head: Normocephalic and atraumatic.      Right Ear: External ear normal.      Left Ear: External ear normal.      Nose: Nose normal.      Mouth/Throat:      Mouth: Mucous membranes  are moist.      Pharynx: Oropharynx is clear.   Eyes:      Pupils: Pupils are equal, round, and reactive to light.   Cardiovascular:      Rate and Rhythm: Normal rate and regular rhythm.      Heart sounds: Normal heart sounds.   Pulmonary:      Effort: Pulmonary effort is normal.      Breath sounds: Normal breath sounds.   Abdominal:      Palpations: Abdomen is soft.   Musculoskeletal:      Cervical back: Normal range of motion and neck supple.   Skin:     General: Skin is warm and dry.   Neurological:      General: No focal deficit present.      Mental Status: She is alert and oriented to person, place, and time. Mental status is at baseline.   Psychiatric:         Mood and Affect: Mood normal.         Behavior: Behavior normal.         Thought Content: Thought content normal.         Judgment: Judgment normal.       Assessment:     1. Ulcer of right foot with fat layer exposed    2. Embolic stroke involving left middle cerebral artery    3. Hypertension, unspecified type    4. Hyperlipidemia, unspecified hyperlipidemia type    5. Acute cystitis without hematuria        Plan:     Problem List Items Addressed This Visit        Neuro    Embolic stroke involving left middle cerebral artery       Cardiac/Vascular    Hypertension    Hyperlipidemia       Renal/    Acute cystitis without hematuria       Orthopedic    Ulcer of right foot with fat layer exposed - Primary        No follow-ups on file.      I am having Deborah Sotelo maintain her amLODIPine, atorvastatin, insulin asp prt-insulin aspart (NovoLOG 70/30), LANTUS U-100 INSULIN, loratadine, losartan, omeprazole, DAKIN'S SOLUTION, CMPD diclofenac 3%- cyclobenzaprine 2%- baclofen 2%- gabapentin 6%- LIDOcaine 2%- prilocaine 2% transdermal gel, and HYDROcodone-acetaminophen.    Diagnoses and all orders for this visit:    Ulcer of right foot with fat layer exposed    Embolic stroke involving left middle cerebral artery    Hypertension, unspecified  type    Hyperlipidemia, unspecified hyperlipidemia type    Acute cystitis without hematuria         [unfilled]  No orders of the defined types were placed in this encounter.

## 2022-07-11 NOTE — ASSESSMENT & PLAN NOTE
Diabetic foot ulcer on right plantar heel. Healing well  Wound culture: moderate growth enterobacter cloacae,  Heavy growth MSSA  D/maikol vanc(7/10)  Continue zosyn(7/8-) Will continue IV abx for two more days through 07/12  Blood cx ng @24 hours  Wound care consulted  Right foot xray without signs of osteomyelitis

## 2022-07-11 NOTE — PLAN OF CARE
Problem: Adult Inpatient Plan of Care  Goal: Plan of Care Review  Outcome: Ongoing, Progressing  Flowsheets (Taken 7/11/2022 1757)  Plan of Care Reviewed With: patient  Goal: Patient-Specific Goal (Individualized)  Outcome: Ongoing, Progressing  Flowsheets (Taken 7/11/2022 1757)  Anxieties, Fears or Concerns: Falling, and hitting her foot where it was amputated  Individualized Care Needs: pain control  Patient-Specific Goals (Include Timeframe): not to fall or bumpt the leg  Goal: Absence of Hospital-Acquired Illness or Injury  Outcome: Ongoing, Progressing  Goal: Optimal Comfort and Wellbeing  Outcome: Ongoing, Progressing  Goal: Readiness for Transition of Care  Outcome: Ongoing, Progressing     Problem: Bariatric Environmental Safety  Goal: Safety Maintained with Care  Outcome: Ongoing, Progressing     Problem: Impaired Wound Healing  Goal: Optimal Wound Healing  Outcome: Ongoing, Progressing
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
## 2022-07-12 LAB
ANION GAP SERPL CALCULATED.3IONS-SCNC: 10 MMOL/L (ref 7–16)
ANISOCYTOSIS BLD QL SMEAR: ABNORMAL
BASOPHILS # BLD AUTO: 0.01 K/UL (ref 0–0.2)
BASOPHILS NFR BLD AUTO: 0.3 % (ref 0–1)
BUN SERPL-MCNC: 7 MG/DL (ref 7–18)
BUN/CREAT SERPL: 7 (ref 6–20)
CALCIUM SERPL-MCNC: 8.7 MG/DL (ref 8.5–10.1)
CHLORIDE SERPL-SCNC: 106 MMOL/L (ref 98–107)
CO2 SERPL-SCNC: 26 MMOL/L (ref 21–32)
CREAT SERPL-MCNC: 1.06 MG/DL (ref 0.55–1.02)
DIFFERENTIAL METHOD BLD: ABNORMAL
EOSINOPHIL # BLD AUTO: 0.19 K/UL (ref 0–0.5)
EOSINOPHIL NFR BLD AUTO: 5.6 % (ref 1–4)
ERYTHROCYTE [DISTWIDTH] IN BLOOD BY AUTOMATED COUNT: 15.4 % (ref 11.5–14.5)
GLUCOSE SERPL-MCNC: 174 MG/DL (ref 70–105)
GLUCOSE SERPL-MCNC: 182 MG/DL (ref 70–105)
GLUCOSE SERPL-MCNC: 192 MG/DL (ref 74–106)
GLUCOSE SERPL-MCNC: 201 MG/DL (ref 70–105)
GLUCOSE SERPL-MCNC: 234 MG/DL (ref 70–105)
HCT VFR BLD AUTO: 25 % (ref 38–47)
HGB BLD-MCNC: 9 G/DL (ref 12–16)
IMM GRANULOCYTES # BLD AUTO: 0.02 K/UL (ref 0–0.04)
IMM GRANULOCYTES NFR BLD: 0.6 % (ref 0–0.4)
LYMPHOCYTES # BLD AUTO: 1.41 K/UL (ref 1–4.8)
LYMPHOCYTES NFR BLD AUTO: 41.7 % (ref 27–41)
MCH RBC QN AUTO: 25.6 PG (ref 27–31)
MCHC RBC AUTO-ENTMCNC: 36 G/DL (ref 32–36)
MCV RBC AUTO: 71 FL (ref 80–96)
MICROCYTES BLD QL SMEAR: ABNORMAL
MONOCYTES # BLD AUTO: 0.26 K/UL (ref 0–0.8)
MONOCYTES NFR BLD AUTO: 7.7 % (ref 2–6)
MPC BLD CALC-MCNC: 11.3 FL (ref 9.4–12.4)
NEUTROPHILS # BLD AUTO: 1.49 K/UL (ref 1.8–7.7)
NEUTROPHILS NFR BLD AUTO: 44.1 % (ref 53–65)
NRBC # BLD AUTO: 0 X10E3/UL
NRBC, AUTO (.00): 0 %
PLATELET # BLD AUTO: 127 K/UL (ref 150–400)
PLATELET MORPHOLOGY: ABNORMAL
POLYCHROMASIA BLD QL SMEAR: ABNORMAL
POTASSIUM SERPL-SCNC: 3.9 MMOL/L (ref 3.5–5.1)
RBC # BLD AUTO: 3.52 M/UL (ref 4.2–5.4)
SODIUM SERPL-SCNC: 138 MMOL/L (ref 136–145)
WBC # BLD AUTO: 3.38 K/UL (ref 4.5–11)

## 2022-07-12 PROCEDURE — 80048 BASIC METABOLIC PNL TOTAL CA: CPT

## 2022-07-12 PROCEDURE — C9399 UNCLASSIFIED DRUGS OR BIOLOG: HCPCS

## 2022-07-12 PROCEDURE — 99232 SBSQ HOSP IP/OBS MODERATE 35: CPT | Mod: GC,,, | Performed by: INTERNAL MEDICINE

## 2022-07-12 PROCEDURE — 85025 COMPLETE CBC W/AUTO DIFF WBC: CPT

## 2022-07-12 PROCEDURE — 82962 GLUCOSE BLOOD TEST: CPT

## 2022-07-12 PROCEDURE — 25000003 PHARM REV CODE 250

## 2022-07-12 PROCEDURE — 36415 COLL VENOUS BLD VENIPUNCTURE: CPT

## 2022-07-12 PROCEDURE — 97161 PT EVAL LOW COMPLEX 20 MIN: CPT

## 2022-07-12 PROCEDURE — 11000001 HC ACUTE MED/SURG PRIVATE ROOM

## 2022-07-12 PROCEDURE — 99232 PR SUBSEQUENT HOSPITAL CARE,LEVL II: ICD-10-PCS | Mod: GC,,, | Performed by: INTERNAL MEDICINE

## 2022-07-12 PROCEDURE — 25000003 PHARM REV CODE 250: Performed by: INTERNAL MEDICINE

## 2022-07-12 PROCEDURE — 63600175 PHARM REV CODE 636 W HCPCS

## 2022-07-12 RX ADMIN — ENOXAPARIN SODIUM 40 MG: 40 INJECTION SUBCUTANEOUS at 05:07

## 2022-07-12 RX ADMIN — ATORVASTATIN CALCIUM 80 MG: 80 TABLET, FILM COATED ORAL at 09:07

## 2022-07-12 RX ADMIN — LOSARTAN POTASSIUM 100 MG: 100 TABLET, FILM COATED ORAL at 09:07

## 2022-07-12 RX ADMIN — PIPERACILLIN SODIUM AND TAZOBACTAM SODIUM 4.5 G: 4; .5 INJECTION, POWDER, LYOPHILIZED, FOR SOLUTION INTRAVENOUS at 03:07

## 2022-07-12 RX ADMIN — PIPERACILLIN SODIUM AND TAZOBACTAM SODIUM 4.5 G: 4; .5 INJECTION, POWDER, LYOPHILIZED, FOR SOLUTION INTRAVENOUS at 12:07

## 2022-07-12 RX ADMIN — INSULIN ASPART 2 UNITS: 100 INJECTION, SOLUTION INTRAVENOUS; SUBCUTANEOUS at 06:07

## 2022-07-12 RX ADMIN — PANTOPRAZOLE SODIUM 40 MG: 40 TABLET, DELAYED RELEASE ORAL at 09:07

## 2022-07-12 RX ADMIN — SILVER SULFADIAZINE: 10 CREAM TOPICAL at 09:07

## 2022-07-12 RX ADMIN — INSULIN ASPART 2 UNITS: 100 INJECTION, SOLUTION INTRAVENOUS; SUBCUTANEOUS at 09:07

## 2022-07-12 RX ADMIN — AMLODIPINE BESYLATE 5 MG: 5 TABLET ORAL at 09:07

## 2022-07-12 RX ADMIN — PIPERACILLIN SODIUM AND TAZOBACTAM SODIUM 4.5 G: 4; .5 INJECTION, POWDER, LYOPHILIZED, FOR SOLUTION INTRAVENOUS at 09:07

## 2022-07-12 RX ADMIN — INSULIN ASPART 4 UNITS: 100 INJECTION, SOLUTION INTRAVENOUS; SUBCUTANEOUS at 12:07

## 2022-07-12 RX ADMIN — INSULIN DETEMIR 42 UNITS: 100 INJECTION, SOLUTION SUBCUTANEOUS at 09:07

## 2022-07-12 NOTE — SUBJECTIVE & OBJECTIVE
Interval History: The patient's right foot ulcer has improved. She will get one more day of IV antibiotics and be likely discharged tomorrow with home health. BG was high this morning so will increase nightly levemir dose.     Review of Systems   Constitutional:  Negative for activity change, appetite change, chills, fatigue and fever.   Respiratory:  Negative for shortness of breath.    Cardiovascular:  Negative for chest pain, palpitations and leg swelling.   Gastrointestinal:  Negative for abdominal pain, diarrhea, nausea and vomiting.   Genitourinary:  Negative for dysuria and hematuria.   Musculoskeletal:  Negative for arthralgias and back pain.   Skin:  Positive for wound.   Neurological:  Negative for dizziness, syncope, facial asymmetry, speech difficulty, weakness and numbness.   Psychiatric/Behavioral:  Negative for confusion.    Objective:     Vital Signs (Most Recent):  Temp: 97.6 °F (36.4 °C) (07/12/22 1100)  Pulse: 89 (07/12/22 1100)  Resp: 18 (07/12/22 1100)  BP: 114/72 (07/12/22 1100)  SpO2: 97 % (07/12/22 1100) Vital Signs (24h Range):  Temp:  [97.6 °F (36.4 °C)-98.6 °F (37 °C)] 97.6 °F (36.4 °C)  Pulse:  [71-89] 89  Resp:  [18] 18  SpO2:  [95 %-98 %] 97 %  BP: (112-158)/(67-91) 114/72     Weight: (!) 140.1 kg (308 lb 13.8 oz)  Body mass index is 46.96 kg/m².    Intake/Output Summary (Last 24 hours) at 7/12/2022 1226  Last data filed at 7/11/2022 1700  Gross per 24 hour   Intake 700 ml   Output 1500 ml   Net -800 ml      Physical Exam  Constitutional:       Appearance: Normal appearance. She is obese.   HENT:      Head: Normocephalic and atraumatic.      Mouth/Throat:      Mouth: Mucous membranes are moist.      Pharynx: Oropharynx is clear.   Eyes:      Extraocular Movements: Extraocular movements intact.      Conjunctiva/sclera: Conjunctivae normal.      Pupils: Pupils are equal, round, and reactive to light.   Cardiovascular:      Rate and Rhythm: Normal rate and regular rhythm.      Pulses:  Normal pulses.      Heart sounds: Normal heart sounds.   Pulmonary:      Effort: Pulmonary effort is normal.      Breath sounds: Normal breath sounds.   Abdominal:      General: Abdomen is flat. Bowel sounds are normal.      Palpations: Abdomen is soft.   Musculoskeletal:         General: Normal range of motion.   Feet:      Comments: Right foot ulcer on heel and ulcer on plantar base of big toe.     Left foot amputation of 4th and 5th toes  Skin:     Capillary Refill: Capillary refill takes less than 2 seconds.   Neurological:      General: No focal deficit present.      Mental Status: She is alert and oriented to person, place, and time.       Significant Labs: All pertinent labs within the past 24 hours have been reviewed.    Significant Imaging: I have reviewed all pertinent imaging results/findings within the past 24 hours.

## 2022-07-12 NOTE — PLAN OF CARE
Problem: Adult Inpatient Plan of Care  Goal: Plan of Care Review  Outcome: Ongoing, Progressing  Goal: Patient-Specific Goal (Individualized)  Outcome: Ongoing, Progressing  Goal: Absence of Hospital-Acquired Illness or Injury  Outcome: Ongoing, Progressing  Goal: Optimal Comfort and Wellbeing  Outcome: Ongoing, Progressing  Goal: Readiness for Transition of Care  Outcome: Ongoing, Progressing     Problem: Bariatric Environmental Safety  Goal: Safety Maintained with Care  Outcome: Ongoing, Progressing     Problem: Impaired Wound Healing  Goal: Optimal Wound Healing  Outcome: Ongoing, Progressing     Problem: Adjustment to Illness (Sepsis/Septic Shock)  Goal: Optimal Coping  Outcome: Ongoing, Progressing     Problem: Bleeding (Sepsis/Septic Shock)  Goal: Absence of Bleeding  Outcome: Ongoing, Progressing     Problem: Glycemic Control Impaired (Sepsis/Septic Shock)  Goal: Blood Glucose Level Within Desired Range  Outcome: Ongoing, Progressing     Problem: Infection Progression (Sepsis/Septic Shock)  Goal: Absence of Infection Signs and Symptoms  Outcome: Ongoing, Progressing     Problem: Nutrition Impaired (Sepsis/Septic Shock)  Goal: Optimal Nutrition Intake  Outcome: Ongoing, Progressing     Problem: Diabetes Comorbidity  Goal: Blood Glucose Level Within Targeted Range  Outcome: Ongoing, Progressing     Problem: Fall Injury Risk  Goal: Absence of Fall and Fall-Related Injury  Outcome: Ongoing, Progressing

## 2022-07-12 NOTE — PLAN OF CARE
Problem: Adult Inpatient Plan of Care  Goal: Plan of Care Review  Outcome: Ongoing, Progressing  Goal: Patient-Specific Goal (Individualized)  Outcome: Ongoing, Progressing  Goal: Absence of Hospital-Acquired Illness or Injury  Outcome: Ongoing, Progressing  Goal: Optimal Comfort and Wellbeing  Outcome: Ongoing, Progressing  Goal: Readiness for Transition of Care  Outcome: Ongoing, Progressing     Problem: Impaired Wound Healing  Goal: Optimal Wound Healing  Outcome: Ongoing, Progressing     Problem: Glycemic Control Impaired (Sepsis/Septic Shock)  Goal: Blood Glucose Level Within Desired Range  Outcome: Ongoing, Not Progressing     Problem: Infection Progression (Sepsis/Septic Shock)  Goal: Absence of Infection Signs and Symptoms  Outcome: Ongoing, Progressing     Problem: Diabetes Comorbidity  Goal: Blood Glucose Level Within Targeted Range  Outcome: Ongoing, Not Progressing     Problem: Fall Injury Risk  Goal: Absence of Fall and Fall-Related Injury  Outcome: Ongoing, Progressing

## 2022-07-12 NOTE — PROGRESS NOTES
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 The patient has a chronic diabetic right foot ulcer on her heel, that was recently treated earlier this year with antibiotics through a PICC line. The patient says that she has noticed the area where the ulcer is becoming more and more painful over the past couple of months. She went to Dr. Tinajero yesterday with complaints of increasing pain in her right foot, and was given Augmentin to take outpatient. However, she came to the ED this morning after she fell at home and had to call her son to come pick her up from the floor. The patient says that she did not fall due to a seizure, pre-syncope or a LOC, but that she was feeling very weak when she got out of bed this morning. Additionally, the patient says that she has been feeling like feverish for the past couple of days, and has had an episode of watery diarrhea and vomiting earlier today. She denies any new chest pain, SOB, palpitations, change in appetite, headaches, dizziness, chills or night sweats.     In the ED, the patient was found to have a BP in the 70s/30s with a pulse of 116 and a temperature of 101.1 F. Wound and blood cultures were collected and the patient was stated on empiric vancomycin and zosyn for her infected foot ulcer. A right foot xray did not show any acute changes or signs of osteomyelitis. A lactic acid was  wnl, and a second one was drawn. The patient was given a 1L fluid bolus, with another 3L ordered to be given. She was found to have an MALIA, with a BUN/Crt ratio of 22/1.44. Her WBC and H&H were wnl.     The patient will be admitted to the [facility] under Dr. Infante for further management and treatment of her sepsis associated with an infected diabetic foot ulcer.       Overview/Hospital Course:  No notes on file    Interval History: The patient's right foot ulcer has improved. She will get one more day of IV antibiotics and be likely discharged tomorrow with home health. BG was high this morning so will increase nightly levemir dose.     Review of Systems   Constitutional:  Negative for activity change, appetite change, chills, fatigue and fever.   Respiratory:  Negative for shortness of breath.    Cardiovascular:  Negative for chest pain, palpitations and leg swelling.   Gastrointestinal:  Negative for abdominal pain, diarrhea, nausea and vomiting.   Genitourinary:  Negative for dysuria and hematuria.   Musculoskeletal:  Negative for arthralgias and back pain.   Skin:  Positive for wound.   Neurological:  Negative for dizziness, syncope, facial asymmetry, speech difficulty, weakness and numbness.   Psychiatric/Behavioral:  Negative for confusion.    Objective:     Vital Signs (Most Recent):  Temp: 97.6 °F (36.4 °C) (07/12/22 1100)  Pulse: 89 (07/12/22 1100)  Resp: 18 (07/12/22 1100)  BP: 114/72 (07/12/22 1100)  SpO2: 97 % (07/12/22 1100) Vital Signs (24h Range):  Temp:  [97.6 °F (36.4 °C)-98.6 °F (37 °C)] 97.6 °F (36.4 °C)  Pulse:  [71-89] 89  Resp:  [18] 18  SpO2:  [95 %-98 %] 97 %  BP: (112-158)/(67-91) 114/72     Weight: (!) 140.1 kg (308 lb 13.8 oz)  Body mass index is 46.96 kg/m².    Intake/Output Summary (Last 24 hours) at 7/12/2022 1226  Last data filed at 7/11/2022 1700  Gross per 24 hour   Intake 700 ml   Output 1500 ml   Net -800 ml      Physical Exam  Constitutional:       Appearance: Normal appearance. She  is obese.   HENT:      Head: Normocephalic and atraumatic.      Mouth/Throat:      Mouth: Mucous membranes are moist.      Pharynx: Oropharynx is clear.   Eyes:      Extraocular Movements: Extraocular movements intact.      Conjunctiva/sclera: Conjunctivae normal.      Pupils: Pupils are equal, round, and reactive to light.   Cardiovascular:      Rate and Rhythm: Normal rate and regular rhythm.      Pulses: Normal pulses.      Heart sounds: Normal heart sounds.   Pulmonary:      Effort: Pulmonary effort is normal.      Breath sounds: Normal breath sounds.   Abdominal:      General: Abdomen is flat. Bowel sounds are normal.      Palpations: Abdomen is soft.   Musculoskeletal:         General: Normal range of motion.   Feet:      Comments: Right foot ulcer on heel and ulcer on plantar base of big toe.     Left foot amputation of 4th and 5th toes  Skin:     Capillary Refill: Capillary refill takes less than 2 seconds.   Neurological:      General: No focal deficit present.      Mental Status: She is alert and oriented to person, place, and time.       Significant Labs: All pertinent labs within the past 24 hours have been reviewed.    Significant Imaging: I have reviewed all pertinent imaging results/findings within the past 24 hours.      Assessment/Plan:      * Diabetic foot ulcer  Diabetic foot ulcer on right plantar heel. Healing well  Wound culture: moderate growth enterobacter cloacae,  Heavy growth MSSA  D/maikol vanc(7/10)  Continue zosyn(7/8-) Will continue IV abx days through 07/12  Blood cx ng @24 hours  Wound care consulted  Right foot xray without signs of osteomyelitis      Anemia  No signs of active bleeding, stable H&H  FOBT negative  Recent iron level 42, ferritin, vit b12 and folate wnl  H&H 10.7/30.4 on admission          Gastroesophageal reflux disease  Pantoprazole 40mg qd      Hyperlipidemia  Continue home atorvastatin 80mg qd      Hypertension  Continue amlodipine and losartan       Type 2 diabetes  mellitus with foot ulcer, with long-term current use of insulin  Blood glucose 192 this morning  Uses pzknzsh08/30 50 units AM and 25 units QHS and lantus 20 units QHS at home  Increase detemir from 40 to 42 units QHS   Last A1C 9.3  POCT glucose checks x 4 AC & HS  Diabetic educator consulted      Embolic stroke involving left middle cerebral artery  lovenox 40mg qd as prophylaxis   INR 1.17 on admission        VTE Risk Mitigation (From admission, onward)         Ordered     enoxaparin injection 40 mg  Daily         07/08/22 0918     IP VTE HIGH RISK PATIENT  Once         07/08/22 0918     Place sequential compression device  Until discontinued         07/08/22 0812                Discharge Planning   REYES:      Code Status: Full Code   Is the patient medically ready for discharge?:     Reason for patient still in hospital (select all that apply): Treatment  Discharge Plan A: Home with family                  Geneva Snow MD  Department of Hospital Medicine   [facility] - Orthopedic

## 2022-07-12 NOTE — PLAN OF CARE
[facility] - Orthopedic  Discharge Reassessment    Primary Care Provider: Ron Sanches MD    Expected Discharge Date:     Reassessment (most recent)     Discharge Reassessment - 07/12/22 1237        Discharge Reassessment    Assessment Type Discharge Planning Reassessment     Discharge Plan discussed with: Patient     Discharge Plan A Home Health   [facility]    Discharge Plan B Home with family     DME Needed Upon Discharge  none     Discharge Barriers Identified None     Why the patient remains in the hospital Requires continued medical care        Post-Acute Status    Post-Acute Authorization Home Health     Home Health Status Set-up Complete/Auth obtained     Patient choice form signed by patient/caregiver List with quality metrics by geographic area provided;List from CMS Compare     Discharge Delays None known at this time               MANDO consulted for hh. MANDO spoke with pt and received choice for [facility]. Referral faxed, MANDO will cont to follow.

## 2022-07-12 NOTE — ASSESSMENT & PLAN NOTE
Diabetic foot ulcer on right plantar heel. Healing well  Wound culture: moderate growth enterobacter cloacae,  Heavy growth MSSA  D/maikol vanc(7/10)  Continue zosyn(7/8-) Will continue IV abx days through 07/12  Blood cx ng @24 hours  Wound care consulted  Right foot xray without signs of osteomyelitis

## 2022-07-12 NOTE — PT/OT/SLP EVAL
Physical Therapy Evaluation    Patient Name:  Deborah Sotelo   MRN:  [id number]    Recommendations:     Discharge Recommendations:      Discharge Equipment Recommendations: none   Barriers to discharge: None    Assessment:     Deborah Sotelo is a 61 y.o. female admitted with a medical diagnosis of Diabetic foot ulcer.  She presents with the following impairments/functional limitations:  impaired skin Patient with wounds to right forfoot. Instructed patient to heel walk with walker and limited unnecessary ambulation.    Rehab Prognosis: Good; patient would benefit from acute skilled PT services to address these deficits and reach maximum level of function.    Recent Surgery: * No surgery found *      Plan:     During this hospitalization, patient to be seen 1 x/week to address the identified rehab impairments via gait training and progress toward the following goals:    · Plan of Care Expires:  07/12/22    Subjective     Chief Complaint:   Patient/Family Comments/goals: Patient plans on dc home tomorrow. Has walker  Pain/Comfort:  · Pain Rating 1: 0/10    Patients cultural, spiritual, Sikh conflicts given the current situation:      Living Environment:  Lives   Prior to admission, patients level of function was household with walker.  Equipment used at home: walker, rolling.  DME owned (not currently used): rolling walker.  Upon discharge, patient will have assistance from family.    Objective:     Communicated with nurse prior to session.  Patient found supine with    upon PT entry to room.    General Precautions: Standard,     Orthopedic Precautions:    Braces:    Respiratory Status: Room air    Exams:  · Cognitive Exam:  Patient is oriented to Person, Place, Time and Situation  · Skin Integrity/Edema:      · -       wounds to left foot, documented by skin care nurse  · RLE ROM: WNL  · RLE Strength: WNL  · LLE ROM: WNL  · LLE Strength: WNL    Functional Mobility:  · Bed Mobility:     · Supine to Sit: contact  guard assistance  · Transfers:     · Sit to Stand:  contact guard assistance with rolling walker  · Gait: ambulated 50 feet with cues to stay on right heel    Therapeutic Activities and Exercises:   na    AM-PAC 6 CLICK MOBILITY  Total Score:24     Patient left up in chair with call button in reach.    GOALS:   Multidisciplinary Problems     Physical Therapy Goals     Not on file                History:     Past Medical History:   Diagnosis Date    Anemia 7/5/2022    Diabetes mellitus, type 2     Hyperlipidemia     Hypertension     Obesity     Primary osteoarthritis of left shoulder 5/24/2022    Stroke        Past Surgical History:   Procedure Laterality Date    AMPUTATION      APPENDECTOMY      EYE SURGERY         Time Tracking:     PT Received On: 07/12/22  PT Start Time: 1800     PT Stop Time: 1820  PT Total Time (min): 20 min     Billable Minutes: Evaluation 20       07/12/2022

## 2022-07-12 NOTE — PLAN OF CARE
Problem: Adult Inpatient Plan of Care  Goal: Plan of Care Review  Outcome: Ongoing, Progressing  Goal: Patient-Specific Goal (Individualized)  Outcome: Ongoing, Progressing  Flowsheets (Taken 7/12/2022 6752)  Anxieties, Fears or Concerns: Failling, or bumping foot on something  Individualized Care Needs: pain control  Patient-Specific Goals (Include Timeframe): not to fall and bump the foot  Goal: Absence of Hospital-Acquired Illness or Injury  Outcome: Ongoing, Progressing  Goal: Optimal Comfort and Wellbeing  Outcome: Ongoing, Progressing  Goal: Readiness for Transition of Care  Outcome: Ongoing, Progressing

## 2022-07-12 NOTE — ASSESSMENT & PLAN NOTE
Blood glucose 192 this morning  Uses lbocphx53/30 50 units AM and 25 units QHS and lantus 20 units QHS at home  Increase detemir from 40 to 42 units QHS   Last A1C 9.3  POCT glucose checks x 4 AC & HS  Diabetic educator consulted

## 2022-07-13 VITALS
BODY MASS INDEX: 44.41 KG/M2 | HEART RATE: 82 BPM | HEIGHT: 68 IN | TEMPERATURE: 98 F | DIASTOLIC BLOOD PRESSURE: 74 MMHG | RESPIRATION RATE: 18 BRPM | WEIGHT: 293 LBS | SYSTOLIC BLOOD PRESSURE: 124 MMHG | OXYGEN SATURATION: 98 %

## 2022-07-13 LAB
BACTERIA BLD CULT: NORMAL
BACTERIA BLD CULT: NORMAL
GLUCOSE SERPL-MCNC: 248 MG/DL (ref 70–105)

## 2022-07-13 PROCEDURE — 25000003 PHARM REV CODE 250

## 2022-07-13 PROCEDURE — 63600175 PHARM REV CODE 636 W HCPCS

## 2022-07-13 PROCEDURE — 82962 GLUCOSE BLOOD TEST: CPT

## 2022-07-13 PROCEDURE — 99239 HOSP IP/OBS DSCHRG MGMT >30: CPT | Mod: GC,,, | Performed by: INTERNAL MEDICINE

## 2022-07-13 PROCEDURE — 99239 PR HOSPITAL DISCHARGE DAY,>30 MIN: ICD-10-PCS | Mod: GC,,, | Performed by: INTERNAL MEDICINE

## 2022-07-13 RX ADMIN — AMLODIPINE BESYLATE 5 MG: 5 TABLET ORAL at 09:07

## 2022-07-13 RX ADMIN — PANTOPRAZOLE SODIUM 40 MG: 40 TABLET, DELAYED RELEASE ORAL at 09:07

## 2022-07-13 RX ADMIN — INSULIN ASPART 4 UNITS: 100 INJECTION, SOLUTION INTRAVENOUS; SUBCUTANEOUS at 11:07

## 2022-07-13 RX ADMIN — SILVER SULFADIAZINE: 10 CREAM TOPICAL at 11:07

## 2022-07-13 RX ADMIN — ATORVASTATIN CALCIUM 80 MG: 80 TABLET, FILM COATED ORAL at 09:07

## 2022-07-13 NOTE — DISCHARGE INSTRUCTIONS
Hospitalist Discharge orders  *Notify your healthcare provider if you experience any of the following: temperature >100.4  * Notify your healthcare provider if you experience any of the following: redness, tenderness.    *Notify your healthcare provider if you experience any of the following: difficulty breathing or     increased cough.  *Notify your physician if you experience any persistent nausea, vomiting, or diarrhea or headache  *Notify your physician if you experience any of the following:severe uncontrolled pain;worsening rash, increased confusion or weakness; dizziness, lightheadedness or visual disturbances

## 2022-07-13 NOTE — HOSPITAL COURSE
The patient was admitted for treatment of her sepsis associated with her infected right foot diabetic foot ulcer. She was initially given IV Vancomycin and Zosyn for empiric coverage, while blood and wound cultures were pending. Wound cultures grew enterobacter clocae and Staph aureus, so the patient was switched to IV Vanc and Merram. Blood cultures did not show any growth. The patient got IV antibiotics as a bolus in the ED, and then continual with LR until her vitals normalized and she was no longer septic. She was given a total of 5 days of IV Vanc and Merram. Her blood glucose ran high while she was in the hospital, and her insulin dose had to be adjusted several times. The patient was counseled at length about the importance of controlling her diabetes to prevent further diabetic foot infections and potential amputations. The patient was seen by wound care while in the hospital and had her wound dressed daily, as well as treated with silver sulfadiazine cream. The patient was offered swing bed placement for after discharge, but she refused and so she will home with home health. She will need to continue to follow up with the [facility] outpatient wound care, which she was already going to before her admission. She will also need to follow up with her PCP to continue to work on controlling her diabetes.

## 2022-07-13 NOTE — DISCHARGE SUMMARY
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 The patient has a chronic diabetic right foot ulcer on her heel, that was recently treated earlier this year with antibiotics through a PICC line. The patient says that she has noticed the area where the ulcer is becoming more and more painful over the past couple of months. She went to Dr. Tinajero yesterday with complaints of increasing pain in her right foot, and was given Augmentin to take outpatient. However, she came to the ED this morning after she fell at home and had to call her son to come pick her up from the floor. The patient says that she did not fall due to a seizure, pre-syncope or a LOC, but that she was feeling very weak when she got out of bed this morning. Additionally, the patient says that she has been feeling like feverish for the past couple of days, and has had an episode of watery diarrhea and vomiting earlier today. She denies any new chest pain, SOB, palpitations, change in appetite, headaches, dizziness, chills or night sweats.     In the ED, the patient was found to have a BP in the 70s/30s with a pulse of 116 and a temperature of 101.1 F. Wound and blood cultures were collected and the patient was stated on empiric vancomycin and zosyn for her infected foot ulcer. A right foot xray did not show any acute changes or  signs of osteomyelitis. A lactic acid was wnl, and a second one was drawn. The patient was given a 1L fluid bolus, with another 3L ordered to be given. She was found to have an MALIA, with a BUN/Crt ratio of 22/1.44. Her WBC and H&H were wnl.     The patient will be admitted to the [facility] under Dr. Infante for further management and treatment of her sepsis associated with an infected diabetic foot ulcer.       * No surgery found *      Hospital Course:   The patient was admitted for treatment of her sepsis associated with her infected right foot diabetic foot ulcer. She was initially given IV Vancomycin and Zosyn for empiric coverage, while blood and wound cultures were pending. Wound cultures grew enterobacter clocae and Staph aureus, so the patient was switched to IV Vanc and Merram. Blood cultures did not show any growth. The patient got IV antibiotics as a bolus in the ED, and then continual with LR until her vitals normalized and she was no longer septic. She was given a total of 5 days of IV Vanc and Merram. Her blood glucose ran high while she was in the hospital, and her insulin dose had to be adjusted several times. The patient was counseled at length about the importance of controlling her diabetes to prevent further diabetic foot infections and potential amputations. The patient was seen by wound care while in the hospital and had her wound dressed daily, as well as treated with silver sulfadiazine cream. The patient was offered swing bed placement for after discharge, but she refused and so she will home with home health. She will need to continue to follow up with the [facility] outpatient wound care, which she was already going to before her admission. She will also need to follow up with her PCP to continue to work on controlling her diabetes.        Goals of Care Treatment Preferences:  Code Status: Full Code      Consults:   Consults (From admission, onward)        Status Ordering Provider     Inpatient  consult to Social Work  Once        Provider:  (Not yet assigned)    Completed ARIANA BRIONES     Inpatient consult to Diabetes educator  Once        Provider:  (Not yet assigned)    Acknowledged MINGO KITCHEN          * Diabetic foot ulcer  Continue to go to outpatient wound care      Anemia  Follow up with PCP for regular CBC labs        Gastroesophageal reflux disease  Pantoprazole 40mg qd      Hyperlipidemia  Continue home atorvastatin 80mg qd      Hypertension  Continue amlodipine and losartan       Type 2 diabetes mellitus with foot ulcer, with long-term current use of insulin  Continue to use home insulin  See PCP for a follow up       Embolic stroke involving left middle cerebral artery          Final Active Diagnoses:    Diagnosis Date Noted POA    PRINCIPAL PROBLEM:  Diabetic foot ulcer [E11.621, L97.509] 07/08/2022 Yes    Anemia [D64.9] 07/05/2022 Yes    Gastroesophageal reflux disease [K21.9] 11/09/2021 Yes    Type 2 diabetes mellitus with foot ulcer, with long-term current use of insulin [E11.621, L97.509, Z79.4] 03/19/2021 Not Applicable    Hypertension [I10] 03/19/2021 Yes    Hyperlipidemia [E78.5] 03/19/2021 Yes    Embolic stroke involving left middle cerebral artery [I63.412] 02/12/2021 Yes      Problems Resolved During this Admission:    Diagnosis Date Noted Date Resolved POA    Sepsis [A41.9] 07/08/2022 07/11/2022 Yes    Elevated serum creatinine [R79.89] 07/10/2022 07/11/2022 Unknown    Infected ulcer of skin [L98.499, L08.9]  07/11/2022 Yes       Discharged Condition: good    Disposition: Home or Self Care    Follow Up:   Follow-up Information     [facility] - [facility] Follow up in 1 week(s).    Specialty: Wound Care  Why: Please follow up on July 21 at 9:00  Contact information:  [address] [place] [id number] [phone]           Ron Sanches MD Follow up in 1 week(s).    Specialty: Family Medicine  Why: please follow up on Aug. 2 at 8:15  Contact  information:  [address] [facility] [address]  [phone]                       Patient Instructions:      Ambulatory referral/consult to Podiatry   Standing Status: Future   Referral Priority: Routine Referral Type: Consultation   Referral Reason: Specialty Services Required   Requested Specialty: Podiatry   Number of Visits Requested: 1     Ambulatory referral/consult to Wound Clinic   Standing Status: Future   Referral Priority: Routine Referral Type: Consultation   Referral Reason: Specialty Services Required   Requested Specialty: Wound Care   Number of Visits Requested: 1     Diet diabetic     Notify your health care provider if you experience any of the following:  temperature >100.4     Notify your health care provider if you experience any of the following:  persistent nausea and vomiting or diarrhea     Notify your health care provider if you experience any of the following:  severe uncontrolled pain     Notify your health care provider if you experience any of the following:  redness, tenderness, or signs of infection (pain, swelling, redness, odor or green/yellow discharge around incision site)     Notify your health care provider if you experience any of the following:  difficulty breathing or increased cough     Notify your health care provider if you experience any of the following:  severe persistent headache     Notify your health care provider if you experience any of the following:  worsening rash     Notify your health care provider if you experience any of the following:  persistent dizziness, light-headedness, or visual disturbances     Notify your health care provider if you experience any of the following:  increased confusion or weakness     Notify your health care provider if you experience any of the following:     Activity as tolerated       Significant Diagnostic Studies: Labs:   BMP:   Recent Labs   Lab 07/12/22  0442   *      K 3.9      CO2 26   BUN 7    CREATININE 1.06*   CALCIUM 8.7    and CMP   Recent Labs   Lab 07/12/22  0442      K 3.9      CO2 26   *   BUN 7   CREATININE 1.06*   CALCIUM 8.7   ANIONGAP 10   EGFRNONAA 56*       Pending Diagnostic Studies:     Procedure Component Value Units Date/Time    EXTRA TUBES [[id number]] Collected: 07/08/22 0314    Order Status: Sent Lab Status: In process Updated: 07/08/22 0320    Specimen: Blood, Venous     Narrative:      The following orders were created for panel order EXTRA TUBES.  Procedure                               Abnormality         Status                     ---------                               -----------         ------                     Lavender Top Hold[[id number]]                                In process                   Please view results for these tests on the individual orders.         Medications:  Reconciled Home Medications:      Medication List      CONTINUE taking these medications    amLODIPine 5 MG tablet  Commonly known as: NORVASC  Take 1 tablet (5 mg total) by mouth once daily.     atorvastatin 80 MG tablet  Commonly known as: LIPITOR  TAKE 1 TABLET BY MOUTH EACH NIGHT AT BEDTIME FOR CHOLESTEROL ~~DO NOT EAT GRAPEFRUIT OR DRINK GRAPEFRUIT JUICE WHILE TAKING THIS MEDICATION~~     CMPD PAIN MANAGEMENT COMPOUND OINTMENT TEN  Apply 1 to 2 grams up to 4 times daily as directed for additional pain relief     DAKIN'S SOLUTION external solution  Generic drug: sodium hypochlorite 0.5 %  Apply topically once daily. Pack wound with dakin's moistened nuguaze daily     HYDROcodone-acetaminophen 7.5-325 mg per tablet  Commonly known as: NORCO  1/2 to 1 po every 6 hours for left shoulder pain     insulin asp prt-insulin aspart (NovoLOG 70/30) 100 unit/mL (70-30) Soln  Commonly known as: NovoLOG 70/30  INJECT 50 UNITS SUB-Q EACH MORNING AND 30 UNITS SUB-Q EACH EVENING     LANTUS U-100 INSULIN 100 unit/mL injection  Generic drug: insulin glargine  Inject 20 Units into the skin  once daily. Take at night     loratadine 10 mg tablet  Commonly known as: CLARITIN  Take 1 tablet (10 mg total) by mouth once daily.     losartan 100 MG tablet  Commonly known as: COZAAR  Take 1 tablet (100 mg total) by mouth every evening.     omeprazole 20 MG capsule  Commonly known as: PRILOSEC  Take 1 capsule (20 mg total) by mouth once daily.            Indwelling Lines/Drains at time of discharge:   Lines/Drains/Airways     None                 Time spent on the discharge of patient: 45 minutes         Geneva Snow MD  Department of Hospital Medicine  [facility] - Orthopedic

## 2022-07-13 NOTE — PLAN OF CARE
[facility] - Orthopedic  Discharge Final Note    Primary Care Provider: Ron Sanches MD    Expected Discharge Date: 7/13/2022    Final Discharge Note (most recent)     Final Note - 07/13/22 1254        Final Note    Assessment Type Final Discharge Note     Anticipated Discharge Disposition Home-Health Care Svc   [facility]    What phone number can be called within the next 1-3 days to see how you are doing after discharge? [phone]        Post-Acute Status    Post-Acute Authorization Home Health     Patient choice form signed by patient/caregiver List with quality metrics by geographic area provided;List from CMS Compare     Discharge Delays None known at this time                 Important Message from Medicare              Follow-up providers     [facility] - Wound Care   Specialty: Wound Care    [address]  [facility] [address]   Phone: [phone]       Next Steps: Follow up in 1 week(s)    Instructions: Please follow up on July 21 at 9:00    Ron Sanches MD   Specialty: Family Medicine   Relationship: PCP - General    [address]  [facility] [address]   Phone: [phone]       Next Steps: Follow up in 1 week(s)    Instructions: please follow up on Aug. 2 at 8:15          After-discharge care            Home Medical Care     [facility]   Service: Home Health Services    [address]  [address]  [facility] [address]   Phone: [phone]                       Pt to dc home today. SW faxed dc orders to [facility]. No other needs.

## 2022-07-20 NOTE — PATIENT INSTRUCTIONS
Clean with dakin's solution or baby shampoo and water     Apply betadine moist gauze to open areas, cover with dry gauze,wrap with meng and paper tape     Change daily and as needed     Elevate feet as much as possible        No prolong standing  Monitor closely for s/s of infection including fever, chills, increase in pain, odor from wound, and increased redness from foot. Go to ER if any complications develop.   Keep leg elevated and avoid pressure on wound.  Diabetes:  Monitor glucose closely. Check fasting glucose and 2 hours after meals. HgA1C goal <7, fasting glucose , and 2 hours after meals <180  Hypertension:  Check blood pressure twice daily, goal <120/80  Diet:   Increase protein intake, avoid fried, fatty foods and foods high in simple carbs.   Vitamins:  Take vitamin C 1000 mg, zinc 50mg, vitamin d 5000 units, and a daily multivitamin. Dawson is a good source of protein and nutrients to aid in wound healing.

## 2022-07-20 NOTE — PROGRESS NOTES
TOMASA Maldonado   [facility] - WOUND CARE  [address] [facility] [address]  [phone]      PATIENT NAME: Deborah Sotelo  : 1960  DATE: 22  MRN: [id number]      Billing Provider: TOMASA Maldonado  Level of Service:   Patient PCP Information     Provider PCP Type    Ron Sanches MD General          Reason for Visit / Chief Complaint: Non-healing Wound Follow Up (Right foot)       History of Present Illness / Problem Focused Workflow     Deborah Sotelo is a 61 y.o. female who presents to clinic for follow up on chronic-non healing wound on right TMA.  Since last visit, she was admitted for IV antibiotics due to wound infection. Reports  improvement in pain and swelling in right foot since last visit. Hypergranulation noted to wound bed today, callus jonathon-wound. Bedside debridement and chemical cauterization. Labs from recent hospitial stay reviewed, repeat labs today. Pertinent factors that delay wound healing include multiple co-morbidities, poor vascular supply, diabetes, edema, heavy drainage, decreased granulation tissue, tract(s), undermining and no protective sensation. Denies fever and chills.       Review of Systems     Review of Systems   Constitutional: Positive for activity change. Negative for chills and fever.   Respiratory: Negative for chest tightness and shortness of breath.    Cardiovascular: Positive for leg swelling. Negative for chest pain and palpitations.   Musculoskeletal: Positive for arthralgias and joint swelling.   Skin: Positive for color change and wound.        See wound assessment   Neurological: Positive for weakness and numbness.   Psychiatric/Behavioral: Negative for agitation, behavioral problems, confusion and self-injury.       Medical / Social / Family History     Past Medical History:   Diagnosis Date    Anemia 2022    Diabetes mellitus, type 2     Hyperlipidemia     Hypertension     Obesity     Primary  osteoarthritis of left shoulder 5/24/2022    Stroke        Past Surgical History:   Procedure Laterality Date    AMPUTATION      APPENDECTOMY      EYE SURGERY         Social History  Ms. Deborah Sotelo  reports that she has never smoked. She has never used smokeless tobacco. She reports current alcohol use. She reports that she does not use drugs.    Family History  Ms.'srinath Sotelo family history includes Appendicitis in her father; Asthma in her sister; Cancer in her mother; Diabetes in her brother.    Medications and Allergies     Medications  Outpatient Medications Marked as Taking for the 7/21/22 encounter (Office Visit) with TOMASA Maldonado   Medication Sig Dispense Refill    amLODIPine (NORVASC) 5 MG tablet Take 1 tablet (5 mg total) by mouth once daily. 30 tablet 2    atorvastatin (LIPITOR) 80 MG tablet TAKE 1 TABLET BY MOUTH EACH NIGHT AT BEDTIME FOR CHOLESTEROL ~~DO NOT EAT GRAPEFRUIT OR DRINK GRAPEFRUIT JUICE WHILE TAKING THIS MEDICATION~~ 30 tablet 2    HYDROcodone-acetaminophen (NORCO) 7.5-325 mg per tablet 1/2 to 1 po every 6 hours for left shoulder pain 12 tablet 0    insulin asp prt-insulin aspart, NovoLOG 70/30, (NOVOLOG 70/30) 100 unit/mL (70-30) Soln INJECT 50 UNITS SUB-Q EACH MORNING AND 30 UNITS SUB-Q EACH EVENING 30 mL 5    LANTUS U-100 INSULIN 100 unit/mL injection Inject 20 Units into the skin once daily. Take at night 6 mL 2    loratadine (CLARITIN) 10 mg tablet Take 1 tablet (10 mg total) by mouth once daily. 90 tablet 0    losartan (COZAAR) 100 MG tablet Take 1 tablet (100 mg total) by mouth every evening. 30 tablet 2    omeprazole (PRILOSEC) 20 MG capsule Take 1 capsule (20 mg total) by mouth once daily. 30 capsule 2       Allergies  Review of patient's allergies indicates:   Allergen Reactions    Aspirin      Other reaction(s): UPSET STOMACH     Bactrim ds [sulfamethoxazole-trimethoprim] Itching       Physical Examination     Vitals:    07/21/22 1025   BP: (!)  150/89   Pulse:    Resp:    Temp:      Physical Exam  Vitals and nursing note reviewed.   Constitutional:       Appearance: Normal appearance.   HENT:      Head: Normocephalic.   Cardiovascular:      Rate and Rhythm: Normal rate and regular rhythm.      Pulses: Normal pulses.      Heart sounds: Normal heart sounds.   Pulmonary:      Effort: Pulmonary effort is normal. No respiratory distress.   Chest:      Chest wall: No tenderness.   Musculoskeletal:         General: Swelling, tenderness and deformity present.      Right lower leg: Edema present.      Comments: Left shoulder decreased ROM and weakness to left upper extremity   Skin:     General: Skin is warm and dry.      Comments: Wound, see LDA for measurements and picture   Neurological:      General: No focal deficit present.      Mental Status: She is alert and oriented to person, place, and time. Mental status is at baseline.   Psychiatric:         Mood and Affect: Mood normal.         Behavior: Behavior normal.         Thought Content: Thought content normal.         Judgment: Judgment normal.         Assessment and Plan      1. Ulcer of right foot with fat layer exposed    2. Infected ulcer of skin    3. Diabetic foot ulcer    4. Wound infection           Wound 01/18/21 1051 Diabetic Ulcer Right lateral Plantar #1 (Active)   01/18/21 1051    Pre-existing: Yes   Primary Wound Type: Diabetic ulc   Side: Right   Orientation: lateral   Location: Plantar   Wound Number: #1   Ankle-Brachial Index:    Pulses: normal   Removal Indication and Assessment:    Wound Outcome:    (Retired) Wound Type:    (Retired) Wound Length (cm):    (Retired) Wound Width (cm):    (Retired) Depth (cm):    Wound Description (Comments):    Removal Indications:    Wound Image    07/21/22 1040   Dressing Appearance Moist drainage 07/21/22 1013   Drainage Amount Moderate 07/21/22 1013   Drainage Characteristics/Odor Serosanguineous 07/21/22 1013   Appearance Pink;Moist;Granulating 07/21/22  1013   Tissue loss description Full thickness 07/21/22 1013   Black (%), Wound Tissue Color 0 % 07/21/22 1013   Red (%), Wound Tissue Color 100 % 07/21/22 1013   Yellow (%), Wound Tissue Color 0 % 07/21/22 1013   Periwound Area Moist;Pale white 07/21/22 1013   Wound Edges Open 07/21/22 1013   Johnson Classification (diabetic foot ulcers only) Grade 2 07/21/22 1013   Wound Length (cm) 2 cm 07/21/22 1040   Wound Width (cm) 2.5 cm 07/21/22 1040   Wound Depth (cm) 0.5 cm 07/21/22 1040   Wound Volume (cm^3) 2.5 cm^3 07/21/22 1040   Wound Surface Area (cm^2) 5 cm^2 07/21/22 1040   Care Debrided;Applied:;Povidone iodine 07/21/22 1040   Dressing Applied;Gauze;Rolled gauze 07/21/22 1013   Periwound Care Moisture barrier applied 07/21/22 1013   Dressing Change Due 07/22/22 07/21/22 1013            Wound 01/21/22 Diabetic Ulcer Right medial Foot #5 (Active)   01/21/22     Pre-existing: Yes   Primary Wound Type: Diabetic ulc   Side: Right   Orientation: medial   Location: Foot   Wound Number: #5   Ankle-Brachial Index:    Pulses:    Removal Indication and Assessment:    Wound Outcome:    (Retired) Wound Type:    (Retired) Wound Length (cm):    (Retired) Wound Width (cm):    (Retired) Depth (cm):    Wound Description (Comments):    Removal Indications:    Wound Image    07/21/22 1011   Dressing Appearance Dry;Intact;Clean 07/21/22 1011   Drainage Amount None 07/21/22 1011   Appearance Maroon;Dry;Fibrin 07/21/22 1011   Tissue loss description Not applicable 07/21/22 1011   Black (%), Wound Tissue Color 0 % 07/21/22 1011   Red (%), Wound Tissue Color 0 % 07/21/22 1011   Yellow (%), Wound Tissue Color 0 % 07/21/22 1011   Periwound Area Dry 07/21/22 1011   Wound Edges Callused;Rolled/closed 07/21/22 1011   Wound Length (cm) 0 cm 07/21/22 1011   Wound Width (cm) 0 cm 07/21/22 1011   Wound Depth (cm) 0 cm 07/21/22 1011   Wound Volume (cm^3) 0 cm^3 07/21/22 1011   Wound Surface Area (cm^2) 0 cm^2 07/21/22 1011   Care Cleansed with:;Soap  and water 07/21/22 1011   Dressing Applied;Gauze;Rolled gauze 07/21/22 1011   Periwound Care Moisturizer applied 07/21/22 1011   Dressing Change Due 07/22/22 07/21/22 1011         Active Problem List with Overview Notes    Diagnosis Date Noted    Gastroenteritis 07/08/2022    Diabetic foot ulcer 07/08/2022    Anemia 07/05/2022    Primary osteoarthritis of left shoulder 05/24/2022    Acute cystitis without hematuria 03/08/2022    Allergy to environmental factors 03/08/2022    Elevated alkaline phosphatase level 03/08/2022    Shoulder pain 01/26/2022     Patient arrived for 1000 appt at around 0930. At 1005 the patient was placed on the pulleys for warm-up and stretching of left shoulder.  assist couple minutes later the patient was seen leaving her appt /s any word to staff.  Patient was found sitting in the lobby awaiting her ride home.  She would not express her reasons for leaving only that she was going home.  Patient's regular therapist was notified.      Decreased range of motion of left shoulder 01/26/2022    Weakness of left arm 01/26/2022    Muscle spasm of left shoulder 01/18/2022    Gastroesophageal reflux disease 11/09/2021    Needs flu shot 11/09/2021    Ulcer of right foot with fat layer exposed 06/28/2021    Type 2 diabetes mellitus with foot ulcer, with long-term current use of insulin 03/19/2021    Lower extremity edema 03/19/2021    Hypertension 03/19/2021    Hyperlipidemia 03/19/2021    Embolic stroke involving left middle cerebral artery 02/12/2021         Plan:   Clean with dakin's solution or baby shampoo and water  Apply betadine moist gauze to open areas, cover with dry gauze,wrap with meng and paper tape  Change daily and as needed  Elevate feet as much as possible   Avoid prolonged standing  Monitor closely for s/s of infection including fever, chills, increase in pain, odor from wound, and increased redness from foot. Go to ER if any complications develop.   Keep leg  elevated and avoid pressure on wound.  Diabetes:  Monitor glucose closely. Check fasting glucose and 2 hours after meals. HgA1C goal <7, fasting glucose , and 2 hours after meals <180  Hypertension:  Check blood pressure twice daily, goal <120/80  Diet:   Increase protein intake, avoid fried, fatty foods and foods high in simple carbs.   Vitamins:  Take vitamin C 1000 mg, zinc 50mg, vitamin d 5000 units, and a daily multivitamin. Dawson is a good source of protein and nutrients to aid in wound healing.   Problem List Items Addressed This Visit        Endocrine    Diabetic foot ulcer    Current Assessment & Plan     Local wound care  Diabetes:  Monitor glucose closely. Check fasting glucose and 2 hours after meals. HgA1C goal <7, fasting glucose , and 2 hours after meals <180  Hypertension:  Check blood pressure twice daily, goal <120/80  Diet:   Increase protein intake, avoid fried, fatty foods and foods high in simple carbs.   Vitamins:  Take vitamin C 1000 mg, zinc 50mg, vitamin d 5000 units, and a daily multivitamin. Dawson is a good source of protein and nutrients to aid in wound healing.               Orthopedic    Ulcer of right foot with fat layer exposed - Primary    Current Assessment & Plan     Clean with dakin's solution or baby shampoo and water  Apply betadine moist gauze to open areas, cover with dry gauze,wrap with meng and paper tape  Change daily and as needed  Elevate feet as much as possible  Avoid prolonged standing  Monitor closely for s/s of infection including fever, chills, increase in pain, odor from wound, and increased redness from foot. Go to ER if any complications develop.              Relevant Orders    CBC Auto Differential (Completed)    Sedimentation Rate (Completed)    C-Reactive Protein (Completed)      Other Visit Diagnoses     Infected ulcer of skin        Wound infection        Relevant Orders    CBC Auto Differential (Completed)    Sedimentation Rate (Completed)     C-Reactive Protein (Completed)          Future Appointments   Date Time Provider Department Center   8/2/2022  8:15 AM Ron Sanches MD [facility]   8/4/2022 10:00 AM TOMASA Maldonado [facility]   9/6/2022  1:00 PM Kentrell Sanz MD [facility]   10/20/2022 11:00 AM [facility]            Signature:  TOMASA Maldonado  [facility] - WOUND CARE  [address]  [place] [address]  [phone]    Date of encounter: 7/21/22

## 2022-07-21 ENCOUNTER — OFFICE VISIT (OUTPATIENT)
Dept: WOUND CARE | Facility: CLINIC | Age: 62
End: 2022-07-21
Attending: FAMILY MEDICINE
Payer: MEDICAID

## 2022-07-21 VITALS
HEART RATE: 80 BPM | RESPIRATION RATE: 20 BRPM | TEMPERATURE: 97 F | SYSTOLIC BLOOD PRESSURE: 150 MMHG | DIASTOLIC BLOOD PRESSURE: 89 MMHG

## 2022-07-21 DIAGNOSIS — L98.499 INFECTED ULCER OF SKIN: ICD-10-CM

## 2022-07-21 DIAGNOSIS — L08.9 WOUND INFECTION: ICD-10-CM

## 2022-07-21 DIAGNOSIS — L08.9 INFECTED ULCER OF SKIN: ICD-10-CM

## 2022-07-21 DIAGNOSIS — T14.8XXA WOUND INFECTION: ICD-10-CM

## 2022-07-21 DIAGNOSIS — L97.509 DIABETIC FOOT ULCER: ICD-10-CM

## 2022-07-21 DIAGNOSIS — E11.621 DIABETIC FOOT ULCER: ICD-10-CM

## 2022-07-21 DIAGNOSIS — L97.512 ULCER OF RIGHT FOOT WITH FAT LAYER EXPOSED: Primary | ICD-10-CM

## 2022-07-21 PROCEDURE — 4010F ACE/ARB THERAPY RXD/TAKEN: CPT | Mod: CPTII,,, | Performed by: NURSE PRACTITIONER

## 2022-07-21 PROCEDURE — 3079F DIAST BP 80-89 MM HG: CPT | Mod: CPTII,,, | Performed by: NURSE PRACTITIONER

## 2022-07-21 PROCEDURE — 3046F PR MOST RECENT HEMOGLOBIN A1C LEVEL > 9.0%: ICD-10-PCS | Mod: CPTII,,, | Performed by: NURSE PRACTITIONER

## 2022-07-21 PROCEDURE — 1159F MED LIST DOCD IN RCRD: CPT | Mod: CPTII,,, | Performed by: NURSE PRACTITIONER

## 2022-07-21 PROCEDURE — 99499 NO LOS: ICD-10-PCS | Mod: S$PBB,,, | Performed by: NURSE PRACTITIONER

## 2022-07-21 PROCEDURE — 1160F PR REVIEW ALL MEDS BY PRESCRIBER/CLIN PHARMACIST DOCUMENTED: ICD-10-PCS | Mod: CPTII,,, | Performed by: NURSE PRACTITIONER

## 2022-07-21 PROCEDURE — 11042 DBRDMT SUBQ TIS 1ST 20SQCM/<: CPT | Mod: PBBFAC | Performed by: NURSE PRACTITIONER

## 2022-07-21 PROCEDURE — 1159F PR MEDICATION LIST DOCUMENTED IN MEDICAL RECORD: ICD-10-PCS | Mod: CPTII,,, | Performed by: NURSE PRACTITIONER

## 2022-07-21 PROCEDURE — 3079F PR MOST RECENT DIASTOLIC BLOOD PRESSURE 80-89 MM HG: ICD-10-PCS | Mod: CPTII,,, | Performed by: NURSE PRACTITIONER

## 2022-07-21 PROCEDURE — 11042 DEBRIDEMENT: ICD-10-PCS | Mod: S$PBB,,, | Performed by: NURSE PRACTITIONER

## 2022-07-21 PROCEDURE — 3046F HEMOGLOBIN A1C LEVEL >9.0%: CPT | Mod: CPTII,,, | Performed by: NURSE PRACTITIONER

## 2022-07-21 PROCEDURE — 99215 OFFICE O/P EST HI 40 MIN: CPT | Mod: PBBFAC,25 | Performed by: NURSE PRACTITIONER

## 2022-07-21 PROCEDURE — 99499 UNLISTED E&M SERVICE: CPT | Mod: S$PBB,,, | Performed by: NURSE PRACTITIONER

## 2022-07-21 PROCEDURE — 4010F PR ACE/ARB THEARPY RXD/TAKEN: ICD-10-PCS | Mod: CPTII,,, | Performed by: NURSE PRACTITIONER

## 2022-07-21 PROCEDURE — 3077F PR MOST RECENT SYSTOLIC BLOOD PRESSURE >= 140 MM HG: ICD-10-PCS | Mod: CPTII,,, | Performed by: NURSE PRACTITIONER

## 2022-07-21 PROCEDURE — 3077F SYST BP >= 140 MM HG: CPT | Mod: CPTII,,, | Performed by: NURSE PRACTITIONER

## 2022-07-21 PROCEDURE — 1160F RVW MEDS BY RX/DR IN RCRD: CPT | Mod: CPTII,,, | Performed by: NURSE PRACTITIONER

## 2022-07-21 RX ORDER — AMOXICILLIN AND CLAVULANATE POTASSIUM 875; 125 MG/1; MG/1
1 TABLET, FILM COATED ORAL 2 TIMES DAILY
COMMUNITY
Start: 2022-07-07 | End: 2022-09-13 | Stop reason: ALTCHOICE

## 2022-07-21 RX ORDER — SILVER SULFADIAZINE 10 G/1000G
1 CREAM TOPICAL DAILY
COMMUNITY
Start: 2022-07-07 | End: 2022-08-31 | Stop reason: ALTCHOICE

## 2022-07-21 NOTE — PROCEDURES
Debridement    Date/Time: 7/21/2022 9:00 AM  Performed by: TOMASA Maldonado  Authorized by: TOMASA Maldonado     Consent Done?:  Yes (Written)  Local anesthesia used?: No      Wound Details:    Location:  Right foot    Location:  Right Plantar    Type of Debridement:  Excisional       Length (cm):  2       Area (sq cm):  5       Width (cm):  2.5       Percent Debrided (%):  100       Depth (cm):  0.5       Total Area Debrided (sq cm):  5    Depth of debridement:  Subcutaneous tissue    Tissue debrided:  Adipose, Dermis, Epidermis and Subcutaneous    Devitalized tissue debrided:  Biofilm and Callus    Instruments:  Curette    Bleeding:  Moderate  Hemostasis Achieved: Yes    Method Used:  Silver Nitrate  Patient tolerance:  Patient tolerated the procedure well with no immediate complications     Assistant TIFFANI Duvall LPN

## 2022-07-21 NOTE — ASSESSMENT & PLAN NOTE
Clean with dakin's solution or baby shampoo and water  Apply betadine moist gauze to open areas, cover with dry gauze,wrap with meng and paper tape  Change daily and as needed  Elevate feet as much as possible  Avoid prolonged standing  Monitor closely for s/s of infection including fever, chills, increase in pain, odor from wound, and increased redness from foot. Go to ER if any complications develop.

## 2022-07-21 NOTE — PROGRESS NOTES
See wound assessment. Debridement done. Stop silvadene for now. Betadine moist gauze/dry dressing daily. RTC in 2weeks. Orders faxed to [facility]. Transferred to lab via w/c for CBC,Sed  Rate and c-reactive protein

## 2022-07-21 NOTE — PROGRESS NOTES
Debridement Performed for Assessment: Wound# 2  Performed By: Provider: Felisha Love NP  Assistant: Cici Duvall LPN    Debridement: Surgical    Photo taken post procedure:    Time-Out Taken: Yes  Level: Skin/Subcutaneous Tissue  Post Debridement Measurements  Length: (cm) 2.0  Width: (cm) 2.5  Depth: (cm) 0.5      Area: (cm²) 5.0  Percent Debrided: 100%  Total Area Debrided: (sq cm)     Tissue and other material debrided:  Adipose, Dermis, Epidermis, Subcutaneous  Devitalized Tissue Debrided:Biofilm, callus  Instrument: Curette  Bleeding: Moderate  Hemostasis Achieved: Pressure  Procedural Pain: Insensate  Post Procedural Pain: Insensate  Response to Treatment: Procedure was tolerated well    Devitalized materials/tissue Removed  the following was removed during debridement  subcutaneous      Post Debridement Diagnosis chronic right foot diabetic ulcer  Post debridement diagnosis  Same as Pre-operative debridement diagnosis, No Complications noted.      Grafts or implants applied  Was a graft or implant applied?  No      Procedure assistant  Procedure assisted by: Cici Duvall LPN  Assistant is the same as nurse listed above      Complications related to procedure  Did any complication occure during procedure?  No complications noted during or after procedure.      Specimen  Specimen collect during procedure?  No specimen collected      Anaesthesia:  Anesthesia used  None      Blood Loss:  Blood loss during procedure  less than 5 cc

## 2022-07-21 NOTE — ASSESSMENT & PLAN NOTE
Local wound care  Diabetes:  Monitor glucose closely. Check fasting glucose and 2 hours after meals. HgA1C goal <7, fasting glucose , and 2 hours after meals <180  Hypertension:  Check blood pressure twice daily, goal <120/80  Diet:   Increase protein intake, avoid fried, fatty foods and foods high in simple carbs.   Vitamins:  Take vitamin C 1000 mg, zinc 50mg, vitamin d 5000 units, and a daily multivitamin. Dawson is a good source of protein and nutrients to aid in wound healing.

## 2022-07-22 DIAGNOSIS — I10 HYPERTENSION, UNSPECIFIED TYPE: Primary | ICD-10-CM

## 2022-08-03 ENCOUNTER — PATIENT OUTREACH (OUTPATIENT)
Dept: FAMILY MEDICINE | Facility: CLINIC | Age: 62
End: 2022-08-03
Payer: MEDICAID

## 2022-08-10 ENCOUNTER — OFFICE VISIT (OUTPATIENT)
Dept: WOUND CARE | Facility: CLINIC | Age: 62
End: 2022-08-10
Attending: FAMILY MEDICINE
Payer: MEDICAID

## 2022-08-10 VITALS
RESPIRATION RATE: 20 BRPM | TEMPERATURE: 98 F | HEART RATE: 102 BPM | SYSTOLIC BLOOD PRESSURE: 146 MMHG | DIASTOLIC BLOOD PRESSURE: 89 MMHG

## 2022-08-10 DIAGNOSIS — L97.512 ULCER OF RIGHT FOOT WITH FAT LAYER EXPOSED: Primary | ICD-10-CM

## 2022-08-10 PROCEDURE — 3079F DIAST BP 80-89 MM HG: CPT | Mod: CPTII,,, | Performed by: NURSE PRACTITIONER

## 2022-08-10 PROCEDURE — 17250 CHEM CAUT OF GRANLTJ TISSUE: CPT | Mod: PBBFAC | Performed by: NURSE PRACTITIONER

## 2022-08-10 PROCEDURE — 3077F SYST BP >= 140 MM HG: CPT | Mod: CPTII,,, | Performed by: NURSE PRACTITIONER

## 2022-08-10 PROCEDURE — 99214 OFFICE O/P EST MOD 30 MIN: CPT | Mod: PBBFAC | Performed by: NURSE PRACTITIONER

## 2022-08-10 PROCEDURE — 1160F PR REVIEW ALL MEDS BY PRESCRIBER/CLIN PHARMACIST DOCUMENTED: ICD-10-PCS | Mod: CPTII,,, | Performed by: NURSE PRACTITIONER

## 2022-08-10 PROCEDURE — 4010F PR ACE/ARB THEARPY RXD/TAKEN: ICD-10-PCS | Mod: CPTII,,, | Performed by: NURSE PRACTITIONER

## 2022-08-10 PROCEDURE — 99499 NO LOS: ICD-10-PCS | Mod: S$PBB,,, | Performed by: NURSE PRACTITIONER

## 2022-08-10 PROCEDURE — 3046F HEMOGLOBIN A1C LEVEL >9.0%: CPT | Mod: CPTII,,, | Performed by: NURSE PRACTITIONER

## 2022-08-10 PROCEDURE — 3077F PR MOST RECENT SYSTOLIC BLOOD PRESSURE >= 140 MM HG: ICD-10-PCS | Mod: CPTII,,, | Performed by: NURSE PRACTITIONER

## 2022-08-10 PROCEDURE — 17250 CHEM CAUT OF GRANLTJ TISSUE: CPT | Mod: S$PBB,,, | Performed by: NURSE PRACTITIONER

## 2022-08-10 PROCEDURE — 3079F PR MOST RECENT DIASTOLIC BLOOD PRESSURE 80-89 MM HG: ICD-10-PCS | Mod: CPTII,,, | Performed by: NURSE PRACTITIONER

## 2022-08-10 PROCEDURE — 4010F ACE/ARB THERAPY RXD/TAKEN: CPT | Mod: CPTII,,, | Performed by: NURSE PRACTITIONER

## 2022-08-10 PROCEDURE — 1160F RVW MEDS BY RX/DR IN RCRD: CPT | Mod: CPTII,,, | Performed by: NURSE PRACTITIONER

## 2022-08-10 PROCEDURE — 17250 PR CHEM CAUTERY GRANULATN TISSUE: ICD-10-PCS | Mod: S$PBB,,, | Performed by: NURSE PRACTITIONER

## 2022-08-10 PROCEDURE — 1159F MED LIST DOCD IN RCRD: CPT | Mod: CPTII,,, | Performed by: NURSE PRACTITIONER

## 2022-08-10 PROCEDURE — 1159F PR MEDICATION LIST DOCUMENTED IN MEDICAL RECORD: ICD-10-PCS | Mod: CPTII,,, | Performed by: NURSE PRACTITIONER

## 2022-08-10 PROCEDURE — 99499 UNLISTED E&M SERVICE: CPT | Mod: S$PBB,,, | Performed by: NURSE PRACTITIONER

## 2022-08-10 PROCEDURE — 3046F PR MOST RECENT HEMOGLOBIN A1C LEVEL > 9.0%: ICD-10-PCS | Mod: CPTII,,, | Performed by: NURSE PRACTITIONER

## 2022-08-10 NOTE — PROGRESS NOTES
Debridement Performed for Assessment: Wound right plantar foot  Performed By: Provider;Felisha Love NP  Assistant:    Debridement: Chemical/enzymatic  Debridement Description: Non-Selective      Wound/Ulcer size:    Length: 2.5  Width:  2.5  Depth:              0  Cm2:           5.0    Photo taken post procedure:  Procedural Pain: Insensate  Post Procedural Pain: Insensate  Response to Treatment: Procedure was tolerated well    Specimen  Was a specimen collected?  No Specimen collected      Graft or Implant Aplpied  Was a graft of implant applied?  No      Post deridement diagnosis  Did the post debridment diagnosis chagne? Chronic right foot diabetic ulcer  The post debridment diagnosis is the same as the pre-op diagnosis.      Assistant of procedure  Who assisted with the procedure? Cici Duvall LPN  The assistant was the same as the nurse listed above.      Complication  Complications related to debridement?  No complications noted      Estimated Blood Loss  How much blood loss occured?  No blood loss      Anesthesia  Anesthesia used?  None Debridement Performed for Assessment: Wound # right plantar foor  Estimated Blood Loss  How much blood loss occured? zero  No blood loss      Anesthesia  Anesthesia used?  None

## 2022-08-10 NOTE — PATIENT INSTRUCTIONS
Clean with dakin's solution or baby shampoo and water  Apply Iodosorb  to open areas, cover with dry gauze, wrap with meng and paper tape  Change daily and as needed for soilage  Elevate feet as much as possible  Avoid prolonged standing  Monitor closely for s/s of infection including fever, chills, increase in pain, odor from wound, and increased redness from foot. Go to ER if any complications develop.   Keep leg elevated and avoid pressure on wound.  Diabetes:  Monitor glucose closely. Check fasting glucose and 2 hours after meals. HgA1C goal <7, fasting glucose , and 2 hours after meals <180  Hypertension:  Check blood pressure twice daily, goal <120/80  Diet:   Increase protein intake, avoid fried, fatty foods and foods high in simple carbs.   Vitamins:  Take vitamin C 1000 mg, zinc 50mg, vitamin d 5000 units, and a daily multivitamin. Dawson is a good source of protein and nutrients to aid in wound healing.

## 2022-08-10 NOTE — PROGRESS NOTES
TOMASA Maldonado   [facility]  [address] [facility] [address]  [phone]      PATIENT NAME: Deborah Sotelo  : 1960  DATE: 8/10/22  MRN: [id number]      Billing Provider: TOMASA Maldonado  Level of Service:   Patient PCP Information     Provider PCP Type    Ron Sanches MD General          Reason for Visit / Chief Complaint: Ulcer of right foot with fat layer exposed       History of Present Illness / Problem Focused Workflow     Deborah Sotelo is a 61 y.o. female who presents to clinic for follow up on chronic-non healing wound on right TMA.  Wound continues to have hypergranulation tissue.  Compliant with current POC including betadine moistened gauze to wound bed. Chemical cauterization today. Reviewed labs obtained at last visit today. Pertinent factors that delay wound healing include multiple co-morbidities, poor vascular supply, diabetes, edema, heavy drainage, decreased granulation tissue, tract(s), undermining and no protective sensation. Denies fever and chills.       Review of Systems     Review of Systems   Constitutional: Positive for activity change. Negative for chills and fever.   Respiratory: Negative for chest tightness and shortness of breath.    Cardiovascular: Positive for leg swelling. Negative for chest pain and palpitations.   Musculoskeletal: Positive for arthralgias and joint swelling.   Skin: Positive for color change and wound.        See wound assessment   Neurological: Positive for weakness and numbness.   Psychiatric/Behavioral: Negative for agitation, behavioral problems, confusion and self-injury.       Medical / Social / Family History     Past Medical History:   Diagnosis Date    Anemia 2022    Diabetes mellitus, type 2     Hyperlipidemia     Hypertension     Obesity     Primary osteoarthritis of left shoulder 2022    Stroke        Past Surgical History:   Procedure Laterality Date     AMPUTATION      APPENDECTOMY      EYE SURGERY         Social History  Ms. Deborah Sotelo  reports that she has never smoked. She has never used smokeless tobacco. She reports current alcohol use. She reports that she does not use drugs.    Family History  Ms.'s Deborah Sotelo family history includes Appendicitis in her father; Asthma in her sister; Cancer in her mother; Diabetes in her brother.    Medications and Allergies     Medications  No outpatient medications have been marked as taking for the 8/10/22 encounter (Office Visit) with TOMASA Maldonado.       Allergies  Review of patient's allergies indicates:   Allergen Reactions    Aspirin      Other reaction(s): UPSET STOMACH     Bactrim ds [sulfamethoxazole-trimethoprim] Itching       Physical Examination     Vitals:    08/10/22 1348   BP: (!) 146/89   Pulse: 102   Resp: 20   Temp: 97.9 °F (36.6 °C)     Physical Exam  Vitals and nursing note reviewed.   Constitutional:       Appearance: Normal appearance.   HENT:      Head: Normocephalic.   Cardiovascular:      Rate and Rhythm: Normal rate and regular rhythm.      Pulses: Normal pulses.      Heart sounds: Normal heart sounds.   Pulmonary:      Effort: Pulmonary effort is normal. No respiratory distress.   Chest:      Chest wall: No tenderness.   Musculoskeletal:         General: Swelling, tenderness and deformity present.      Right lower leg: Edema present.      Comments: Left shoulder decreased ROM and weakness to left upper extremity   Skin:     General: Skin is warm and dry.      Comments: Wound, see LDA for measurements and picture   Neurological:      General: No focal deficit present.      Mental Status: She is alert and oriented to person, place, and time. Mental status is at baseline.   Psychiatric:         Mood and Affect: Mood normal.         Behavior: Behavior normal.         Thought Content: Thought content normal.         Judgment: Judgment normal.         Assessment and Plan      1.  Ulcer of right foot with fat layer exposed           Wound 01/18/21 1051 Diabetic Ulcer Right lateral Plantar #1 (Active)   01/18/21 1051    Pre-existing: Yes   Primary Wound Type: Diabetic ulc   Side: Right   Orientation: lateral   Location: Plantar   Wound Number: #1   Ankle-Brachial Index:    Pulses: normal   Removal Indication and Assessment:    Wound Outcome:    (Retired) Wound Type:    (Retired) Wound Length (cm):    (Retired) Wound Width (cm):    (Retired) Depth (cm):    Wound Description (Comments):    Removal Indications:    Wound Image      08/10/22 1325   Dressing Appearance Dried drainage 08/10/22 1325   Drainage Amount Moderate 08/10/22 1325   Drainage Characteristics/Odor Serosanguineous 08/10/22 1325   Appearance Pink;Red;Slough;Hypergranulation 08/10/22 1325   Tissue loss description Full thickness 08/10/22 1325   Black (%), Wound Tissue Color 0 % 08/10/22 1325   Red (%), Wound Tissue Color 100 % 08/10/22 1325   Yellow (%), Wound Tissue Color 0 % 08/10/22 1325   Periwound Area Dry 08/10/22 1325   Wound Edges Callused;Open 08/10/22 1325   Wound Length (cm) 2.5 cm 08/10/22 1325   Wound Width (cm) 2.5 cm 08/10/22 1325   Wound Depth (cm) 0 cm 08/10/22 1325   Wound Volume (cm^3) 0 cm^3 08/10/22 1325   Wound Surface Area (cm^2) 6.25 cm^2 08/10/22 1325   Undermining (depth (cm)/location) 0.9 from 12-12 08/10/22 1325   Care Cleansed with:;Soap and water;Applied:;Moisturizing agent 08/10/22 1325   Dressing Applied;Gauze, wet to dry 08/10/22 1325   Periwound Care Moisturizer applied 08/10/22 1325   Off Loading Off loading shoe 08/10/22 1325   Dressing Change Due 08/11/22 08/10/22 1325            Wound 01/21/22 Diabetic Ulcer Right medial Foot #5 (Active)   01/21/22     Pre-existing: Yes   Primary Wound Type: Diabetic ulc   Side: Right   Orientation: medial   Location: Foot   Wound Number: #5   Ankle-Brachial Index:    Pulses:    Removal Indication and Assessment:    Wound Outcome:    (Retired) Wound Type:     (Retired) Wound Length (cm):    (Retired) Wound Width (cm):    (Retired) Depth (cm):    Wound Description (Comments):    Removal Indications:    Wound Image    08/10/22 1322   Dressing Appearance Dried drainage 08/10/22 1322   Drainage Amount Moderate 08/10/22 1322   Drainage Characteristics/Odor Serosanguineous;No odor 08/10/22 1322   Appearance Eschar;Dry;Granulating 08/10/22 1322   Tissue loss description Full thickness 08/10/22 1322   Black (%), Wound Tissue Color 0 % 08/10/22 1322   Red (%), Wound Tissue Color 10 % 08/10/22 1322   Yellow (%), Wound Tissue Color 0 % 08/10/22 1322   Periwound Area Dry 08/10/22 1322   Wound Edges Callused;Open 08/10/22 1322   Wound Length (cm) 1.5 cm 08/10/22 1322   Wound Width (cm) 1.2 cm 08/10/22 1322   Wound Depth (cm) 0.3 cm 08/10/22 1322   Wound Volume (cm^3) 0.54 cm^3 08/10/22 1322   Wound Surface Area (cm^2) 1.8 cm^2 08/10/22 1322   Care Cleansed with:;Soap and water;Applied:;Moisturizing agent 08/10/22 1322   Dressing Applied;Other (comment) 08/10/22 1322   Periwound Care Moisturizer applied 08/10/22 1322   Off Loading Off loading shoe 08/10/22 1322   Dressing Change Due 08/11/22 08/10/22 1322         Active Problem List with Overview Notes    Diagnosis Date Noted    Gastroenteritis 07/08/2022    Diabetic foot ulcer 07/08/2022    Anemia 07/05/2022    Primary osteoarthritis of left shoulder 05/24/2022    Acute cystitis without hematuria 03/08/2022    Allergy to environmental factors 03/08/2022    Elevated alkaline phosphatase level 03/08/2022    Shoulder pain 01/26/2022     Patient arrived for 1000 appt at around 0930. At 1005 the patient was placed on the pulleys for warm-up and stretching of left shoulder.  assist couple minutes later the patient was seen leaving her appt /s any word to staff.  Patient was found sitting in the lobby awaiting her ride home.  She would not express her reasons for leaving only that she was going home.  Patient's regular therapist  was notified.      Decreased range of motion of left shoulder 01/26/2022    Weakness of left arm 01/26/2022    Muscle spasm of left shoulder 01/18/2022    Gastroesophageal reflux disease 11/09/2021    Needs flu shot 11/09/2021    Ulcer of right foot with fat layer exposed 06/28/2021    Type 2 diabetes mellitus with foot ulcer, with long-term current use of insulin 03/19/2021    Lower extremity edema 03/19/2021    Hypertension 03/19/2021    Hyperlipidemia 03/19/2021    Embolic stroke involving left middle cerebral artery 02/12/2021         Plan:   Clean with dakin's solution or baby shampoo and water  Apply Iodosorb  to open areas, cover with dry gauze, wrap with meng and paper tape  Change daily and as needed for soilage  Elevate feet as much as possible  Avoid prolonged standing  Monitor closely for s/s of infection including fever, chills, increase in pain, odor from wound, and increased redness from foot. Go to ER if any complications develop.   Keep leg elevated and avoid pressure on wound.  Diabetes:  Monitor glucose closely. Check fasting glucose and 2 hours after meals. HgA1C goal <7, fasting glucose , and 2 hours after meals <180  Hypertension:  Check blood pressure twice daily, goal <120/80  Diet:   Increase protein intake, avoid fried, fatty foods and foods high in simple carbs.   Vitamins:  Take vitamin C 1000 mg, zinc 50mg, vitamin d 5000 units, and a daily multivitamin. Dawson is a good source of protein and nutrients to aid in wound healing.    Problem List Items Addressed This Visit        Orthopedic    Ulcer of right foot with fat layer exposed - Primary          Future Appointments   Date Time Provider Department Center   8/31/2022 10:00 AM T[facility] Maldonado [facility]   9/6/2022  1:00 PM Kentrell Sanz MD [facility]   10/20/2022 11:00 AM TOMASA Pelayo [facility]            Signature:  TOMASA Maldonado  [facility]  [facility]  [address] [facility] [place] [address]  [phone]    Date of encounter: 8/10/22

## 2022-08-30 NOTE — PATIENT INSTRUCTIONS
Clean with dakin's solution or baby shampoo and water  Apply silver polymem max   to open areas, cover with dry gauze, wrap with meng and paper tape  Change every other day and as needed for soilage  Elevate feet as much as possible  Avoid prolonged standing  Monitor closely for s/s of infection including fever, chills, increase in pain, odor from wound, and increased redness from foot. Go to ER if any complications develop.   Keep leg elevated and avoid pressure on wound.  Diabetes:  Monitor glucose closely. Check fasting glucose and 2 hours after meals. HgA1C goal <7, fasting glucose , and 2 hours after meals <180  Hypertension:  Check blood pressure twice daily, goal <120/80  Diet:   Increase protein intake, avoid fried, fatty foods and foods high in simple carbs.   Vitamins:  Take vitamin C 1000 mg, zinc 50mg, vitamin d 5000 units, and a daily multivitamin. Dawson is a good source of protein and nutrients to aid in wound healing.

## 2022-08-30 NOTE — PROGRESS NOTES
TOMASA Maldonado   [facility]  [address] [facility] [address]  [phone]      PATIENT NAME: Deborah Sotelo  : 1960  DATE: 22  MRN: [id number]      Billing Provider: TOMASA Maldondao  Level of Service:   Patient PCP Information       Provider PCP Type    Ron Sanches MD General            Reason for Visit / Chief Complaint: Diabetic Foot Ulcer (Right foot )       History of Present Illness / Problem Focused Workflow     Deborah Sotelo is a 61 y.o. female presents to clinic for follow up on chronic-non healing wound on right TMA.  Wound continues to have hypergranulation tissue. She has been using betadine at home. Chemical debridement today. D/c betadine and use polymem silver. Significant PMH diabetes, anemia, and CVA. Last HgA1C 9.3 in July. She is due for vascular studies, set up at next appointment. Pertinent factors that delay wound healing include multiple co-morbidities, poor vascular supply, diabetes, edema, heavy drainage, decreased granulation tissue, tract(s), undermining and no protective sensation. Denies fever and chills.     Review of Systems     Review of Systems   Constitutional:  Positive for activity change. Negative for chills and fever.   Respiratory:  Negative for chest tightness and shortness of breath.    Cardiovascular:  Positive for leg swelling. Negative for chest pain and palpitations.   Musculoskeletal:  Positive for arthralgias and joint swelling.   Skin:  Positive for color change and wound.        See wound assessment   Neurological:  Positive for weakness and numbness.   Psychiatric/Behavioral:  Negative for agitation, behavioral problems, confusion and self-injury.      Medical / Social / Family History     Past Medical History:   Diagnosis Date    Anemia 2022    Diabetes mellitus, type 2     Hyperlipidemia     Hypertension     Obesity     Primary osteoarthritis of left shoulder 2022     Stroke        Past Surgical History:   Procedure Laterality Date    AMPUTATION      APPENDECTOMY      EYE SURGERY         Social History  Ms. Deborah Sotelo  reports that she has never smoked. She has never used smokeless tobacco. She reports current alcohol use. She reports that she does not use drugs.    Family History  Ms.'srinath Sotelo family history includes Appendicitis in her father; Asthma in her sister; Cancer in her mother; Diabetes in her brother.    Medications and Allergies     Medications  Outpatient Medications Marked as Taking for the 8/31/22 encounter (Office Visit) with TOMASA Maldonado   Medication Sig Dispense Refill    amitriptyline (ELAVIL) 50 MG tablet Take 0.5 tablets by mouth every evening.      amLODIPine (NORVASC) 5 MG tablet Take 1 tablet (5 mg total) by mouth once daily. 30 tablet 2    amoxicillin-clavulanate 875-125mg (AUGMENTIN) 875-125 mg per tablet Take 1 tablet by mouth 2 (two) times daily.      atorvastatin (LIPITOR) 80 MG tablet TAKE 1 TABLET BY MOUTH EACH NIGHT AT BEDTIME FOR CHOLESTEROL ~~DO NOT EAT GRAPEFRUIT OR DRINK GRAPEFRUIT JUICE WHILE TAKING THIS MEDICATION~~ 30 tablet 2    blood sugar diagnostic Strp 1 strip by Misc.(Non-Drug; Combo Route) route 3 (three) times daily after meals. for 90 doses 100 each 5    CMPD diclofenac 3%- cyclobenzaprine 2%- baclofen 2%- gabapentin 6%- LIDOcaine 2%- prilocaine 2% transdermal gel Apply 1 to 2 grams up to 4 times daily as directed for additional pain relief 240 g 1    HYDROcodone-acetaminophen (NORCO) 7.5-325 mg per tablet 1/2 to 1 po every 6 hours for left shoulder pain 12 tablet 0    insulin asp prt-insulin aspart, NovoLOG 70/30, (NOVOLOG 70/30) 100 unit/mL (70-30) Soln INJECT 50 UNITS SUB-Q EACH MORNING AND 30 UNITS EACH EVENING ...KEEP IN REFRIGERATOR ...USE WITHIN 28 DAYS AFTER OPENING EACH VIAL 30 mL 5    LANTUS U-100 INSULIN 100 unit/mL injection Inject 20 Units into the skin once daily. Take at night 6 mL 2     loratadine (CLARITIN) 10 mg tablet Take 1 tablet (10 mg total) by mouth once daily. 90 tablet 0    losartan (COZAAR) 100 MG tablet Take 1 tablet (100 mg total) by mouth every evening. 30 tablet 2    omeprazole (PRILOSEC) 20 MG capsule Take 1 capsule (20 mg total) by mouth once daily. 30 capsule 2    sodium hypochlorite 0.5 % (DAKIN'S SOLUTION) external solution Apply topically once daily. Pack wound with dakin's moistened nuguaze daily 473 mL 0    tiZANidine (ZANAFLEX) 4 MG tablet Take 1 tablet by mouth 3 (three) times daily.         Allergies  Review of patient's allergies indicates:   Allergen Reactions    Aspirin      Other reaction(s): UPSET STOMACH     Bactrim ds [sulfamethoxazole-trimethoprim] Itching       Physical Examination     Vitals:    08/31/22 1005   BP: (!) 145/91   Pulse: 101   Resp: 20   Temp: 97.7 °F (36.5 °C)     Physical Exam  Vitals and nursing note reviewed.   Constitutional:       Appearance: Normal appearance.   HENT:      Head: Normocephalic.   Cardiovascular:      Rate and Rhythm: Normal rate and regular rhythm.      Pulses: Normal pulses.      Heart sounds: Normal heart sounds.   Pulmonary:      Effort: Pulmonary effort is normal. No respiratory distress.   Chest:      Chest wall: No tenderness.   Musculoskeletal:         General: Swelling, tenderness and deformity present.      Right lower leg: Edema present.      Comments: Left shoulder decreased ROM and weakness to left upper extremity   Skin:     General: Skin is warm and dry.      Comments: Wound, see LDA for measurements and picture   Neurological:      General: No focal deficit present.      Mental Status: She is alert and oriented to person, place, and time. Mental status is at baseline.   Psychiatric:         Mood and Affect: Mood normal.         Behavior: Behavior normal.         Thought Content: Thought content normal.         Judgment: Judgment normal.     Assessment and Plan             Wound 01/18/21 1051 Diabetic  Ulcer Right lateral Plantar #1 (Active)   01/18/21 1051    Pre-existing: Yes   Primary Wound Type: Diabetic ulc   Side: Right   Orientation: lateral   Location: Plantar   Wound Number: #1   Ankle-Brachial Index:    Pulses: normal   Removal Indication and Assessment:    Wound Outcome:    (Retired) Wound Type:    (Retired) Wound Length (cm):    (Retired) Wound Width (cm):    (Retired) Depth (cm):    Wound Description (Comments):    Removal Indications:    Wound Image    08/31/22 1007   Dressing Appearance Moist drainage 08/31/22 1007   Drainage Amount Moderate 08/31/22 1007   Drainage Characteristics/Odor Serosanguineous 08/31/22 1007   Appearance Red;Moist;Granulating 08/31/22 1007   Tissue loss description Full thickness 08/31/22 1007   Black (%), Wound Tissue Color 0 % 08/31/22 1007   Red (%), Wound Tissue Color 100 % 08/31/22 1007   Yellow (%), Wound Tissue Color 0 % 08/31/22 1007   Periwound Area Moist 08/31/22 1007   Wound Edges Open 08/31/22 [facility]   Wound Length (cm) 3.7 cm 08/31/22 [facility]   Wound Width (cm) 3.5 cm 08/31/22 [facility]   Wound Depth (cm) 1.5 cm 08/31/22 [facility]   Wound Volume (cm^3) 19.425 cm^3 08/31/22 [facility]   Wound Surface Area (cm^2) 12.95 cm^2 08/31/22 [facility]   Care Cleansed with:;Antimicrobial agent 08/31/22 1007   Dressing Applied;Gauze;Rolled gauze 08/31/22 [facility]   Periwound Care Moisture barrier applied 08/31/22 1007   Off Loading Off loading shoe 08/31/22 [facility]   Dressing Change Due 09/01/22 08/31/22 [facility]     Problem List Items Addressed This Visit          Endocrine    Type 2 diabetes mellitus with foot ulcer, with long-term current use of insulin    Overview                      Current Assessment & Plan     Clean with dakin's solution or baby shampoo and water  Apply silver polymem max   to open areas, cover with dry gauze, wrap with meng and paper tape  Change every other day and as needed for soilage  Elevate feet as much as possible  Avoid prolonged standing  Monitor closely for s/s of infection  including fever, chills, increase in pain, odor from wound, and increased redness from foot. Go to ER if any complications develop.   Keep leg elevated and avoid pressure on wound.  Diabetes:  Monitor glucose closely. Check fasting glucose and 2 hours after meals. HgA1C goal <7, fasting glucose , and 2 hours after meals <180  Hypertension:  Check blood pressure twice daily, goal <120/80  Diet:   Increase protein intake, avoid fried, fatty foods and foods high in simple carbs.   Vitamins:  Take vitamin C 1000 mg, zinc 50mg, vitamin d 5000 units, and a daily multivitamin. Dawson is a good source of protein and nutrients to aid in wound healing.            Orthopedic    Ulcer of right foot with fat layer exposed - Primary       Future Appointments   Date Time Provider Department Center   9/6/2022  1:00 PM Ron Sanches MD [facility]   9/15/2022 10:00 AM [facility]donado [facility]   10/20/2022 11:00 AM TOMASA Pelayo [facility] ANITRA            Signature:  TOMASA Maldonado  [facility]  [address] [facility] [place] [address]  [phone]    Date of encounter: 8/31/22

## 2022-08-31 ENCOUNTER — OFFICE VISIT (OUTPATIENT)
Dept: WOUND CARE | Facility: CLINIC | Age: 62
End: 2022-08-31
Attending: FAMILY MEDICINE
Payer: MEDICAID

## 2022-08-31 VITALS
HEART RATE: 101 BPM | DIASTOLIC BLOOD PRESSURE: 91 MMHG | SYSTOLIC BLOOD PRESSURE: 145 MMHG | RESPIRATION RATE: 20 BRPM | TEMPERATURE: 98 F

## 2022-08-31 DIAGNOSIS — L97.512 ULCER OF RIGHT FOOT WITH FAT LAYER EXPOSED: Primary | ICD-10-CM

## 2022-08-31 DIAGNOSIS — L97.509 TYPE 2 DIABETES MELLITUS WITH FOOT ULCER, WITH LONG-TERM CURRENT USE OF INSULIN: ICD-10-CM

## 2022-08-31 DIAGNOSIS — E11.621 TYPE 2 DIABETES MELLITUS WITH FOOT ULCER, WITH LONG-TERM CURRENT USE OF INSULIN: ICD-10-CM

## 2022-08-31 DIAGNOSIS — Z79.4 TYPE 2 DIABETES MELLITUS WITH FOOT ULCER, WITH LONG-TERM CURRENT USE OF INSULIN: ICD-10-CM

## 2022-08-31 PROCEDURE — 1159F MED LIST DOCD IN RCRD: CPT | Mod: CPTII,,, | Performed by: NURSE PRACTITIONER

## 2022-08-31 PROCEDURE — 1159F PR MEDICATION LIST DOCUMENTED IN MEDICAL RECORD: ICD-10-PCS | Mod: CPTII,,, | Performed by: NURSE PRACTITIONER

## 2022-08-31 PROCEDURE — 99499 NO LOS: ICD-10-PCS | Mod: S$PBB,,, | Performed by: NURSE PRACTITIONER

## 2022-08-31 PROCEDURE — 99214 OFFICE O/P EST MOD 30 MIN: CPT | Mod: PBBFAC | Performed by: NURSE PRACTITIONER

## 2022-08-31 PROCEDURE — 4010F ACE/ARB THERAPY RXD/TAKEN: CPT | Mod: CPTII,,, | Performed by: NURSE PRACTITIONER

## 2022-08-31 PROCEDURE — 17250 CHEM CAUT OF GRANLTJ TISSUE: CPT | Mod: S$PBB,,, | Performed by: NURSE PRACTITIONER

## 2022-08-31 PROCEDURE — 3046F PR MOST RECENT HEMOGLOBIN A1C LEVEL > 9.0%: ICD-10-PCS | Mod: CPTII,,, | Performed by: NURSE PRACTITIONER

## 2022-08-31 PROCEDURE — 3077F PR MOST RECENT SYSTOLIC BLOOD PRESSURE >= 140 MM HG: ICD-10-PCS | Mod: CPTII,,, | Performed by: NURSE PRACTITIONER

## 2022-08-31 PROCEDURE — 3080F PR MOST RECENT DIASTOLIC BLOOD PRESSURE >= 90 MM HG: ICD-10-PCS | Mod: CPTII,,, | Performed by: NURSE PRACTITIONER

## 2022-08-31 PROCEDURE — 3046F HEMOGLOBIN A1C LEVEL >9.0%: CPT | Mod: CPTII,,, | Performed by: NURSE PRACTITIONER

## 2022-08-31 PROCEDURE — 1160F RVW MEDS BY RX/DR IN RCRD: CPT | Mod: CPTII,,, | Performed by: NURSE PRACTITIONER

## 2022-08-31 PROCEDURE — 1160F PR REVIEW ALL MEDS BY PRESCRIBER/CLIN PHARMACIST DOCUMENTED: ICD-10-PCS | Mod: CPTII,,, | Performed by: NURSE PRACTITIONER

## 2022-08-31 PROCEDURE — 3077F SYST BP >= 140 MM HG: CPT | Mod: CPTII,,, | Performed by: NURSE PRACTITIONER

## 2022-08-31 PROCEDURE — 3080F DIAST BP >= 90 MM HG: CPT | Mod: CPTII,,, | Performed by: NURSE PRACTITIONER

## 2022-08-31 PROCEDURE — 99499 UNLISTED E&M SERVICE: CPT | Mod: S$PBB,,, | Performed by: NURSE PRACTITIONER

## 2022-08-31 PROCEDURE — 4010F PR ACE/ARB THEARPY RXD/TAKEN: ICD-10-PCS | Mod: CPTII,,, | Performed by: NURSE PRACTITIONER

## 2022-08-31 PROCEDURE — 17250 CHEM CAUT OF GRANLTJ TISSUE: CPT | Mod: PBBFAC | Performed by: NURSE PRACTITIONER

## 2022-08-31 PROCEDURE — 17250 PR CHEM CAUTERY GRANULATN TISSUE: ICD-10-PCS | Mod: S$PBB,,, | Performed by: NURSE PRACTITIONER

## 2022-08-31 RX ORDER — TIZANIDINE 4 MG/1
1 TABLET ORAL 3 TIMES DAILY
COMMUNITY
Start: 2022-08-17 | End: 2023-02-16 | Stop reason: SDUPTHER

## 2022-08-31 RX ORDER — AMITRIPTYLINE HYDROCHLORIDE 50 MG/1
0.5 TABLET, FILM COATED ORAL NIGHTLY
COMMUNITY
Start: 2022-08-17 | End: 2023-02-28

## 2022-08-31 NOTE — PROGRESS NOTES
Debridement Performed for Assessment: Wound # Right plantar foot  Performed By: Provider;Felisha Love NP  Assistant: Cici Duvall LPN    Debridement: Chemical/enzymatic  Debridement Description: Non-Selective      Wound/Ulcer size:    Length:   Width: 3.7   Depth:  3.5  Cm2: 12.95    Photo taken post procedure:  Procedural Pain: Insensate  Post Procedural Pain: Insensate  Response to Treatment: Procedure was tolerated well    Specimen  Was a specimen collected?  No Specimen collected      Graft or Implant Aplpied  Was a graft of implant applied?  No      Post deridement diagnosis  Did the post debridment diagnosis chagne?  Chronic right foot diabetic ulcer   The post debridment diagnosis is the same as the pre-op diagnosis.      Assistant of procedure  Who assisted with the procedure?  The assistant was the same as the nurse listed above.      Complication  Complications related to debridement?  No complications noted      Estimated Blood Loss  How much blood loss occured?  No blood loss      Anesthesia  Anesthesia used?  None Debridement Performed for Assessment: Right plantar foot  Estimated Blood Loss  How much blood loss occured?  No blood loss      Anesthesia  Anesthesia used?  None

## 2022-09-13 ENCOUNTER — OFFICE VISIT (OUTPATIENT)
Dept: FAMILY MEDICINE | Facility: CLINIC | Age: 62
End: 2022-09-13
Payer: MEDICAID

## 2022-09-13 VITALS
HEIGHT: 68 IN | DIASTOLIC BLOOD PRESSURE: 88 MMHG | SYSTOLIC BLOOD PRESSURE: 165 MMHG | TEMPERATURE: 98 F | BODY MASS INDEX: 44.41 KG/M2 | WEIGHT: 293 LBS | RESPIRATION RATE: 20 BRPM | HEART RATE: 96 BPM | OXYGEN SATURATION: 98 %

## 2022-09-13 DIAGNOSIS — E11.69 TYPE 2 DIABETES MELLITUS WITH OTHER SPECIFIED COMPLICATION, UNSPECIFIED WHETHER LONG TERM INSULIN USE: Primary | ICD-10-CM

## 2022-09-13 DIAGNOSIS — E11.9 ENCOUNTER FOR DIABETIC FOOT EXAM: ICD-10-CM

## 2022-09-13 DIAGNOSIS — Z23 NEEDS FLU SHOT: ICD-10-CM

## 2022-09-13 DIAGNOSIS — Z01.00 DIABETIC EYE EXAM: ICD-10-CM

## 2022-09-13 DIAGNOSIS — E78.5 HYPERLIPIDEMIA, UNSPECIFIED HYPERLIPIDEMIA TYPE: ICD-10-CM

## 2022-09-13 DIAGNOSIS — N18.9 CHRONIC KIDNEY DISEASE, UNSPECIFIED CKD STAGE: ICD-10-CM

## 2022-09-13 DIAGNOSIS — Z12.11 SCREENING FOR COLON CANCER: ICD-10-CM

## 2022-09-13 DIAGNOSIS — N39.0 URINARY TRACT INFECTION WITHOUT HEMATURIA, SITE UNSPECIFIED: ICD-10-CM

## 2022-09-13 DIAGNOSIS — Z23 FLU VACCINE NEED: ICD-10-CM

## 2022-09-13 DIAGNOSIS — Z11.3 SCREENING EXAMINATION FOR STD (SEXUALLY TRANSMITTED DISEASE): ICD-10-CM

## 2022-09-13 DIAGNOSIS — E11.9 DIABETIC EYE EXAM: ICD-10-CM

## 2022-09-13 DIAGNOSIS — Z23 PNEUMOCOCCAL VACCINE ADMINISTERED: ICD-10-CM

## 2022-09-13 LAB
CREAT UR-MCNC: 80 MG/DL (ref 28–219)
HCV AB SER QL: NORMAL
HIV 1+O+2 AB SERPL QL: NORMAL
MICROALBUMIN UR-MCNC: 1.1 MG/DL (ref 0–2.8)
MICROALBUMIN/CREAT RATIO PNL UR: 13.8 MG/G (ref 0–30)
SYPHILIS AB INTERPRETATION: NORMAL

## 2022-09-13 PROCEDURE — 3046F HEMOGLOBIN A1C LEVEL >9.0%: CPT | Mod: CPTII,,, | Performed by: FAMILY MEDICINE

## 2022-09-13 PROCEDURE — 4010F PR ACE/ARB THEARPY RXD/TAKEN: ICD-10-PCS | Mod: CPTII,,, | Performed by: FAMILY MEDICINE

## 2022-09-13 PROCEDURE — 3066F NEPHROPATHY DOC TX: CPT | Mod: CPTII,,, | Performed by: FAMILY MEDICINE

## 2022-09-13 PROCEDURE — 3077F PR MOST RECENT SYSTOLIC BLOOD PRESSURE >= 140 MM HG: ICD-10-PCS | Mod: CPTII,,, | Performed by: FAMILY MEDICINE

## 2022-09-13 PROCEDURE — 3066F PR DOCUMENTATION OF TREATMENT FOR NEPHROPATHY: ICD-10-PCS | Mod: CPTII,,, | Performed by: FAMILY MEDICINE

## 2022-09-13 PROCEDURE — 4010F ACE/ARB THERAPY RXD/TAKEN: CPT | Mod: CPTII,,, | Performed by: FAMILY MEDICINE

## 2022-09-13 PROCEDURE — 3008F PR BODY MASS INDEX (BMI) DOCUMENTED: ICD-10-PCS | Mod: CPTII,,, | Performed by: FAMILY MEDICINE

## 2022-09-13 PROCEDURE — 87389 HIV 1 / 2 ANTIBODY: ICD-10-PCS | Mod: ,,, | Performed by: CLINICAL MEDICAL LABORATORY

## 2022-09-13 PROCEDURE — 3061F PR NEG MICROALBUMINURIA RESULT DOCUMENTED/REVIEW: ICD-10-PCS | Mod: CPTII,,, | Performed by: FAMILY MEDICINE

## 2022-09-13 PROCEDURE — 3061F NEG MICROALBUMINURIA REV: CPT | Mod: CPTII,,, | Performed by: FAMILY MEDICINE

## 2022-09-13 PROCEDURE — 87591 CHLAMYDIA/GONORRHOEAE(GC), PCR: ICD-10-PCS | Mod: ,,, | Performed by: CLINICAL MEDICAL LABORATORY

## 2022-09-13 PROCEDURE — 3079F PR MOST RECENT DIASTOLIC BLOOD PRESSURE 80-89 MM HG: ICD-10-PCS | Mod: CPTII,,, | Performed by: FAMILY MEDICINE

## 2022-09-13 PROCEDURE — 99214 PR OFFICE/OUTPT VISIT, EST, LEVL IV, 30-39 MIN: ICD-10-PCS | Mod: 25,GC,, | Performed by: FAMILY MEDICINE

## 2022-09-13 PROCEDURE — 82570 MICROALBUMIN / CREATININE RATIO URINE: ICD-10-PCS | Mod: ,,, | Performed by: CLINICAL MEDICAL LABORATORY

## 2022-09-13 PROCEDURE — 90677 PNEUMOCOCCAL CONJUGATE VACCINE 20-VALENT: ICD-10-PCS | Mod: GC,,, | Performed by: FAMILY MEDICINE

## 2022-09-13 PROCEDURE — 90471 PNEUMOCOCCAL CONJUGATE VACCINE 20-VALENT: ICD-10-PCS | Mod: GC,,, | Performed by: FAMILY MEDICINE

## 2022-09-13 PROCEDURE — 87491 CHLAMYDIA/GONORRHOEAE(GC), PCR: ICD-10-PCS | Mod: ,,, | Performed by: CLINICAL MEDICAL LABORATORY

## 2022-09-13 PROCEDURE — 3008F BODY MASS INDEX DOCD: CPT | Mod: CPTII,,, | Performed by: FAMILY MEDICINE

## 2022-09-13 PROCEDURE — 3046F PR MOST RECENT HEMOGLOBIN A1C LEVEL > 9.0%: ICD-10-PCS | Mod: CPTII,,, | Performed by: FAMILY MEDICINE

## 2022-09-13 PROCEDURE — 87491 CHLMYD TRACH DNA AMP PROBE: CPT | Mod: ,,, | Performed by: CLINICAL MEDICAL LABORATORY

## 2022-09-13 PROCEDURE — 99214 OFFICE O/P EST MOD 30 MIN: CPT | Mod: 25,GC,, | Performed by: FAMILY MEDICINE

## 2022-09-13 PROCEDURE — 86803 HEPATITIS C AB TEST: CPT | Mod: ,,, | Performed by: CLINICAL MEDICAL LABORATORY

## 2022-09-13 PROCEDURE — 90471 IMMUNIZATION ADMIN: CPT | Mod: GC,,, | Performed by: FAMILY MEDICINE

## 2022-09-13 PROCEDURE — 86780 TREPONEMA PALLIDUM: CPT | Mod: ,,, | Performed by: CLINICAL MEDICAL LABORATORY

## 2022-09-13 PROCEDURE — 3079F DIAST BP 80-89 MM HG: CPT | Mod: CPTII,,, | Performed by: FAMILY MEDICINE

## 2022-09-13 PROCEDURE — 82043 UR ALBUMIN QUANTITATIVE: CPT | Mod: ,,, | Performed by: CLINICAL MEDICAL LABORATORY

## 2022-09-13 PROCEDURE — 3077F SYST BP >= 140 MM HG: CPT | Mod: CPTII,,, | Performed by: FAMILY MEDICINE

## 2022-09-13 PROCEDURE — 82043 MICROALBUMIN / CREATININE RATIO URINE: ICD-10-PCS | Mod: ,,, | Performed by: CLINICAL MEDICAL LABORATORY

## 2022-09-13 PROCEDURE — 87389 HIV-1 AG W/HIV-1&-2 AB AG IA: CPT | Mod: ,,, | Performed by: CLINICAL MEDICAL LABORATORY

## 2022-09-13 PROCEDURE — 90677 PCV20 VACCINE IM: CPT | Mod: GC,,, | Performed by: FAMILY MEDICINE

## 2022-09-13 PROCEDURE — 86803 HEPATITIS C ANTIBODY: ICD-10-PCS | Mod: ,,, | Performed by: CLINICAL MEDICAL LABORATORY

## 2022-09-13 PROCEDURE — 82570 ASSAY OF URINE CREATININE: CPT | Mod: ,,, | Performed by: CLINICAL MEDICAL LABORATORY

## 2022-09-13 PROCEDURE — 86780 TREPONEMA PALLIDUM (SYPHILIS) ANTIBODY: ICD-10-PCS | Mod: ,,, | Performed by: CLINICAL MEDICAL LABORATORY

## 2022-09-13 PROCEDURE — 87591 N.GONORRHOEAE DNA AMP PROB: CPT | Mod: ,,, | Performed by: CLINICAL MEDICAL LABORATORY

## 2022-09-13 RX ORDER — ATORVASTATIN CALCIUM 80 MG/1
TABLET, FILM COATED ORAL
Qty: 30 TABLET | Refills: 2 | Status: SHIPPED | OUTPATIENT
Start: 2022-09-13 | End: 2023-02-28 | Stop reason: SDUPTHER

## 2022-09-13 RX ORDER — NITROFURANTOIN 25; 75 MG/1; MG/1
100 CAPSULE ORAL EVERY 6 HOURS
Qty: 20 CAPSULE | Refills: 0 | Status: SHIPPED | OUTPATIENT
Start: 2022-09-13 | End: 2022-09-18

## 2022-09-13 NOTE — PROGRESS NOTES
Subjective:       Patient ID: Deborah Sotelo is a 62 y.o. female.    Chief Complaint: No chief complaint on file.    62 year old female with PMHx of leg wound, DM2 and HTN came in for a regular check up. She is also complaining of a burn with urination and believes she has another UTI. She denies urgency and frequency and believes she has hesitancy. Denies blood per urine. She reports she has constipation and has been taking milk of mag to reduce the constipation with no success. Patient reports she has ran out of her glucometer strips and it has been impossible for her to find compatible strips for the machine.   Review of Systems   Constitutional:  Negative for activity change, appetite change, chills, fatigue and fever.   HENT:  Negative for nasal congestion, postnasal drip, rhinorrhea, sinus pressure/congestion, sneezing and sore throat.    Respiratory:  Negative for cough, choking, chest tightness and shortness of breath.    Cardiovascular:  Negative for chest pain, palpitations, leg swelling and claudication.   Gastrointestinal:  Negative for abdominal distention, abdominal pain, blood in stool, constipation, diarrhea, nausea and vomiting.   Genitourinary:  Negative for bladder incontinence, decreased urine volume, difficulty urinating, dyspareunia, dysuria, flank pain, genital sores, hematuria, nocturia, urgency, vaginal bleeding, vaginal discharge, vaginal pain and vaginal dryness.   Musculoskeletal:  Positive for arthralgias.   Integumentary:  Negative for breast mass and breast discharge.   Neurological:  Negative for seizures, light-headedness, numbness, headaches, coordination difficulties and coordination difficulties.   Hematological:  Negative for adenopathy.   Psychiatric/Behavioral:  Negative for agitation.    All other systems reviewed and are negative.  Breast: Negative for mass      Objective:      Physical Exam  Vitals and nursing note reviewed.   Constitutional:       General: She is not in  acute distress.     Appearance: Normal appearance. She is obese. She is not ill-appearing or toxic-appearing.   HENT:      Head: Normocephalic.      Right Ear: External ear normal.      Left Ear: External ear normal.      Nose: Nose normal. No congestion or rhinorrhea.      Mouth/Throat:      Mouth: Mucous membranes are moist.   Eyes:      General:         Right eye: No discharge.         Left eye: No discharge.      Conjunctiva/sclera: Conjunctivae normal.   Cardiovascular:      Rate and Rhythm: Normal rate and regular rhythm.      Pulses: Normal pulses.      Heart sounds: Normal heart sounds. No murmur heard.  Pulmonary:      Effort: Pulmonary effort is normal. No respiratory distress.      Breath sounds: Normal breath sounds. No rhonchi.   Abdominal:      General: Bowel sounds are normal. There is no distension.      Palpations: Abdomen is soft.      Tenderness: There is no abdominal tenderness.   Musculoskeletal:      Left shoulder: Tenderness present. Decreased range of motion.      Cervical back: Neck supple.   Skin:     General: Skin is warm.      Coloration: Skin is not pale.   Neurological:      Mental Status: She is alert and oriented to person, place, and time.       Assessment:       Problem List Items Addressed This Visit          Ophtho    Diabetic eye exam     Patient has a ophthalmologist who sees her regularly            Cardiac/Vascular    Hyperlipidemia     Lipitor was refilled         Relevant Medications    atorvastatin (LIPITOR) 80 MG tablet       Renal/    Urinary tract infection without hematuria     Macrobid was prescribed  Urinalysis was done  Chlamydia and CG pending          Relevant Medications    nitrofurantoin, macrocrystal-monohydrate, (MACROBID) 100 MG capsule    Other Relevant Orders    POCT URINALYSIS W/O SCOPE       ID    Needs flu shot     Will come back for flu shot next week          Screening examination for STD (sexually transmitted disease)     Patient is sexually active  and HIV, hep C, Chlamydia, syphilis and Gonorrhea was ordered         Relevant Orders    HIV 1/2 Ag/Ab (4th Gen)    Hepatitis C Antibody    Syphilis Antibody with reflex to [facility]    Chlamydia/GC, PCR    Pneumococcal vaccine administered    Relevant Orders    (In Office Administered) Pneumococcal Conjugate Vaccine (20 Valent) (IM) (Completed)       Endocrine    Type 2 diabetes mellitus with other specified complication - Primary     New Glucometer was provided  Strips were provided and ordered as well  Diabetic urine screen          Relevant Medications    blood sugar diagnostic Strp    Other Relevant Orders    Microalbumin/Creatinine Ratio, Urine    Encounter for diabetic foot exam     Patient goes to [facility] on weekly basis for her leg wounds. Pictures are provided            GI    Screening for colon cancer     referral sent         Relevant Orders    Ambulatory referral/consult to Gastroenterology     Other Visit Diagnoses       Chronic kidney disease, unspecified CKD stage        Relevant Orders    Microalbumin/Creatinine Ratio, Urine    Flu vaccine need                  Plan:       Type 2 diabetes mellitus with other specified complication, unspecified whether long term insulin use  -     Microalbumin/Creatinine Ratio, Urine  -     blood sugar diagnostic Strp; 1 strip by Misc.(Non-Drug; Combo Route) route 3 (three) times daily.  Dispense: 100 strip; Refill: 11    Screening for colon cancer  -     Ambulatory referral/consult to Gastroenterology; Future; Expected date: 09/20/2022    Screening examination for STD (sexually transmitted disease)  -     HIV 1/2 Ag/Ab (4th Gen); Future; Expected date: 09/13/2022  -     Hepatitis C Antibody; Future; Expected date: 09/13/2022  -     Syphilis Antibody with reflex to [facility]; Future; Expected date: 09/13/2022  -     Chlamydia/GC, PCR    Pneumococcal vaccine administered  -     Cancel: (In Office Administered) Pneumococcal Polysaccharide Vaccine (23 Valent)  (SQ/IM)  -     (In Office Administered) Pneumococcal Conjugate Vaccine (20 Valent) (IM)    Urinary tract infection without hematuria, site unspecified  -     POCT URINALYSIS W/O SCOPE  -     nitrofurantoin, macrocrystal-monohydrate, (MACROBID) 100 MG capsule; Take 1 capsule (100 mg total) by mouth every 6 (six) hours. for 5 days  Dispense: 20 capsule; Refill: 0    Chronic kidney disease, unspecified CKD stage  -     Microalbumin/Creatinine Ratio, Urine    Hyperlipidemia, unspecified hyperlipidemia type  -     atorvastatin (LIPITOR) 80 MG tablet; TAKE 1 TABLET BY MOUTH EACH NIGHT AT BEDTIME FOR CHOLESTEROL ~~DO NOT EAT GRAPEFRUIT OR DRINK GRAPEFRUIT JUICE WHILE TAKING THIS MEDICATION~~  Dispense: 30 tablet; Refill: 2    Encounter for diabetic foot exam    Diabetic eye exam    Flu vaccine need    Needs flu shot

## 2022-09-14 LAB
CHLAMYDIA BY PCR: NEGATIVE
N. GONORRHOEAE (GC) BY PCR: NEGATIVE

## 2022-09-15 ENCOUNTER — OFFICE VISIT (OUTPATIENT)
Dept: WOUND CARE | Facility: CLINIC | Age: 62
End: 2022-09-15
Attending: FAMILY MEDICINE
Payer: MEDICAID

## 2022-09-15 VITALS
SYSTOLIC BLOOD PRESSURE: 142 MMHG | DIASTOLIC BLOOD PRESSURE: 80 MMHG | HEART RATE: 78 BPM | RESPIRATION RATE: 20 BRPM | TEMPERATURE: 97 F

## 2022-09-15 DIAGNOSIS — M19.012 PRIMARY OSTEOARTHRITIS OF LEFT SHOULDER: ICD-10-CM

## 2022-09-15 DIAGNOSIS — R60.0 LOWER EXTREMITY EDEMA: ICD-10-CM

## 2022-09-15 DIAGNOSIS — L97.512 ULCER OF RIGHT FOOT WITH FAT LAYER EXPOSED: Primary | ICD-10-CM

## 2022-09-15 DIAGNOSIS — R29.898 WEAKNESS OF LEFT ARM: ICD-10-CM

## 2022-09-15 DIAGNOSIS — R74.8 ELEVATED ALKALINE PHOSPHATASE LEVEL: ICD-10-CM

## 2022-09-15 PROCEDURE — 4010F ACE/ARB THERAPY RXD/TAKEN: CPT | Mod: CPTII,,, | Performed by: FAMILY MEDICINE

## 2022-09-15 PROCEDURE — 99214 OFFICE O/P EST MOD 30 MIN: CPT | Mod: S$PBB,,, | Performed by: FAMILY MEDICINE

## 2022-09-15 PROCEDURE — 3061F PR NEG MICROALBUMINURIA RESULT DOCUMENTED/REVIEW: ICD-10-PCS | Mod: CPTII,,, | Performed by: FAMILY MEDICINE

## 2022-09-15 PROCEDURE — 3077F SYST BP >= 140 MM HG: CPT | Mod: CPTII,,, | Performed by: FAMILY MEDICINE

## 2022-09-15 PROCEDURE — 99214 PR OFFICE/OUTPT VISIT, EST, LEVL IV, 30-39 MIN: ICD-10-PCS | Mod: S$PBB,,, | Performed by: FAMILY MEDICINE

## 2022-09-15 PROCEDURE — 3066F NEPHROPATHY DOC TX: CPT | Mod: CPTII,,, | Performed by: FAMILY MEDICINE

## 2022-09-15 PROCEDURE — 3066F PR DOCUMENTATION OF TREATMENT FOR NEPHROPATHY: ICD-10-PCS | Mod: CPTII,,, | Performed by: FAMILY MEDICINE

## 2022-09-15 PROCEDURE — 3061F NEG MICROALBUMINURIA REV: CPT | Mod: CPTII,,, | Performed by: FAMILY MEDICINE

## 2022-09-15 PROCEDURE — 1159F MED LIST DOCD IN RCRD: CPT | Mod: CPTII,,, | Performed by: FAMILY MEDICINE

## 2022-09-15 PROCEDURE — 3046F HEMOGLOBIN A1C LEVEL >9.0%: CPT | Mod: CPTII,,, | Performed by: FAMILY MEDICINE

## 2022-09-15 PROCEDURE — 3079F PR MOST RECENT DIASTOLIC BLOOD PRESSURE 80-89 MM HG: ICD-10-PCS | Mod: CPTII,,, | Performed by: FAMILY MEDICINE

## 2022-09-15 PROCEDURE — 3079F DIAST BP 80-89 MM HG: CPT | Mod: CPTII,,, | Performed by: FAMILY MEDICINE

## 2022-09-15 PROCEDURE — 99214 OFFICE O/P EST MOD 30 MIN: CPT | Mod: PBBFAC | Performed by: FAMILY MEDICINE

## 2022-09-15 PROCEDURE — 3046F PR MOST RECENT HEMOGLOBIN A1C LEVEL > 9.0%: ICD-10-PCS | Mod: CPTII,,, | Performed by: FAMILY MEDICINE

## 2022-09-15 PROCEDURE — 4010F PR ACE/ARB THEARPY RXD/TAKEN: ICD-10-PCS | Mod: CPTII,,, | Performed by: FAMILY MEDICINE

## 2022-09-15 PROCEDURE — 3077F PR MOST RECENT SYSTOLIC BLOOD PRESSURE >= 140 MM HG: ICD-10-PCS | Mod: CPTII,,, | Performed by: FAMILY MEDICINE

## 2022-09-15 PROCEDURE — 1159F PR MEDICATION LIST DOCUMENTED IN MEDICAL RECORD: ICD-10-PCS | Mod: CPTII,,, | Performed by: FAMILY MEDICINE

## 2022-09-15 NOTE — PROGRESS NOTES
Don Tinajero III, DO   [facility]  [address] [facility] [place] [address]  [phone]      PATIENT NAME: Deborah Sotelo  : 1960  DATE: 9/15/22  MRN: [id number]      Billing Provider: Don Tinajero III, DO  Level of Service:   Patient PCP Information       Provider PCP Type    Ron Sanches MD General            Reason for Visit / Chief Complaint: Diabetic Foot Ulcer (Right foot)       History of Present Illness / Problem Focused Workflow     Deborah Sotelo is a HPI    Review of Systems     Review of Systems   Constitutional: Negative.  Negative for activity change, appetite change, chills and diaphoresis.   HENT: Negative.  Negative for congestion, ear pain, hearing loss and postnasal drip.    Eyes:  Negative for itching.   Respiratory:  Negative for chest tightness and shortness of breath.    Cardiovascular:  Negative for chest pain.   Gastrointestinal:  Negative for abdominal pain.   Endocrine: Negative for polydipsia.   Genitourinary:  Negative for frequency.   Musculoskeletal:  Positive for arthralgias. Negative for back pain.   Skin:  Positive for wound.   Neurological:  Negative for headaches.     Medical / Social / Family History     Past Medical History:   Diagnosis Date    Anemia 2022    Diabetes mellitus, type 2     Hyperlipidemia     Hypertension     Obesity     Primary osteoarthritis of left shoulder 2022    Stroke        Past Surgical History:   Procedure Laterality Date    AMPUTATION      APPENDECTOMY      EYE SURGERY         Social History  Ms. Deborah Sotelo  reports that she has never smoked. She has never used smokeless tobacco. She reports current alcohol use. She reports that she does not use drugs.    Family History  Ms.'srinath Sotelo family history includes Appendicitis in her father; Asthma in her sister; Cancer in her mother; Diabetes in her brother.    Medications and Allergies     Medications  Outpatient  Medications Marked as Taking for the 9/15/22 encounter (Office Visit) with Don Tinajero III, DO   Medication Sig Dispense Refill    amitriptyline (ELAVIL) 50 MG tablet Take 0.5 tablets by mouth every evening.      amLODIPine (NORVASC) 5 MG tablet Take 1 tablet (5 mg total) by mouth once daily. 30 tablet 2    atorvastatin (LIPITOR) 80 MG tablet TAKE 1 TABLET BY MOUTH EACH NIGHT AT BEDTIME FOR CHOLESTEROL ~~DO NOT EAT GRAPEFRUIT OR DRINK GRAPEFRUIT JUICE WHILE TAKING THIS MEDICATION~~ 30 tablet 2    blood sugar diagnostic Strp 1 strip by Misc.(Non-Drug; Combo Route) route 3 (three) times daily. 100 strip 11    CMPD diclofenac 3%- cyclobenzaprine 2%- baclofen 2%- gabapentin 6%- LIDOcaine 2%- prilocaine 2% transdermal gel Apply 1 to 2 grams up to 4 times daily as directed for additional pain relief 240 g 1    HYDROcodone-acetaminophen (NORCO) 7.5-325 mg per tablet 1/2 to 1 po every 6 hours for left shoulder pain 12 tablet 0    insulin asp prt-insulin aspart, NovoLOG 70/30, (NOVOLOG 70/30) 100 unit/mL (70-30) Soln INJECT 50 UNITS SUB-Q EACH MORNING AND 30 UNITS EACH EVENING ...KEEP IN REFRIGERATOR ...USE WITHIN 28 DAYS AFTER OPENING EACH VIAL 30 mL 5    nitrofurantoin, macrocrystal-monohydrate, (MACROBID) 100 MG capsule Take 1 capsule (100 mg total) by mouth every 6 (six) hours. for 5 days 20 capsule 0    sodium hypochlorite 0.5 % (DAKIN'S SOLUTION) external solution Apply topically once daily. Pack wound with dakin's moistened nuguaze daily 473 mL 0    tiZANidine (ZANAFLEX) 4 MG tablet Take 1 tablet by mouth 3 (three) times daily.         Allergies  Review of patient's allergies indicates:   Allergen Reactions    Aspirin      Other reaction(s): UPSET STOMACH     Bactrim ds [sulfamethoxazole-trimethoprim] Itching       Physical Examination     Vitals:    09/15/22 0938   BP: (!) 142/80   Pulse: 78   Resp: 20   Temp: 97.4 °F (36.3 °C)     Physical Exam  Constitutional:       Appearance: Normal appearance.  She is obese.   HENT:      Head: Atraumatic.   Skin:     Coloration: Skin is jaundiced and pale.      Findings: Bruising, erythema, lesion and rash present.   Neurological:      Mental Status: She is oriented to person, place, and time. Mental status is at baseline.     Assessment and Plan             Wound 01/18/21 1051 Diabetic Ulcer Right lateral Plantar #1 (Active)   01/18/21 1051    Pre-existing: Yes   Primary Wound Type: Diabetic ulc   Side: Right   Orientation: lateral   Location: Plantar   Wound Number: #1   Ankle-Brachial Index:    Pulses: normal   Removal Indication and Assessment:    Wound Outcome:    (Retired) Wound Type:    (Retired) Wound Length (cm):    (Retired) Wound Width (cm):    (Retired) Depth (cm):    Wound Description (Comments):    Removal Indications:    Wound Image    09/15/22 0938   Dressing Appearance Dried drainage 09/15/22 0938   Drainage Amount Moderate 09/15/22 0938   Drainage Characteristics/Odor Serosanguineous 09/15/22 0938   Appearance Red;Moist;Hypergranulation 09/15/22 0938   Tissue loss description Full thickness 09/15/22 0938   Black (%), Wound Tissue Color 100 % 09/15/22 0938   Red (%), Wound Tissue Color 0 % 09/15/22 0938   Yellow (%), Wound Tissue Color 0 % 09/15/22 0938   Periwound Area Moist 09/15/22 0938   Wound Edges Callused 09/15/22 0938   Johnson Classification (diabetic foot ulcers only) Grade 2 09/15/22 0938   Wound Length (cm) 3 cm 09/15/22 0938   Wound Width (cm) 2.2 cm 09/15/22 0938   Wound Depth (cm) 0.5 cm 09/15/22 0938   Wound Volume (cm^3) 3.3 cm^3 09/15/22 0938   Wound Surface Area (cm^2) 6.6 cm^2 09/15/22 0938   Care Cleansed with:;Antimicrobial agent;Soap and water 09/15/22 0938   Dressing Applied;Gauze;Rolled gauze 09/15/22 0938   Periwound Care Moisture barrier applied 09/15/22 0938   Off Loading Off loading shoe 09/15/22 0938   Dressing Change Due 09/16/22 09/15/22 0938     Problem List Items Addressed This Visit          Orthopedic    Ulcer of right  foot with fat layer exposed - Primary       Future Appointments   Date Time Provider Department Center   9/15/2022 10:00 AM Don Tinajero III,  ND [facility]   10/20/2022 11:00 AM TOMASA Pelayo [facility] JOSE RAUL [facility] ANITRA            Signature:  LALITO ALBARRAN LPN  [facility]  [address]  [place] [address]  [phone]    Date of encounter: 9/15/22

## 2022-09-27 DIAGNOSIS — Z12.11 COLON CANCER SCREENING: Primary | ICD-10-CM

## 2022-09-27 RX ORDER — POLYETHYLENE GLYCOL 3350, SODIUM SULFATE ANHYDROUS, SODIUM BICARBONATE, SODIUM CHLORIDE, POTASSIUM CHLORIDE 236; 22.74; 6.74; 5.86; 2.97 G/4L; G/4L; G/4L; G/4L; G/4L
4 POWDER, FOR SOLUTION ORAL ONCE
Qty: 4000 ML | Refills: 0 | Status: SHIPPED | OUTPATIENT
Start: 2022-09-27 | End: 2022-09-27

## 2022-09-28 NOTE — PATIENT INSTRUCTIONS
Clean with vashe or baby shampoo and water  Apply silver polymem max   to open areas, cover with dry gauze, wrap with meng and paper tape. May use betadine until Silver polymem is avalible  Change every other day and as needed for soilage  Elevate feet as much as possible  Avoid prolonged standing  Monitor closely for s/s of infection including fever, chills, increase in pain, odor from wound, and increased redness from foot. Go to ER if any complications develop.   Keep leg elevated and avoid pressure on wound.  Diabetes:  Monitor glucose closely. Check fasting glucose and 2 hours after meals. HgA1C goal <7, fasting glucose , and 2 hours after meals <180  Hypertension:  Check blood pressure twice daily, goal <120/80  Diet:   Increase protein intake, avoid fried, fatty foods and foods high in simple carbs.   Vitamins:  Take vitamin C 1000 mg, zinc 50mg, vitamin d 5000 units, and a daily multivitamin. Dawson is a good source of protein and nutrients to aid in wound healing.

## 2022-09-28 NOTE — PROGRESS NOTES
TOMASA Maldonado   [facility]  [address] [facility] [address]  [phone]      PATIENT NAME: Deborah Sotelo  : 1960  DATE: 22  MRN: [id number]      Billing Provider: TOMASA Maldonado  Level of Service:   Patient PCP Information       Provider PCP Type    Ron Sanches MD General            Reason for Visit / Chief Complaint: Diabetic Foot Ulcer (Right foot)       History of Present Illness / Problem Focused Workflow     Deborah Sotelo is a 61 y.o. female presents to clinic for follow up on chronic-non healing wound on right TMA. Wound continues to remain hypergranulated, chemical cauterization today. Significant PMH diabetes, anemia, and CVA. Last HgA1C 9.3 in July. She is due for vascular studies, set up at next appointment. Pertinent factors that delay wound healing include multiple co-morbidities, poor vascular supply, diabetes, edema, heavy drainage, decreased granulation tissue, tract(s), undermining and no protective sensation. Denies fever and chills.     Review of Systems     Review of Systems   Constitutional:  Positive for activity change. Negative for chills and fever.   Respiratory:  Negative for chest tightness and shortness of breath.    Cardiovascular:  Positive for leg swelling. Negative for chest pain and palpitations.   Musculoskeletal:  Positive for arthralgias and joint swelling.   Skin:  Positive for color change and wound.        See wound assessment   Neurological:  Positive for weakness and numbness.   Psychiatric/Behavioral:  Negative for agitation, behavioral problems, confusion and self-injury.      Medical / Social / Family History     Past Medical History:   Diagnosis Date    Anemia 2022    Diabetes mellitus, type 2     Hyperlipidemia     Hypertension     Obesity     Primary osteoarthritis of left shoulder 2022    Stroke        Past Surgical History:   Procedure Laterality Date    AMPUTATION       APPENDECTOMY      EYE SURGERY         Social History  Ms. Deborah Sotelo  reports that she has never smoked. She has never used smokeless tobacco. She reports current alcohol use. She reports that she does not use drugs.    Family History  Ms.'s Deborah Sotelo family history includes Appendicitis in her father; Asthma in her sister; Cancer in her mother; Diabetes in her brother.    Medications and Allergies     Medications  Outpatient Medications Marked as Taking for the 22 encounter (Office Visit) with TOMASA Maldonado   Medication Sig Dispense Refill    amitriptyline (ELAVIL) 50 MG tablet Take 0.5 tablets by mouth every evening.      amLODIPine (NORVASC) 5 MG tablet Take 1 tablet (5 mg total) by mouth once daily. 30 tablet 2    [] blood sugar diagnostic Strp 1 strip by Misc.(Non-Drug; Combo Route) route 3 (three) times daily. 100 strip 11    CMPD diclofenac 3%- cyclobenzaprine 2%- baclofen 2%- gabapentin 6%- LIDOcaine 2%- prilocaine 2% transdermal gel Apply 1 to 2 grams up to 4 times daily as directed for additional pain relief 240 g 1    insulin asp prt-insulin aspart, NovoLOG 70/30, (NOVOLOG 70/30) 100 unit/mL (70-30) Soln INJECT 50 UNITS SUB-Q EACH MORNING AND 30 UNITS EACH EVENING ...KEEP IN REFRIGERATOR ...USE WITHIN 28 DAYS AFTER OPENING EACH VIAL 30 mL 5    sodium hypochlorite 0.5 % (DAKIN'S SOLUTION) external solution Apply topically once daily. Pack wound with dakin's moistened nuguaze daily 473 mL 0    tiZANidine (ZANAFLEX) 4 MG tablet Take 1 tablet by mouth 3 (three) times daily.         Allergies  Review of patient's allergies indicates:   Allergen Reactions    Aspirin      Other reaction(s): UPSET STOMACH     Bactrim ds [sulfamethoxazole-trimethoprim] Itching       Physical Examination     Vitals:    22 1003   BP: (!) 121/91   Pulse: 80   Resp: 20   Temp: 98.2 °F (36.8 °C)     Physical Exam  Vitals and nursing note reviewed.   Constitutional:       Appearance:  Normal appearance.   HENT:      Head: Normocephalic.   Cardiovascular:      Rate and Rhythm: Normal rate and regular rhythm.      Pulses: Normal pulses.      Heart sounds: Normal heart sounds.   Pulmonary:      Effort: Pulmonary effort is normal. No respiratory distress.   Chest:      Chest wall: No tenderness.   Musculoskeletal:         General: Swelling, tenderness and deformity present.      Right lower leg: Edema present.      Comments: Left shoulder decreased ROM and weakness to left upper extremity   Skin:     General: Skin is warm and dry.      Comments: Wound, see LDA for measurements and picture   Neurological:      General: No focal deficit present.      Mental Status: She is alert and oriented to person, place, and time. Mental status is at baseline.   Psychiatric:         Mood and Affect: Mood normal.         Behavior: Behavior normal.         Thought Content: Thought content normal.         Judgment: Judgment normal.     Assessment and Plan             Wound 01/18/21 1051 Diabetic Ulcer Right lateral Plantar #1 (Active)   01/18/21 1051    Pre-existing: Yes   Primary Wound Type: Diabetic ulc   Side: Right   Orientation: lateral   Location: Plantar   Wound Number: #1   Ankle-Brachial Index:    Pulses: normal   Removal Indication and Assessment:    Wound Outcome:    (Retired) Wound Type:    (Retired) Wound Length (cm):    (Retired) Wound Width (cm):    (Retired) Depth (cm):    Wound Description (Comments):    Removal Indications:    Wound Image     09/29/22 1018   Dressing Appearance Moist drainage 09/29/22 1018   Drainage Amount Moderate 09/29/22 1018   Drainage Characteristics/Odor Serosanguineous 09/29/22 1018   Appearance Pink;Moist;Granulating 09/29/22 1018   Tissue loss description Full thickness 09/29/22 1018   Black (%), Wound Tissue Color 0 % 09/29/22 1018   Red (%), Wound Tissue Color 100 % 09/29/22 1018   Yellow (%), Wound Tissue Color 0 % 09/29/22 1018   Periwound Area Moist 09/29/22 1018    Wound Edges Callused 09/29/22 1018   Johnson Classification (diabetic foot ulcers only) Grade 1 09/29/22 1018   Wound Length (cm) 2.6 cm 09/29/22 1018   Wound Width (cm) 2.1 cm 09/29/22 1018   Wound Depth (cm) 0.3 cm 09/29/22 1018   Wound Volume (cm^3) 1.638 cm^3 09/29/22 1018   Wound Surface Area (cm^2) 5.46 cm^2 09/29/22 1018   Care Cleansed with:;Soap and water;Applied:;Povidone iodine 09/29/22 1018   Dressing Applied;Gauze;Rolled gauze 09/29/22 1018   Periwound Care Moisturizer applied 09/29/22 1018   Off Loading Off loading shoe 09/29/22 1018   Dressing Change Due 09/30/22 09/29/22 1018            Wound 01/21/22 Diabetic Ulcer Right medial Foot #5 (Active)   01/21/22     Pre-existing: Yes   Primary Wound Type: Diabetic ulc   Side: Right   Orientation: medial   Location: Foot   Wound Number: #5   Ankle-Brachial Index:    Pulses:    Removal Indication and Assessment:    Wound Outcome:    (Retired) Wound Type:    (Retired) Wound Length (cm):    (Retired) Wound Width (cm):    (Retired) Depth (cm):    Wound Description (Comments):    Removal Indications:    Wound Image    09/29/22 1008   Dressing Appearance Dry;Intact;Clean 09/29/22 1008   Drainage Amount None 09/29/22 1008   Appearance Intact;Tan 09/29/22 1008   Tissue loss description Not applicable 09/29/22 1008   Black (%), Wound Tissue Color 0 % 09/29/22 1008   Red (%), Wound Tissue Color 0 % 09/29/22 1008   Yellow (%), Wound Tissue Color 0 % 09/29/22 1008   Periwound Area Intact;Dry 09/29/22 1008   Wound Edges Callused 09/29/22 1008   Wound Length (cm) 0 cm 09/29/22 1008   Wound Width (cm) 0 cm 09/29/22 1008   Wound Depth (cm) 0 cm 09/29/22 1008   Wound Volume (cm^3) 0 cm^3 09/29/22 1008   Wound Surface Area (cm^2) 0 cm^2 09/29/22 1008   Care Cleansed with:;Soap and water 09/29/22 1008   Dressing Applied 09/29/22 1008   Periwound Care Moisturizer applied 09/29/22 1008   Dressing Change Due 09/30/22 09/29/22 1008     Problem List Items Addressed This Visit           Endocrine    Diabetic foot ulcer    Overview                  Current Assessment & Plan     Clean with vashe or baby shampoo and water  Apply silver polymem max   to open areas, cover with dry gauze, wrap with meng and paper tape. May use betadine until Silver polymem is avalible  Change every other day and as needed for soilage  Elevate feet as much as possible  Avoid prolonged standing  Monitor closely for s/s of infection including fever, chills, increase in pain, odor from wound, and increased redness from foot. Go to ER if any complications develop.   Keep leg elevated and avoid pressure on wound.  Diabetes:  Monitor glucose closely. Check fasting glucose and 2 hours after meals. HgA1C goal <7, fasting glucose , and 2 hours after meals <180  Hypertension:  Check blood pressure twice daily, goal <120/80  Diet:   Increase protein intake, avoid fried, fatty foods and foods high in simple carbs.   Vitamins:  Take vitamin C 1000 mg, zinc 50mg, vitamin d 5000 units, and a daily multivitamin. Dawson is a good source of protein and nutrients to aid in wound healing.            Orthopedic    Ulcer of right foot with fat layer exposed - Primary       Future Appointments   Date Time Provider Department Center   10/27/2022 10:00 AM [facility]            Signature:  TOMASA Maldonado  [facility]  [address] [facility] [place] [address]  [phone]    Date of encounter: 9/29/22

## 2022-09-29 ENCOUNTER — OFFICE VISIT (OUTPATIENT)
Dept: WOUND CARE | Facility: CLINIC | Age: 62
End: 2022-09-29
Attending: FAMILY MEDICINE
Payer: MEDICAID

## 2022-09-29 ENCOUNTER — TELEPHONE (OUTPATIENT)
Dept: WOUND CARE | Facility: CLINIC | Age: 62
End: 2022-09-29
Payer: MEDICAID

## 2022-09-29 VITALS
TEMPERATURE: 98 F | HEART RATE: 80 BPM | RESPIRATION RATE: 20 BRPM | DIASTOLIC BLOOD PRESSURE: 91 MMHG | SYSTOLIC BLOOD PRESSURE: 121 MMHG

## 2022-09-29 DIAGNOSIS — L97.512 ULCER OF RIGHT FOOT WITH FAT LAYER EXPOSED: Primary | ICD-10-CM

## 2022-09-29 DIAGNOSIS — L97.412 DIABETIC ULCER OF RIGHT MIDFOOT ASSOCIATED WITH TYPE 2 DIABETES MELLITUS, WITH FAT LAYER EXPOSED: ICD-10-CM

## 2022-09-29 DIAGNOSIS — E11.621 DIABETIC ULCER OF RIGHT MIDFOOT ASSOCIATED WITH TYPE 2 DIABETES MELLITUS, WITH FAT LAYER EXPOSED: ICD-10-CM

## 2022-09-29 PROCEDURE — 17250 CHEM CAUT OF GRANLTJ TISSUE: CPT | Mod: S$PBB,,, | Performed by: NURSE PRACTITIONER

## 2022-09-29 PROCEDURE — 1160F PR REVIEW ALL MEDS BY PRESCRIBER/CLIN PHARMACIST DOCUMENTED: ICD-10-PCS | Mod: CPTII,,, | Performed by: NURSE PRACTITIONER

## 2022-09-29 PROCEDURE — 4010F PR ACE/ARB THEARPY RXD/TAKEN: ICD-10-PCS | Mod: CPTII,,, | Performed by: NURSE PRACTITIONER

## 2022-09-29 PROCEDURE — 3046F HEMOGLOBIN A1C LEVEL >9.0%: CPT | Mod: CPTII,,, | Performed by: NURSE PRACTITIONER

## 2022-09-29 PROCEDURE — 3046F PR MOST RECENT HEMOGLOBIN A1C LEVEL > 9.0%: ICD-10-PCS | Mod: CPTII,,, | Performed by: NURSE PRACTITIONER

## 2022-09-29 PROCEDURE — 1159F PR MEDICATION LIST DOCUMENTED IN MEDICAL RECORD: ICD-10-PCS | Mod: CPTII,,, | Performed by: NURSE PRACTITIONER

## 2022-09-29 PROCEDURE — 3074F SYST BP LT 130 MM HG: CPT | Mod: CPTII,,, | Performed by: NURSE PRACTITIONER

## 2022-09-29 PROCEDURE — 99499 NO LOS: ICD-10-PCS | Mod: S$PBB,,, | Performed by: NURSE PRACTITIONER

## 2022-09-29 PROCEDURE — 3080F PR MOST RECENT DIASTOLIC BLOOD PRESSURE >= 90 MM HG: ICD-10-PCS | Mod: CPTII,,, | Performed by: NURSE PRACTITIONER

## 2022-09-29 PROCEDURE — 3061F NEG MICROALBUMINURIA REV: CPT | Mod: CPTII,,, | Performed by: NURSE PRACTITIONER

## 2022-09-29 PROCEDURE — 3080F DIAST BP >= 90 MM HG: CPT | Mod: CPTII,,, | Performed by: NURSE PRACTITIONER

## 2022-09-29 PROCEDURE — 1160F RVW MEDS BY RX/DR IN RCRD: CPT | Mod: CPTII,,, | Performed by: NURSE PRACTITIONER

## 2022-09-29 PROCEDURE — 4010F ACE/ARB THERAPY RXD/TAKEN: CPT | Mod: CPTII,,, | Performed by: NURSE PRACTITIONER

## 2022-09-29 PROCEDURE — 3066F PR DOCUMENTATION OF TREATMENT FOR NEPHROPATHY: ICD-10-PCS | Mod: CPTII,,, | Performed by: NURSE PRACTITIONER

## 2022-09-29 PROCEDURE — 1159F MED LIST DOCD IN RCRD: CPT | Mod: CPTII,,, | Performed by: NURSE PRACTITIONER

## 2022-09-29 PROCEDURE — 17250 PR CHEM CAUTERY GRANULATN TISSUE: ICD-10-PCS | Mod: S$PBB,,, | Performed by: NURSE PRACTITIONER

## 2022-09-29 PROCEDURE — 99215 OFFICE O/P EST HI 40 MIN: CPT | Mod: PBBFAC | Performed by: NURSE PRACTITIONER

## 2022-09-29 PROCEDURE — 99499 UNLISTED E&M SERVICE: CPT | Mod: S$PBB,,, | Performed by: NURSE PRACTITIONER

## 2022-09-29 PROCEDURE — 3061F PR NEG MICROALBUMINURIA RESULT DOCUMENTED/REVIEW: ICD-10-PCS | Mod: CPTII,,, | Performed by: NURSE PRACTITIONER

## 2022-09-29 PROCEDURE — 17250 CHEM CAUT OF GRANLTJ TISSUE: CPT | Mod: PBBFAC | Performed by: NURSE PRACTITIONER

## 2022-09-29 PROCEDURE — 3074F PR MOST RECENT SYSTOLIC BLOOD PRESSURE < 130 MM HG: ICD-10-PCS | Mod: CPTII,,, | Performed by: NURSE PRACTITIONER

## 2022-09-29 PROCEDURE — 3066F NEPHROPATHY DOC TX: CPT | Mod: CPTII,,, | Performed by: NURSE PRACTITIONER

## 2022-09-29 RX ORDER — NITROFURANTOIN 25; 75 MG/1; MG/1
200 CAPSULE ORAL 2 TIMES DAILY
COMMUNITY
Start: 2022-09-21 | End: 2022-11-17 | Stop reason: ALTCHOICE

## 2022-09-29 NOTE — TELEPHONE ENCOUNTER
Spoke with patient's insurance who states that her current meter is not covered and that [employer] is covered.     Called in [employer] meter with all needed supplies, including lancets and test strips to [facility] per SARTHAK Bruner FNP order.

## 2022-10-12 NOTE — PROGRESS NOTES
Debridement Performed for Assessment: Wound: R foot ulcer  Performed By: Provider;Felisha Love NP  Assistant: MARCO Ngo RN    Debridement: Chemical/enzymatic  Debridement Description: Non-Selective      Wound/Ulcer size:    Length:  0.6  Width:   0.7  Depth:  0.4  Cm2:  0.42    Photo taken post procedure:  Procedural Pain: Insensate  Post Procedural Pain: Insensate  Response to Treatment: Procedure was tolerated well    Specimen  Was a specimen collected?  No Specimen collected      Graft or Implant Aplpied  Was a graft of implant applied?  No      Post deridement diagnosis  Did the post debridment diagnosis chagne?  The post debridment diagnosis is the same as the pre-op diagnosis.      Assistant of procedure  Who assisted with the procedure?  The assistant was the same as the nurse listed above.      Complication  Complications related to debridement?  No complications noted      Estimated Blood Loss  How much blood loss occured?  No blood loss      Anesthesia  Anesthesia used?  None Debridement Performed for Assessment: Wound #  Estimated Blood Loss  How much blood loss occured?  No blood loss      Anesthesia  Anesthesia used?  None

## 2022-10-13 ENCOUNTER — OFFICE VISIT (OUTPATIENT)
Dept: WOUND CARE | Facility: CLINIC | Age: 62
End: 2022-10-13
Attending: FAMILY MEDICINE
Payer: MEDICAID

## 2022-10-13 VITALS
HEART RATE: 88 BPM | DIASTOLIC BLOOD PRESSURE: 88 MMHG | TEMPERATURE: 97 F | SYSTOLIC BLOOD PRESSURE: 156 MMHG | RESPIRATION RATE: 20 BRPM

## 2022-10-13 DIAGNOSIS — E11.621 DIABETIC ULCER OF RIGHT MIDFOOT ASSOCIATED WITH TYPE 2 DIABETES MELLITUS, WITH FAT LAYER EXPOSED: Primary | ICD-10-CM

## 2022-10-13 DIAGNOSIS — M25.519 SHOULDER PAIN, UNSPECIFIED CHRONICITY, UNSPECIFIED LATERALITY: ICD-10-CM

## 2022-10-13 DIAGNOSIS — L97.412 DIABETIC ULCER OF RIGHT MIDFOOT ASSOCIATED WITH TYPE 2 DIABETES MELLITUS, WITH FAT LAYER EXPOSED: Primary | ICD-10-CM

## 2022-10-13 PROCEDURE — 3066F PR DOCUMENTATION OF TREATMENT FOR NEPHROPATHY: ICD-10-PCS | Mod: CPTII,,, | Performed by: NURSE PRACTITIONER

## 2022-10-13 PROCEDURE — 3061F NEG MICROALBUMINURIA REV: CPT | Mod: CPTII,,, | Performed by: NURSE PRACTITIONER

## 2022-10-13 PROCEDURE — 3077F PR MOST RECENT SYSTOLIC BLOOD PRESSURE >= 140 MM HG: ICD-10-PCS | Mod: CPTII,,, | Performed by: NURSE PRACTITIONER

## 2022-10-13 PROCEDURE — 3046F PR MOST RECENT HEMOGLOBIN A1C LEVEL > 9.0%: ICD-10-PCS | Mod: CPTII,,, | Performed by: NURSE PRACTITIONER

## 2022-10-13 PROCEDURE — 3077F SYST BP >= 140 MM HG: CPT | Mod: CPTII,,, | Performed by: NURSE PRACTITIONER

## 2022-10-13 PROCEDURE — 99214 PR OFFICE/OUTPT VISIT, EST, LEVL IV, 30-39 MIN: ICD-10-PCS | Mod: S$PBB,,, | Performed by: NURSE PRACTITIONER

## 2022-10-13 PROCEDURE — 1160F RVW MEDS BY RX/DR IN RCRD: CPT | Mod: CPTII,,, | Performed by: NURSE PRACTITIONER

## 2022-10-13 PROCEDURE — 4010F PR ACE/ARB THEARPY RXD/TAKEN: ICD-10-PCS | Mod: CPTII,,, | Performed by: NURSE PRACTITIONER

## 2022-10-13 PROCEDURE — 1159F PR MEDICATION LIST DOCUMENTED IN MEDICAL RECORD: ICD-10-PCS | Mod: CPTII,,, | Performed by: NURSE PRACTITIONER

## 2022-10-13 PROCEDURE — 3046F HEMOGLOBIN A1C LEVEL >9.0%: CPT | Mod: CPTII,,, | Performed by: NURSE PRACTITIONER

## 2022-10-13 PROCEDURE — 3066F NEPHROPATHY DOC TX: CPT | Mod: CPTII,,, | Performed by: NURSE PRACTITIONER

## 2022-10-13 PROCEDURE — 3079F DIAST BP 80-89 MM HG: CPT | Mod: CPTII,,, | Performed by: NURSE PRACTITIONER

## 2022-10-13 PROCEDURE — 99214 OFFICE O/P EST MOD 30 MIN: CPT | Mod: S$PBB,,, | Performed by: NURSE PRACTITIONER

## 2022-10-13 PROCEDURE — 1160F PR REVIEW ALL MEDS BY PRESCRIBER/CLIN PHARMACIST DOCUMENTED: ICD-10-PCS | Mod: CPTII,,, | Performed by: NURSE PRACTITIONER

## 2022-10-13 PROCEDURE — 3061F PR NEG MICROALBUMINURIA RESULT DOCUMENTED/REVIEW: ICD-10-PCS | Mod: CPTII,,, | Performed by: NURSE PRACTITIONER

## 2022-10-13 PROCEDURE — 3079F PR MOST RECENT DIASTOLIC BLOOD PRESSURE 80-89 MM HG: ICD-10-PCS | Mod: CPTII,,, | Performed by: NURSE PRACTITIONER

## 2022-10-13 PROCEDURE — 4010F ACE/ARB THERAPY RXD/TAKEN: CPT | Mod: CPTII,,, | Performed by: NURSE PRACTITIONER

## 2022-10-13 PROCEDURE — 1159F MED LIST DOCD IN RCRD: CPT | Mod: CPTII,,, | Performed by: NURSE PRACTITIONER

## 2022-10-13 PROCEDURE — 99215 OFFICE O/P EST HI 40 MIN: CPT | Mod: PBBFAC | Performed by: NURSE PRACTITIONER

## 2022-10-13 RX ORDER — OXYCODONE AND ACETAMINOPHEN 10; 325 MG/1; MG/1
1 TABLET ORAL EVERY 12 HOURS PRN
Qty: 10 TABLET | Refills: 0 | Status: SHIPPED | OUTPATIENT
Start: 2022-10-13 | End: 2022-10-18

## 2022-10-13 RX ORDER — OXYCODONE AND ACETAMINOPHEN 10; 325 MG/1; MG/1
1 TABLET ORAL EVERY 12 HOURS PRN
Qty: 10 TABLET | Refills: 0 | Status: SHIPPED | OUTPATIENT
Start: 2022-10-13 | End: 2022-10-13

## 2022-10-13 NOTE — PROGRESS NOTES
TOMASA Maldonado   [facility]  [address]  [facility] [place] [address]  [phone]      PATIENT NAME: Deborah Sotelo  : 1960  DATE: 10/13/22  MRN: [id number]      Billing Provider: TOMASA Maldonado  Level of Service:   Patient PCP Information       Provider PCP Type    Ron Sanches MD General            Reason for Visit / Chief Complaint: Non-healing Wound Follow Up and Diabetic Foot Ulcer (Right foot)       History of Present Illness / Problem Focused Workflow     Deborah Sotelo is a 61 y.o. female presents to clinic for follow up on chronic-non healing wound on right TMA.  Wound has improved since last visit. D/c betadine and use polymem silver. Significant PMH diabetes, anemia, and CVA. Last HgA1C 9.3 in July. She is due for vascular studies, set up at next appointment. Since last visit reports appointment with Dr. Younger. She has follow up with him on Monday. Records were requested from [facility]. Patient is generally pleasant at appointments. Today she cried throughout entirety of visit due to the severity of pain in her shoulder. Pain keeps her awake at night and shoulder hurts constantly. She has progressive loss of ROM in her left shoulder. MRI left shoulder 2022 shows severe DJD of the glenohumeral joint. There was advanced tendinopathy of the rotator cuff without discrete tearing. She has taking NSAIDs, opioids, steroid injection in , physical therapy which exacerbated symptoms, and will ultimately require reverse total shoulder replacement arthroplasty. She has had little relief from symptoms thus far. Patient is well known to me and due to level of pain and effects on quality of life I prescribed a 3 day supply of pain medication to get her to f/u appointment with Dr. Younger on Monday. She is aware that this is a one time supply and she has to keep appointment with Pain management and follow up pain contract. Pertinent factors  that delay wound healing include multiple co-morbidities, poor vascular supply, diabetes, edema, heavy drainage, decreased granulation tissue, tract(s), undermining and no protective sensation. Denies fever and chills.       Review of Systems     Review of Systems   Constitutional:  Positive for activity change. Negative for chills and fever.   Respiratory:  Negative for chest tightness and shortness of breath.    Cardiovascular:  Positive for leg swelling. Negative for chest pain and palpitations.   Musculoskeletal:  Positive for arthralgias, back pain and joint swelling.        Severe shoulder pain, 10 of 10 on pain scale   Skin:  Positive for color change and wound.        See wound assessment   Neurological:  Positive for weakness and numbness.   Psychiatric/Behavioral:  Negative for agitation, behavioral problems, confusion and self-injury.      Medical / Social / Family History     Past Medical History:   Diagnosis Date    Anemia 7/5/2022    Diabetes mellitus, type 2     Hyperlipidemia     Hypertension     Obesity     Primary osteoarthritis of left shoulder 5/24/2022    Stroke        Past Surgical History:   Procedure Laterality Date    AMPUTATION      APPENDECTOMY      EYE SURGERY         Social History  Ms. Deborah Sotelo  reports that she has never smoked. She has never used smokeless tobacco. She reports current alcohol use. She reports that she does not use drugs.    Family History  Ms.'s Deborah Sotelo family history includes Appendicitis in her father; Asthma in her sister; Cancer in her mother; Diabetes in her brother.    Medications and Allergies     Medications  Outpatient Medications Marked as Taking for the 10/13/22 encounter (Office Visit) with TOMASA Maldonado   Medication Sig Dispense Refill    [DISCONTINUED] oxyCODONE-acetaminophen (PERCOCET)  mg per tablet Take 1 tablet by mouth every 12 (twelve) hours as needed for Pain (left shoulder pain, foot pain). 10 tablet 0        Allergies  Review of patient's allergies indicates:   Allergen Reactions    Aspirin      Other reaction(s): UPSET STOMACH     Bactrim ds [sulfamethoxazole-trimethoprim] Itching       Physical Examination     Vitals:    10/13/22 1102   BP: (!) 156/88   Pulse: 88   Resp: 20   Temp: 97.4 °F (36.3 °C)     Physical Exam  Vitals and nursing note reviewed.   Constitutional:       Comments: Tearful during exam   HENT:      Head: Normocephalic.   Cardiovascular:      Rate and Rhythm: Normal rate and regular rhythm.      Pulses: Normal pulses.      Heart sounds: Normal heart sounds.   Pulmonary:      Effort: Pulmonary effort is normal. No respiratory distress.   Chest:      Chest wall: No tenderness.   Musculoskeletal:         General: Swelling, tenderness and deformity present.      Right lower leg: Edema present.      Comments: Left shoulder decreased ROM with weakness to left upper extremity   Skin:     General: Skin is warm and dry.      Capillary Refill: Capillary refill takes 2 to 3 seconds.      Comments: Wound, see LDA for measurements and picture   Neurological:      Mental Status: She is alert and oriented to person, place, and time.   Psychiatric:         Mood and Affect: Mood normal.         Behavior: Behavior normal.         Thought Content: Thought content normal.         Judgment: Judgment normal.     Assessment and Plan             Wound 01/18/21 1051 Diabetic Ulcer Right lateral Plantar #1 (Active)   01/18/21 1051    Pre-existing: Yes   Primary Wound Type: Diabetic ulc   Side: Right   Orientation: lateral   Location: Plantar   Wound Number: #1   Ankle-Brachial Index:    Pulses: normal   Removal Indication and Assessment:    Wound Outcome:    (Retired) Wound Type:    (Retired) Wound Length (cm):    (Retired) Wound Width (cm):    (Retired) Depth (cm):    Wound Description (Comments):    Removal Indications:    Wound Image    10/13/22 1102   Dressing Appearance Moist drainage 10/13/22 1102   Drainage  Amount Moderate 10/13/22 1102   Drainage Characteristics/Odor Serosanguineous 10/13/22 1102   Appearance Pink;Moist;Granulating 10/13/22 1102   Tissue loss description Full thickness 10/13/22 1102   Black (%), Wound Tissue Color 0 % 10/13/22 1102   Red (%), Wound Tissue Color 100 % 10/13/22 1102   Yellow (%), Wound Tissue Color 0 % 10/13/22 1102   Wound Edges Callused 10/13/22 1102   Johnson Classification (diabetic foot ulcers only) Grade 1 10/13/22 1102   Wound Length (cm) 2 cm 10/13/22 1102   Wound Width (cm) 1.6 cm 10/13/22 1102   Wound Depth (cm) 0.4 cm 10/13/22 1102   Wound Volume (cm^3) 1.28 cm^3 10/13/22 1102   Wound Surface Area (cm^2) 3.2 cm^2 10/13/22 1102   Care Cleansed with:;Antimicrobial agent 10/13/22 1102   Dressing Applied;Gauze, wet to dry;Gauze;Rolled gauze 10/13/22 1102   Periwound Care Moisture barrier applied 10/13/22 1102   Dressing Change Due 10/14/22 10/13/22 1102     Problem List Items Addressed This Visit          Orthopedic    Ulcer of right foot with fat layer exposed - Primary    Shoulder pain    Current Assessment & Plan     3 day supply of Pain medication given  F/u with Dr. Younger on Monday  Schedule appointment with Ortho to discuss surgical intervention            Future Appointments   Date Time Provider Department Center   10/20/2022 11:00 AM TOMASA ePlayo [facility]   10/27/2022 10:00 AM [facility]            Signature:  TOMASA Maldonado  [facility]  [address]  [facility] [place] [address]  [phone]    Date of encounter: 10/13/22

## 2022-10-13 NOTE — PATIENT INSTRUCTIONS
Clean with baby shampoo and water or vashe    Apply silver polymem to wound,cover with dry gauze,wrap with meng,secure with paper tape,change daily and as needed    Monitor closely for s/s of infection including fever, chills, increase in pain, odor from wound, and increased redness from foot. Go to ER if any complications develop.   Keep leg elevated and avoid pressure on wound  Diabetes:  Monitor glucose closely. Check fasting glucose and 2 hours after meals. HgA1C goal <7, fasting glucose , and 2 hours after meals <180  Hypertension:  Check blood pressure twice daily, goal <120/80  Diet:   Increase protein intake, avoid fried, fatty foods and foods high in simple carbs.   Vitamins:  Take vitamin C 1000 mg, zinc 50mg, vitamin d 5000 units, and a daily multivitamin. Dawson is a good source of protein and nutrients to aid in wound healing.

## 2022-10-16 NOTE — ASSESSMENT & PLAN NOTE
3 day supply of Pain medication given  F/u with Dr. Younger on Monday  Schedule appointment with Ortho to discuss surgical intervention

## 2022-10-26 NOTE — PATIENT INSTRUCTIONS
Clean with baby shampoo and water or vashe     Apply silver polymem to wound, cover with dry gauze, wrap with meng,secure with paper tape, change daily and as needed     Monitor closely for s/s of infection including fever, chills, increase in pain, odor from wound, and increased redness from foot. Go to ER if any complications develop.   Keep leg elevated and avoid pressure on wound  Diabetes:  Monitor glucose closely. Check fasting glucose and 2 hours after meals. HgA1C goal <7, fasting glucose , and 2 hours after meals <180  Hypertension:  Check blood pressure twice daily, goal <120/80  Diet:   Increase protein intake, avoid fried, fatty foods and foods high in simple carbs.   Vitamins:  Take vitamin C 1000 mg, zinc 50mg, vitamin d 5000 units, and a daily multivitamin. Dawson is a good source of protein and nutrients to aid in wound healing.

## 2022-10-26 NOTE — PROGRESS NOTES
TOMASA Maldonado   [facility]  [address] [facility] [address]  [phone]      PATIENT NAME: Deborah Sotelo  : 1960  DATE: 10/27/22  MRN: [id number]      Billing Provider: TOMASA Maldonado  Level of Service:   Patient PCP Information       Provider PCP Type    Ron Sanches MD General            Reason for Visit / Chief Complaint: Diabetic ulcer of right midfoot associated with type 2 diab       History of Present Illness / Problem Focused Workflow     Deborah Sotelo is a 61 y.o. female presents to clinic for follow up on chronic-non healing wound on right TMA.  Wound has improved since last visit. Callus trimmed around wound bed. Compliant with polymem silver. Significant PMH diabetes, anemia, and CVA. Last HgA1C 9.3 in July, diabetes managed by PCP. Pertinent factors that delay wound healing include multiple co-morbidities, poor vascular supply, diabetes, edema, heavy drainage, decreased granulation tissue, tract(s), undermining and no protective sensation. Denies fever and chills.     Review of Systems     Review of Systems   Constitutional:  Positive for activity change. Negative for chills and fever.   Respiratory:  Negative for chest tightness and shortness of breath.    Cardiovascular:  Positive for leg swelling. Negative for chest pain and palpitations.   Musculoskeletal:  Positive for arthralgias, back pain and joint swelling.        Severe shoulder pain, 10 of 10 on pain scale   Skin:  Positive for color change and wound.        See wound assessment   Neurological:  Positive for weakness and numbness.   Psychiatric/Behavioral:  Negative for agitation, behavioral problems, confusion and self-injury.      Medical / Social / Family History     Past Medical History:   Diagnosis Date    Anemia 2022    Diabetes mellitus, type 2     Hyperlipidemia     Hypertension     Obesity     Primary osteoarthritis of left shoulder 2022     Stroke        Past Surgical History:   Procedure Laterality Date    AMPUTATION      APPENDECTOMY      EYE SURGERY         Social History  Ms. Deborah Sotelo  reports that she has never smoked. She has never used smokeless tobacco. She reports current alcohol use. She reports that she does not use drugs.    Family History  Ms.'s Deborah Sotelo family history includes Appendicitis in her father; Asthma in her sister; Cancer in her mother; Diabetes in her brother.    Medications and Allergies     Medications  Outpatient Medications Marked as Taking for the 10/27/22 encounter (Office Visit) with TOMASA Maldonado   Medication Sig Dispense Refill    amitriptyline (ELAVIL) 50 MG tablet Take 0.5 tablets by mouth every evening.      amLODIPine (NORVASC) 5 MG tablet Take 1 tablet (5 mg total) by mouth once daily. 30 tablet 2    amoxicillin-clavulanate 875-125mg (AUGMENTIN) 875-125 mg per tablet Take 1 tablet by mouth 2 (two) times daily.      CMPD diclofenac 3%- cyclobenzaprine 2%- baclofen 2%- gabapentin 6%- LIDOcaine 2%- prilocaine 2% transdermal gel Apply 1 to 2 grams up to 4 times daily as directed for additional pain relief 240 g 1    insulin asp prt-insulin aspart, NovoLOG 70/30, (NOVOLOG 70/30) 100 unit/mL (70-30) Soln INJECT 50 UNITS SUB-Q EACH MORNING AND 30 UNITS EACH EVENING ...KEEP IN REFRIGERATOR ...USE WITHIN 28 DAYS AFTER OPENING EACH VIAL 30 mL 5    nitrofurantoin, macrocrystal-monohydrate, (MACROBID) 100 MG capsule Take 200 mg by mouth 2 (two) times daily.      sodium hypochlorite 0.5 % (DAKIN'S SOLUTION) external solution Apply topically once daily. Pack wound with dakin's moistened nuguaze daily 473 mL 0    tiZANidine (ZANAFLEX) 4 MG tablet Take 1 tablet by mouth 3 (three) times daily.         Allergies  Review of patient's allergies indicates:   Allergen Reactions    Aspirin      Other reaction(s): UPSET STOMACH     Bactrim ds [sulfamethoxazole-trimethoprim] Itching       Physical  Examination     Vitals:    10/27/22 1005   BP: (!) 138/90   Pulse: 86   Resp: 18   Temp: 98.2 °F (36.8 °C)     Physical Exam  Vitals and nursing note reviewed.   Constitutional:       Comments: Tearful during exam   HENT:      Head: Normocephalic.   Cardiovascular:      Rate and Rhythm: Normal rate and regular rhythm.      Pulses: Normal pulses.      Heart sounds: Normal heart sounds.   Pulmonary:      Effort: Pulmonary effort is normal. No respiratory distress.   Chest:      Chest wall: No tenderness.   Musculoskeletal:         General: Swelling, tenderness and deformity present.      Right lower leg: Edema present.      Comments: Left shoulder decreased ROM with weakness to left upper extremity   Skin:     General: Skin is warm and dry.      Capillary Refill: Capillary refill takes 2 to 3 seconds.      Comments: Wound, see LDA for measurements and picture   Neurological:      Mental Status: She is alert and oriented to person, place, and time.   Psychiatric:         Mood and Affect: Mood normal.         Behavior: Behavior normal.         Thought Content: Thought content normal.         Judgment: Judgment normal.     Assessment and Plan             Wound 01/18/21 1051 Diabetic Ulcer Right lateral Plantar #1 (Active)   01/18/21 1051    Pre-existing: Yes   Primary Wound Type: Diabetic ulc   Side: Right   Orientation: lateral   Location: Plantar   Wound Number: #1   Ankle-Brachial Index:    Pulses: normal   Removal Indication and Assessment:    Wound Outcome:    (Retired) Wound Type:    (Retired) Wound Length (cm):    (Retired) Wound Width (cm):    (Retired) Depth (cm):    Wound Description (Comments):    Removal Indications:    Wound Image    10/27/22 1007   Dressing Appearance Dried drainage 10/27/22 1007   Drainage Amount Small 10/27/22 1007   Drainage Characteristics/Odor Serosanguineous;No odor 10/27/22 1007   Appearance Pink;Red 10/27/22 1007   Tissue loss description Full thickness 10/27/22 1007   Black (%),  Wound Tissue Color 0 % 10/27/22 1007   Red (%), Wound Tissue Color 100 % 10/27/22 1007   Yellow (%), Wound Tissue Color 0 % 10/27/22 1007   Periwound Area Dry;Caban;Redness 10/27/22 1007   Wound Edges Callused;Open 10/27/22 1007   Wound Length (cm) 1.1 cm 10/27/22 1007   Wound Width (cm) 1.5 cm 10/27/22 1007   Wound Depth (cm) 0.2 cm 10/27/22 1007   Wound Volume (cm^3) 0.33 cm^3 10/27/22 1007   Wound Surface Area (cm^2) 1.65 cm^2 10/27/22 1007   Care Cleansed with:;Soap and water;Applied:;Moisturizing agent;Debrided 10/27/22 1007   Dressing Applied;Silver;Gauze;Rolled gauze 10/27/22 1007   Periwound Care Moisturizer applied 10/27/22 1007   Dressing Change Due 10/28/22 10/27/22 1007     Problem List Items Addressed This Visit          Endocrine    Diabetic foot ulcer - Primary    Overview                          Current Assessment & Plan     Clean with baby shampoo and water or vashe     Apply silver polymem to wound, cover with dry gauze, wrap with meng,secure with paper tape, change daily and as needed     Monitor closely for s/s of infection including fever, chills, increase in pain, odor from wound, and increased redness from foot. Go to ER if any complications develop.   Keep leg elevated and avoid pressure on wound  Diabetes:  Monitor glucose closely. Check fasting glucose and 2 hours after meals. HgA1C goal <7, fasting glucose , and 2 hours after meals <180  Hypertension:  Check blood pressure twice daily, goal <120/80  Diet:   Increase protein intake, avoid fried, fatty foods and foods high in simple carbs.   Vitamins:  Take vitamin C 1000 mg, zinc 50mg, vitamin d 5000 units, and a daily multivitamin. Dawson is a good source of protein and nutrients to aid in wound healing.             Future Appointments   Date Time Provider Department Center   11/17/2022 10:00 AM TOMASA Maldonado [facility]            Signature:  TOMASA Maldonado  [facility]  [address] [facility] [address]  [phone]    Date of encounter: 10/27/22

## 2022-10-27 ENCOUNTER — OFFICE VISIT (OUTPATIENT)
Dept: WOUND CARE | Facility: CLINIC | Age: 62
End: 2022-10-27
Attending: FAMILY MEDICINE
Payer: MEDICAID

## 2022-10-27 VITALS
SYSTOLIC BLOOD PRESSURE: 138 MMHG | TEMPERATURE: 98 F | RESPIRATION RATE: 18 BRPM | DIASTOLIC BLOOD PRESSURE: 90 MMHG | HEART RATE: 86 BPM

## 2022-10-27 DIAGNOSIS — L97.412 DIABETIC ULCER OF RIGHT MIDFOOT ASSOCIATED WITH TYPE 2 DIABETES MELLITUS, WITH FAT LAYER EXPOSED: Primary | ICD-10-CM

## 2022-10-27 DIAGNOSIS — E11.621 DIABETIC ULCER OF RIGHT MIDFOOT ASSOCIATED WITH TYPE 2 DIABETES MELLITUS, WITH FAT LAYER EXPOSED: Primary | ICD-10-CM

## 2022-10-27 PROCEDURE — 3046F HEMOGLOBIN A1C LEVEL >9.0%: CPT | Mod: CPTII,,, | Performed by: NURSE PRACTITIONER

## 2022-10-27 PROCEDURE — 99213 OFFICE O/P EST LOW 20 MIN: CPT | Mod: S$PBB,,, | Performed by: NURSE PRACTITIONER

## 2022-10-27 PROCEDURE — 3080F PR MOST RECENT DIASTOLIC BLOOD PRESSURE >= 90 MM HG: ICD-10-PCS | Mod: CPTII,,, | Performed by: NURSE PRACTITIONER

## 2022-10-27 PROCEDURE — 3061F PR NEG MICROALBUMINURIA RESULT DOCUMENTED/REVIEW: ICD-10-PCS | Mod: CPTII,,, | Performed by: NURSE PRACTITIONER

## 2022-10-27 PROCEDURE — 3066F NEPHROPATHY DOC TX: CPT | Mod: CPTII,,, | Performed by: NURSE PRACTITIONER

## 2022-10-27 PROCEDURE — 1159F MED LIST DOCD IN RCRD: CPT | Mod: CPTII,,, | Performed by: NURSE PRACTITIONER

## 2022-10-27 PROCEDURE — 4010F ACE/ARB THERAPY RXD/TAKEN: CPT | Mod: CPTII,,, | Performed by: NURSE PRACTITIONER

## 2022-10-27 PROCEDURE — 3046F PR MOST RECENT HEMOGLOBIN A1C LEVEL > 9.0%: ICD-10-PCS | Mod: CPTII,,, | Performed by: NURSE PRACTITIONER

## 2022-10-27 PROCEDURE — 1160F PR REVIEW ALL MEDS BY PRESCRIBER/CLIN PHARMACIST DOCUMENTED: ICD-10-PCS | Mod: CPTII,,, | Performed by: NURSE PRACTITIONER

## 2022-10-27 PROCEDURE — 1160F RVW MEDS BY RX/DR IN RCRD: CPT | Mod: CPTII,,, | Performed by: NURSE PRACTITIONER

## 2022-10-27 PROCEDURE — 4010F PR ACE/ARB THEARPY RXD/TAKEN: ICD-10-PCS | Mod: CPTII,,, | Performed by: NURSE PRACTITIONER

## 2022-10-27 PROCEDURE — 3080F DIAST BP >= 90 MM HG: CPT | Mod: CPTII,,, | Performed by: NURSE PRACTITIONER

## 2022-10-27 PROCEDURE — 99213 PR OFFICE/OUTPT VISIT, EST, LEVL III, 20-29 MIN: ICD-10-PCS | Mod: S$PBB,,, | Performed by: NURSE PRACTITIONER

## 2022-10-27 PROCEDURE — 3075F SYST BP GE 130 - 139MM HG: CPT | Mod: CPTII,,, | Performed by: NURSE PRACTITIONER

## 2022-10-27 PROCEDURE — 3061F NEG MICROALBUMINURIA REV: CPT | Mod: CPTII,,, | Performed by: NURSE PRACTITIONER

## 2022-10-27 PROCEDURE — 3075F PR MOST RECENT SYSTOLIC BLOOD PRESS GE 130-139MM HG: ICD-10-PCS | Mod: CPTII,,, | Performed by: NURSE PRACTITIONER

## 2022-10-27 PROCEDURE — 3066F PR DOCUMENTATION OF TREATMENT FOR NEPHROPATHY: ICD-10-PCS | Mod: CPTII,,, | Performed by: NURSE PRACTITIONER

## 2022-10-27 PROCEDURE — 1159F PR MEDICATION LIST DOCUMENTED IN MEDICAL RECORD: ICD-10-PCS | Mod: CPTII,,, | Performed by: NURSE PRACTITIONER

## 2022-10-27 PROCEDURE — 99214 OFFICE O/P EST MOD 30 MIN: CPT | Mod: PBBFAC | Performed by: NURSE PRACTITIONER

## 2022-10-27 RX ORDER — AMOXICILLIN AND CLAVULANATE POTASSIUM 875; 125 MG/1; MG/1
1 TABLET, FILM COATED ORAL 2 TIMES DAILY
COMMUNITY
Start: 2022-10-13 | End: 2022-11-17 | Stop reason: ALTCHOICE

## 2022-11-16 NOTE — PROGRESS NOTES
TOMASA Maldonado   [facility]  [address] [facility] [place] [address]  [phone]      PATIENT NAME: Deborah Sotelo  : 1960  DATE: 22  MRN: [id number]      Billing Provider: TOMASA Maldonado  Level of Service:   Patient PCP Information       Provider PCP Type    Ron Sanches MD General            Reason for Visit / Chief Complaint: Diabetic ulcer of right midfoot associated with type 2 diab       History of Present Illness / Problem Focused Workflow     Deborah Sotelo is a 61 y.o. female presents to clinic for follow up on chronic-non healing wound on right TMA.  Wound has improved since last visit. Thick callus jonathon-wound, bedside debridement today. Compliant with polymem silver. Significant PMH diabetes, anemia, and CVA. Last HgA1C 9.3 in July, diabetes managed by PCP. Pertinent factors that delay wound healing include multiple co-morbidities, poor vascular supply, diabetes, edema, heavy drainage, decreased granulation tissue, tract(s), undermining and no protective sensation. Denies fever and chills.     Review of Systems     Review of Systems   Constitutional:  Positive for activity change. Negative for chills and fever.   Respiratory:  Negative for chest tightness and shortness of breath.    Cardiovascular:  Positive for leg swelling. Negative for chest pain and palpitations.   Musculoskeletal:  Positive for arthralgias, back pain and joint swelling.        Severe shoulder pain, 10 of 10 on pain scale   Skin:  Positive for color change and wound.        See wound assessment   Neurological:  Positive for weakness and numbness.   Psychiatric/Behavioral:  Negative for agitation, behavioral problems, confusion and self-injury.      Medical / Social / Family History     Past Medical History:   Diagnosis Date    Anemia 2022    Diabetes mellitus, type 2     Hyperlipidemia     Hypertension     Obesity     Primary osteoarthritis of left  shoulder 5/24/2022    Stroke        Past Surgical History:   Procedure Laterality Date    AMPUTATION      APPENDECTOMY      EYE SURGERY         Social History  Ms. Deborah Sotelo  reports that she has never smoked. She has never used smokeless tobacco. She reports current alcohol use. She reports that she does not use drugs.    Family History  Ms.'srinath Sotelo family history includes Appendicitis in her father; Asthma in her sister; Cancer in her mother; Diabetes in her brother.    Medications and Allergies     Medications  Outpatient Medications Marked as Taking for the 11/17/22 encounter (Office Visit) with TOMASA Maldonado   Medication Sig Dispense Refill    amitriptyline (ELAVIL) 50 MG tablet Take 0.5 tablets by mouth every evening.      amLODIPine (NORVASC) 5 MG tablet Take 1 tablet (5 mg total) by mouth once daily. 30 tablet 2    CMPD diclofenac 3%- cyclobenzaprine 2%- baclofen 2%- gabapentin 6%- LIDOcaine 2%- prilocaine 2% transdermal gel Apply 1 to 2 grams up to 4 times daily as directed for additional pain relief 240 g 1    insulin asp prt-insulin aspart, NovoLOG 70/30, (NOVOLOG 70/30) 100 unit/mL (70-30) Soln INJECT 50 UNITS SUB-Q EACH MORNING AND 30 UNITS EACH EVENING ...KEEP IN REFRIGERATOR ...USE WITHIN 28 DAYS AFTER OPENING EACH VIAL 30 mL 5    sodium hypochlorite 0.5 % (DAKIN'S SOLUTION) external solution Apply topically once daily. Pack wound with dakin's moistened nuguaze daily 473 mL 0    tiZANidine (ZANAFLEX) 4 MG tablet Take 1 tablet by mouth 3 (three) times daily.         Allergies  Review of patient's allergies indicates:   Allergen Reactions    Aspirin      Other reaction(s): UPSET STOMACH     Bactrim ds [sulfamethoxazole-trimethoprim] Itching       Physical Examination   There were no vitals filed for this visit.  Physical Exam  Vitals and nursing note reviewed.   Constitutional:       Comments: Tearful during exam   HENT:      Head: Normocephalic.   Cardiovascular:      Rate and  Rhythm: Normal rate and regular rhythm.      Pulses: Normal pulses.      Heart sounds: Normal heart sounds.   Pulmonary:      Effort: Pulmonary effort is normal. No respiratory distress.   Chest:      Chest wall: No tenderness.   Musculoskeletal:         General: Swelling, tenderness and deformity present.      Right lower leg: Edema present.      Comments: Left shoulder decreased ROM with weakness to left upper extremity   Skin:     General: Skin is warm and dry.      Capillary Refill: Capillary refill takes 2 to 3 seconds.      Comments: Wound, see LDA for measurements and picture   Neurological:      Mental Status: She is alert and oriented to person, place, and time.   Psychiatric:         Mood and Affect: Mood normal.         Behavior: Behavior normal.         Thought Content: Thought content normal.         Judgment: Judgment normal.     Assessment and Plan             Wound 01/18/21 1051 Diabetic Ulcer Right lateral Plantar #1 (Active)   01/18/21 1051    Pre-existing: Yes   Primary Wound Type: Diabetic ulc   Side: Right   Orientation: lateral   Location: Plantar   Wound Number: #1   Ankle-Brachial Index:    Pulses: normal   Removal Indication and Assessment:    Wound Outcome:    (Retired) Wound Type:    (Retired) Wound Length (cm):    (Retired) Wound Width (cm):    (Retired) Depth (cm):    Wound Description (Comments):    Removal Indications:    Wound Image    11/17/22 1024   Dressing Appearance Dried drainage 11/17/22 0940   Drainage Amount Small 11/17/22 0940   Drainage Characteristics/Odor Serosanguineous;No odor 11/17/22 0940   Appearance Pink;Red;Dry 11/17/22 0940   Tissue loss description Full thickness 11/17/22 0940   Black (%), Wound Tissue Color 0 % 11/17/22 0940   Red (%), Wound Tissue Color 100 % 11/17/22 0940   Yellow (%), Wound Tissue Color 0 % 11/17/22 0940   Periwound Area Dry;Harrington;Redness 11/17/22 0940   Wound Edges Callused;Open 11/17/22 0940   Wound Length (cm) 0.5 cm 11/17/22 1024   Wound  Width (cm) 1 cm 11/17/22 1024   Wound Depth (cm) 0.2 cm 11/17/22 1024   Wound Volume (cm^3) 0.1 cm^3 11/17/22 1024   Wound Surface Area (cm^2) 0.5 cm^2 11/17/22 1024   Care Cleansed with:;Soap and water;Applied:;Moisturizing agent;Debrided 11/17/22 0940   Dressing Applied;Silver;Gauze;Rolled gauze 11/17/22 0940   Periwound Care Moisturizer applied 11/17/22 0940   Dressing Change Due 11/18/22 11/17/22 0940     Problem List Items Addressed This Visit          Endocrine    Diabetic foot ulcer - Primary    Overview                              Current Assessment & Plan     Clean with baby shampoo and water or vashe     Apply silver polymem to wound, cover with dry gauze, wrap with meng,secure with paper tape, change daily and as needed     Monitor closely for s/s of infection including fever, chills, increase in pain, odor from wound, and increased redness from foot. Go to ER if any complications develop.   Keep leg elevated and avoid pressure on wound  Diabetes:  Monitor glucose closely. Check fasting glucose and 2 hours after meals. HgA1C goal <7, fasting glucose , and 2 hours after meals <180  Hypertension:  Check blood pressure twice daily, goal <120/80  Diet:   Increase protein intake, avoid fried, fatty foods and foods high in simple carbs.   Vitamins:  Take vitamin C 1000 mg, zinc 50mg, vitamin d 5000 units, and a daily multivitamin. Dawson is a good source of protein and nutrients to aid in wound healing.             Future Appointments   Date Time Provider Department Center   12/8/2022 10:00 AM TOMASA Maldonado [facility]            Signature:  TOMASA Maldonado  [facility]  [address]  [place] [address]  [phone]    Date of encounter: 11/17/22

## 2022-11-17 ENCOUNTER — OFFICE VISIT (OUTPATIENT)
Dept: WOUND CARE | Facility: CLINIC | Age: 62
End: 2022-11-17
Attending: FAMILY MEDICINE
Payer: MEDICAID

## 2022-11-17 DIAGNOSIS — E11.621 DIABETIC ULCER OF RIGHT MIDFOOT ASSOCIATED WITH TYPE 2 DIABETES MELLITUS, WITH FAT LAYER EXPOSED: Primary | ICD-10-CM

## 2022-11-17 DIAGNOSIS — L97.412 DIABETIC ULCER OF RIGHT MIDFOOT ASSOCIATED WITH TYPE 2 DIABETES MELLITUS, WITH FAT LAYER EXPOSED: Primary | ICD-10-CM

## 2022-11-17 PROCEDURE — 3046F PR MOST RECENT HEMOGLOBIN A1C LEVEL > 9.0%: ICD-10-PCS | Mod: CPTII,,, | Performed by: NURSE PRACTITIONER

## 2022-11-17 PROCEDURE — 1160F RVW MEDS BY RX/DR IN RCRD: CPT | Mod: CPTII,,, | Performed by: NURSE PRACTITIONER

## 2022-11-17 PROCEDURE — 99213 OFFICE O/P EST LOW 20 MIN: CPT | Mod: PBBFAC,25 | Performed by: NURSE PRACTITIONER

## 2022-11-17 PROCEDURE — 1159F MED LIST DOCD IN RCRD: CPT | Mod: CPTII,,, | Performed by: NURSE PRACTITIONER

## 2022-11-17 PROCEDURE — 1160F PR REVIEW ALL MEDS BY PRESCRIBER/CLIN PHARMACIST DOCUMENTED: ICD-10-PCS | Mod: CPTII,,, | Performed by: NURSE PRACTITIONER

## 2022-11-17 PROCEDURE — 1159F PR MEDICATION LIST DOCUMENTED IN MEDICAL RECORD: ICD-10-PCS | Mod: CPTII,,, | Performed by: NURSE PRACTITIONER

## 2022-11-17 PROCEDURE — 3066F PR DOCUMENTATION OF TREATMENT FOR NEPHROPATHY: ICD-10-PCS | Mod: CPTII,,, | Performed by: NURSE PRACTITIONER

## 2022-11-17 PROCEDURE — 3061F PR NEG MICROALBUMINURIA RESULT DOCUMENTED/REVIEW: ICD-10-PCS | Mod: CPTII,,, | Performed by: NURSE PRACTITIONER

## 2022-11-17 PROCEDURE — 3046F HEMOGLOBIN A1C LEVEL >9.0%: CPT | Mod: CPTII,,, | Performed by: NURSE PRACTITIONER

## 2022-11-17 PROCEDURE — 4010F PR ACE/ARB THEARPY RXD/TAKEN: ICD-10-PCS | Mod: CPTII,,, | Performed by: NURSE PRACTITIONER

## 2022-11-17 PROCEDURE — 99499 NO LOS: ICD-10-PCS | Mod: S$PBB,,, | Performed by: NURSE PRACTITIONER

## 2022-11-17 PROCEDURE — 3061F NEG MICROALBUMINURIA REV: CPT | Mod: CPTII,,, | Performed by: NURSE PRACTITIONER

## 2022-11-17 PROCEDURE — 11042 DEBRIDEMENT: ICD-10-PCS | Mod: S$PBB,,, | Performed by: NURSE PRACTITIONER

## 2022-11-17 PROCEDURE — 3066F NEPHROPATHY DOC TX: CPT | Mod: CPTII,,, | Performed by: NURSE PRACTITIONER

## 2022-11-17 PROCEDURE — 4010F ACE/ARB THERAPY RXD/TAKEN: CPT | Mod: CPTII,,, | Performed by: NURSE PRACTITIONER

## 2022-11-17 PROCEDURE — 99499 UNLISTED E&M SERVICE: CPT | Mod: S$PBB,,, | Performed by: NURSE PRACTITIONER

## 2022-11-17 PROCEDURE — 11042 DBRDMT SUBQ TIS 1ST 20SQCM/<: CPT | Mod: PBBFAC | Performed by: NURSE PRACTITIONER

## 2022-11-17 NOTE — PROCEDURES
Debridement    Date/Time: 11/17/2022 10:00 AM  Performed by: TOMASA Maldonado  Authorized by: TOMASA Maldonado     Consent Done?:  Yes (Written)    Wound Details:    Location:  Right foot    Location:  Right Plantar    Type of Debridement:  Excisional       Length (cm):  0.5       Area (sq cm):  0.5       Width (cm):  1       Percent Debrided (%):  100       Depth (cm):  0.2       Total Area Debrided (sq cm):  0.5    Depth of debridement:  Subcutaneous tissue    Tissue debrided:  Adipose, Dermis, Epidermis and Subcutaneous    Devitalized tissue debrided:  Callus and Fibrin    Instruments:  Curette    Bleeding:  Minimal  Hemostasis Achieved: Yes    Method Used:  Pressure  Patient tolerance:  Patient tolerated the procedure well with no immediate complications     Assistant VAMSI Gentile RN

## 2022-11-17 NOTE — PROGRESS NOTES
Debridement Performed for Assessment: Wound # right lateral plantar foot  Performed By: Provider;Felisha Love NP  Assistant: Cassidy Gentile RN    Debridement: Chemical/enzymatic  Debridement Description: Non-Selective      Wound/Ulcer size:    Length: 0.5  Width:  1  Depth:  0.2  Cm2:  0.5    Photo taken post procedure:  Procedural Pain: Insensate  Post Procedural Pain: Insensate  Response to Treatment: Procedure was tolerated well    Specimen  Was a specimen collected?  No Specimen collected      Graft or Implant Aplpied  Was a graft of implant applied?  No      Post deridement diagnosis  Did the post debridment diagnosis chagne?  The post debridment diagnosis is the same as the pre-op diagnosis.      Assistant of procedure  Who assisted with the procedure?  The assistant was the same as the nurse listed above.      Complication  Complications related to debridement?  No complications noted      Estimated Blood Loss  How much blood loss occured?  No blood loss      Anesthesia  Anesthesia used?  None Debridement Performed for Assessment: Wound #  Estimated Blood Loss  How much blood loss occured?  No blood loss      Anesthesia  Anesthesia used?  None

## 2022-11-17 NOTE — PROGRESS NOTES
Debridement Performed for Assessment: Wound# right lateral plantar foot  Performed By: Provider: Felisha Love NP  Assistant: Cassidy Gentile RN    Debridement: Surgical    Photo taken post procedure:    Time-Out Taken: Yes  Level: Skin/Subcutaneous Tissue  Post Debridement Measurements  Length: (cm) 0.5  Width: (cm) 1  Depth: (cm) 0.2      Area: (cm²) 0.5  Percent Debrided: 100%  Total Area Debrided: (sq cm)     Tissue and other material debrided:  Adipose, Dermis, Epidermis, Subcutaneous  Devitalized Tissue Debrided:Biofilm, Slough, callus  Instrument: Curette  Bleeding: Moderate  Hemostasis Achieved: Pressure  Procedural Pain: Insensate  Post Procedural Pain: Insensate  Response to Treatment: Procedure was tolerated well    Devitalized materials/tissue Removed  the following was removed during debridement  subcutaneous,      Post Debridement Diagnosis  Post debridement diagnosis  Same as Pre-operative debridement diagnosis, No Complications noted.      Grafts or implants applied  Was a graft or implant applied?  No      Procedure assistant  Procedure assisted by:  Assistant is the same as nurse listed above      Complications related to procedure  Did any complication occure during procedure?  No complications noted during or after procedure.      Specimen  Specimen collect during procedure?  No specimen collected      Anaesthesia:  Anesthesia used  None      Blood Loss:  Blood loss during procedure  less than 5 cc

## 2022-12-19 PROBLEM — N39.0 URINARY TRACT INFECTION WITHOUT HEMATURIA: Status: RESOLVED | Noted: 2022-09-13 | Resolved: 2022-12-19

## 2022-12-28 ENCOUNTER — OFFICE VISIT (OUTPATIENT)
Dept: WOUND CARE | Facility: CLINIC | Age: 62
End: 2022-12-28
Attending: SURGERY
Payer: MEDICAID

## 2022-12-28 VITALS
SYSTOLIC BLOOD PRESSURE: 121 MMHG | RESPIRATION RATE: 20 BRPM | TEMPERATURE: 97 F | HEART RATE: 101 BPM | DIASTOLIC BLOOD PRESSURE: 71 MMHG

## 2022-12-28 DIAGNOSIS — E11.621 DIABETIC ULCER OF RIGHT MIDFOOT ASSOCIATED WITH TYPE 2 DIABETES MELLITUS, WITH MUSCLE INVOLVEMENT WITHOUT EVIDENCE OF NECROSIS: Primary | ICD-10-CM

## 2022-12-28 DIAGNOSIS — L97.415 DIABETIC ULCER OF RIGHT MIDFOOT ASSOCIATED WITH TYPE 2 DIABETES MELLITUS, WITH MUSCLE INVOLVEMENT WITHOUT EVIDENCE OF NECROSIS: Primary | ICD-10-CM

## 2022-12-28 PROCEDURE — 3066F PR DOCUMENTATION OF TREATMENT FOR NEPHROPATHY: ICD-10-PCS | Mod: CPTII,,, | Performed by: SURGERY

## 2022-12-28 PROCEDURE — 87075 CULTR BACTERIA EXCEPT BLOOD: CPT | Mod: ,,, | Performed by: CLINICAL MEDICAL LABORATORY

## 2022-12-28 PROCEDURE — 3061F NEG MICROALBUMINURIA REV: CPT | Mod: CPTII,,, | Performed by: SURGERY

## 2022-12-28 PROCEDURE — 87075 CULTURE, ANAEROBE: ICD-10-PCS | Mod: ,,, | Performed by: CLINICAL MEDICAL LABORATORY

## 2022-12-28 PROCEDURE — 3066F NEPHROPATHY DOC TX: CPT | Mod: CPTII,,, | Performed by: SURGERY

## 2022-12-28 PROCEDURE — 4010F ACE/ARB THERAPY RXD/TAKEN: CPT | Mod: CPTII,,, | Performed by: SURGERY

## 2022-12-28 PROCEDURE — 1159F PR MEDICATION LIST DOCUMENTED IN MEDICAL RECORD: ICD-10-PCS | Mod: CPTII,,, | Performed by: SURGERY

## 2022-12-28 PROCEDURE — 1159F MED LIST DOCD IN RCRD: CPT | Mod: CPTII,,, | Performed by: SURGERY

## 2022-12-28 PROCEDURE — 87077 CULTURE, WOUND: ICD-10-PCS | Mod: ,,, | Performed by: CLINICAL MEDICAL LABORATORY

## 2022-12-28 PROCEDURE — 99499 NO LOS: ICD-10-PCS | Mod: S$PBB,,, | Performed by: SURGERY

## 2022-12-28 PROCEDURE — 99499 UNLISTED E&M SERVICE: CPT | Mod: S$PBB,,, | Performed by: SURGERY

## 2022-12-28 PROCEDURE — 3078F PR MOST RECENT DIASTOLIC BLOOD PRESSURE < 80 MM HG: ICD-10-PCS | Mod: CPTII,,, | Performed by: SURGERY

## 2022-12-28 PROCEDURE — 87070 CULTURE OTHR SPECIMN AEROBIC: CPT | Mod: ,,, | Performed by: CLINICAL MEDICAL LABORATORY

## 2022-12-28 PROCEDURE — 3078F DIAST BP <80 MM HG: CPT | Mod: CPTII,,, | Performed by: SURGERY

## 2022-12-28 PROCEDURE — 87070 CULTURE, WOUND: ICD-10-PCS | Mod: ,,, | Performed by: CLINICAL MEDICAL LABORATORY

## 2022-12-28 PROCEDURE — 87077 CULTURE AEROBIC IDENTIFY: CPT | Mod: ,,, | Performed by: CLINICAL MEDICAL LABORATORY

## 2022-12-28 PROCEDURE — 3074F SYST BP LT 130 MM HG: CPT | Mod: CPTII,,, | Performed by: SURGERY

## 2022-12-28 PROCEDURE — 3061F PR NEG MICROALBUMINURIA RESULT DOCUMENTED/REVIEW: ICD-10-PCS | Mod: CPTII,,, | Performed by: SURGERY

## 2022-12-28 PROCEDURE — 87186 SC STD MICRODIL/AGAR DIL: CPT | Mod: ,,, | Performed by: CLINICAL MEDICAL LABORATORY

## 2022-12-28 PROCEDURE — 3046F HEMOGLOBIN A1C LEVEL >9.0%: CPT | Mod: CPTII,,, | Performed by: SURGERY

## 2022-12-28 PROCEDURE — 87186 CULTURE, WOUND: ICD-10-PCS | Mod: ,,, | Performed by: CLINICAL MEDICAL LABORATORY

## 2022-12-28 PROCEDURE — 4010F PR ACE/ARB THEARPY RXD/TAKEN: ICD-10-PCS | Mod: CPTII,,, | Performed by: SURGERY

## 2022-12-28 PROCEDURE — 99214 OFFICE O/P EST MOD 30 MIN: CPT | Mod: PBBFAC | Performed by: SURGERY

## 2022-12-28 PROCEDURE — 3074F PR MOST RECENT SYSTOLIC BLOOD PRESSURE < 130 MM HG: ICD-10-PCS | Mod: CPTII,,, | Performed by: SURGERY

## 2022-12-28 PROCEDURE — 3046F PR MOST RECENT HEMOGLOBIN A1C LEVEL > 9.0%: ICD-10-PCS | Mod: CPTII,,, | Performed by: SURGERY

## 2022-12-28 RX ORDER — AMOXICILLIN AND CLAVULANATE POTASSIUM 875; 125 MG/1; MG/1
1 TABLET, FILM COATED ORAL 2 TIMES DAILY
Status: ON HOLD | COMMUNITY
Start: 2022-12-13 | End: 2023-01-13 | Stop reason: HOSPADM

## 2022-12-28 RX ORDER — HYDROCODONE BITARTRATE AND ACETAMINOPHEN 7.5; 325 MG/1; MG/1
1 TABLET ORAL EVERY 4 HOURS PRN
Status: ON HOLD | COMMUNITY
Start: 2022-11-23 | End: 2023-01-13 | Stop reason: HOSPADM

## 2022-12-28 RX ORDER — ACETAMINOPHEN AND CODEINE PHOSPHATE 300; 30 MG/1; MG/1
1 TABLET ORAL EVERY 6 HOURS PRN
COMMUNITY
Start: 2022-11-01 | End: 2023-02-17 | Stop reason: ALTCHOICE

## 2022-12-28 NOTE — PROGRESS NOTES
Debridement Performed for Assessment: Wound# right lateral plantar foot  Performed By: Provider: Dr. Ravi Ramirez  Assistant:  Cici Duvall LPN    Debridement: Surgical    Photo taken post procedure:    Time-Out Taken: Yes  Level: Skin/Subcutaneous Tissue/ Muscle  Post Debridement Measurements  Length: (cm) 3.0  Width: (cm) 1.2  Depth: (cm) 1.5      Area: (cm²) 3.6  Percent Debrided: 100%  Total Area Debrided: (sq cm)     Tissue and other material debrided:  Adipose, Dermis, Epidermis, Subcutaneous, Muscle  Devitalized Tissue Debrided:Biofilm, Slough  Instrument: Curette,pick ups and 15 blade  Bleeding: Moderate  Hemostasis Achieved: Pressure  Procedural Pain: Insensate  Post Procedural Pain: Insensate  Response to Treatment: Procedure was tolerated well    Devitalized materials/tissue Removed  the following was removed during debridement  subcutaneous, muscle      Post Debridement Diagnosis  chronic right diabetic foot ulcer  Post debridement diagnosis  Same as Pre-operative debridement diagnosis, No Complications noted.      Grafts or implants applied  Was a graft or implant applied?  No      Procedure assistant  Procedure assisted by:  Cici Duvall LPN  Assistant is the same as nurse listed above      Complications related to procedure  Did any complication occure during procedure?  No complications noted during or after procedure.      Specimen  Specimen collect during procedure?  No specimen collected      Anaesthesia:  Anesthesia used  None      Blood Loss:  Blood loss during procedure  less than 5 cc

## 2022-12-28 NOTE — PATIENT INSTRUCTIONS
Clean with baby shampoo and water or vashe     Apply vashe moisten drawtex to wound, cover with dry gauze, wrap with meng,secure with paper tape, change daily and as needed     Monitor closely for s/s of infection including fever, chills, increase in pain, odor from wound, and increased redness from foot. Go to ER if any complications develop.   Keep leg elevated and avoid pressure on wound  Diabetes:  Monitor glucose closely. Check fasting glucose and 2 hours after meals. HgA1C goal <7, fasting glucose , and 2 hours after meals <180  Hypertension:  Check blood pressure twice daily, goal <120/80  Diet:   Increase protein intake, avoid fried, fatty foods and foods high in simple carbs.   Vitamins:  Take vitamin C 1000 mg, zinc 50mg, vitamin d 5000 units, and a daily multivitamin. Dawson is a good source of protein and nutrients to aid in wound healing.

## 2022-12-28 NOTE — PROGRESS NOTES
Ravi Ramirez MD   [facility]  [address] [facility] [address]  [phone]      PATIENT NAME: Deborah Sotelo  : 1960  DATE: 22  MRN: [id number]      Billing Provider: Ravi Ramirez MD  Level of Service:   Patient PCP Information       Provider PCP Type    Ron Sanches MD General            Reason for Visit / Chief Complaint: Diabetic Foot Ulcer (R DFU)       History of Present Illness / Problem Focused Workflow     Deborah Sotelo is a 61 y.o. female presents to clinic for follow up on chronic-non healing wound on right TMA.  Wound has improved since last visit. Thick callus jonathon-wound, bedside debridement today. Compliant with polymem silver. Significant PMH diabetes, anemia, and CVA. Last HgA1C 9.3 in July, diabetes managed by PCP. Pertinent factors that delay wound healing include multiple co-morbidities, poor vascular supply, diabetes, edema, heavy drainage, decreased granulation tissue, tract(s), undermining and no protective sensation. Denies fever and chills.     Review of Systems     Review of Systems   Constitutional:  Positive for activity change. Negative for chills and fever.   Respiratory:  Negative for chest tightness and shortness of breath.    Cardiovascular:  Positive for leg swelling. Negative for chest pain and palpitations.   Musculoskeletal:  Positive for arthralgias, back pain and joint swelling.        Severe shoulder pain, 10 of 10 on pain scale   Skin:  Positive for color change and wound.        See wound assessment   Neurological:  Positive for weakness and numbness.   Psychiatric/Behavioral:  Negative for agitation, behavioral problems, confusion and self-injury.      Medical / Social / Family History     Past Medical History:   Diagnosis Date    Anemia 2022    Diabetes mellitus, type 2     Hyperlipidemia     Hypertension     Obesity     Primary osteoarthritis of left shoulder 2022    Stroke        Past  Surgical History:   Procedure Laterality Date    AMPUTATION      APPENDECTOMY      EYE SURGERY         Social History  Ms. Deborah Sotelo  reports that she has never smoked. She has never used smokeless tobacco. She reports current alcohol use. She reports that she does not use drugs.    Family History  Ms.'srinath Sotelo family history includes Appendicitis in her father; Asthma in her sister; Cancer in her mother; Diabetes in her brother.    Medications and Allergies     Medications  Outpatient Medications Marked as Taking for the 12/28/22 encounter (Office Visit) with Ravi Ramirez MD   Medication Sig Dispense Refill    amitriptyline (ELAVIL) 50 MG tablet Take 0.5 tablets by mouth every evening.      amLODIPine (NORVASC) 5 MG tablet Take 1 tablet (5 mg total) by mouth once daily. 30 tablet 2    CMPD diclofenac 3%- cyclobenzaprine 2%- baclofen 2%- gabapentin 6%- LIDOcaine 2%- prilocaine 2% transdermal gel Apply 1 to 2 grams up to 4 times daily as directed for additional pain relief 240 g 1    insulin asp prt-insulin aspart, NovoLOG 70/30, (NOVOLOG 70/30) 100 unit/mL (70-30) Soln INJECT 50 UNITS SUB-Q EACH MORNING AND 30 UNITS EACH EVENING ...KEEP IN REFRIGERATOR ...USE WITHIN 28 DAYS AFTER OPENING EACH VIAL 30 mL 5    tiZANidine (ZANAFLEX) 4 MG tablet Take 1 tablet by mouth 3 (three) times daily.         Allergies  Review of patient's allergies indicates:   Allergen Reactions    Aspirin      Other reaction(s): UPSET STOMACH     Bactrim ds [sulfamethoxazole-trimethoprim] Itching       Physical Examination     Vitals:    12/28/22 1002   BP: 121/71   Pulse: 101   Resp: 20   Temp: 97.4 °F (36.3 °C)     Physical Exam  Vitals and nursing note reviewed.   Constitutional:       Comments: Tearful during exam   HENT:      Head: Normocephalic.   Cardiovascular:      Rate and Rhythm: Normal rate and regular rhythm.      Pulses: Normal pulses.      Heart sounds: Normal heart sounds.   Pulmonary:      Effort:  Pulmonary effort is normal. No respiratory distress.   Chest:      Chest wall: No tenderness.   Musculoskeletal:         General: Swelling, tenderness and deformity present.      Right lower leg: Edema present.      Comments: Left shoulder decreased ROM with weakness to left upper extremity   Skin:     General: Skin is warm and dry.      Capillary Refill: Capillary refill takes 2 to 3 seconds.      Comments: Wound, see LDA for measurements and picture   Neurological:      Mental Status: She is alert and oriented to person, place, and time.   Psychiatric:         Mood and Affect: Mood normal.         Behavior: Behavior normal.         Thought Content: Thought content normal.         Judgment: Judgment normal.     Assessment and Plan             Wound 01/18/21 1051 Diabetic Ulcer Right lateral Plantar #1 (Active)   01/18/21 1051    Pre-existing: Yes   Primary Wound Type: Diabetic ulc   Side: Right   Orientation: lateral   Location: Plantar   Wound Number: #1   Ankle-Brachial Index:    Pulses: normal   Removal Indication and Assessment:    Wound Outcome:    (Retired) Wound Type:    (Retired) Wound Length (cm):    (Retired) Wound Width (cm):    (Retired) Depth (cm):    Wound Description (Comments):    Removal Indications:    Wound Image    12/28/22 1004   Dressing Appearance Intact 12/28/22 1004   Drainage Amount Moderate 12/28/22 1004   Drainage Characteristics/Odor Serosanguineous;Purulent 12/28/22 1004   Appearance Pink;Tan;Moist;Granulating;Fibrin 12/28/22 1004   Tissue loss description Full thickness 12/28/22 1004   Black (%), Wound Tissue Color 0 % 12/28/22 1004   Red (%), Wound Tissue Color 80 % 12/28/22 1004   Yellow (%), Wound Tissue Color 20 % 12/28/22 1004   Periwound Area Intact 12/28/22 1004   Wound Edges Callused 12/28/22 1004   Johnson Classification (diabetic foot ulcers only) Grade 2 12/28/22 1004   Wound Length (cm) 0.2 cm 12/28/22 1004   Wound Width (cm) 0.4 cm 12/28/22 1004   Wound Depth (cm) 0.5 cm  12/28/22 1004   Wound Volume (cm^3) 0.04 cm^3 12/28/22 1004   Wound Surface Area (cm^2) 0.08 cm^2 12/28/22 1004   Care Cleansed with:;Antimicrobial agent 12/28/22 1004   Dressing Applied;Gauze, wet to dry;Gauze;Rolled gauze 12/28/22 1004   Periwound Care Moisture barrier applied 12/28/22 1004   Off Loading Off loading shoe 12/28/22 1004   Dressing Change Due 12/29/22 12/28/22 1004            Wound 01/21/22 Diabetic Ulcer Right medial Foot #5 (Active)   01/21/22     Pre-existing: Yes   Primary Wound Type: Diabetic ulc   Side: Right   Orientation: medial   Location: Foot   Wound Number: #5   Ankle-Brachial Index:    Pulses:    Removal Indication and Assessment:    Wound Outcome:    (Retired) Wound Type:    (Retired) Wound Length (cm):    (Retired) Wound Width (cm):    (Retired) Depth (cm):    Wound Description (Comments):    Removal Indications:    Wound Image   12/28/22 1006   Dressing Appearance Open to air 12/28/22 1006   Drainage Amount None 12/28/22 1006   Appearance Intact;Purple 12/28/22 1006   Tissue loss description Not applicable 12/28/22 1006   Black (%), Wound Tissue Color 0 % 12/28/22 1006   Red (%), Wound Tissue Color 0 % 12/28/22 1006   Yellow (%), Wound Tissue Color 0 % 12/28/22 1006   Periwound Area Intact 12/28/22 1006   Wound Edges Rolled/closed 12/28/22 1006   Wound Length (cm) 0 cm 12/28/22 1006   Wound Width (cm) 0 cm 12/28/22 1006   Wound Depth (cm) 0 cm 12/28/22 1006   Wound Volume (cm^3) 0 cm^3 12/28/22 1006   Wound Surface Area (cm^2) 0 cm^2 12/28/22 1006   Care Cleansed with:;Antimicrobial agent;Soap and water 12/28/22 1006   Periwound Care Moisture barrier applied 12/28/22 1006   Dressing Change Due 12/29/22 12/28/22 1006     Problem List Items Addressed This Visit    None      Future Appointments   Date Time Provider Department Center   1/12/2023 11:15 AM [facility] [place]            Signature:  LALITO ALBARRAN LPN  [facility]  [address] [facility] [address]  [phone]    Date of encounter: 12/28/22

## 2022-12-30 LAB — MICROORGANISM SPEC CULT: ABNORMAL

## 2022-12-31 LAB — BACTERIA SPEC ANAEROBE CULT: NORMAL

## 2023-01-11 ENCOUNTER — HOSPITAL ENCOUNTER (OUTPATIENT)
Facility: HOSPITAL | Age: 63
Discharge: HOME | End: 2023-01-13
Attending: SURGERY | Admitting: INTERNAL MEDICINE
Payer: MEDICAID

## 2023-01-11 ENCOUNTER — OFFICE VISIT (OUTPATIENT)
Dept: WOUND CARE | Facility: CLINIC | Age: 63
End: 2023-01-11
Attending: FAMILY MEDICINE
Payer: MEDICAID

## 2023-01-11 VITALS
SYSTOLIC BLOOD PRESSURE: 164 MMHG | HEART RATE: 84 BPM | RESPIRATION RATE: 20 BRPM | DIASTOLIC BLOOD PRESSURE: 90 MMHG | TEMPERATURE: 97 F

## 2023-01-11 DIAGNOSIS — Z91.09 ALLERGY TO ENVIRONMENTAL FACTORS: ICD-10-CM

## 2023-01-11 DIAGNOSIS — L08.9 DIABETIC FOOT INFECTION: Primary | ICD-10-CM

## 2023-01-11 DIAGNOSIS — L08.9 INFECTED WOUND: ICD-10-CM

## 2023-01-11 DIAGNOSIS — Z12.11 SCREENING FOR COLON CANCER: ICD-10-CM

## 2023-01-11 DIAGNOSIS — Z01.00 DIABETIC EYE EXAM: ICD-10-CM

## 2023-01-11 DIAGNOSIS — Z23 NEEDS FLU SHOT: ICD-10-CM

## 2023-01-11 DIAGNOSIS — M19.012 PRIMARY OSTEOARTHRITIS OF LEFT SHOULDER: ICD-10-CM

## 2023-01-11 DIAGNOSIS — T14.8XXA INFECTED WOUND: ICD-10-CM

## 2023-01-11 DIAGNOSIS — R74.8 ELEVATED ALKALINE PHOSPHATASE LEVEL: ICD-10-CM

## 2023-01-11 DIAGNOSIS — D64.9 ANEMIA, UNSPECIFIED TYPE: ICD-10-CM

## 2023-01-11 DIAGNOSIS — N30.00 ACUTE CYSTITIS WITHOUT HEMATURIA: ICD-10-CM

## 2023-01-11 DIAGNOSIS — E11.49 TYPE 2 DIABETES MELLITUS WITH OTHER NEUROLOGIC COMPLICATION, WITH LONG-TERM CURRENT USE OF INSULIN: ICD-10-CM

## 2023-01-11 DIAGNOSIS — E11.9 ENCOUNTER FOR DIABETIC FOOT EXAM: ICD-10-CM

## 2023-01-11 DIAGNOSIS — E11.69 TYPE 2 DIABETES MELLITUS WITH OTHER SPECIFIED COMPLICATION, UNSPECIFIED WHETHER LONG TERM INSULIN USE: ICD-10-CM

## 2023-01-11 DIAGNOSIS — Z11.3 SCREENING EXAMINATION FOR STD (SEXUALLY TRANSMITTED DISEASE): ICD-10-CM

## 2023-01-11 DIAGNOSIS — E11.621 DIABETIC ULCER OF RIGHT MIDFOOT ASSOCIATED WITH TYPE 2 DIABETES MELLITUS, WITH NECROSIS OF MUSCLE: ICD-10-CM

## 2023-01-11 DIAGNOSIS — L97.512 ULCER OF RIGHT FOOT WITH FAT LAYER EXPOSED: ICD-10-CM

## 2023-01-11 DIAGNOSIS — E11.628 DIABETIC FOOT INFECTION: Primary | ICD-10-CM

## 2023-01-11 DIAGNOSIS — K52.9 GASTROENTERITIS: ICD-10-CM

## 2023-01-11 DIAGNOSIS — L97.413 DIABETIC ULCER OF RIGHT MIDFOOT ASSOCIATED WITH TYPE 2 DIABETES MELLITUS, WITH NECROSIS OF MUSCLE: ICD-10-CM

## 2023-01-11 DIAGNOSIS — M62.838 MUSCLE SPASM OF LEFT SHOULDER: ICD-10-CM

## 2023-01-11 DIAGNOSIS — Z79.4 TYPE 2 DIABETES MELLITUS WITH OTHER NEUROLOGIC COMPLICATION, WITH LONG-TERM CURRENT USE OF INSULIN: ICD-10-CM

## 2023-01-11 DIAGNOSIS — Z23 PNEUMOCOCCAL VACCINE ADMINISTERED: ICD-10-CM

## 2023-01-11 DIAGNOSIS — R60.0 LOWER EXTREMITY EDEMA: ICD-10-CM

## 2023-01-11 DIAGNOSIS — I63.412 EMBOLIC STROKE INVOLVING LEFT MIDDLE CEREBRAL ARTERY: ICD-10-CM

## 2023-01-11 DIAGNOSIS — M25.612 DECREASED RANGE OF MOTION OF LEFT SHOULDER: ICD-10-CM

## 2023-01-11 DIAGNOSIS — R29.898 WEAKNESS OF LEFT ARM: ICD-10-CM

## 2023-01-11 DIAGNOSIS — I10 HYPERTENSION, UNSPECIFIED TYPE: ICD-10-CM

## 2023-01-11 DIAGNOSIS — E78.5 HYPERLIPIDEMIA, UNSPECIFIED HYPERLIPIDEMIA TYPE: ICD-10-CM

## 2023-01-11 DIAGNOSIS — K21.9 GASTROESOPHAGEAL REFLUX DISEASE, UNSPECIFIED WHETHER ESOPHAGITIS PRESENT: ICD-10-CM

## 2023-01-11 DIAGNOSIS — M25.519 SHOULDER PAIN, UNSPECIFIED CHRONICITY, UNSPECIFIED LATERALITY: ICD-10-CM

## 2023-01-11 DIAGNOSIS — R07.9 CHEST PAIN: ICD-10-CM

## 2023-01-11 DIAGNOSIS — E11.9 DIABETIC EYE EXAM: ICD-10-CM

## 2023-01-11 LAB
BUN SERPL-MCNC: 18 MG/DL (ref 7–18)
BUN/CREAT SERPL: 18 (ref 6–20)
CREAT SERPL-MCNC: 1.02 MG/DL (ref 0.55–1.02)
EGFR (NO RACE VARIABLE) (RUSH/TITUS): 63 ML/MIN/1.73M²
GLUCOSE SERPL-MCNC: 202 MG/DL (ref 70–105)

## 2023-01-11 PROCEDURE — 25000003 PHARM REV CODE 250: Performed by: SURGERY

## 2023-01-11 PROCEDURE — 4010F ACE/ARB THERAPY RXD/TAKEN: CPT | Mod: CPTII,,, | Performed by: NURSE PRACTITIONER

## 2023-01-11 PROCEDURE — 3077F PR MOST RECENT SYSTOLIC BLOOD PRESSURE >= 140 MM HG: ICD-10-PCS | Mod: CPTII,,, | Performed by: NURSE PRACTITIONER

## 2023-01-11 PROCEDURE — 63600175 PHARM REV CODE 636 W HCPCS: Performed by: SURGERY

## 2023-01-11 PROCEDURE — 11042 DBRDMT SUBQ TIS 1ST 20SQCM/<: CPT | Mod: PBBFAC | Performed by: NURSE PRACTITIONER

## 2023-01-11 PROCEDURE — 1160F RVW MEDS BY RX/DR IN RCRD: CPT | Mod: CPTII,,, | Performed by: NURSE PRACTITIONER

## 2023-01-11 PROCEDURE — G0378 HOSPITAL OBSERVATION PER HR: HCPCS

## 2023-01-11 PROCEDURE — 99499 UNLISTED E&M SERVICE: CPT | Mod: S$PBB,,, | Performed by: NURSE PRACTITIONER

## 2023-01-11 PROCEDURE — 82565 ASSAY OF CREATININE: CPT | Performed by: SURGERY

## 2023-01-11 PROCEDURE — 96366 THER/PROPH/DIAG IV INF ADDON: CPT | Mod: 59

## 2023-01-11 PROCEDURE — G0379 DIRECT REFER HOSPITAL OBSERV: HCPCS

## 2023-01-11 PROCEDURE — 3080F DIAST BP >= 90 MM HG: CPT | Mod: CPTII,,, | Performed by: NURSE PRACTITIONER

## 2023-01-11 PROCEDURE — 11042 DEBRIDEMENT: ICD-10-PCS | Mod: S$PBB,,, | Performed by: NURSE PRACTITIONER

## 2023-01-11 PROCEDURE — 36415 COLL VENOUS BLD VENIPUNCTURE: CPT | Performed by: INTERNAL MEDICINE

## 2023-01-11 PROCEDURE — 84520 ASSAY OF UREA NITROGEN: CPT | Performed by: SURGERY

## 2023-01-11 PROCEDURE — 4010F PR ACE/ARB THEARPY RXD/TAKEN: ICD-10-PCS | Mod: CPTII,,, | Performed by: NURSE PRACTITIONER

## 2023-01-11 PROCEDURE — 1159F MED LIST DOCD IN RCRD: CPT | Mod: CPTII,,, | Performed by: NURSE PRACTITIONER

## 2023-01-11 PROCEDURE — 99499 NO LOS: ICD-10-PCS | Mod: S$PBB,,, | Performed by: NURSE PRACTITIONER

## 2023-01-11 PROCEDURE — 96367 TX/PROPH/DG ADDL SEQ IV INF: CPT | Mod: 59

## 2023-01-11 PROCEDURE — 1159F PR MEDICATION LIST DOCUMENTED IN MEDICAL RECORD: ICD-10-PCS | Mod: CPTII,,, | Performed by: NURSE PRACTITIONER

## 2023-01-11 PROCEDURE — 25000003 PHARM REV CODE 250: Performed by: INTERNAL MEDICINE

## 2023-01-11 PROCEDURE — 87040 BLOOD CULTURE FOR BACTERIA: CPT | Performed by: INTERNAL MEDICINE

## 2023-01-11 PROCEDURE — 96372 THER/PROPH/DIAG INJ SC/IM: CPT | Performed by: SURGERY

## 2023-01-11 PROCEDURE — 1160F PR REVIEW ALL MEDS BY PRESCRIBER/CLIN PHARMACIST DOCUMENTED: ICD-10-PCS | Mod: CPTII,,, | Performed by: NURSE PRACTITIONER

## 2023-01-11 PROCEDURE — 99214 OFFICE O/P EST MOD 30 MIN: CPT | Mod: PBBFAC | Performed by: NURSE PRACTITIONER

## 2023-01-11 PROCEDURE — 11000001 HC ACUTE MED/SURG PRIVATE ROOM

## 2023-01-11 PROCEDURE — 96365 THER/PROPH/DIAG IV INF INIT: CPT | Mod: 59

## 2023-01-11 PROCEDURE — 3080F PR MOST RECENT DIASTOLIC BLOOD PRESSURE >= 90 MM HG: ICD-10-PCS | Mod: CPTII,,, | Performed by: NURSE PRACTITIONER

## 2023-01-11 PROCEDURE — 63600175 PHARM REV CODE 636 W HCPCS: Performed by: INTERNAL MEDICINE

## 2023-01-11 PROCEDURE — 82962 GLUCOSE BLOOD TEST: CPT

## 2023-01-11 PROCEDURE — 3077F SYST BP >= 140 MM HG: CPT | Mod: CPTII,,, | Performed by: NURSE PRACTITIONER

## 2023-01-11 RX ORDER — AMITRIPTYLINE HYDROCHLORIDE 25 MG/1
25 TABLET, FILM COATED ORAL NIGHTLY
Status: DISCONTINUED | OUTPATIENT
Start: 2023-01-11 | End: 2023-01-13 | Stop reason: HOSPADM

## 2023-01-11 RX ORDER — MUPIROCIN 20 MG/G
OINTMENT TOPICAL 2 TIMES DAILY
Status: DISCONTINUED | OUTPATIENT
Start: 2023-01-11 | End: 2023-01-13 | Stop reason: HOSPADM

## 2023-01-11 RX ORDER — RIFAMPIN 300 MG/1
300 CAPSULE ORAL EVERY 12 HOURS
Qty: 28 CAPSULE | Refills: 0 | Status: SHIPPED | OUTPATIENT
Start: 2023-01-11 | End: 2023-01-25

## 2023-01-11 RX ORDER — HYDRALAZINE HYDROCHLORIDE 20 MG/ML
10 INJECTION INTRAMUSCULAR; INTRAVENOUS EVERY 6 HOURS PRN
Status: DISCONTINUED | OUTPATIENT
Start: 2023-01-11 | End: 2023-01-13 | Stop reason: HOSPADM

## 2023-01-11 RX ORDER — IBUPROFEN 200 MG
24 TABLET ORAL
Status: DISCONTINUED | OUTPATIENT
Start: 2023-01-11 | End: 2023-01-13 | Stop reason: HOSPADM

## 2023-01-11 RX ORDER — AMLODIPINE BESYLATE 5 MG/1
5 TABLET ORAL DAILY
Status: DISCONTINUED | OUTPATIENT
Start: 2023-01-11 | End: 2023-01-13 | Stop reason: HOSPADM

## 2023-01-11 RX ORDER — INSULIN ASPART 100 [IU]/ML
1-10 INJECTION, SOLUTION INTRAVENOUS; SUBCUTANEOUS
Status: DISCONTINUED | OUTPATIENT
Start: 2023-01-11 | End: 2023-01-13 | Stop reason: HOSPADM

## 2023-01-11 RX ORDER — ONDANSETRON 2 MG/ML
4 INJECTION INTRAMUSCULAR; INTRAVENOUS EVERY 8 HOURS PRN
Status: DISCONTINUED | OUTPATIENT
Start: 2023-01-11 | End: 2023-01-13 | Stop reason: HOSPADM

## 2023-01-11 RX ORDER — PANTOPRAZOLE SODIUM 40 MG/1
40 TABLET, DELAYED RELEASE ORAL DAILY
Status: DISCONTINUED | OUTPATIENT
Start: 2023-01-11 | End: 2023-01-13 | Stop reason: HOSPADM

## 2023-01-11 RX ORDER — GLUCAGON 1 MG
1 KIT INJECTION
Status: DISCONTINUED | OUTPATIENT
Start: 2023-01-11 | End: 2023-01-13 | Stop reason: HOSPADM

## 2023-01-11 RX ORDER — HYDROCODONE BITARTRATE AND ACETAMINOPHEN 7.5; 325 MG/1; MG/1
1 TABLET ORAL EVERY 4 HOURS PRN
Status: DISCONTINUED | OUTPATIENT
Start: 2023-01-11 | End: 2023-01-13 | Stop reason: HOSPADM

## 2023-01-11 RX ORDER — INSULIN ASPART 100 [IU]/ML
50 INJECTION, SUSPENSION SUBCUTANEOUS
Status: DISCONTINUED | OUTPATIENT
Start: 2023-01-12 | End: 2023-01-13 | Stop reason: HOSPADM

## 2023-01-11 RX ORDER — ACETAMINOPHEN AND CODEINE PHOSPHATE 300; 30 MG/1; MG/1
1 TABLET ORAL EVERY 6 HOURS PRN
Status: DISCONTINUED | OUTPATIENT
Start: 2023-01-11 | End: 2023-01-13 | Stop reason: HOSPADM

## 2023-01-11 RX ORDER — LOSARTAN POTASSIUM 100 MG/1
100 TABLET ORAL NIGHTLY
Status: DISCONTINUED | OUTPATIENT
Start: 2023-01-11 | End: 2023-01-13 | Stop reason: HOSPADM

## 2023-01-11 RX ORDER — IBUPROFEN 200 MG
16 TABLET ORAL
Status: DISCONTINUED | OUTPATIENT
Start: 2023-01-11 | End: 2023-01-13 | Stop reason: HOSPADM

## 2023-01-11 RX ORDER — NALOXONE HCL 0.4 MG/ML
0.02 VIAL (ML) INJECTION
Status: DISCONTINUED | OUTPATIENT
Start: 2023-01-11 | End: 2023-01-13 | Stop reason: HOSPADM

## 2023-01-11 RX ORDER — ATORVASTATIN CALCIUM 80 MG/1
80 TABLET, FILM COATED ORAL NIGHTLY
Status: DISCONTINUED | OUTPATIENT
Start: 2023-01-11 | End: 2023-01-13 | Stop reason: HOSPADM

## 2023-01-11 RX ADMIN — INSULIN DETEMIR 20 UNITS: 100 INJECTION, SOLUTION SUBCUTANEOUS at 08:01

## 2023-01-11 RX ADMIN — PANTOPRAZOLE SODIUM 40 MG: 40 TABLET, DELAYED RELEASE ORAL at 05:01

## 2023-01-11 RX ADMIN — LOSARTAN POTASSIUM 100 MG: 100 TABLET, FILM COATED ORAL at 08:01

## 2023-01-11 RX ADMIN — PIPERACILLIN AND TAZOBACTAM 4.5 G: 4; .5 INJECTION, POWDER, LYOPHILIZED, FOR SOLUTION INTRAVENOUS; PARENTERAL at 05:01

## 2023-01-11 RX ADMIN — VANCOMYCIN HYDROCHLORIDE 2500 MG: 1 INJECTION, POWDER, LYOPHILIZED, FOR SOLUTION INTRAVENOUS at 08:01

## 2023-01-11 RX ADMIN — PIPERACILLIN AND TAZOBACTAM 4.5 G: 4; .5 INJECTION, POWDER, LYOPHILIZED, FOR SOLUTION INTRAVENOUS; PARENTERAL at 11:01

## 2023-01-11 RX ADMIN — MUPIROCIN: 20 OINTMENT TOPICAL at 08:01

## 2023-01-11 RX ADMIN — ATORVASTATIN CALCIUM 80 MG: 80 TABLET, FILM COATED ORAL at 08:01

## 2023-01-11 RX ADMIN — ACETAMINOPHEN AND CODEINE PHOSPHATE 1 TABLET: 300; 30 TABLET ORAL at 08:01

## 2023-01-11 RX ADMIN — AMLODIPINE BESYLATE 5 MG: 5 TABLET ORAL at 05:01

## 2023-01-11 RX ADMIN — AMITRIPTYLINE HYDROCHLORIDE 25 MG: 25 TABLET, FILM COATED ORAL at 08:01

## 2023-01-11 NOTE — ASSESSMENT & PLAN NOTE
Patient with infected diabetic foot ulcer, admitted for IV antibiotics and surgery evaluation.

## 2023-01-11 NOTE — H&P
[facility] Medicine  History & Physical    Patient Name: Deborah Sotelo  MRN: [id number]  Patient Class: IP- Inpatient  Admission Date: (Not on file)  Attending Physician: Ravi Ramirez MD   Primary Care Provider: Ron Sanches MD         Patient information was obtained from patient.     Subjective:     Principal Problem:<principal problem not specified>    Chief Complaint:   Chief Complaint   Patient presents with    Right  infected ulcer of one-week duration        HPI: 62-year-old female patient with past medical history of diabetes, hypertension, and old stroke with no residual weakness.    Patient was seen by Dr. Ramirez at the wound care clinic last week for diabetic foot ulcer, came back today with worsening ulcer, esterase on the plantar aspect of the right foot at the lateral side, today it was noted that it is draining pus.    No history of trauma no fever or chills.    Patient has transmetatarsal amputation of the right foot more than 6 years ago.    Patient stated that her diabetes is uncontrolled she does not know her hemoglobin A1c.    No headache no dizziness no chest pain no palpitation no abdominal pain no nausea no vomiting no diarrhea no constipation.  Review of systems negative other than mentioned.      Past Medical History:   Diagnosis Date    Anemia 7/5/2022    Diabetes mellitus, type 2     Hyperlipidemia     Hypertension     Obesity     Primary osteoarthritis of left shoulder 5/24/2022    Stroke        Past Surgical History:   Procedure Laterality Date    AMPUTATION      APPENDECTOMY      EYE SURGERY         Review of patient's allergies indicates:   Allergen Reactions    Aspirin      Other reaction(s): UPSET STOMACH     Bactrim ds [sulfamethoxazole-trimethoprim] Itching       No current facility-administered medications on file prior to encounter.     Current Outpatient Medications on File Prior to Encounter   Medication Sig     acetaminophen-codeine 300-30mg (TYLENOL #3) 300-30 mg Tab Take 1 tablet by mouth every 6 (six) hours as needed.    amitriptyline (ELAVIL) 50 MG tablet Take 0.5 tablets by mouth every evening.    amLODIPine (NORVASC) 5 MG tablet Take 1 tablet (5 mg total) by mouth once daily.    amoxicillin-clavulanate 875-125mg (AUGMENTIN) 875-125 mg per tablet Take 1 tablet by mouth 2 (two) times daily.    atorvastatin (LIPITOR) 80 MG tablet TAKE 1 TABLET BY MOUTH EACH NIGHT AT BEDTIME FOR CHOLESTEROL ~~DO NOT EAT GRAPEFRUIT OR DRINK GRAPEFRUIT JUICE WHILE TAKING THIS MEDICATION~~ (Patient not taking: Reported on 9/29/2022)    CMPD diclofenac 3%- cyclobenzaprine 2%- baclofen 2%- gabapentin 6%- LIDOcaine 2%- prilocaine 2% transdermal gel Apply 1 to 2 grams up to 4 times daily as directed for additional pain relief    HYDROcodone-acetaminophen (NORCO) 7.5-325 mg per tablet Take 1 tablet by mouth every 4 (four) hours as needed.    insulin asp prt-insulin aspart, NovoLOG 70/30, (NOVOLOG 70/30) 100 unit/mL (70-30) Soln INJECT 50 UNITS SUB-Q EACH MORNING AND 30 UNITS EACH EVENING ...KEEP IN REFRIGERATOR ...USE WITHIN 28 DAYS AFTER OPENING EACH VIAL    LANTUS U-100 INSULIN 100 unit/mL injection Inject 20 Units into the skin once daily. Take at night    loratadine (CLARITIN) 10 mg tablet Take 1 tablet (10 mg total) by mouth once daily.    losartan (COZAAR) 100 MG tablet Take 1 tablet (100 mg total) by mouth every evening.    omeprazole (PRILOSEC) 20 MG capsule Take 1 capsule (20 mg total) by mouth once daily.    rifAMpin (RIFADIN) 300 MG capsule Take 1 capsule (300 mg total) by mouth every 12 (twelve) hours. for 14 days    sodium hypochlorite 0.5 % (DAKIN'S SOLUTION) external solution Apply topically once daily. Pack wound with dakin's moistened nuguaze daily (Patient not taking: Reported on 12/28/2022)    tiZANidine (ZANAFLEX) 4 MG tablet Take 1 tablet by mouth 3 (three) times daily.     Family History       Problem Relation  (Age of Onset)    Appendicitis Father    Asthma Sister    Cancer Mother    Diabetes Brother          Tobacco Use    Smoking status: Never    Smokeless tobacco: Never   Substance and Sexual Activity    Alcohol use: Yes    Drug use: Never    Sexual activity: Never     Family history  Mother  of stroke and diabetes.    Father  of heart disease.     Review of Systems   Constitutional:  Negative for chills and fever.   HENT:  Negative for congestion.    Eyes:  Negative for pain and discharge.   Respiratory:  Negative for cough, chest tightness, shortness of breath, wheezing and stridor.    Cardiovascular:  Negative for chest pain, palpitations and leg swelling.   Gastrointestinal:  Negative for abdominal distention, abdominal pain, blood in stool, diarrhea, nausea and vomiting.   Endocrine: Negative for cold intolerance, polydipsia and polyuria.   Genitourinary:  Negative for difficulty urinating, dysuria, frequency, hematuria and urgency.   Musculoskeletal:  Negative for arthralgias, back pain and neck pain.   Skin:  Positive for wound.        Right foot ulcer P   Neurological:  Negative for dizziness, seizures, syncope, weakness, numbness and headaches.   Hematological:  Negative for adenopathy.   Psychiatric/Behavioral:  Negative for behavioral problems.    All other systems reviewed and are negative.  Objective:     Vital Signs (Most Recent):    Vital Signs (24h Range):  Temp:  [97.4 °F (36.3 °C)] 97.4 °F (36.3 °C)  Pulse:  [84] 84  Resp:  [20] 20  BP: (164)/(90) 164/90        There is no height or weight on file to calculate BMI.    Physical Exam  Vitals reviewed.   HENT:      Nose: Nose normal.      Mouth/Throat:      Pharynx: Oropharynx is clear.   Eyes:      Pupils: Pupils are equal, round, and reactive to light.   Cardiovascular:      Rate and Rhythm: Normal rate and regular rhythm.      Pulses: Normal pulses.      Heart sounds: Normal heart sounds. No murmur heard.  Pulmonary:      Effort:  Pulmonary effort is normal. No respiratory distress.      Breath sounds: Normal breath sounds. No stridor. No wheezing or rales.   Abdominal:      General: Abdomen is flat. Bowel sounds are normal. There is no distension.      Tenderness: There is no abdominal tenderness. There is no rebound.   Musculoskeletal:         General: No swelling or deformity. Normal range of motion.      Cervical back: Normal range of motion and neck supple.   Skin:     Coloration: Skin is not jaundiced or pale.      Findings: Lesion present.      Comments: Right foot lateral plantar aspect ulcer 2 x 2 cm draining pus, with tenderness   Neurological:      General: No focal deficit present.      Mental Status: She is alert and oriented to person, place, and time. Mental status is at baseline.      Cranial Nerves: No cranial nerve deficit.      Sensory: No sensory deficit.      Motor: No weakness.   Psychiatric:         Mood and Affect: Mood normal.         Behavior: Behavior normal.         CRANIAL NERVES     CN III, IV, VI   Pupils are equal, round, and reactive to light.     Significant Labs: All pertinent labs within the past 24 hours have been reviewed.    Significant Imaging: I have reviewed all pertinent imaging results/findings within the past 24 hours.    Assessment/Plan:     Diabetic foot infection  Will start IV Zosyn.  IV vancomycin.    We will do a follow on cultures   Surgery consulted.    Hyperlipidemia    Continue statins     Hypertension    We will continue home antihypertensives and will monitor vital signs closely.    Type 2 diabetes mellitus with other specified complication  Patient with infected diabetic foot ulcer, admitted for IV antibiotics and surgery evaluation.          VTE Risk Mitigation (From admission, onward)    None             Valentino Mishra MD  Department of Hospital Medicine   [facility] - [facility]

## 2023-01-11 NOTE — PROGRESS NOTES
Debridement Performed for Assessment: Wound# right lateral foot  Performed By: Provider: Felisha Love NP  Assistant:  Cici Duvall LPN    Debridement: Surgical    Photo taken post procedure:    Time-Out Taken: Yes  Level: Skin/Subcutaneous Tissue  Post Debridement Measurements  Length: (cm) 4.0  Width: (cm) 2.6  Depth: (cm) 0.6      Area: (cm²) 10.4  Percent Debrided: 100%  Total Area Debrided: (sq cm)     Tissue and other material debrided:  Adipose, Dermis, Epidermis, Subcutaneous  Devitalized Tissue Debrided:Biofilm, callus  Instrument: Curette  Bleeding: Moderate  Hemostasis Achieved: Pressure  Procedural Pain: Insensate  Post Procedural Pain: Insensate  Response to Treatment: Procedure was tolerated well    Devitalized materials/tissue Removed  the following was removed during debridement  subcutaneous,      Post Debridement Diagnosis  chronic right foot diabetic ulcer  Post debridement diagnosis  Same as Pre-operative debridement diagnosis, No Complications noted.      Grafts or implants applied  Was a graft or implant applied?  No      Procedure assistant  Procedure assisted by: Ciic Duvall LPN  Assistant is the same as nurse listed above      Complications related to procedure  Did any complication occure during procedure?  No complications noted during or after procedure.      Specimen  Specimen collect during procedure?  No specimen collected      Anaesthesia:  Anesthesia used  None      Blood Loss:  Blood loss during procedure  less than 5 cc

## 2023-01-11 NOTE — SUBJECTIVE & OBJECTIVE
Past Medical History:   Diagnosis Date    Anemia 7/5/2022    Diabetes mellitus, type 2     Hyperlipidemia     Hypertension     Obesity     Primary osteoarthritis of left shoulder 5/24/2022    Stroke        Past Surgical History:   Procedure Laterality Date    AMPUTATION      APPENDECTOMY      EYE SURGERY         Review of patient's allergies indicates:   Allergen Reactions    Aspirin      Other reaction(s): UPSET STOMACH     Bactrim ds [sulfamethoxazole-trimethoprim] Itching       No current facility-administered medications on file prior to encounter.     Current Outpatient Medications on File Prior to Encounter   Medication Sig    acetaminophen-codeine 300-30mg (TYLENOL #3) 300-30 mg Tab Take 1 tablet by mouth every 6 (six) hours as needed.    amitriptyline (ELAVIL) 50 MG tablet Take 0.5 tablets by mouth every evening.    amLODIPine (NORVASC) 5 MG tablet Take 1 tablet (5 mg total) by mouth once daily.    amoxicillin-clavulanate 875-125mg (AUGMENTIN) 875-125 mg per tablet Take 1 tablet by mouth 2 (two) times daily.    atorvastatin (LIPITOR) 80 MG tablet TAKE 1 TABLET BY MOUTH EACH NIGHT AT BEDTIME FOR CHOLESTEROL ~~DO NOT EAT GRAPEFRUIT OR DRINK GRAPEFRUIT JUICE WHILE TAKING THIS MEDICATION~~ (Patient not taking: Reported on 9/29/2022)    CMPD diclofenac 3%- cyclobenzaprine 2%- baclofen 2%- gabapentin 6%- LIDOcaine 2%- prilocaine 2% transdermal gel Apply 1 to 2 grams up to 4 times daily as directed for additional pain relief    HYDROcodone-acetaminophen (NORCO) 7.5-325 mg per tablet Take 1 tablet by mouth every 4 (four) hours as needed.    insulin asp prt-insulin aspart, NovoLOG 70/30, (NOVOLOG 70/30) 100 unit/mL (70-30) Soln INJECT 50 UNITS SUB-Q EACH MORNING AND 30 UNITS EACH EVENING ...KEEP IN REFRIGERATOR ...USE WITHIN 28 DAYS AFTER OPENING EACH VIAL    LANTUS U-100 INSULIN 100 unit/mL injection Inject 20 Units into the skin once daily. Take at night    loratadine (CLARITIN) 10 mg tablet Take 1 tablet (10 mg  total) by mouth once daily.    losartan (COZAAR) 100 MG tablet Take 1 tablet (100 mg total) by mouth every evening.    omeprazole (PRILOSEC) 20 MG capsule Take 1 capsule (20 mg total) by mouth once daily.    rifAMpin (RIFADIN) 300 MG capsule Take 1 capsule (300 mg total) by mouth every 12 (twelve) hours. for 14 days    sodium hypochlorite 0.5 % (DAKIN'S SOLUTION) external solution Apply topically once daily. Pack wound with dakin's moistened nuguaze daily (Patient not taking: Reported on 2022)    tiZANidine (ZANAFLEX) 4 MG tablet Take 1 tablet by mouth 3 (three) times daily.     Family History       Problem Relation (Age of Onset)    Appendicitis Father    Asthma Sister    Cancer Mother    Diabetes Brother          Tobacco Use    Smoking status: Never    Smokeless tobacco: Never   Substance and Sexual Activity    Alcohol use: Yes    Drug use: Never    Sexual activity: Never     Family history  Mother  of stroke and diabetes.    Father  of heart disease.     Review of Systems   Constitutional:  Negative for chills and fever.   HENT:  Negative for congestion.    Eyes:  Negative for pain and discharge.   Respiratory:  Negative for cough, chest tightness, shortness of breath, wheezing and stridor.    Cardiovascular:  Negative for chest pain, palpitations and leg swelling.   Gastrointestinal:  Negative for abdominal distention, abdominal pain, blood in stool, diarrhea, nausea and vomiting.   Endocrine: Negative for cold intolerance, polydipsia and polyuria.   Genitourinary:  Negative for difficulty urinating, dysuria, frequency, hematuria and urgency.   Musculoskeletal:  Negative for arthralgias, back pain and neck pain.   Skin:  Positive for wound.        Right foot ulcer P   Neurological:  Negative for dizziness, seizures, syncope, weakness, numbness and headaches.   Hematological:  Negative for adenopathy.   Psychiatric/Behavioral:  Negative for behavioral problems.    All other systems reviewed and are  negative.  Objective:     Vital Signs (Most Recent):    Vital Signs (24h Range):  Temp:  [97.4 °F (36.3 °C)] 97.4 °F (36.3 °C)  Pulse:  [84] 84  Resp:  [20] 20  BP: (164)/(90) 164/90        There is no height or weight on file to calculate BMI.    Physical Exam  Vitals reviewed.   HENT:      Nose: Nose normal.      Mouth/Throat:      Pharynx: Oropharynx is clear.   Eyes:      Pupils: Pupils are equal, round, and reactive to light.   Cardiovascular:      Rate and Rhythm: Normal rate and regular rhythm.      Pulses: Normal pulses.      Heart sounds: Normal heart sounds. No murmur heard.  Pulmonary:      Effort: Pulmonary effort is normal. No respiratory distress.      Breath sounds: Normal breath sounds. No stridor. No wheezing or rales.   Abdominal:      General: Abdomen is flat. Bowel sounds are normal. There is no distension.      Tenderness: There is no abdominal tenderness. There is no rebound.   Musculoskeletal:         General: No swelling or deformity. Normal range of motion.      Cervical back: Normal range of motion and neck supple.   Skin:     Coloration: Skin is not jaundiced or pale.      Findings: Lesion present.      Comments: Right foot lateral plantar aspect ulcer 2 x 2 cm draining pus, with tenderness   Neurological:      General: No focal deficit present.      Mental Status: She is alert and oriented to person, place, and time. Mental status is at baseline.      Cranial Nerves: No cranial nerve deficit.      Sensory: No sensory deficit.      Motor: No weakness.   Psychiatric:         Mood and Affect: Mood normal.         Behavior: Behavior normal.         CRANIAL NERVES     CN III, IV, VI   Pupils are equal, round, and reactive to light.     Significant Labs: All pertinent labs within the past 24 hours have been reviewed.    Significant Imaging: I have reviewed all pertinent imaging results/findings within the past 24 hours.

## 2023-01-11 NOTE — PROGRESS NOTES
TOMASA Maldonado   [facility]  [address] [facility] [address]  [phone]      PATIENT NAME: Deborah Sotelo  : 1960  DATE: 23  MRN: [id number]      Billing Provider: TOMASA Maldonado  Level of Service: NO LOS  Patient PCP Information       Provider PCP Type    Ron Sanches MD General            Reason for Visit / Chief Complaint: Diabetic Foot Ulcer (Right foot)       History of Present Illness / Problem Focused Workflow     Deborah Sotelo is a 61 y.o. female presents to clinic for follow up on chronic-non healing wound on right TMA.  Wound is worse since last visit. Thick callus and erythema jonathon-wound, bedside debridement today. Purulent drainage with abscess formation. She is currently on Augmentin. Cultures from  reviewed, MRSA positive. Maceration jonathon-wound. Patient reports she has had body aches, chills, and malaise. Significant PMH diabetes, anemia, and CVA. Last HgA1C 9.3 in July, diabetes managed by PCP. Pertinent factors that delay wound healing include multiple co-morbidities, poor vascular supply, diabetes, edema, heavy drainage, decreased granulation tissue, tract(s), undermining and no protective sensation. Denies fever and chills.     Review of Systems     Review of Systems   Constitutional:  Positive for activity change. Negative for chills and fever.   Respiratory:  Negative for chest tightness and shortness of breath.    Cardiovascular:  Positive for leg swelling. Negative for chest pain and palpitations.   Musculoskeletal:  Positive for arthralgias, back pain and joint swelling.        Severe shoulder pain, 10 of 10 on pain scale   Skin:  Positive for color change and wound.        See wound assessment   Neurological:  Positive for weakness and numbness.   Psychiatric/Behavioral:  Negative for agitation, behavioral problems, confusion and self-injury.      Medical / Social / Family History     Past Medical  History:   Diagnosis Date    Anemia 7/5/2022    Diabetes mellitus, type 2     Hyperlipidemia     Hypertension     Obesity     Primary osteoarthritis of left shoulder 5/24/2022    Stroke        Past Surgical History:   Procedure Laterality Date    AMPUTATION      APPENDECTOMY      EYE SURGERY         Social History  Ms. Deborah Sotelo  reports that she has never smoked. She has never used smokeless tobacco. She reports current alcohol use. She reports that she does not use drugs.    Family History  Ms.'srinath Sotelo family history includes Appendicitis in her father; Asthma in her sister; Cancer in her mother; Diabetes in her brother.    Medications and Allergies     Medications  Outpatient Medications Marked as Taking for the 1/11/23 encounter (Office Visit) with TOMASA Maldonado   Medication Sig Dispense Refill    acetaminophen-codeine 300-30mg (TYLENOL #3) 300-30 mg Tab Take 1 tablet by mouth every 6 (six) hours as needed.      amitriptyline (ELAVIL) 50 MG tablet Take 0.5 tablets by mouth every evening.      amLODIPine (NORVASC) 5 MG tablet Take 1 tablet (5 mg total) by mouth once daily. 30 tablet 2    amoxicillin-clavulanate 875-125mg (AUGMENTIN) 875-125 mg per tablet Take 1 tablet by mouth 2 (two) times daily.      CMPD diclofenac 3%- cyclobenzaprine 2%- baclofen 2%- gabapentin 6%- LIDOcaine 2%- prilocaine 2% transdermal gel Apply 1 to 2 grams up to 4 times daily as directed for additional pain relief 240 g 1    HYDROcodone-acetaminophen (NORCO) 7.5-325 mg per tablet Take 1 tablet by mouth every 4 (four) hours as needed.      insulin asp prt-insulin aspart, NovoLOG 70/30, (NOVOLOG 70/30) 100 unit/mL (70-30) Soln INJECT 50 UNITS SUB-Q EACH MORNING AND 30 UNITS EACH EVENING ...KEEP IN REFRIGERATOR ...USE WITHIN 28 DAYS AFTER OPENING EACH VIAL 30 mL 5    rifAMpin (RIFADIN) 300 MG capsule Take 1 capsule (300 mg total) by mouth every 12 (twelve) hours. for 14 days 28 capsule 0    tiZANidine (ZANAFLEX) 4 MG  tablet Take 1 tablet by mouth 3 (three) times daily.         Allergies  Review of patient's allergies indicates:   Allergen Reactions    Aspirin      Other reaction(s): UPSET STOMACH     Bactrim ds [sulfamethoxazole-trimethoprim] Itching       Physical Examination     Vitals:    01/11/23 0956   BP: (!) 164/90   Pulse: 84   Resp: 20   Temp: 97.4 °F (36.3 °C)     Physical Exam  Vitals and nursing note reviewed.   Constitutional:       Comments: Tearful during exam   HENT:      Head: Normocephalic.   Cardiovascular:      Rate and Rhythm: Normal rate and regular rhythm.      Pulses: Normal pulses.      Heart sounds: Normal heart sounds.   Pulmonary:      Effort: Pulmonary effort is normal. No respiratory distress.   Chest:      Chest wall: No tenderness.   Musculoskeletal:         General: Swelling, tenderness and deformity present.      Right lower leg: Edema present.      Comments: Left shoulder decreased ROM with weakness to left upper extremity   Skin:     General: Skin is warm.      Capillary Refill: Capillary refill takes 2 to 3 seconds.      Findings: Erythema present.      Comments: Wound, see LDA for measurements and picture   Neurological:      Mental Status: She is alert and oriented to person, place, and time.   Psychiatric:         Mood and Affect: Mood normal.         Behavior: Behavior normal.         Thought Content: Thought content normal.         Judgment: Judgment normal.     Assessment and Plan             Wound 01/18/21 1051 Diabetic Ulcer Right lateral Plantar #1 (Active)   01/18/21 1051    Pre-existing: Yes   Primary Wound Type: Diabetic ulc   Side: Right   Orientation: lateral   Location: Plantar   Wound Number: #1   Ankle-Brachial Index:    Pulses: normal   Removal Indication and Assessment:    Wound Outcome:    (Retired) Wound Type:    (Retired) Wound Length (cm):    (Retired) Wound Width (cm):    (Retired) Depth (cm):    Wound Description (Comments):    Removal Indications:    Wound Image      01/11/23 0958   Dressing Appearance Moist drainage 01/11/23 0958   Drainage Amount Moderate 01/11/23 0958   Drainage Characteristics/Odor Serosanguineous 01/11/23 0958   Appearance Pink;Moist;Granulating;Muscle 01/11/23 0958   Tissue loss description Full thickness 01/11/23 0958   Black (%), Wound Tissue Color 0 % 01/11/23 0958   Red (%), Wound Tissue Color 100 % 01/11/23 0958   Yellow (%), Wound Tissue Color 0 % 01/11/23 0958   Periwound Area Macerated 01/11/23 0958   Wound Edges Open 01/11/23 0958   Johnson Classification (diabetic foot ulcers only) Grade 1 01/11/23 0958   Wound Length (cm) 3.5 cm 01/11/23 0958   Wound Width (cm) 2 cm 01/11/23 0958   Wound Depth (cm) 0.6 cm 01/11/23 0958   Wound Volume (cm^3) 4.2 cm^3 01/11/23 0958   Wound Surface Area (cm^2) 7 cm^2 01/11/23 0958   Care Cleansed with:;Antimicrobial agent 01/11/23 0958   Dressing Applied;Gauze, wet to dry;Gauze;Rolled gauze 01/11/23 0958   Periwound Care Moisture barrier applied 01/11/23 0958   Off Loading Off loading shoe 01/11/23 0958   Dressing Change Due 01/12/23 01/11/23 0958            Wound 01/21/22 Diabetic Ulcer Right medial Foot #5 (Active)   01/21/22     Pre-existing: Yes   Primary Wound Type: Diabetic ulc   Side: Right   Orientation: medial   Location: Foot   Wound Number: #5   Ankle-Brachial Index:    Pulses:    Removal Indication and Assessment:    Wound Outcome:    (Retired) Wound Type:    (Retired) Wound Length (cm):    (Retired) Wound Width (cm):    (Retired) Depth (cm):    Wound Description (Comments):    Removal Indications:    Wound Image    01/11/23 1000   Dressing Appearance Open to air 01/11/23 1000   Appearance Maroon 01/11/23 1000   Tissue loss description Not applicable 01/11/23 1000   Black (%), Wound Tissue Color 0 % 01/11/23 1000   Red (%), Wound Tissue Color 0 % 01/11/23 1000   Yellow (%), Wound Tissue Color 0 % 01/11/23 1000   Periwound Area Intact 01/11/23 1000   Wound Length (cm) 0 cm 01/11/23 1000   Wound Width (cm)  0 cm 01/11/23 1000   Wound Depth (cm) 0 cm 01/11/23 1000   Wound Volume (cm^3) 0 cm^3 01/11/23 1000   Wound Surface Area (cm^2) 0 cm^2 01/11/23 1000   Periwound Care Moisture barrier applied 01/11/23 1000   Off Loading Off loading shoe 01/11/23 1000   Dressing Change Due 01/12/23 01/11/23 1000     Problem List Items Addressed This Visit          Endocrine    Diabetic foot ulcer    Overview                                  Current Assessment & Plan     Admit to [facility] for surgical debridement, IV antibiotics, and aggressive wound care.    Needs x-ray, arterial doppler, and wound cultures    Patient known to Dr. Ramirez    Right foot clean with vashe. Apply vashe moisten drawtex,cover with dry gauze,wrap with kerlix,secure with paper tape.          Relevant Orders    Debridement       Future Appointments   Date Time Provider Department Center   1[facility]12[facility]2023 11:[facility]   1/18/2023  9:00 AM TOMASA Maldonado [facility]            Signature:  TOMASA Maldonado  [facility]  [address]  [place] [address]  [phone]    Date of encounter: 1/11/23

## 2023-01-11 NOTE — HPI
62-year-old female patient with past medical history of diabetes, hypertension, and old stroke with no residual weakness.    Patient was seen by Dr. Ramirez at the wound care clinic last week for diabetic foot ulcer, came back today with worsening ulcer, esterase on the plantar aspect of the right foot at the lateral side, today it was noted that it is draining pus.    No history of trauma no fever or chills.    Patient has transmetatarsal amputation of the right foot more than 6 years ago.    Patient stated that her diabetes is uncontrolled she does not know her hemoglobin A1c.    No headache no dizziness no chest pain no palpitation no abdominal pain no nausea no vomiting no diarrhea no constipation.  Review of systems negative other than mentioned.

## 2023-01-11 NOTE — PATIENT INSTRUCTIONS
Right foot clean with vashe. Apply vashe moisten drawtex,cover with dry gauze,wrap with kerlix,secure with paper tape. Change daily and as needed.  Monitor closely for s/s of infection including fever, chills, increase in pain, odor from wound, and increased redness from foot. Go to ER if any complications develop.   Keep leg elevated and avoid pressure on wound. Diabetes:  Monitor glucose closely. Check fasting glucose and 2 hours after meals. HgA1C goal <7, fasting glucose , and 2 hours after meals <180  Hypertension:  Check blood pressure twice daily, goal <120/80  Diet:   Increase protein intake, avoid fried, fatty foods and foods high in simple carbs.   Vitamins:  Take vitamin C 1000 mg, zinc 50mg, vitamin d 5000 units, and a daily multivitamin. Dawson is a good source of protein and nutrients to aid in wound healing.

## 2023-01-11 NOTE — ASSESSMENT & PLAN NOTE
Admit to [facility] for surgical debridement, IV antibiotics, and aggressive wound care.    Needs x-ray, arterial doppler, and wound cultures    Patient known to Dr. Ramirez    Right foot clean with vashe. Apply vashe moisten drawtex,cover with dry gauze,wrap with kerlix,secure with paper tape.

## 2023-01-11 NOTE — PROCEDURES
Debridement    Date/Time: 1/11/2023 2:00 PM  Performed by: TOMASA Maldonado  Authorized by: TOMASA Maldonado     Consent Done?:  Yes (Written)    Wound Details:    Location:  Right foot    Location:  Right Midfoot    Type of Debridement:  Excisional       Length (cm):  4       Area (sq cm):  10.4       Width (cm):  2.6       Percent Debrided (%):  100       Depth (cm):  0.6       Total Area Debrided (sq cm):  10.4    Depth of debridement:  Subcutaneous tissue    Tissue debrided:  Adipose, Dermis, Epidermis and Subcutaneous    Devitalized tissue debrided:  Biofilm, Callus, Clots, Exudate and Slough    Instruments:  Curette    Bleeding:  Minimal  Hemostasis Achieved: Yes    Method Used:  Pressure  Patient tolerance:  Patient tolerated the procedure well with no immediate complications     Assistant TIFFANI Duvall LPN   Hydroxyzine Pregnancy And Lactation Text: This medication is not safe during pregnancy and should not be taken. It is also excreted in breast milk and breast feeding isn't recommended.

## 2023-01-12 ENCOUNTER — ANESTHESIA (OUTPATIENT)
Dept: SURGERY | Facility: HOSPITAL | Age: 63
End: 2023-01-12
Payer: MEDICAID

## 2023-01-12 ENCOUNTER — ANESTHESIA EVENT (OUTPATIENT)
Dept: SURGERY | Facility: HOSPITAL | Age: 63
End: 2023-01-12
Payer: MEDICAID

## 2023-01-12 LAB
ANION GAP SERPL CALCULATED.3IONS-SCNC: 15 MMOL/L (ref 7–16)
BASOPHILS # BLD AUTO: 0.03 K/UL (ref 0–0.2)
BASOPHILS NFR BLD AUTO: 0.5 % (ref 0–1)
BUN SERPL-MCNC: 18 MG/DL (ref 7–18)
BUN/CREAT SERPL: 17 (ref 6–20)
CALCIUM SERPL-MCNC: 9 MG/DL (ref 8.5–10.1)
CHLORIDE SERPL-SCNC: 106 MMOL/L (ref 98–107)
CO2 SERPL-SCNC: 23 MMOL/L (ref 21–32)
CREAT SERPL-MCNC: 1.08 MG/DL (ref 0.55–1.02)
DIFFERENTIAL METHOD BLD: ABNORMAL
EGFR (NO RACE VARIABLE) (RUSH/TITUS): 59 ML/MIN/1.73M²
EOSINOPHIL # BLD AUTO: 0.28 K/UL (ref 0–0.5)
EOSINOPHIL NFR BLD AUTO: 4.7 % (ref 1–4)
ERYTHROCYTE [DISTWIDTH] IN BLOOD BY AUTOMATED COUNT: 15.4 % (ref 11.5–14.5)
GLUCOSE SERPL-MCNC: 111 MG/DL (ref 70–105)
GLUCOSE SERPL-MCNC: 160 MG/DL (ref 70–105)
GLUCOSE SERPL-MCNC: 167 MG/DL (ref 70–105)
GLUCOSE SERPL-MCNC: 188 MG/DL (ref 70–105)
GLUCOSE SERPL-MCNC: 212 MG/DL (ref 74–106)
GLUCOSE SERPL-MCNC: 242 MG/DL (ref 70–105)
GLUCOSE SERPL-MCNC: 244 MG/DL (ref 70–105)
HCT VFR BLD AUTO: 31.2 % (ref 38–47)
HGB BLD-MCNC: 10.2 G/DL (ref 12–16)
IMM GRANULOCYTES # BLD AUTO: 0.02 K/UL (ref 0–0.04)
IMM GRANULOCYTES NFR BLD: 0.3 % (ref 0–0.4)
LYMPHOCYTES # BLD AUTO: 1.95 K/UL (ref 1–4.8)
LYMPHOCYTES NFR BLD AUTO: 32.9 % (ref 27–41)
MAGNESIUM SERPL-MCNC: 1.7 MG/DL (ref 1.7–2.3)
MCH RBC QN AUTO: 24.8 PG (ref 27–31)
MCHC RBC AUTO-ENTMCNC: 32.7 G/DL (ref 32–36)
MCV RBC AUTO: 75.9 FL (ref 80–96)
MONOCYTES # BLD AUTO: 0.38 K/UL (ref 0–0.8)
MONOCYTES NFR BLD AUTO: 6.4 % (ref 2–6)
MPC BLD CALC-MCNC: 11.2 FL (ref 9.4–12.4)
NEUTROPHILS # BLD AUTO: 3.26 K/UL (ref 1.8–7.7)
NEUTROPHILS NFR BLD AUTO: 55.2 % (ref 53–65)
NRBC # BLD AUTO: 0 X10E3/UL
NRBC, AUTO (.00): 0 %
PLATELET # BLD AUTO: 228 K/UL (ref 150–400)
POTASSIUM SERPL-SCNC: 4.7 MMOL/L (ref 3.5–5.1)
RBC # BLD AUTO: 4.11 M/UL (ref 4.2–5.4)
SODIUM SERPL-SCNC: 139 MMOL/L (ref 136–145)
WBC # BLD AUTO: 5.92 K/UL (ref 4.5–11)

## 2023-01-12 PROCEDURE — 99900035 HC TECH TIME PER 15 MIN (STAT)

## 2023-01-12 PROCEDURE — 36415 COLL VENOUS BLD VENIPUNCTURE: CPT | Performed by: INTERNAL MEDICINE

## 2023-01-12 PROCEDURE — 63600175 PHARM REV CODE 636 W HCPCS: Performed by: SURGERY

## 2023-01-12 PROCEDURE — 25000003 PHARM REV CODE 250: Performed by: SURGERY

## 2023-01-12 PROCEDURE — 25000003 PHARM REV CODE 250: Performed by: NURSE ANESTHETIST, CERTIFIED REGISTERED

## 2023-01-12 PROCEDURE — 00400 ANES INTEGUMENTARY SYS NOS: CPT | Performed by: SURGERY

## 2023-01-12 PROCEDURE — 99232 PR SUBSEQUENT HOSPITAL CARE,LEVL II: ICD-10-PCS | Mod: ,,, | Performed by: INTERNAL MEDICINE

## 2023-01-12 PROCEDURE — 25000003 PHARM REV CODE 250: Performed by: INTERNAL MEDICINE

## 2023-01-12 PROCEDURE — 85025 COMPLETE CBC W/AUTO DIFF WBC: CPT | Performed by: INTERNAL MEDICINE

## 2023-01-12 PROCEDURE — G0378 HOSPITAL OBSERVATION PER HR: HCPCS

## 2023-01-12 PROCEDURE — 88304 TISSUE EXAM BY PATHOLOGIST: CPT | Mod: 26,,, | Performed by: PATHOLOGY

## 2023-01-12 PROCEDURE — D9220A PRA ANESTHESIA: Mod: CRNA,,, | Performed by: NURSE ANESTHETIST, CERTIFIED REGISTERED

## 2023-01-12 PROCEDURE — 88304 TISSUE EXAM BY PATHOLOGIST: CPT | Mod: TC,SUR | Performed by: SURGERY

## 2023-01-12 PROCEDURE — 71000033 HC RECOVERY, INTIAL HOUR: Performed by: SURGERY

## 2023-01-12 PROCEDURE — D9220A PRA ANESTHESIA: ICD-10-PCS | Mod: ANES,,, | Performed by: ANESTHESIOLOGY

## 2023-01-12 PROCEDURE — 27000716 HC OXISENSOR PROBE, ANY SIZE: Performed by: ANESTHESIOLOGY

## 2023-01-12 PROCEDURE — 36000706: Performed by: SURGERY

## 2023-01-12 PROCEDURE — 80048 BASIC METABOLIC PNL TOTAL CA: CPT | Performed by: INTERNAL MEDICINE

## 2023-01-12 PROCEDURE — 11042 PR DEBRIDEMENT, SKIN, SUB-Q TISSUE,=<20 SQ CM: ICD-10-PCS | Mod: ,,, | Performed by: SURGERY

## 2023-01-12 PROCEDURE — 63600175 PHARM REV CODE 636 W HCPCS: Performed by: INTERNAL MEDICINE

## 2023-01-12 PROCEDURE — D9220A PRA ANESTHESIA: ICD-10-PCS | Mod: CRNA,,, | Performed by: NURSE ANESTHETIST, CERTIFIED REGISTERED

## 2023-01-12 PROCEDURE — 82962 GLUCOSE BLOOD TEST: CPT

## 2023-01-12 PROCEDURE — 96366 THER/PROPH/DIAG IV INF ADDON: CPT | Mod: 59

## 2023-01-12 PROCEDURE — 63600175 PHARM REV CODE 636 W HCPCS: Performed by: NURSE ANESTHETIST, CERTIFIED REGISTERED

## 2023-01-12 PROCEDURE — 36000707: Performed by: SURGERY

## 2023-01-12 PROCEDURE — 27000177 HC AIRWAY, LARYNGEAL MASK: Performed by: ANESTHESIOLOGY

## 2023-01-12 PROCEDURE — 37000009 HC ANESTHESIA EA ADD 15 MINS: Performed by: SURGERY

## 2023-01-12 PROCEDURE — D9220A PRA ANESTHESIA: Mod: ANES,,, | Performed by: ANESTHESIOLOGY

## 2023-01-12 PROCEDURE — 99204 PR OFFICE/OUTPT VISIT, NEW, LEVL IV, 45-59 MIN: ICD-10-PCS | Mod: 25,,, | Performed by: SURGERY

## 2023-01-12 PROCEDURE — 83735 ASSAY OF MAGNESIUM: CPT | Performed by: INTERNAL MEDICINE

## 2023-01-12 PROCEDURE — 11000001 HC ACUTE MED/SURG PRIVATE ROOM

## 2023-01-12 PROCEDURE — 27000655: Performed by: ANESTHESIOLOGY

## 2023-01-12 PROCEDURE — 37000008 HC ANESTHESIA 1ST 15 MINUTES: Performed by: SURGERY

## 2023-01-12 PROCEDURE — 96372 THER/PROPH/DIAG INJ SC/IM: CPT | Performed by: SURGERY

## 2023-01-12 PROCEDURE — 11042 DBRDMT SUBQ TIS 1ST 20SQCM/<: CPT | Mod: ,,, | Performed by: SURGERY

## 2023-01-12 PROCEDURE — 99232 SBSQ HOSP IP/OBS MODERATE 35: CPT | Mod: ,,, | Performed by: INTERNAL MEDICINE

## 2023-01-12 PROCEDURE — 99204 OFFICE O/P NEW MOD 45 MIN: CPT | Mod: 25,,, | Performed by: SURGERY

## 2023-01-12 PROCEDURE — 88304 SURGICAL PATHOLOGY: ICD-10-PCS | Mod: 26,,, | Performed by: PATHOLOGY

## 2023-01-12 RX ORDER — IBUPROFEN 600 MG/1
600 TABLET ORAL EVERY 6 HOURS PRN
Status: DISCONTINUED | OUTPATIENT
Start: 2023-01-12 | End: 2023-01-13 | Stop reason: HOSPADM

## 2023-01-12 RX ORDER — PHENYLEPHRINE HYDROCHLORIDE 10 MG/ML
INJECTION INTRAVENOUS
Status: DISCONTINUED | OUTPATIENT
Start: 2023-01-12 | End: 2023-01-12

## 2023-01-12 RX ORDER — HYDROCODONE BITARTRATE AND ACETAMINOPHEN 10; 325 MG/1; MG/1
1 TABLET ORAL EVERY 4 HOURS PRN
Status: DISCONTINUED | OUTPATIENT
Start: 2023-01-12 | End: 2023-01-13 | Stop reason: HOSPADM

## 2023-01-12 RX ORDER — MORPHINE SULFATE 10 MG/ML
4 INJECTION INTRAMUSCULAR; INTRAVENOUS; SUBCUTANEOUS EVERY 5 MIN PRN
Status: DISCONTINUED | OUTPATIENT
Start: 2023-01-12 | End: 2023-01-12

## 2023-01-12 RX ORDER — SODIUM CHLORIDE 9 MG/ML
INJECTION, SOLUTION INTRAVENOUS CONTINUOUS
Status: DISCONTINUED | OUTPATIENT
Start: 2023-01-12 | End: 2023-01-13 | Stop reason: HOSPADM

## 2023-01-12 RX ORDER — MEPERIDINE HYDROCHLORIDE 25 MG/ML
25 INJECTION INTRAMUSCULAR; INTRAVENOUS; SUBCUTANEOUS EVERY 10 MIN PRN
Status: DISCONTINUED | OUTPATIENT
Start: 2023-01-12 | End: 2023-01-12

## 2023-01-12 RX ORDER — LIDOCAINE HYDROCHLORIDE 20 MG/ML
INJECTION, SOLUTION EPIDURAL; INFILTRATION; INTRACAUDAL; PERINEURAL
Status: DISCONTINUED | OUTPATIENT
Start: 2023-01-12 | End: 2023-01-12

## 2023-01-12 RX ORDER — ONDANSETRON 4 MG/1
8 TABLET, ORALLY DISINTEGRATING ORAL EVERY 8 HOURS PRN
Status: DISCONTINUED | OUTPATIENT
Start: 2023-01-12 | End: 2023-01-13 | Stop reason: HOSPADM

## 2023-01-12 RX ORDER — SODIUM CHLORIDE 9 MG/ML
INJECTION, SOLUTION INTRAVENOUS CONTINUOUS PRN
Status: DISCONTINUED | OUTPATIENT
Start: 2023-01-12 | End: 2023-01-12

## 2023-01-12 RX ORDER — MORPHINE SULFATE 4 MG/ML
4 INJECTION, SOLUTION INTRAMUSCULAR; INTRAVENOUS ONCE
Status: DISCONTINUED | OUTPATIENT
Start: 2023-01-12 | End: 2023-01-13 | Stop reason: HOSPADM

## 2023-01-12 RX ORDER — PROPOFOL 10 MG/ML
VIAL (ML) INTRAVENOUS
Status: DISCONTINUED | OUTPATIENT
Start: 2023-01-12 | End: 2023-01-12

## 2023-01-12 RX ORDER — ONDANSETRON 2 MG/ML
4 INJECTION INTRAMUSCULAR; INTRAVENOUS DAILY PRN
Status: DISCONTINUED | OUTPATIENT
Start: 2023-01-12 | End: 2023-01-12

## 2023-01-12 RX ORDER — DIPHENHYDRAMINE HYDROCHLORIDE 50 MG/ML
25 INJECTION INTRAMUSCULAR; INTRAVENOUS EVERY 6 HOURS PRN
Status: DISCONTINUED | OUTPATIENT
Start: 2023-01-12 | End: 2023-01-12

## 2023-01-12 RX ORDER — HYDROMORPHONE HYDROCHLORIDE 2 MG/ML
0.5 INJECTION, SOLUTION INTRAMUSCULAR; INTRAVENOUS; SUBCUTANEOUS EVERY 5 MIN PRN
Status: DISCONTINUED | OUTPATIENT
Start: 2023-01-12 | End: 2023-01-12

## 2023-01-12 RX ORDER — ONDANSETRON 2 MG/ML
INJECTION INTRAMUSCULAR; INTRAVENOUS
Status: DISCONTINUED | OUTPATIENT
Start: 2023-01-12 | End: 2023-01-12

## 2023-01-12 RX ADMIN — VANCOMYCIN HYDROCHLORIDE 2500 MG: 1 INJECTION, POWDER, LYOPHILIZED, FOR SOLUTION INTRAVENOUS at 02:01

## 2023-01-12 RX ADMIN — PROPOFOL 150 MG: 10 INJECTION, EMULSION INTRAVENOUS at 12:01

## 2023-01-12 RX ADMIN — HYDROCODONE BITARTRATE AND ACETAMINOPHEN 1 TABLET: 10; 325 TABLET ORAL at 05:01

## 2023-01-12 RX ADMIN — ATORVASTATIN CALCIUM 80 MG: 80 TABLET, FILM COATED ORAL at 09:01

## 2023-01-12 RX ADMIN — SODIUM CHLORIDE: 9 INJECTION, SOLUTION INTRAVENOUS at 10:01

## 2023-01-12 RX ADMIN — INSULIN DETEMIR 20 UNITS: 100 INJECTION, SOLUTION SUBCUTANEOUS at 09:01

## 2023-01-12 RX ADMIN — PANTOPRAZOLE SODIUM 40 MG: 40 TABLET, DELAYED RELEASE ORAL at 09:01

## 2023-01-12 RX ADMIN — PHENYLEPHRINE HYDROCHLORIDE 200 MCG: 10 INJECTION INTRAVENOUS at 12:01

## 2023-01-12 RX ADMIN — ACETAMINOPHEN AND CODEINE PHOSPHATE 1 TABLET: 300; 30 TABLET ORAL at 10:01

## 2023-01-12 RX ADMIN — LOSARTAN POTASSIUM 100 MG: 100 TABLET, FILM COATED ORAL at 09:01

## 2023-01-12 RX ADMIN — MUPIROCIN: 20 OINTMENT TOPICAL at 09:01

## 2023-01-12 RX ADMIN — SODIUM CHLORIDE: 9 INJECTION, SOLUTION INTRAVENOUS at 02:01

## 2023-01-12 RX ADMIN — SODIUM CHLORIDE: 9 INJECTION, SOLUTION INTRAVENOUS at 12:01

## 2023-01-12 RX ADMIN — ONDANSETRON 4 MG: 2 INJECTION INTRAMUSCULAR; INTRAVENOUS at 12:01

## 2023-01-12 RX ADMIN — PIPERACILLIN AND TAZOBACTAM 4.5 G: 4; .5 INJECTION, POWDER, LYOPHILIZED, FOR SOLUTION INTRAVENOUS; PARENTERAL at 10:01

## 2023-01-12 RX ADMIN — LIDOCAINE HYDROCHLORIDE 50 MG: 20 INJECTION, SOLUTION EPIDURAL; INFILTRATION; INTRACAUDAL; PERINEURAL at 12:01

## 2023-01-12 RX ADMIN — INSULIN ASPART 50 UNITS: 100 INJECTION, SUSPENSION SUBCUTANEOUS at 09:01

## 2023-01-12 RX ADMIN — AMITRIPTYLINE HYDROCHLORIDE 25 MG: 25 TABLET, FILM COATED ORAL at 09:01

## 2023-01-12 RX ADMIN — PHENYLEPHRINE HYDROCHLORIDE 300 MCG: 10 INJECTION INTRAVENOUS at 12:01

## 2023-01-12 NOTE — ANESTHESIA PROCEDURE NOTES
Intubation    Date/Time: 1/12/2023 12:18 PM  Performed by: Howard Seay CRNA  Authorized by: Raymond Shankar MD     Intubation:     Induction:  Intravenous    Intubated:  Postinduction    Mask Ventilation:  Easy mask    Attempts:  1    Attempted By:  CRNA    Difficult Airway Encountered?: No      Complications:  None    Airway Device:  Supraglottic airway/LMA    Airway Device Size:  4.0    Placement Verified By:  Capnometry    Complicating Factors:  None    Findings Post-Intubation:  BS equal bilateral

## 2023-01-12 NOTE — OP NOTE
[facility] Tele[facility]     Operative Note    SUMMARY     Date of Procedure: 1/12/2023     Procedure: Procedure(s) (LRB):  DEBRIDEMENT, FOOT (Right)     Surgeon(s) and Role:     * Ravi Ramirez MD - Primary    Assisting Surgeon: None    Pre-Operative Diagnosis: Diabetic foot infection [E11.628, L08.9]    Post-Operative Diagnosis: Post-Op Diagnosis Codes:     * Diabetic foot infection [E11.628, L08.9]    Anesthesia: Local MAC    Technical Procedures Used:  The patient was brought to the operating room and placed in supine position.  General anesthesia was administered per anesthesia staff.  The right foot was prepped and draped standard sterile fashion.  Existing wound was debrided with a curette.  Subsequent to this scissors were used to extend the ulcer posteriorly, toward the heel.  This allowed exposure of the wound.  There was no bone palpable within the wound.  Granulation tissue was debrided with a curette.  Skin edges were also debrided.  The wound was extensively irrigated.  Pressure was held for hemostasis.  Wound was then dressed with moistened gauze.    Description of the Findings of the Procedure:  There was no evidence of abscess, or exposed bone.  Wound was debrided through of soft tissue, portion of which was sent for permanent specimen.  Initial measurements 2.3 cm x 1.2 cm x 2.2 cm.  Postoperative measurement is 4.5 cm by 2.8 cm x 2.2 cm.      Assistant(s): none    Complications: No    Estimated Blood Loss (EBL):  25cc     Implants: * No implants in log *    Specimens:  Debrided tissue right foot  Specimen (24h ago, onward)      None             Condition: Good      Complications:  None

## 2023-01-12 NOTE — CONSULTS
[facility] Tele[facility]  General Surgery  Consult Note    Patient Name: Deborah Sotelo  MRN: [id number]  Code Status: Full Code  Admission Date: 1/11/2023  Hospital Length of Stay: 1 days  Attending Physician: Ravi Ramirez MD  Primary Care Provider: Ron Sanches MD    Patient information was obtained from patient and primary team.     Consults  Subjective:     Principal Problem: Diabetic foot infection    History of Present Illness: 62-year-old female patient who went to wound care today for management of a chronic wound on the right foot.  The patient states the wound has been present for 6 months.  Upon being seen today in the Wound Center, she was noted to have purulent drainage from the wound, and inpatient admission was recommended for IV antibiotics and surgical debridement.  Patient has been admitted by medicine service and surgery is consulted for management.      No current facility-administered medications on file prior to encounter.     Current Outpatient Medications on File Prior to Encounter   Medication Sig    acetaminophen-codeine 300-30mg (TYLENOL #3) 300-30 mg Tab Take 1 tablet by mouth every 6 (six) hours as needed.    amitriptyline (ELAVIL) 50 MG tablet Take 0.5 tablets by mouth every evening.    amLODIPine (NORVASC) 5 MG tablet Take 1 tablet (5 mg total) by mouth once daily.    amoxicillin-clavulanate 875-125mg (AUGMENTIN) 875-125 mg per tablet Take 1 tablet by mouth 2 (two) times daily.    atorvastatin (LIPITOR) 80 MG tablet TAKE 1 TABLET BY MOUTH EACH NIGHT AT BEDTIME FOR CHOLESTEROL ~~DO NOT EAT GRAPEFRUIT OR DRINK GRAPEFRUIT JUICE WHILE TAKING THIS MEDICATION~~ (Patient not taking: Reported on 9/29/2022)    CMPD diclofenac 3%- cyclobenzaprine 2%- baclofen 2%- gabapentin 6%- LIDOcaine 2%- prilocaine 2% transdermal gel Apply 1 to 2 grams up to 4 times daily as directed for additional pain relief    HYDROcodone-acetaminophen (NORCO) 7.5-325 mg per tablet Take 1  tablet by mouth every 4 (four) hours as needed.    insulin asp prt-insulin aspart, NovoLOG 70/30, (NOVOLOG 70/30) 100 unit/mL (70-30) Soln INJECT 50 UNITS SUB-Q EACH MORNING AND 30 UNITS EACH EVENING ...KEEP IN REFRIGERATOR ...USE WITHIN 28 DAYS AFTER OPENING EACH VIAL    LANTUS U-100 INSULIN 100 unit/mL injection Inject 20 Units into the skin once daily. Take at night    loratadine (CLARITIN) 10 mg tablet Take 1 tablet (10 mg total) by mouth once daily.    losartan (COZAAR) 100 MG tablet Take 1 tablet (100 mg total) by mouth every evening.    omeprazole (PRILOSEC) 20 MG capsule Take 1 capsule (20 mg total) by mouth once daily.    rifAMpin (RIFADIN) 300 MG capsule Take 1 capsule (300 mg total) by mouth every 12 (twelve) hours. for 14 days    sodium hypochlorite 0.5 % (DAKIN'S SOLUTION) external solution Apply topically once daily. Pack wound with dakin's moistened nuguaze daily (Patient not taking: Reported on 12/28/2022)    tiZANidine (ZANAFLEX) 4 MG tablet Take 1 tablet by mouth 3 (three) times daily.       Review of patient's allergies indicates:   Allergen Reactions    Aspirin      Other reaction(s): UPSET STOMACH     Bactrim ds [sulfamethoxazole-trimethoprim] Itching       Past Medical History:   Diagnosis Date    Anemia 7/5/2022    Diabetes mellitus, type 2     Hyperlipidemia     Hypertension     Obesity     Primary osteoarthritis of left shoulder 5/24/2022    Stroke      Past Surgical History:   Procedure Laterality Date    AMPUTATION      APPENDECTOMY      EYE SURGERY       Family History       Problem Relation (Age of Onset)    Appendicitis Father    Asthma Sister    Cancer Mother    Diabetes Brother          Tobacco Use    Smoking status: Never    Smokeless tobacco: Never   Substance and Sexual Activity    Alcohol use: Yes    Drug use: Never    Sexual activity: Never     Review of Systems   All other systems reviewed and are negative.  Objective:     Vital Signs (Most  Recent):  Temp: 97.6 °F (36.4 °C) (01/11/23 1901)  Pulse: 99 (01/11/23 1901)  Resp: 16 (01/11/23 2047)  BP: (!) 150/85 (01/11/23 1901)  SpO2: 99 % (01/11/23 1901)   Vital Signs (24h Range):  Temp:  [97.4 °F (36.3 °C)-97.6 °F (36.4 °C)] 97.6 °F (36.4 °C)  Pulse:  [84-99] 99  Resp:  [16-20] 16  SpO2:  [99 %-100 %] 99 %  BP: (145-164)/(85-90) 150/85     Weight: 133 kg (293 lb 3.4 oz)  Body mass index is 44.58 kg/m².    Physical Exam  Cardiovascular:      Rate and Rhythm: Normal rate.   Pulmonary:      Effort: Pulmonary effort is normal. No respiratory distress.   Abdominal:      Palpations: Abdomen is soft.   Skin:     Comments: The patient has had amputation of all digits on the right foot.   Ulceration overlying the lateral plantar surface, at the metatarsal head level with good granulation tissue, but deep penetration into the soft tissue and possibly bone.   Mild purulent fluid is drainage from the ulcer   Neurological:      Mental Status: She is alert. Mental status is at baseline.      Sensory: Sensory deficit present.   Psychiatric:         Mood and Affect: Mood normal.       Significant Labs:  I have reviewed all pertinent lab results within the past 24 hours.  CBC: No results for input(s): WBC, RBC, HGB, HCT, PLT, MCV, MCH, MCHC in the last 168 hours.  BMP:   Recent Labs   Lab 01/11/23 1814   BUN 18   CREATININE 1.02     CMP:   Recent Labs   Lab 01/11/23 1814   BUN 18   CREATININE 1.02     LFTs: No results for input(s): ALT, AST, ALKPHOS, BILITOT, PROT, ALBUMIN in the last 168 hours.    Significant Diagnostics:  I have reviewed all pertinent imaging results/findings within the past 24 hours.      Assessment/Plan:     * Diabetic foot infection  Plan debridement in the OR tomorrow.  Agree with IV antibiotics until cultures are obtained.      VTE Risk Mitigation (From admission, onward)         Ordered     IP VTE LOW RISK PATIENT  Once         01/11/23 1409     Place sequential compression device  Until  discontinued         01/11/23 1408                Thank you for your consult. I will follow-up with patient. Please contact us if you have any additional questions.    Ravi Ramirez MD  General Surgery  [facility] - [address] [place] [address]

## 2023-01-12 NOTE — OR NURSING
1243 REC'D TO PACU IN STABLE COND. PT AWAKE & ALERT. CONNECTED TO BP CUFF, EKG LEADS & SPO2 MONITORS. VS STABLE PT HAD A RIGHT FOOT DEBRIDEMENT TODAY. NO DISTRESS NOTED @ THIS TIME. WILL CONT TO MONITOR.      1315 TRANSFERRED TO ROOM 550 IN STABLE COND. ASSISTED ONTO HOSPITAL BED. VS STABLE. BEDSIDE REPORT GIVEN TO RUSLAN MOULTON RN.  NO DISTRESS NOTED @ THIS TIME.  VS  97.7 82  125/73

## 2023-01-12 NOTE — ANESTHESIA POSTPROCEDURE EVALUATION
Anesthesia Post Evaluation    Patient: Deborah Sotelo    Procedure(s) Performed: Procedure(s) (LRB):  DEBRIDEMENT, FOOT (Right)    Final Anesthesia Type: general      Patient location during evaluation: PACU  Post-procedure vital signs: reviewed and stable  Pain management: adequate  Airway patency: patent    PONV status at discharge: No PONV  Anesthetic complications: no      Cardiovascular status: hemodynamically stable  Respiratory status: unassisted  Hydration status: euvolemic  Follow-up not needed.          Vitals Value Taken Time   /67 01/12/23 1311   Temp 36.3 °C (97.3 °F) 01/12/23 1247   Pulse 85 01/12/23 1315   Resp 17 01/12/23 1315   SpO2 99 % 01/12/23 1315         Event Time   Out of Recovery 01/12/2023 13:15:00         Pain/Xander Score: Pain Rating Prior to Med Admin: 7 (1/11/2023  8:47 PM)  Pain Rating Post Med Admin: 4 (1/11/2023  9:36 PM)  Xander Score: 10 (1/12/2023  1:15 PM)

## 2023-01-12 NOTE — ANESTHESIA PREPROCEDURE EVALUATION
01/12/2023  Deborah Sotelo is a 62 y.o., female.      Pre-op Assessment    I have reviewed the Patient Summary Reports.    I have reviewed the NPO Status.   I have reviewed the Medications.     Review of Systems         Anesthesia Plan  Type of Anesthesia, risks & benefits discussed:    Anesthesia Type: Gen Supraglottic Airway  Intra-op Monitoring Plan: Standard ASA Monitors  Post Op Pain Control Plan: IV/PO Opioids PRN  Induction:  IV  Informed Consent: Informed consent signed with the Patient and all parties understand the risks and agree with anesthesia plan.  All questions answered.   ASA Score: 3    Ready For Surgery From Anesthesia Perspective.     .  NPO greater than 8 hours  NAC  Allergic to aspirin and bactrim    Hct 31  7/8/22 EKG: Sinus tachycardia with sinus arrhythmia; 116 bpm    Medical History   Diabetes mellitus, type 2 Hypertension   Hyperlipidemia Obesity   Stroke Primary osteoarthritis of left shoulder   Anemia    GERD    Airway exam deferred (COVID precautions)

## 2023-01-12 NOTE — HPI
62-year-old female patient who went to wound care today for management of a chronic wound on the right foot.  The patient states the wound has been present for 6 months.  Upon being seen today in the Wound Center, she was noted to have purulent drainage from the wound, and inpatient admission was recommended for IV antibiotics and surgical debridement.  Patient has been admitted by medicine service and surgery is consulted for management.

## 2023-01-12 NOTE — H&P (VIEW-ONLY)
[facility]  General Surgery  Consult Note    Patient Name: Deborah Sotelo  MRN: [id number]  Code Status: Full Code  Admission Date: 1/11/2023  Hospital Length of Stay: 1 days  Attending Physician: Ravi Ramirez MD  Primary Care Provider: Ron Sanches MD    Patient information was obtained from patient and primary team.     Consults  Subjective:     Principal Problem: Diabetic foot infection    History of Present Illness: 62-year-old female patient who went to wound care today for management of a chronic wound on the right foot.  The patient states the wound has been present for 6 months.  Upon being seen today in the [facility], she was noted to have purulent drainage from the wound, and inpatient admission was recommended for IV antibiotics and surgical debridement.  Patient has been admitted by medicine service and surgery is consulted for management.      No current facility-administered medications on file prior to encounter.     Current Outpatient Medications on File Prior to Encounter   Medication Sig    acetaminophen-codeine 300-30mg (TYLENOL #3) 300-30 mg Tab Take 1 tablet by mouth every 6 (six) hours as needed.    amitriptyline (ELAVIL) 50 MG tablet Take 0.5 tablets by mouth every evening.    amLODIPine (NORVASC) 5 MG tablet Take 1 tablet (5 mg total) by mouth once daily.    amoxicillin-clavulanate 875-125mg (AUGMENTIN) 875-125 mg per tablet Take 1 tablet by mouth 2 (two) times daily.    atorvastatin (LIPITOR) 80 MG tablet TAKE 1 TABLET BY MOUTH EACH NIGHT AT BEDTIME FOR CHOLESTEROL ~~DO NOT EAT GRAPEFRUIT OR DRINK GRAPEFRUIT JUICE WHILE TAKING THIS MEDICATION~~ (Patient not taking: Reported on 9/29/2022)    CMPD diclofenac 3%- cyclobenzaprine 2%- baclofen 2%- gabapentin 6%- LIDOcaine 2%- prilocaine 2% transdermal gel Apply 1 to 2 grams up to 4 times daily as directed for additional pain relief    HYDROcodone-acetaminophen (NORCO) 7.5-325 mg per tablet Take 1  tablet by mouth every 4 (four) hours as needed.    insulin asp prt-insulin aspart, NovoLOG 70/30, (NOVOLOG 70/30) 100 unit/mL (70-30) Soln INJECT 50 UNITS SUB-Q EACH MORNING AND 30 UNITS EACH EVENING ...KEEP IN REFRIGERATOR ...USE WITHIN 28 DAYS AFTER OPENING EACH VIAL    LANTUS U-100 INSULIN 100 unit/mL injection Inject 20 Units into the skin once daily. Take at night    loratadine (CLARITIN) 10 mg tablet Take 1 tablet (10 mg total) by mouth once daily.    losartan (COZAAR) 100 MG tablet Take 1 tablet (100 mg total) by mouth every evening.    omeprazole (PRILOSEC) 20 MG capsule Take 1 capsule (20 mg total) by mouth once daily.    rifAMpin (RIFADIN) 300 MG capsule Take 1 capsule (300 mg total) by mouth every 12 (twelve) hours. for 14 days    sodium hypochlorite 0.5 % (DAKIN'S SOLUTION) external solution Apply topically once daily. Pack wound with dakin's moistened nuguaze daily (Patient not taking: Reported on 12/28/2022)    tiZANidine (ZANAFLEX) 4 MG tablet Take 1 tablet by mouth 3 (three) times daily.       Review of patient's allergies indicates:   Allergen Reactions    Aspirin      Other reaction(s): UPSET STOMACH     Bactrim ds [sulfamethoxazole-trimethoprim] Itching       Past Medical History:   Diagnosis Date    Anemia 7/5/2022    Diabetes mellitus, type 2     Hyperlipidemia     Hypertension     Obesity     Primary osteoarthritis of left shoulder 5/24/2022    Stroke      Past Surgical History:   Procedure Laterality Date    AMPUTATION      APPENDECTOMY      EYE SURGERY       Family History       Problem Relation (Age of Onset)    Appendicitis Father    Asthma Sister    Cancer Mother    Diabetes Brother          Tobacco Use    Smoking status: Never    Smokeless tobacco: Never   Substance and Sexual Activity    Alcohol use: Yes    Drug use: Never    Sexual activity: Never     Review of Systems   All other systems reviewed and are negative.  Objective:     Vital Signs (Most  Recent):  Temp: 97.6 °F (36.4 °C) (01/11/23 1901)  Pulse: 99 (01/11/23 1901)  Resp: 16 (01/11/23 2047)  BP: (!) 150/85 (01/11/23 1901)  SpO2: 99 % (01/11/23 1901)   Vital Signs (24h Range):  Temp:  [97.4 °F (36.3 °C)-97.6 °F (36.4 °C)] 97.6 °F (36.4 °C)  Pulse:  [84-99] 99  Resp:  [16-20] 16  SpO2:  [99 %-100 %] 99 %  BP: (145-164)/(85-90) 150/85     Weight: 133 kg (293 lb 3.4 oz)  Body mass index is 44.58 kg/m².    Physical Exam  Cardiovascular:      Rate and Rhythm: Normal rate.   Pulmonary:      Effort: Pulmonary effort is normal. No respiratory distress.   Abdominal:      Palpations: Abdomen is soft.   Skin:     Comments: The patient has had amputation of all digits on the right foot.   Ulceration overlying the lateral plantar surface, at the metatarsal head level with good granulation tissue, but deep penetration into the soft tissue and possibly bone.   Mild purulent fluid is drainage from the ulcer   Neurological:      Mental Status: She is alert. Mental status is at baseline.      Sensory: Sensory deficit present.   Psychiatric:         Mood and Affect: Mood normal.       Significant Labs:  I have reviewed all pertinent lab results within the past 24 hours.  CBC: No results for input(s): WBC, RBC, HGB, HCT, PLT, MCV, MCH, MCHC in the last 168 hours.  BMP:   Recent Labs   Lab 01/11/23 1814   BUN 18   CREATININE 1.02     CMP:   Recent Labs   Lab 01/11/23 1814   BUN 18   CREATININE 1.02     LFTs: No results for input(s): ALT, AST, ALKPHOS, BILITOT, PROT, ALBUMIN in the last 168 hours.    Significant Diagnostics:  I have reviewed all pertinent imaging results/findings within the past 24 hours.      Assessment/Plan:     * Diabetic foot infection  Plan debridement in the OR tomorrow.  Agree with IV antibiotics until cultures are obtained.      VTE Risk Mitigation (From admission, onward)         Ordered     IP VTE LOW RISK PATIENT  Once         01/11/23 1409     Place sequential compression device  Until  discontinued         01/11/23 1408                Thank you for your consult. I will follow-up with patient. Please contact us if you have any additional questions.    Ravi Ramirez MD  General Surgery  [facility] - [address] [place] [address]

## 2023-01-12 NOTE — PROGRESS NOTES
Initial Pharmacy Consult    Consulted to assist in the management of Vanc therapy in this 63 y/o female with SSTI.     Labs: BUN    18            SCR:   1.02            CRCL: 84    Based on the patient's weight of 136kg and the most recent lab data available, the following therapy will be initiated: Vanc 2500mg iv q18hr . Will check Vanc trough prior to the 3rd dose on 1/13 and make adjustments if necessary.  Will Continue to monitor.    MANISH [email or web address].

## 2023-01-12 NOTE — TRANSFER OF CARE
"Anesthesia Transfer of Care Note    Patient: Deborah Sotelo    Procedure(s) Performed: Procedure(s) (LRB):  DEBRIDEMENT, FOOT (Right)    Patient location: PACU    Anesthesia Type: general    Transport from OR: Transported from OR on 6-10 L/min O2 by face mask with adequate spontaneous ventilation    Post pain: adequate analgesia    Post assessment: no apparent anesthetic complications    Post vital signs: stable    Level of consciousness: awake and alert    Complications: none    Transfer of care protocol was followed      Last vitals:   Visit Vitals  /65   Pulse 84   Temp 36.3 °C (97.3 °F)   Resp (!) 22   Ht 5' 8" (1.727 m)   Wt 133 kg (293 lb 3.4 oz)   SpO2 100%   BMI 44.58 kg/m²     "

## 2023-01-12 NOTE — SUBJECTIVE & OBJECTIVE
No current facility-administered medications on file prior to encounter.     Current Outpatient Medications on File Prior to Encounter   Medication Sig    acetaminophen-codeine 300-30mg (TYLENOL #3) 300-30 mg Tab Take 1 tablet by mouth every 6 (six) hours as needed.    amitriptyline (ELAVIL) 50 MG tablet Take 0.5 tablets by mouth every evening.    amLODIPine (NORVASC) 5 MG tablet Take 1 tablet (5 mg total) by mouth once daily.    amoxicillin-clavulanate 875-125mg (AUGMENTIN) 875-125 mg per tablet Take 1 tablet by mouth 2 (two) times daily.    atorvastatin (LIPITOR) 80 MG tablet TAKE 1 TABLET BY MOUTH EACH NIGHT AT BEDTIME FOR CHOLESTEROL ~~DO NOT EAT GRAPEFRUIT OR DRINK GRAPEFRUIT JUICE WHILE TAKING THIS MEDICATION~~ (Patient not taking: Reported on 9/29/2022)    CMPD diclofenac 3%- cyclobenzaprine 2%- baclofen 2%- gabapentin 6%- LIDOcaine 2%- prilocaine 2% transdermal gel Apply 1 to 2 grams up to 4 times daily as directed for additional pain relief    HYDROcodone-acetaminophen (NORCO) 7.5-325 mg per tablet Take 1 tablet by mouth every 4 (four) hours as needed.    insulin asp prt-insulin aspart, NovoLOG 70/30, (NOVOLOG 70/30) 100 unit/mL (70-30) Soln INJECT 50 UNITS SUB-Q EACH MORNING AND 30 UNITS EACH EVENING ...KEEP IN REFRIGERATOR ...USE WITHIN 28 DAYS AFTER OPENING EACH VIAL    LANTUS U-100 INSULIN 100 unit/mL injection Inject 20 Units into the skin once daily. Take at night    loratadine (CLARITIN) 10 mg tablet Take 1 tablet (10 mg total) by mouth once daily.    losartan (COZAAR) 100 MG tablet Take 1 tablet (100 mg total) by mouth every evening.    omeprazole (PRILOSEC) 20 MG capsule Take 1 capsule (20 mg total) by mouth once daily.    rifAMpin (RIFADIN) 300 MG capsule Take 1 capsule (300 mg total) by mouth every 12 (twelve) hours. for 14 days    sodium hypochlorite 0.5 % (DAKIN'S SOLUTION) external solution Apply topically once daily. Pack wound with dakin's moistened nuguaze daily (Patient not taking:  Reported on 12/28/2022)    tiZANidine (ZANAFLEX) 4 MG tablet Take 1 tablet by mouth 3 (three) times daily.       Review of patient's allergies indicates:   Allergen Reactions    Aspirin      Other reaction(s): UPSET STOMACH     Bactrim ds [sulfamethoxazole-trimethoprim] Itching       Past Medical History:   Diagnosis Date    Anemia 7/5/2022    Diabetes mellitus, type 2     Hyperlipidemia     Hypertension     Obesity     Primary osteoarthritis of left shoulder 5/24/2022    Stroke      Past Surgical History:   Procedure Laterality Date    AMPUTATION      APPENDECTOMY      EYE SURGERY       Family History       Problem Relation (Age of Onset)    Appendicitis Father    Asthma Sister    Cancer Mother    Diabetes Brother          Tobacco Use    Smoking status: Never    Smokeless tobacco: Never   Substance and Sexual Activity    Alcohol use: Yes    Drug use: Never    Sexual activity: Never     Review of Systems   All other systems reviewed and are negative.  Objective:     Vital Signs (Most Recent):  Temp: 97.6 °F (36.4 °C) (01/11/23 1901)  Pulse: 99 (01/11/23 1901)  Resp: 16 (01/11/23 2047)  BP: (!) 150/85 (01/11/23 1901)  SpO2: 99 % (01/11/23 1901)   Vital Signs (24h Range):  Temp:  [97.4 °F (36.3 °C)-97.6 °F (36.4 °C)] 97.6 °F (36.4 °C)  Pulse:  [84-99] 99  Resp:  [16-20] 16  SpO2:  [99 %-100 %] 99 %  BP: (145-164)/(85-90) 150/85     Weight: 133 kg (293 lb 3.4 oz)  Body mass index is 44.58 kg/m².    Physical Exam  Cardiovascular:      Rate and Rhythm: Normal rate.   Pulmonary:      Effort: Pulmonary effort is normal. No respiratory distress.   Abdominal:      Palpations: Abdomen is soft.   Skin:     Comments: The patient has had amputation of all digits on the right foot.   Ulceration overlying the lateral plantar surface, at the metatarsal head level with good granulation tissue, but deep penetration into the soft tissue and possibly bone.   Mild purulent fluid is drainage from the ulcer   Neurological:      Mental  Status: She is alert. Mental status is at baseline.      Sensory: Sensory deficit present.   Psychiatric:         Mood and Affect: Mood normal.       Significant Labs:  I have reviewed all pertinent lab results within the past 24 hours.  CBC: No results for input(s): WBC, RBC, HGB, HCT, PLT, MCV, MCH, MCHC in the last 168 hours.  BMP:   Recent Labs   Lab 01/11/23  1814   BUN 18   CREATININE 1.02     CMP:   Recent Labs   Lab 01/11/23  1814   BUN 18   CREATININE 1.02     LFTs: No results for input(s): ALT, AST, ALKPHOS, BILITOT, PROT, ALBUMIN in the last 168 hours.    Significant Diagnostics:  I have reviewed all pertinent imaging results/findings within the past 24 hours.

## 2023-01-12 NOTE — HOSPITAL COURSE
01/12/2023   Patient has no complaints.    Her vital signs are stable   Her white blood cell count 5, creatinine 1.  Arterial ultrasound both lower extremity showed no areas of occlusion.  And foot x-ray showed no evidence of osteomyelitis.    Patient with diabetic foot infected ulcer with purulent discharge.    Plan for debridement surgery today.    We will continue broad-spectrum IV antibiotics, wound and blood cultures pending.  Diabetes is controlled and blood pressure is controlled   Further management as per surgeon.    01/13/2023   Patient has pain at the site of procedure but no other complaints, she is afebrile with stable vital signs.    Normal white count.    Patient is status post debridement, no evidence of abscessed or exposed bone as per surgery report.    Antibiotics selection and duration of treatment to be addressed by surgery, blood and wound cultures came back negative.    Will continue same current management, possible discharge today if okay with surgeon.    Update at 1300: Surgery noted patient was okay to discharge from their standpoint. Will need additional 2 weeks of antibiotics. Patient to follow up with surgery in 2 weeks, wound care, and PCP. Home health to be secured prior to d/c.

## 2023-01-12 NOTE — PROGRESS NOTES
[facility] Hospital Medicine  Progress Note    Patient Name: Deborah Sotelo  MRN: [id number]  Patient Class: IP- Inpatient   Admission Date: 1/11/2023  Length of Stay: 1 days  Attending Physician: Ravi Ramirez MD  Primary Care Provider: Ron Sanches MD        Subjective:     Principal Problem:Diabetic foot infection        HPI:  62-year-old female patient with past medical history of diabetes, hypertension, and old stroke with no residual weakness.    Patient was seen by Dr. Ramirez at the wound care clinic last week for diabetic foot ulcer, came back today with worsening ulcer, esterase on the plantar aspect of the right foot at the lateral side, today it was noted that it is draining pus.    No history of trauma no fever or chills.    Patient has transmetatarsal amputation of the right foot more than 6 years ago.    Patient stated that her diabetes is uncontrolled she does not know her hemoglobin A1c.    No headache no dizziness no chest pain no palpitation no abdominal pain no nausea no vomiting no diarrhea no constipation.  Review of systems negative other than mentioned.      Overview/Hospital Course:  01/12/2023   Patient has no complaints.    Her vital signs are stable   Her white blood cell count 5, creatinine 1.  Arterial ultrasound both lower extremity showed no areas of occlusion.  And foot x-ray showed no evidence of osteomyelitis.    Patient with diabetic foot infected ulcer with purulent discharge.    Plan for debridement surgery today.    We will continue broad-spectrum IV antibiotics, wound and blood cultures pending.  Diabetes is controlled and blood pressure is controlled   Further management as per surgeon.      Past Medical History:   Diagnosis Date    Anemia 7/5/2022    Diabetes mellitus, type 2     Hyperlipidemia     Hypertension     Obesity     Primary osteoarthritis of left shoulder 5/24/2022    Stroke        Past Surgical History:   Procedure  Laterality Date    AMPUTATION      APPENDECTOMY      EYE SURGERY         Review of patient's allergies indicates:   Allergen Reactions    Aspirin      Other reaction(s): UPSET STOMACH     Bactrim ds [sulfamethoxazole-trimethoprim] Itching       No current facility-administered medications on file prior to encounter.     Current Outpatient Medications on File Prior to Encounter   Medication Sig    acetaminophen-codeine 300-30mg (TYLENOL #3) 300-30 mg Tab Take 1 tablet by mouth every 6 (six) hours as needed.    amitriptyline (ELAVIL) 50 MG tablet Take 0.5 tablets by mouth every evening.    amLODIPine (NORVASC) 5 MG tablet Take 1 tablet (5 mg total) by mouth once daily.    amoxicillin-clavulanate 875-125mg (AUGMENTIN) 875-125 mg per tablet Take 1 tablet by mouth 2 (two) times daily.    atorvastatin (LIPITOR) 80 MG tablet TAKE 1 TABLET BY MOUTH EACH NIGHT AT BEDTIME FOR CHOLESTEROL ~~DO NOT EAT GRAPEFRUIT OR DRINK GRAPEFRUIT JUICE WHILE TAKING THIS MEDICATION~~ (Patient not taking: Reported on 9/29/2022)    CMPD diclofenac 3%- cyclobenzaprine 2%- baclofen 2%- gabapentin 6%- LIDOcaine 2%- prilocaine 2% transdermal gel Apply 1 to 2 grams up to 4 times daily as directed for additional pain relief    HYDROcodone-acetaminophen (NORCO) 7.5-325 mg per tablet Take 1 tablet by mouth every 4 (four) hours as needed.    insulin asp prt-insulin aspart, NovoLOG 70/30, (NOVOLOG 70/30) 100 unit/mL (70-30) Soln INJECT 50 UNITS SUB-Q EACH MORNING AND 30 UNITS EACH EVENING ...KEEP IN REFRIGERATOR ...USE WITHIN 28 DAYS AFTER OPENING EACH VIAL    LANTUS U-100 INSULIN 100 unit/mL injection Inject 20 Units into the skin once daily. Take at night    loratadine (CLARITIN) 10 mg tablet Take 1 tablet (10 mg total) by mouth once daily.    losartan (COZAAR) 100 MG tablet Take 1 tablet (100 mg total) by mouth every evening.    omeprazole (PRILOSEC) 20 MG capsule Take 1 capsule (20 mg total) by mouth once daily.    rifAMpin  (RIFADIN) 300 MG capsule Take 1 capsule (300 mg total) by mouth every 12 (twelve) hours. for 14 days    sodium hypochlorite 0.5 % (DAKIN'S SOLUTION) external solution Apply topically once daily. Pack wound with dakin's moistened nuguaze daily (Patient not taking: Reported on 2022)    tiZANidine (ZANAFLEX) 4 MG tablet Take 1 tablet by mouth 3 (three) times daily.     Family History       Problem Relation (Age of Onset)    Appendicitis Father    Asthma Sister    Cancer Mother    Diabetes Brother          Tobacco Use    Smoking status: Never    Smokeless tobacco: Never   Substance and Sexual Activity    Alcohol use: Yes    Drug use: Never    Sexual activity: Never     Family history  Mother  of stroke and diabetes.    Father  of heart disease.     Review of Systems   Constitutional:  Negative for chills and fever.   HENT:  Negative for congestion.    Eyes:  Negative for pain and discharge.   Respiratory:  Negative for cough, chest tightness, shortness of breath, wheezing and stridor.    Cardiovascular:  Negative for chest pain, palpitations and leg swelling.   Gastrointestinal:  Negative for abdominal distention, abdominal pain, blood in stool, diarrhea, nausea and vomiting.   Endocrine: Negative for cold intolerance, polydipsia and polyuria.   Genitourinary:  Negative for difficulty urinating, dysuria, frequency, hematuria and urgency.   Musculoskeletal:  Negative for arthralgias, back pain and neck pain.   Skin:  Positive for wound.        Right foot ulcer P   Neurological:  Negative for dizziness, seizures, syncope, weakness, numbness and headaches.   Hematological:  Negative for adenopathy.   Psychiatric/Behavioral:  Negative for behavioral problems.    All other systems reviewed and are negative.  Objective:     Vital Signs (Most Recent):  Temp: 97.5 °F (36.4 °C) (23)  Pulse: 78 (23)  Resp: 18 (23)  BP: (!) 99/58 (23)  SpO2: 97 % (23)  Vital Signs (24h Range):  Temp:  [97.4 °F (36.3 °C)-98.2 °F (36.8 °C)] 97.5 °F (36.4 °C)  Pulse:  [76-99] 78  Resp:  [16-20] 18  SpO2:  [97 %-100 %] 97 %  BP: ()/(58-90) 99/58     Weight: 133 kg (293 lb 3.4 oz)  Body mass index is 44.58 kg/m².    Physical Exam  Vitals reviewed.   HENT:      Nose: Nose normal.      Mouth/Throat:      Pharynx: Oropharynx is clear.   Eyes:      Pupils: Pupils are equal, round, and reactive to light.   Cardiovascular:      Rate and Rhythm: Normal rate and regular rhythm.      Pulses: Normal pulses.      Heart sounds: Normal heart sounds. No murmur heard.  Pulmonary:      Effort: Pulmonary effort is normal. No respiratory distress.      Breath sounds: Normal breath sounds. No stridor. No wheezing or rales.   Abdominal:      General: Abdomen is flat. Bowel sounds are normal. There is no distension.      Tenderness: There is no abdominal tenderness. There is no rebound.   Musculoskeletal:         General: No swelling or deformity. Normal range of motion.      Cervical back: Normal range of motion and neck supple.   Skin:     Coloration: Skin is not jaundiced or pale.      Findings: Lesion present.      Comments: Right foot lateral plantar aspect ulcer 2 x 2 cm draining pus, with tenderness   Neurological:      General: No focal deficit present.      Mental Status: She is alert and oriented to person, place, and time. Mental status is at baseline.      Cranial Nerves: No cranial nerve deficit.      Sensory: No sensory deficit.      Motor: No weakness.   Psychiatric:         Mood and Affect: Mood normal.         Behavior: Behavior normal.         CRANIAL NERVES     CN III, IV, VI   Pupils are equal, round, and reactive to light.     Significant Labs: All pertinent labs within the past 24 hours have been reviewed.    Significant Imaging: I have reviewed all pertinent imaging results/findings within the past 24 hours.      Assessment/Plan:      * Diabetic foot infection  Will start IV  Zosyn.  IV vancomycin.    We will do a follow on cultures   Surgery consulted.    Hyperlipidemia    Continue statins     Hypertension    We will continue home antihypertensives and will monitor vital signs closely.    Type 2 diabetes mellitus with other specified complication  Patient with infected diabetic foot ulcer, admitted for IV antibiotics and surgery evaluation.          VTE Risk Mitigation (From admission, onward)         Ordered     IP VTE LOW RISK PATIENT  Once         01/11/23 1409     Place sequential compression device  Until discontinued         01/11/23 1409                Discharge Planning   REYES:      Code Status: Full Code   Is the patient medically ready for discharge?:     Reason for patient still in hospital (select all that apply): Treatment                     Valentino Mishra MD  Department of Hospital Medicine   [facility]

## 2023-01-12 NOTE — SUBJECTIVE & OBJECTIVE
Past Medical History:   Diagnosis Date    Anemia 7/5/2022    Diabetes mellitus, type 2     Hyperlipidemia     Hypertension     Obesity     Primary osteoarthritis of left shoulder 5/24/2022    Stroke        Past Surgical History:   Procedure Laterality Date    AMPUTATION      APPENDECTOMY      EYE SURGERY         Review of patient's allergies indicates:   Allergen Reactions    Aspirin      Other reaction(s): UPSET STOMACH     Bactrim ds [sulfamethoxazole-trimethoprim] Itching       No current facility-administered medications on file prior to encounter.     Current Outpatient Medications on File Prior to Encounter   Medication Sig    acetaminophen-codeine 300-30mg (TYLENOL #3) 300-30 mg Tab Take 1 tablet by mouth every 6 (six) hours as needed.    amitriptyline (ELAVIL) 50 MG tablet Take 0.5 tablets by mouth every evening.    amLODIPine (NORVASC) 5 MG tablet Take 1 tablet (5 mg total) by mouth once daily.    amoxicillin-clavulanate 875-125mg (AUGMENTIN) 875-125 mg per tablet Take 1 tablet by mouth 2 (two) times daily.    atorvastatin (LIPITOR) 80 MG tablet TAKE 1 TABLET BY MOUTH EACH NIGHT AT BEDTIME FOR CHOLESTEROL ~~DO NOT EAT GRAPEFRUIT OR DRINK GRAPEFRUIT JUICE WHILE TAKING THIS MEDICATION~~ (Patient not taking: Reported on 9/29/2022)    CMPD diclofenac 3%- cyclobenzaprine 2%- baclofen 2%- gabapentin 6%- LIDOcaine 2%- prilocaine 2% transdermal gel Apply 1 to 2 grams up to 4 times daily as directed for additional pain relief    HYDROcodone-acetaminophen (NORCO) 7.5-325 mg per tablet Take 1 tablet by mouth every 4 (four) hours as needed.    insulin asp prt-insulin aspart, NovoLOG 70/30, (NOVOLOG 70/30) 100 unit/mL (70-30) Soln INJECT 50 UNITS SUB-Q EACH MORNING AND 30 UNITS EACH EVENING ...KEEP IN REFRIGERATOR ...USE WITHIN 28 DAYS AFTER OPENING EACH VIAL    LANTUS U-100 INSULIN 100 unit/mL injection Inject 20 Units into the skin once daily. Take at night    loratadine (CLARITIN) 10 mg tablet Take 1 tablet (10 mg  total) by mouth once daily.    losartan (COZAAR) 100 MG tablet Take 1 tablet (100 mg total) by mouth every evening.    omeprazole (PRILOSEC) 20 MG capsule Take 1 capsule (20 mg total) by mouth once daily.    rifAMpin (RIFADIN) 300 MG capsule Take 1 capsule (300 mg total) by mouth every 12 (twelve) hours. for 14 days    sodium hypochlorite 0.5 % (DAKIN'S SOLUTION) external solution Apply topically once daily. Pack wound with dakin's moistened nuguaze daily (Patient not taking: Reported on 2022)    tiZANidine (ZANAFLEX) 4 MG tablet Take 1 tablet by mouth 3 (three) times daily.     Family History       Problem Relation (Age of Onset)    Appendicitis Father    Asthma Sister    Cancer Mother    Diabetes Brother          Tobacco Use    Smoking status: Never    Smokeless tobacco: Never   Substance and Sexual Activity    Alcohol use: Yes    Drug use: Never    Sexual activity: Never     Family history  Mother  of stroke and diabetes.    Father  of heart disease.     Review of Systems   Constitutional:  Negative for chills and fever.   HENT:  Negative for congestion.    Eyes:  Negative for pain and discharge.   Respiratory:  Negative for cough, chest tightness, shortness of breath, wheezing and stridor.    Cardiovascular:  Negative for chest pain, palpitations and leg swelling.   Gastrointestinal:  Negative for abdominal distention, abdominal pain, blood in stool, diarrhea, nausea and vomiting.   Endocrine: Negative for cold intolerance, polydipsia and polyuria.   Genitourinary:  Negative for difficulty urinating, dysuria, frequency, hematuria and urgency.   Musculoskeletal:  Negative for arthralgias, back pain and neck pain.   Skin:  Positive for wound.        Right foot ulcer P   Neurological:  Negative for dizziness, seizures, syncope, weakness, numbness and headaches.   Hematological:  Negative for adenopathy.   Psychiatric/Behavioral:  Negative for behavioral problems.    All other systems reviewed and are  negative.  Objective:     Vital Signs (Most Recent):  Temp: 97.5 °F (36.4 °C) (01/12/23 0724)  Pulse: 78 (01/12/23 0724)  Resp: 18 (01/12/23 0724)  BP: (!) 99/58 (01/12/23 0724)  SpO2: 97 % (01/12/23 0724) Vital Signs (24h Range):  Temp:  [97.4 °F (36.3 °C)-98.2 °F (36.8 °C)] 97.5 °F (36.4 °C)  Pulse:  [76-99] 78  Resp:  [16-20] 18  SpO2:  [97 %-100 %] 97 %  BP: ()/(58-90) 99/58     Weight: 133 kg (293 lb 3.4 oz)  Body mass index is 44.58 kg/m².    Physical Exam  Vitals reviewed.   HENT:      Nose: Nose normal.      Mouth/Throat:      Pharynx: Oropharynx is clear.   Eyes:      Pupils: Pupils are equal, round, and reactive to light.   Cardiovascular:      Rate and Rhythm: Normal rate and regular rhythm.      Pulses: Normal pulses.      Heart sounds: Normal heart sounds. No murmur heard.  Pulmonary:      Effort: Pulmonary effort is normal. No respiratory distress.      Breath sounds: Normal breath sounds. No stridor. No wheezing or rales.   Abdominal:      General: Abdomen is flat. Bowel sounds are normal. There is no distension.      Tenderness: There is no abdominal tenderness. There is no rebound.   Musculoskeletal:         General: No swelling or deformity. Normal range of motion.      Cervical back: Normal range of motion and neck supple.   Skin:     Coloration: Skin is not jaundiced or pale.      Findings: Lesion present.      Comments: Right foot lateral plantar aspect ulcer 2 x 2 cm draining pus, with tenderness   Neurological:      General: No focal deficit present.      Mental Status: She is alert and oriented to person, place, and time. Mental status is at baseline.      Cranial Nerves: No cranial nerve deficit.      Sensory: No sensory deficit.      Motor: No weakness.   Psychiatric:         Mood and Affect: Mood normal.         Behavior: Behavior normal.         CRANIAL NERVES     CN III, IV, VI   Pupils are equal, round, and reactive to light.     Significant Labs: All pertinent labs within the  past 24 hours have been reviewed.    Significant Imaging: I have reviewed all pertinent imaging results/findings within the past 24 hours.

## 2023-01-13 VITALS
WEIGHT: 293 LBS | SYSTOLIC BLOOD PRESSURE: 96 MMHG | BODY MASS INDEX: 44.41 KG/M2 | OXYGEN SATURATION: 97 % | TEMPERATURE: 98 F | RESPIRATION RATE: 18 BRPM | DIASTOLIC BLOOD PRESSURE: 53 MMHG | HEART RATE: 81 BPM | HEIGHT: 68 IN

## 2023-01-13 LAB
ANION GAP SERPL CALCULATED.3IONS-SCNC: 14 MMOL/L (ref 7–16)
BASOPHILS # BLD AUTO: 0.03 K/UL (ref 0–0.2)
BASOPHILS NFR BLD AUTO: 0.6 % (ref 0–1)
BUN SERPL-MCNC: 20 MG/DL (ref 7–18)
BUN/CREAT SERPL: 16 (ref 6–20)
CALCIUM SERPL-MCNC: 8.5 MG/DL (ref 8.5–10.1)
CHLORIDE SERPL-SCNC: 107 MMOL/L (ref 98–107)
CO2 SERPL-SCNC: 24 MMOL/L (ref 21–32)
CREAT SERPL-MCNC: 1.29 MG/DL (ref 0.55–1.02)
DIFFERENTIAL METHOD BLD: ABNORMAL
EGFR (NO RACE VARIABLE) (RUSH/TITUS): 47 ML/MIN/1.73M²
EOSINOPHIL # BLD AUTO: 0.32 K/UL (ref 0–0.5)
EOSINOPHIL NFR BLD AUTO: 6 % (ref 1–4)
ERYTHROCYTE [DISTWIDTH] IN BLOOD BY AUTOMATED COUNT: 15.1 % (ref 11.5–14.5)
ESTROGEN SERPL-MCNC: NORMAL PG/ML
GLUCOSE SERPL-MCNC: 204 MG/DL (ref 70–105)
GLUCOSE SERPL-MCNC: 239 MG/DL (ref 74–106)
GLUCOSE SERPL-MCNC: 80 MG/DL (ref 70–105)
HCT VFR BLD AUTO: 30.4 % (ref 38–47)
HGB BLD-MCNC: 10.3 G/DL (ref 12–16)
IMM GRANULOCYTES # BLD AUTO: 0.01 K/UL (ref 0–0.04)
IMM GRANULOCYTES NFR BLD: 0.2 % (ref 0–0.4)
INSULIN SERPL-ACNC: NORMAL U[IU]/ML
LAB AP GROSS DESCRIPTION: NORMAL
LAB AP LABORATORY NOTES: NORMAL
LYMPHOCYTES # BLD AUTO: 1.59 K/UL (ref 1–4.8)
LYMPHOCYTES NFR BLD AUTO: 29.6 % (ref 27–41)
MAGNESIUM SERPL-MCNC: 1.5 MG/DL (ref 1.7–2.3)
MCH RBC QN AUTO: 25.6 PG (ref 27–31)
MCHC RBC AUTO-ENTMCNC: 33.9 G/DL (ref 32–36)
MCV RBC AUTO: 75.4 FL (ref 80–96)
MONOCYTES # BLD AUTO: 0.39 K/UL (ref 0–0.8)
MONOCYTES NFR BLD AUTO: 7.3 % (ref 2–6)
MPC BLD CALC-MCNC: 10.8 FL (ref 9.4–12.4)
NEUTROPHILS # BLD AUTO: 3.03 K/UL (ref 1.8–7.7)
NEUTROPHILS NFR BLD AUTO: 56.3 % (ref 53–65)
NRBC # BLD AUTO: 0 X10E3/UL
NRBC, AUTO (.00): 0 %
PLATELET # BLD AUTO: 200 K/UL (ref 150–400)
POTASSIUM SERPL-SCNC: 4.3 MMOL/L (ref 3.5–5.1)
POTASSIUM SERPL-SCNC: 5.2 MMOL/L (ref 3.5–5.1)
RBC # BLD AUTO: 4.03 M/UL (ref 4.2–5.4)
SODIUM SERPL-SCNC: 140 MMOL/L (ref 136–145)
T3RU NFR SERPL: NORMAL %
VANCOMYCIN TROUGH SERPL-MCNC: 20.7 ΜG/ML (ref 10–20)
WBC # BLD AUTO: 5.37 K/UL (ref 4.5–11)

## 2023-01-13 PROCEDURE — 36415 COLL VENOUS BLD VENIPUNCTURE: CPT | Performed by: NURSE PRACTITIONER

## 2023-01-13 PROCEDURE — 96366 THER/PROPH/DIAG IV INF ADDON: CPT | Mod: 59

## 2023-01-13 PROCEDURE — 96372 THER/PROPH/DIAG INJ SC/IM: CPT | Performed by: SURGERY

## 2023-01-13 PROCEDURE — 85025 COMPLETE CBC W/AUTO DIFF WBC: CPT | Performed by: SURGERY

## 2023-01-13 PROCEDURE — 25000003 PHARM REV CODE 250: Performed by: NURSE PRACTITIONER

## 2023-01-13 PROCEDURE — 99239 PR HOSPITAL DISCHARGE DAY,>30 MIN: ICD-10-PCS | Mod: ,,, | Performed by: INTERNAL MEDICINE

## 2023-01-13 PROCEDURE — 99239 HOSP IP/OBS DSCHRG MGMT >30: CPT | Mod: ,,, | Performed by: INTERNAL MEDICINE

## 2023-01-13 PROCEDURE — 82962 GLUCOSE BLOOD TEST: CPT

## 2023-01-13 PROCEDURE — 63600175 PHARM REV CODE 636 W HCPCS: Performed by: SURGERY

## 2023-01-13 PROCEDURE — 80048 BASIC METABOLIC PNL TOTAL CA: CPT | Performed by: SURGERY

## 2023-01-13 PROCEDURE — G0378 HOSPITAL OBSERVATION PER HR: HCPCS

## 2023-01-13 PROCEDURE — 36415 COLL VENOUS BLD VENIPUNCTURE: CPT | Performed by: SURGERY

## 2023-01-13 PROCEDURE — 25000003 PHARM REV CODE 250: Performed by: SURGERY

## 2023-01-13 PROCEDURE — 80202 ASSAY OF VANCOMYCIN: CPT | Performed by: SURGERY

## 2023-01-13 PROCEDURE — 84132 ASSAY OF SERUM POTASSIUM: CPT | Mod: 59 | Performed by: NURSE PRACTITIONER

## 2023-01-13 PROCEDURE — 83735 ASSAY OF MAGNESIUM: CPT | Performed by: SURGERY

## 2023-01-13 RX ORDER — HYDROCODONE BITARTRATE AND ACETAMINOPHEN 7.5; 325 MG/1; MG/1
1 TABLET ORAL EVERY 6 HOURS PRN
Qty: 28 TABLET | Refills: 0 | Status: SHIPPED | OUTPATIENT
Start: 2023-01-13 | End: 2023-05-04 | Stop reason: ALTCHOICE

## 2023-01-13 RX ORDER — INSULIN ASPART 100 [IU]/ML
INJECTION, SUSPENSION SUBCUTANEOUS
Qty: 30 ML | Refills: 0 | Status: SHIPPED | OUTPATIENT
Start: 2023-01-13 | End: 2023-02-28 | Stop reason: SDUPTHER

## 2023-01-13 RX ORDER — AMOXICILLIN AND CLAVULANATE POTASSIUM 875; 125 MG/1; MG/1
1 TABLET, FILM COATED ORAL 2 TIMES DAILY
Qty: 28 TABLET | Refills: 0 | Status: SHIPPED | OUTPATIENT
Start: 2023-01-13 | End: 2023-01-27

## 2023-01-13 RX ADMIN — PIPERACILLIN AND TAZOBACTAM 4.5 G: 4; .5 INJECTION, POWDER, LYOPHILIZED, FOR SOLUTION INTRAVENOUS; PARENTERAL at 10:01

## 2023-01-13 RX ADMIN — SODIUM CHLORIDE: 9 INJECTION, SOLUTION INTRAVENOUS at 08:01

## 2023-01-13 RX ADMIN — MUPIROCIN: 20 OINTMENT TOPICAL at 10:01

## 2023-01-13 RX ADMIN — AMLODIPINE BESYLATE 5 MG: 5 TABLET ORAL at 08:01

## 2023-01-13 RX ADMIN — INSULIN ASPART 50 UNITS: 100 INJECTION, SUSPENSION SUBCUTANEOUS at 06:01

## 2023-01-13 RX ADMIN — HYDROCODONE BITARTRATE AND ACETAMINOPHEN 1 TABLET: 10; 325 TABLET ORAL at 08:01

## 2023-01-13 RX ADMIN — PANTOPRAZOLE SODIUM 40 MG: 40 TABLET, DELAYED RELEASE ORAL at 08:01

## 2023-01-13 RX ADMIN — SODIUM POLYSTYRENE SULFONATE 15 G: 15 SUSPENSION ORAL; RECTAL at 09:01

## 2023-01-13 NOTE — PLAN OF CARE
[facility]  Initial Discharge Assessment       Primary Care Provider: Ron Sanches MD    Admission Diagnosis: Infected wound [T14.8XXA, L08.9]    Admission Date: 1/11/2023  Expected Discharge Date: 1/13/2023    Discharge Barriers Identified: None    Payor: MEDICAID [place] / Plan: MEDICAID [place] [employer] / Product Type: Managed Medicaid /     Extended Emergency Contact Information  Primary Emergency Contact: Deborah Sotelo  Address: [address] [place] [address] [address]           [address]           [place] [address] [facility]  Home Phone: [phone]  Relation: Other  Preferred language: English   needed? No    Discharge Plan A: Home Health, Home  Discharge Plan B: Home, Home Health      [facility] - [place], [place] - [address]  [address]  [place] [address]  Phone: [phone] Fax: [phone]    [id number] - [place], [place] - [address] AT [facility] [address]  [address]  [place] [address]  Phone: [phone] Fax: [phone]    [facility] [place] - [address]  [address]  [place] [address]  Phone: [phone] Fax: [phone]      Initial Assessment (most recent)       Adult Discharge Assessment - 01/13/23 0952          Discharge Assessment    Assessment Type Discharge Planning Assessment     Confirmed/corrected address, phone number and insurance Yes     Confirmed Demographics Correct on Facesheet     Source of Information patient     Communicated REYES with patient/caregiver Date not available/Unable to determine     People in Home alone     Do you expect to return to your current living situation? Yes     Do you have help at home or someone to help you manage your care at home? No     Prior to hospitilization cognitive status: Unable to Assess     Current cognitive status: Alert/Oriented     Walking or Climbing Stairs ambulation difficulty, requires equipment      Mobility Management Rollator walker     Home Accessibility wheelchair accessible     Home Layout Able to live on 1st floor     Equipment Currently Used at Home rollator;prateek oh     Patient currently being followed by outpatient case management? No     Do you currently have service(s) that help you manage your care at home? No     Do you take prescription medications? Yes     Do you have prescription coverage? Yes     Do you have any problems affording any of your prescribed medications? No     Is the patient taking medications as prescribed? yes     Who is going to help you get home at discharge? son     How do you get to doctors appointments? health plan transportation     Are you on dialysis? No     Do you take coumadin? No     Discharge Plan A Home Health;Home     Discharge Plan B Home;Home Health     DME Needed Upon Discharge  none     Discharge Plan discussed with: Patient     Discharge Barriers Identified None        Physical Activity    On average, how many days per week do you engage in moderate to strenuous exercise (like a brisk walk)? 0 days     On average, how many minutes do you engage in exercise at this level? 0 min        Financial Resource Strain    How hard is it for you to pay for the very basics like food, housing, medical care, and heating? Not hard at all        Housing Stability    In the last 12 months, was there a time when you were not able to pay the mortgage or rent on time? No     In the last 12 months, how many places have you lived? 1        Transportation Needs    In the past 12 months, has lack of transportation kept you from medical appointments or from getting medications? No     In the past 12 months, has lack of transportation kept you from meetings, work, or from getting things needed for daily living? No        Food Insecurity    Within the past 12 months, you worried that your food would run out before you got the money to buy more. Never true     Within the past 12  months, the food you bought just didn't last and you didn't have money to get more. Never true        Stress    Do you feel stress - tense, restless, nervous, or anxious, or unable to sleep at night because your mind is troubled all the time - these days? Not at all        Social Connections    In a typical week, how many times do you talk on the phone with family, friends, or neighbors? More than three times a week     How often do you get together with friends or relatives? More than three times a week     How often do you attend Christianity or Sikhism services? More than 4 times per year     Do you belong to any clubs or organizations such as Christianity groups, unions, fraternal or athletic groups, or school groups? No     How often do you attend meetings of the clubs or organizations you belong to? More than 4 times per year     Are you , , , , never , or living with a partner?         Alcohol Use    Q1: How often do you have a drink containing alcohol? Never     Q2: How many drinks containing alcohol do you have on a typical day when you are drinking? Patient does not drink     Q3: How often do you have six or more drinks on one occasion? Never                      SS spoke with pt in room. Pt lives at home alone. Pt is not current with home health. Pt has several rolling walkers and a cane. Discharge plan is to return home, no anticipated needs. [facility] questions answered.

## 2023-01-13 NOTE — PLAN OF CARE
Problem: Adult Inpatient Plan of Care  Goal: Plan of Care Review  Outcome: Ongoing, Progressing  Goal: Patient-Specific Goal (Individualized)  Outcome: Ongoing, Progressing  Goal: Absence of Hospital-Acquired Illness or Injury  Outcome: Ongoing, Progressing  Goal: Optimal Comfort and Wellbeing  Outcome: Ongoing, Progressing  Goal: Readiness for Transition of Care  Outcome: Ongoing, Progressing     Problem: Diabetes Comorbidity  Goal: Blood Glucose Level Within Targeted Range  Outcome: Ongoing, Progressing     Problem: Bariatric Environmental Safety  Goal: Safety Maintained with Care  Outcome: Ongoing, Progressing     Problem: Impaired Wound Healing  Goal: Optimal Wound Healing  Outcome: Ongoing, Progressing

## 2023-01-13 NOTE — PROGRESS NOTES
[facility]  Progress Note    Patient Name: Deborah Sotelo  MRN: [id number]  Patient Class: IP- Inpatient   Admission Date: 1/11/2023  Length of Stay: 2 days  Attending Physician: Valentino Mishra MD  Primary Care Provider: Ron Sanches MD        Subjective:     Principal Problem:Diabetic foot infection        HPI:  62-year-old female patient with past medical history of diabetes, hypertension, and old stroke with no residual weakness.    Patient was seen by Dr. Ramirez at the wound care clinic last week for diabetic foot ulcer, came back today with worsening ulcer, esterase on the plantar aspect of the right foot at the lateral side, today it was noted that it is draining pus.    No history of trauma no fever or chills.    Patient has transmetatarsal amputation of the right foot more than 6 years ago.    Patient stated that her diabetes is uncontrolled she does not know her hemoglobin A1c.    No headache no dizziness no chest pain no palpitation no abdominal pain no nausea no vomiting no diarrhea no constipation.  Review of systems negative other than mentioned.      Overview/Hospital Course:  01/12/2023   Patient has no complaints.    Her vital signs are stable   Her white blood cell count 5, creatinine 1.  Arterial ultrasound both lower extremity showed no areas of occlusion.  And foot x-ray showed no evidence of osteomyelitis.    Patient with diabetic foot infected ulcer with purulent discharge.    Plan for debridement surgery today.    We will continue broad-spectrum IV antibiotics, wound and blood cultures pending.  Diabetes is controlled and blood pressure is controlled   Further management as per surgeon.    01/13/2023   Patient has pain at the site of procedure but no other complaints, she is afebrile with stable vital signs.    Normal white count.    Patient is status post debridement, no evidence of abscessed or exposed bone as per surgery report.     Antibiotics selection and duration of treatment to be addressed by surgery, blood and wound cultures came back negative.    Will continue same current management, possible discharge today if okay with surgeon.      Past Medical History:   Diagnosis Date    Anemia 7/5/2022    Diabetes mellitus, type 2     Hyperlipidemia     Hypertension     Obesity     Primary osteoarthritis of left shoulder 5/24/2022    Stroke        Past Surgical History:   Procedure Laterality Date    AMPUTATION      APPENDECTOMY      DEBRIDEMENT OF FOOT Right 1/12/2023    Procedure: DEBRIDEMENT, FOOT;  Surgeon: Ravi Ramirez MD;  Location: [facility];  Service: General;  Laterality: Right;    EYE SURGERY         Review of patient's allergies indicates:   Allergen Reactions    Aspirin      Other reaction(s): UPSET STOMACH     Bactrim ds [sulfamethoxazole-trimethoprim] Itching       No current facility-administered medications on file prior to encounter.     Current Outpatient Medications on File Prior to Encounter   Medication Sig    acetaminophen-codeine 300-30mg (TYLENOL #3) 300-30 mg Tab Take 1 tablet by mouth every 6 (six) hours as needed.    amitriptyline (ELAVIL) 50 MG tablet Take 0.5 tablets by mouth every evening.    amLODIPine (NORVASC) 5 MG tablet Take 1 tablet (5 mg total) by mouth once daily.    amoxicillin-clavulanate 875-125mg (AUGMENTIN) 875-125 mg per tablet Take 1 tablet by mouth 2 (two) times daily.    atorvastatin (LIPITOR) 80 MG tablet TAKE 1 TABLET BY MOUTH EACH NIGHT AT BEDTIME FOR CHOLESTEROL ~~DO NOT EAT GRAPEFRUIT OR DRINK GRAPEFRUIT JUICE WHILE TAKING THIS MEDICATION~~ (Patient not taking: Reported on 9/29/2022)    CMPD diclofenac 3%- cyclobenzaprine 2%- baclofen 2%- gabapentin 6%- LIDOcaine 2%- prilocaine 2% transdermal gel Apply 1 to 2 grams up to 4 times daily as directed for additional pain relief    HYDROcodone-acetaminophen (NORCO) 7.5-325 mg per tablet Take 1 tablet by mouth every 4 (four)  hours as needed.    insulin asp prt-insulin aspart, NovoLOG 70/30, (NOVOLOG 70/30) 100 unit/mL (70-30) Soln INJECT 50 UNITS SUB-Q EACH MORNING AND 30 UNITS EACH EVENING ...KEEP IN REFRIGERATOR ...USE WITHIN 28 DAYS AFTER OPENING EACH VIAL    LANTUS U-100 INSULIN 100 unit/mL injection Inject 20 Units into the skin once daily. Take at night    loratadine (CLARITIN) 10 mg tablet Take 1 tablet (10 mg total) by mouth once daily.    losartan (COZAAR) 100 MG tablet Take 1 tablet (100 mg total) by mouth every evening.    omeprazole (PRILOSEC) 20 MG capsule Take 1 capsule (20 mg total) by mouth once daily.    rifAMpin (RIFADIN) 300 MG capsule Take 1 capsule (300 mg total) by mouth every 12 (twelve) hours. for 14 days    sodium hypochlorite 0.5 % (DAKIN'S SOLUTION) external solution Apply topically once daily. Pack wound with dakin's moistened nuguaze daily (Patient not taking: Reported on 2022)    tiZANidine (ZANAFLEX) 4 MG tablet Take 1 tablet by mouth 3 (three) times daily.     Family History       Problem Relation (Age of Onset)    Appendicitis Father    Asthma Sister    Cancer Mother    Diabetes Brother          Tobacco Use    Smoking status: Never    Smokeless tobacco: Never   Substance and Sexual Activity    Alcohol use: Yes    Drug use: Never    Sexual activity: Never     Family history  Mother  of stroke and diabetes.    Father  of heart disease.     Review of Systems   Constitutional:  Negative for chills and fever.   HENT:  Negative for congestion.    Eyes:  Negative for pain and discharge.   Respiratory:  Negative for cough, chest tightness, shortness of breath, wheezing and stridor.    Cardiovascular:  Negative for chest pain, palpitations and leg swelling.   Gastrointestinal:  Negative for abdominal distention, abdominal pain, blood in stool, diarrhea, nausea and vomiting.   Endocrine: Negative for cold intolerance, polydipsia and polyuria.   Genitourinary:  Negative for difficulty  urinating, dysuria, frequency, hematuria and urgency.   Musculoskeletal:  Negative for arthralgias, back pain and neck pain.   Skin:  Positive for wound.        Right foot ulcer P   Neurological:  Negative for dizziness, seizures, syncope, weakness, numbness and headaches.   Hematological:  Negative for adenopathy.   Psychiatric/Behavioral:  Negative for behavioral problems.    All other systems reviewed and are negative.  Objective:     Vital Signs (Most Recent):  Temp: 97.9 °F (36.6 °C) (01/13/23 0700)  Pulse: 77 (01/13/23 0700)  Resp: 17 (01/13/23 0827)  BP: 132/75 (01/13/23 0700)  SpO2: 98 % (01/13/23 0700) Vital Signs (24h Range):  Temp:  [97.3 °F (36.3 °C)-98.4 °F (36.9 °C)] 97.9 °F (36.6 °C)  Pulse:  [74-86] 77  Resp:  [0-22] 17  SpO2:  [96 %-100 %] 98 %  BP: ()/(47-85) 132/75     Weight: 133 kg (293 lb 3.4 oz)  Body mass index is 44.58 kg/m².    Physical Exam  Vitals reviewed.   HENT:      Nose: Nose normal.      Mouth/Throat:      Pharynx: Oropharynx is clear.   Eyes:      Pupils: Pupils are equal, round, and reactive to light.   Cardiovascular:      Rate and Rhythm: Normal rate and regular rhythm.      Pulses: Normal pulses.      Heart sounds: Normal heart sounds. No murmur heard.  Pulmonary:      Effort: Pulmonary effort is normal. No respiratory distress.      Breath sounds: Normal breath sounds. No stridor. No wheezing or rales.   Abdominal:      General: Abdomen is flat. Bowel sounds are normal. There is no distension.      Tenderness: There is no abdominal tenderness. There is no rebound.   Musculoskeletal:         General: No swelling or deformity. Normal range of motion.      Cervical back: Normal range of motion and neck supple.   Skin:     Coloration: Skin is not jaundiced or pale.      Findings: Lesion present.      Comments: Right foot lateral plantar aspect ulcer 2 x 2 cm draining pus, with tenderness   Neurological:      General: No focal deficit present.      Mental Status: She is alert  and oriented to person, place, and time. Mental status is at baseline.      Cranial Nerves: No cranial nerve deficit.      Sensory: No sensory deficit.      Motor: No weakness.   Psychiatric:         Mood and Affect: Mood normal.         Behavior: Behavior normal.         CRANIAL NERVES     CN III, IV, VI   Pupils are equal, round, and reactive to light.     Significant Labs: All pertinent labs within the past 24 hours have been reviewed.    Significant Imaging: I have reviewed all pertinent imaging results/findings within the past 24 hours.      Assessment/Plan:      * Diabetic foot infection  Will start IV Zosyn.  IV vancomycin.    We will do a follow on cultures   Surgery consulted.    Hyperlipidemia    Continue statins     Hypertension    We will continue home antihypertensives and will monitor vital signs closely.    Type 2 diabetes mellitus with other specified complication  Patient with infected diabetic foot ulcer, admitted for IV antibiotics and surgery evaluation.          VTE Risk Mitigation (From admission, onward)         Ordered     IP VTE LOW RISK PATIENT  Once         01/11/23 1409     Place sequential compression device  Until discontinued         01/11/23 1409                Discharge Planning   REYES: 1/13/2023     Code Status: Full Code   Is the patient medically ready for discharge?:     Reason for patient still in hospital (select all that apply): Treatment  Discharge Plan A: Home Health, Home                  Valentino Mishra MD  Department of Hospital Medicine   [facility]

## 2023-01-13 NOTE — PLAN OF CARE
SS consulted on pt for home health. SS spoke with pt and choice obtained for [facility]. Referral faxed.

## 2023-01-13 NOTE — PROGRESS NOTES
Pharmacy dosing vancomycin for SSTI.Trough level is above goal of 10-15. Change dose to vanc 2500mg Q24H. Trough/random level scheduled for 1/15/23 at 1430. Pharmacy will continue to follow and dose.

## 2023-01-13 NOTE — SUBJECTIVE & OBJECTIVE
Past Medical History:   Diagnosis Date    Anemia 7/5/2022    Diabetes mellitus, type 2     Hyperlipidemia     Hypertension     Obesity     Primary osteoarthritis of left shoulder 5/24/2022    Stroke        Past Surgical History:   Procedure Laterality Date    AMPUTATION      APPENDECTOMY      DEBRIDEMENT OF FOOT Right 1/12/2023    Procedure: DEBRIDEMENT, FOOT;  Surgeon: Ravi Ramirez MD;  Location: [facility];  Service: General;  Laterality: Right;    EYE SURGERY         Review of patient's allergies indicates:   Allergen Reactions    Aspirin      Other reaction(s): UPSET STOMACH     Bactrim ds [sulfamethoxazole-trimethoprim] Itching       No current facility-administered medications on file prior to encounter.     Current Outpatient Medications on File Prior to Encounter   Medication Sig    acetaminophen-codeine 300-30mg (TYLENOL #3) 300-30 mg Tab Take 1 tablet by mouth every 6 (six) hours as needed.    amitriptyline (ELAVIL) 50 MG tablet Take 0.5 tablets by mouth every evening.    amLODIPine (NORVASC) 5 MG tablet Take 1 tablet (5 mg total) by mouth once daily.    amoxicillin-clavulanate 875-125mg (AUGMENTIN) 875-125 mg per tablet Take 1 tablet by mouth 2 (two) times daily.    atorvastatin (LIPITOR) 80 MG tablet TAKE 1 TABLET BY MOUTH EACH NIGHT AT BEDTIME FOR CHOLESTEROL ~~DO NOT EAT GRAPEFRUIT OR DRINK GRAPEFRUIT JUICE WHILE TAKING THIS MEDICATION~~ (Patient not taking: Reported on 9/29/2022)    CMPD diclofenac 3%- cyclobenzaprine 2%- baclofen 2%- gabapentin 6%- LIDOcaine 2%- prilocaine 2% transdermal gel Apply 1 to 2 grams up to 4 times daily as directed for additional pain relief    HYDROcodone-acetaminophen (NORCO) 7.5-325 mg per tablet Take 1 tablet by mouth every 4 (four) hours as needed.    insulin asp prt-insulin aspart, NovoLOG 70/30, (NOVOLOG 70/30) 100 unit/mL (70-30) Soln INJECT 50 UNITS SUB-Q EACH MORNING AND 30 UNITS EACH EVENING ...KEEP IN REFRIGERATOR ...USE WITHIN 28 DAYS AFTER OPENING EACH VIAL     LANTUS U-100 INSULIN 100 unit/mL injection Inject 20 Units into the skin once daily. Take at night    loratadine (CLARITIN) 10 mg tablet Take 1 tablet (10 mg total) by mouth once daily.    losartan (COZAAR) 100 MG tablet Take 1 tablet (100 mg total) by mouth every evening.    omeprazole (PRILOSEC) 20 MG capsule Take 1 capsule (20 mg total) by mouth once daily.    rifAMpin (RIFADIN) 300 MG capsule Take 1 capsule (300 mg total) by mouth every 12 (twelve) hours. for 14 days    sodium hypochlorite 0.5 % (DAKIN'S SOLUTION) external solution Apply topically once daily. Pack wound with dakin's moistened nuguaze daily (Patient not taking: Reported on 2022)    tiZANidine (ZANAFLEX) 4 MG tablet Take 1 tablet by mouth 3 (three) times daily.     Family History       Problem Relation (Age of Onset)    Appendicitis Father    Asthma Sister    Cancer Mother    Diabetes Brother          Tobacco Use    Smoking status: Never    Smokeless tobacco: Never   Substance and Sexual Activity    Alcohol use: Yes    Drug use: Never    Sexual activity: Never     Family history  Mother  of stroke and diabetes.    Father  of heart disease.     Review of Systems   Constitutional:  Negative for chills and fever.   HENT:  Negative for congestion.    Eyes:  Negative for pain and discharge.   Respiratory:  Negative for cough, chest tightness, shortness of breath, wheezing and stridor.    Cardiovascular:  Negative for chest pain, palpitations and leg swelling.   Gastrointestinal:  Negative for abdominal distention, abdominal pain, blood in stool, diarrhea, nausea and vomiting.   Endocrine: Negative for cold intolerance, polydipsia and polyuria.   Genitourinary:  Negative for difficulty urinating, dysuria, frequency, hematuria and urgency.   Musculoskeletal:  Negative for arthralgias, back pain and neck pain.   Skin:  Positive for wound.        Right foot ulcer P   Neurological:  Negative for dizziness, seizures, syncope, weakness,  numbness and headaches.   Hematological:  Negative for adenopathy.   Psychiatric/Behavioral:  Negative for behavioral problems.    All other systems reviewed and are negative.  Objective:     Vital Signs (Most Recent):  Temp: 97.9 °F (36.6 °C) (01/13/23 0700)  Pulse: 77 (01/13/23 0700)  Resp: 17 (01/13/23 0827)  BP: 132/75 (01/13/23 0700)  SpO2: 98 % (01/13/23 0700) Vital Signs (24h Range):  Temp:  [97.3 °F (36.3 °C)-98.4 °F (36.9 °C)] 97.9 °F (36.6 °C)  Pulse:  [74-86] 77  Resp:  [0-22] 17  SpO2:  [96 %-100 %] 98 %  BP: ()/(47-85) 132/75     Weight: 133 kg (293 lb 3.4 oz)  Body mass index is 44.58 kg/m².    Physical Exam  Vitals reviewed.   HENT:      Nose: Nose normal.      Mouth/Throat:      Pharynx: Oropharynx is clear.   Eyes:      Pupils: Pupils are equal, round, and reactive to light.   Cardiovascular:      Rate and Rhythm: Normal rate and regular rhythm.      Pulses: Normal pulses.      Heart sounds: Normal heart sounds. No murmur heard.  Pulmonary:      Effort: Pulmonary effort is normal. No respiratory distress.      Breath sounds: Normal breath sounds. No stridor. No wheezing or rales.   Abdominal:      General: Abdomen is flat. Bowel sounds are normal. There is no distension.      Tenderness: There is no abdominal tenderness. There is no rebound.   Musculoskeletal:         General: No swelling or deformity. Normal range of motion.      Cervical back: Normal range of motion and neck supple.   Skin:     Coloration: Skin is not jaundiced or pale.      Findings: Lesion present.      Comments: Right foot lateral plantar aspect ulcer 2 x 2 cm draining pus, with tenderness   Neurological:      General: No focal deficit present.      Mental Status: She is alert and oriented to person, place, and time. Mental status is at baseline.      Cranial Nerves: No cranial nerve deficit.      Sensory: No sensory deficit.      Motor: No weakness.   Psychiatric:         Mood and Affect: Mood normal.         Behavior:  Behavior normal.         CRANIAL NERVES     CN III, IV, VI   Pupils are equal, round, and reactive to light.     Significant Labs: All pertinent labs within the past 24 hours have been reviewed.    Significant Imaging: I have reviewed all pertinent imaging results/findings within the past 24 hours.

## 2023-01-13 NOTE — DISCHARGE SUMMARY
[facility] Hospital Medicine  Discharge Summary      Patient Name: Deborah Sotelo  MRN: [id number]  DEION: [id number]  Patient Class: OP- Observation  Admission Date: 1/11/2023  Hospital Length of Stay: 0 days  Discharge Date and Time:  01/13/2023 1:11 PM  Attending Physician: Valentino Mishra MD   Discharging Provider: TOMASA Galvin  Primary Care Provider: Ron Sanches MD    Primary Care Team: Networked reference to record PCT     HPI:   62-year-old female patient with past medical history of diabetes, hypertension, and old stroke with no residual weakness.    Patient was seen by Dr. Ramirez at the wound care clinic last week for diabetic foot ulcer, came back today with worsening ulcer, esterase on the plantar aspect of the right foot at the lateral side, today it was noted that it is draining pus.    No history of trauma no fever or chills.    Patient has transmetatarsal amputation of the right foot more than 6 years ago.    Patient stated that her diabetes is uncontrolled she does not know her hemoglobin A1c.    No headache no dizziness no chest pain no palpitation no abdominal pain no nausea no vomiting no diarrhea no constipation.  Review of systems negative other than mentioned.      Procedure(s) (LRB):  DEBRIDEMENT, FOOT (Right)      Hospital Course:   01/12/2023   Patient has no complaints.    Her vital signs are stable   Her white blood cell count 5, creatinine 1.  Arterial ultrasound both lower extremity showed no areas of occlusion.  And foot x-ray showed no evidence of osteomyelitis.    Patient with diabetic foot infected ulcer with purulent discharge.    Plan for debridement surgery today.    We will continue broad-spectrum IV antibiotics, wound and blood cultures pending.  Diabetes is controlled and blood pressure is controlled   Further management as per surgeon.    01/13/2023   Patient has pain at the site of procedure but no other complaints, she is  afebrile with stable vital signs.    Normal white count.    Patient is status post debridement, no evidence of abscessed or exposed bone as per surgery report.    Antibiotics selection and duration of treatment to be addressed by surgery, blood and wound cultures came back negative.    Will continue same current management, possible discharge today if okay with surgeon.    Update at 1300: Surgery noted patient was okay to discharge from their standpoint. Will need additional 2 weeks of antibiotics. Patient to follow up with surgery in 2 weeks, wound care, and PCP. Home health to be secured prior to d/c.        Goals of Care Treatment Preferences:  Code Status: Full Code      Consults:   Consults (From admission, onward)        Status Ordering Provider     Inpatient consult to Social Work  Once        Provider:  (Not yet assigned)    Completed JUANJO JOSEPH          * Diabetic foot infection  Will start IV Zosyn.  IV vancomycin.    We will do a follow on cultures   Surgery consulted.    1/13- pt is now post op day 1 surgical debridement. Surgery approved discharge for today. Antibiotics to continue for 2 additional weeks. Close follow up with wound care, surgery, and PCP. Home health ordered as well.     Gastroesophageal reflux disease  Continue home prilosec      Hyperlipidemia  Continue statins     Hypertension  We will continue home antihypertensives and will monitor vital signs closely.    Type 2 diabetes mellitus with other specified complication  Patient with infected diabetic foot ulcer, admitted for IV antibiotics and surgery evaluation.          Final Active Diagnoses:    Diagnosis Date Noted POA    PRINCIPAL PROBLEM:  Diabetic foot infection [E11.628, L08.9] 01/11/2023 Yes    Gastroesophageal reflux disease [K21.9] 11/09/2021 Yes    Type 2 diabetes mellitus with other specified complication [E11.69] 03/19/2021 Yes    Hypertension [I10] 03/19/2021 Yes    Hyperlipidemia [E78.5] 03/19/2021 Yes       Problems Resolved During this Admission:       Discharged Condition: stable    Disposition: Home or Self Care    Follow Up:   Follow-up Information     Ron Sanches MD. Schedule an appointment as soon as possible for a visit in 1 week(s).    Specialty: Family Medicine  Why: Hospital d/c follow up  Contact information:  [address] [facility] [address]  [phone]             [facility] - Wound Care Follow up in 2 week(s).    Specialty: Wound Care  Why: Hospital d/c follow up  Contact information:  [address] [facility] [address] [phone]           Ravi Ramirez MD. Schedule an appointment as soon as possible for a visit in 2 week(s).    Specialties: General Surgery, Surgery  Why: hospital follow up s/p debridement of right foot  Contact information:  [address] [place] [address]  [phone]                       Patient Instructions:      Diet diabetic     Notify your health care provider if you experience any of the following:  temperature >100.4     Notify your health care provider if you experience any of the following:  persistent nausea and vomiting or diarrhea     Notify your health care provider if you experience any of the following:  severe uncontrolled pain     Notify your health care provider if you experience any of the following:  difficulty breathing or increased cough     Notify your health care provider if you experience any of the following:  redness, tenderness, or signs of infection (pain, swelling, redness, odor or green/yellow discharge around incision site)     Notify your health care provider if you experience any of the following:  persistent dizziness, light-headedness, or visual disturbances     Notify your health care provider if you experience any of the following:  increased confusion or weakness     Activity as tolerated       Significant Diagnostic Studies: Labs:   BMP:   Recent Labs   Lab 01/11/23  1814 01/12/23  0251  01/13/23 0247 01/13/23  0930   GLU  --  212* 239*  --    NA  --  139 140  --    K  --  4.7 5.2* 4.3   CL  --  106 107  --    CO2  --  23 24  --    BUN 18 18 20*  --    CREATININE 1.02 1.08* 1.29*  --    CALCIUM  --  9.0 8.5  --    MG  --  1.7 1.5*  --    , CBC   Recent Labs   Lab 01/12/23  0251 01/13/23 0247   WBC 5.92 5.37   HGB 10.2* 10.3*   HCT 31.2* 30.4*    200    All labs within the past 24 hours have been reviewed     Medications:  Reconciled Home Medications:      Medication List      START taking these medications    amoxicillin-clavulanate 875-125mg 875-125 mg per tablet  Commonly known as: AUGMENTIN  Take 1 tablet by mouth 2 (two) times daily. for 14 days     atorvastatin 80 MG tablet  Commonly known as: LIPITOR  TAKE 1 TABLET BY MOUTH EACH NIGHT AT BEDTIME FOR CHOLESTEROL ~~DO NOT EAT GRAPEFRUIT OR DRINK GRAPEFRUIT JUICE WHILE TAKING THIS MEDICATION~~        CONTINUE taking these medications    acetaminophen-codeine 300-30mg 300-30 mg Tab  Commonly known as: TYLENOL #3  Take 1 tablet by mouth every 6 (six) hours as needed.     amitriptyline 50 MG tablet  Commonly known as: ELAVIL  Take 0.5 tablets by mouth every evening.     amLODIPine 5 MG tablet  Commonly known as: NORVASC  Take 1 tablet (5 mg total) by mouth once daily.     CMPD PAIN MANAGEMENT COMPOUND OINTMENT TEN  Apply 1 to 2 grams up to 4 times daily as directed for additional pain relief     DAKIN'S SOLUTION external solution  Generic drug: sodium hypochlorite 0.5 %  Apply topically once daily. Pack wound with dakin's moistened nuguaze daily     HYDROcodone-acetaminophen 7.5-325 mg per tablet  Commonly known as: NORCO  Take 1 tablet by mouth every 4 (four) hours as needed.     insulin asp prt-insulin aspart (NovoLOG 70/30) 100 unit/mL (70-30) Soln  Commonly known as: NovoLOG 70/30  INJECT 50 UNITS SUB-Q EACH MORNING AND 30 UNITS EACH EVENING ...KEEP IN REFRIGERATOR ...USE WITHIN 28 DAYS AFTER OPENING EACH VIAL     LANTUS U-100 INSULIN  100 unit/mL injection  Generic drug: insulin glargine  Inject 20 Units into the skin once daily. Take at night     loratadine 10 mg tablet  Commonly known as: CLARITIN  Take 1 tablet (10 mg total) by mouth once daily.     losartan 100 MG tablet  Commonly known as: COZAAR  Take 1 tablet (100 mg total) by mouth every evening.     omeprazole 20 MG capsule  Commonly known as: PRILOSEC  Take 1 capsule (20 mg total) by mouth once daily.     rifAMpin 300 MG capsule  Commonly known as: RIFADIN  Take 1 capsule (300 mg total) by mouth every 12 (twelve) hours. for 14 days     tiZANidine 4 MG tablet  Commonly known as: ZANAFLEX  Take 1 tablet by mouth 3 (three) times daily.            Indwelling Lines/Drains at time of discharge:   Lines/Drains/Airways     None             The patient has been seen and examined by both myself and Dr Valentino Mishra MD on the date of discharge and determined to be suitable/stable for discharge.    Time spent on the discharge of patient: Greater than 30 minutes         TOMASA Galvin  Department of Hospital Medicine  [facility] - [facility]

## 2023-01-13 NOTE — ASSESSMENT & PLAN NOTE
Will start IV Zosyn.  IV vancomycin.    We will do a follow on cultures   Surgery consulted.    1/13- pt is now post op day 1 surgical debridement. Surgery approved discharge for today. Antibiotics to continue for 2 additional weeks. Close follow up with wound care, surgery, and PCP. Home health ordered as well.

## 2023-01-17 LAB — BACTERIA BLD CULT: NORMAL

## 2023-01-17 NOTE — PROGRESS NOTES
TOMASA Maldonado   [facility]  [address] [facility] [address]  [phone]      PATIENT NAME: Deborah Sotelo  : 1960  DATE: 23  MRN: [id number]      Billing Provider: TOMASA Maldonado  Level of Service:   Patient PCP Information       Provider PCP Type    Ron Sanches MD General            Reason for Visit / Chief Complaint: Diabetic ulcer of right midfoot associated with type 2 diab       History of Present Illness / Problem Focused Workflow     Deborah Sotelo is a 61 y.o. female presents to clinic for follow up on chronic-non healing wound on right TMA.  Patient was admitted to [facility] at last visit. Dr. Ramirez performed surgical debridement 23. Reports foot remains tender. She is taking augmentin as prescribed.     Significant PMH diabetes, anemia, and CVA. Last HgA1C 9.3 in July, diabetes managed by PCP. Pertinent factors that delay wound healing include multiple co-morbidities, poor vascular supply, diabetes, edema, heavy drainage, decreased granulation tissue, tract(s), undermining and no protective sensation. Denies fever and chills.     Review of Systems     Review of Systems   Constitutional:  Positive for activity change. Negative for chills and fever.   Respiratory:  Negative for chest tightness and shortness of breath.    Cardiovascular:  Positive for leg swelling. Negative for chest pain and palpitations.   Musculoskeletal:  Positive for arthralgias, back pain and joint swelling.        Severe shoulder pain, 10 of 10 on pain scale   Skin:  Positive for color change and wound.        See wound assessment   Neurological:  Positive for weakness and numbness.   Psychiatric/Behavioral:  Negative for agitation, behavioral problems, confusion and self-injury.      Medical / Social / Family History     Past Medical History:   Diagnosis Date    Anemia 2022    Diabetes mellitus, type 2     Hyperlipidemia     Hypertension      Obesity     Primary osteoarthritis of left shoulder 5/24/2022    Stroke        Past Surgical History:   Procedure Laterality Date    AMPUTATION      APPENDECTOMY      DEBRIDEMENT OF FOOT Right 1/12/2023    Procedure: DEBRIDEMENT, FOOT;  Surgeon: Ravi Ramirez MD;  Location: [facility];  Service: General;  Laterality: Right;    EYE SURGERY         Social History  Ms. Deborah Sotelo  reports that she has never smoked. She has never used smokeless tobacco. She reports current alcohol use. She reports that she does not use drugs.    Family History  Ms.'srinath Sotelo family history includes Appendicitis in her father; Asthma in her sister; Cancer in her mother; Diabetes in her brother.    Medications and Allergies     Medications  Outpatient Medications Marked as Taking for the 1/18/23 encounter (Office Visit) with TOMASA Maldonado   Medication Sig Dispense Refill    acetaminophen-codeine 300-30mg (TYLENOL #3) 300-30 mg Tab Take 1 tablet by mouth every 6 (six) hours as needed.      amitriptyline (ELAVIL) 50 MG tablet Take 0.5 tablets by mouth every evening.      amLODIPine (NORVASC) 5 MG tablet Take 1 tablet (5 mg total) by mouth once daily. 30 tablet 2       Allergies  Review of patient's allergies indicates:   Allergen Reactions    Aspirin      Other reaction(s): UPSET STOMACH     Bactrim ds [sulfamethoxazole-trimethoprim] Itching       Physical Examination     Vitals:    01/18/23 1022   BP: (!) 183/97   Pulse: 91   Resp: 20   Temp: 98 °F (36.7 °C)     Physical Exam  Vitals and nursing note reviewed.   Constitutional:       Comments: Tearful during exam   HENT:      Head: Normocephalic.   Cardiovascular:      Rate and Rhythm: Normal rate and regular rhythm.      Pulses: Normal pulses.      Heart sounds: Normal heart sounds.   Pulmonary:      Effort: Pulmonary effort is normal. No respiratory distress.   Chest:      Chest wall: No tenderness.   Musculoskeletal:         General: Swelling, tenderness  and deformity present.      Right lower leg: Edema present.      Comments: Left shoulder decreased ROM with weakness to left upper extremity   Skin:     General: Skin is warm.      Capillary Refill: Capillary refill takes 2 to 3 seconds.      Findings: Erythema present.      Comments: Wound, see LDA for measurements and picture   Neurological:      Mental Status: She is alert and oriented to person, place, and time.   Psychiatric:         Mood and Affect: Mood normal.         Behavior: Behavior normal.         Thought Content: Thought content normal.         Judgment: Judgment normal.     Assessment and Plan             Wound 01/18/21 1051 Diabetic Ulcer Right lateral Plantar #1 (Active)   01/18/21 1051    Pre-existing: Yes   Primary Wound Type: Diabetic ulc   Side: Right   Orientation: lateral   Location: Plantar   Wound Number: #1   Ankle-Brachial Index:    Pulses: normal   Removal Indication and Assessment:    Wound Outcome:    (Retired) Wound Type:    (Retired) Wound Length (cm):    (Retired) Wound Width (cm):    (Retired) Depth (cm):    Wound Description (Comments):    Removal Indications:    Wound Image    01/18/23 1020   Dressing Appearance Dried drainage 01/18/23 1020   Drainage Amount Moderate 01/18/23 1020   Drainage Characteristics/Odor Sanguineous;No odor 01/18/23 1020   Appearance Pink;Red 01/18/23 1020   Tissue loss description Full thickness 01/18/23 1020   Black (%), Wound Tissue Color 0 % 01/18/23 1020   Red (%), Wound Tissue Color 100 % 01/18/23 1020   Yellow (%), Wound Tissue Color 0 % 01/18/23 1020   Periwound Area Macerated;Moist;Pale white 01/18/23 1020   Wound Edges Callused;Open 01/18/23 1020   Wound Length (cm) 3 cm 01/18/23 1020   Wound Width (cm) 2.3 cm 01/18/23 1020   Wound Depth (cm) 0.9 cm 01/18/23 1020   Wound Volume (cm^3) 6.21 cm^3 01/18/23 1020   Wound Surface Area (cm^2) 6.9 cm^2 01/18/23 1020   Care Cleansed with:;Soap and water;Applied:;Skin Barrier;Debrided 01/18/23 1020    Dressing Applied;Gauze, wet to dry;Gauze;Rolled gauze 01/18/23 1020   Periwound Care Moisture barrier applied 01/18/23 1020   Off Loading Off loading shoe 01/18/23 1020   Dressing Change Due 01/19/23 01/18/23 1020            Wound 01/21/22 Diabetic Ulcer Right medial Foot #5 (Active)   01/21/22     Pre-existing: Yes   Primary Wound Type: Diabetic ulc   Side: Right   Orientation: medial   Location: Foot   Wound Number: #5   Ankle-Brachial Index:    Pulses:    Removal Indication and Assessment:    Wound Outcome:    (Retired) Wound Type:    (Retired) Wound Length (cm):    (Retired) Wound Width (cm):    (Retired) Depth (cm):    Wound Description (Comments):    Removal Indications:    Wound Image    01/18/23 1022   Dressing Appearance Dry;Intact;Clean 01/18/23 1022   Drainage Amount None 01/18/23 1022   Appearance Dry;Closed/resurfaced 01/18/23 1022   Tissue loss description Not applicable 01/18/23 1022   Black (%), Wound Tissue Color 0 % 01/18/23 1022   Red (%), Wound Tissue Color 0 % 01/18/23 1022   Yellow (%), Wound Tissue Color 0 % 01/18/23 1022   Periwound Area Dry 01/18/23 1022   Wound Edges Callused;Rolled/closed 01/18/23 1022   Wound Length (cm) 0 cm 01/18/23 1022   Wound Width (cm) 0 cm 01/18/23 1022   Wound Depth (cm) 0 cm 01/18/23 1022   Wound Volume (cm^3) 0 cm^3 01/18/23 1022   Wound Surface Area (cm^2) 0 cm^2 01/18/23 1022   Care Cleansed with:;Soap and water;Applied:;Moisturizing agent;Debrided 01/18/23 1022   Dressing Applied;Foam;Island/border 01/18/23 1022   Periwound Care Moisturizer applied 01/18/23 1022   Off Loading Off loading shoe 01/18/23 1022   Dressing Change Due 01/20/23 01/18/23 1022     Problem List Items Addressed This Visit          Endocrine    Diabetic foot ulcer - Primary    Overview                                  Current Assessment & Plan     Clean with baby shampoo and water or vashe     Apply vashe moisten drawtex to wound, cover with dry gauze, wrap with meng,secure with paper  tape, change daily and as needed     Monitor closely for s/s of infection including fever, chills, increase in pain, odor from wound, and increased redness from foot. Go to ER if any complications develop.   Keep leg elevated and avoid pressure on wound  Diabetes:  Monitor glucose closely. Check fasting glucose and 2 hours after meals. HgA1C goal <7, fasting glucose , and 2 hours after meals <180  Hypertension:  Check blood pressure twice daily, goal <120/80  Diet:   Increase protein intake, avoid fried, fatty foods and foods high in simple carbs.   Vitamins:  Take vitamin C 1000 mg, zinc 50mg, vitamin d 5000 units, and a daily multivitamin. Dawson is a good source of protein and nutrients to aid in wound healing.             Future Appointments   Date Time Provider Department Center   2/6/2023  1:00 PM Ravi Ramirez MD [facility]   2/16/2023 10:00 AM TOMASA Maldonado [facility]            Signature:  TOMASA Maldonado  [facility]  [address] [facility] [address]  [phone]    Date of encounter: 1/18/23

## 2023-01-18 ENCOUNTER — OFFICE VISIT (OUTPATIENT)
Dept: WOUND CARE | Facility: CLINIC | Age: 63
End: 2023-01-18
Attending: FAMILY MEDICINE
Payer: MEDICAID

## 2023-01-18 VITALS
RESPIRATION RATE: 20 BRPM | HEART RATE: 91 BPM | DIASTOLIC BLOOD PRESSURE: 97 MMHG | SYSTOLIC BLOOD PRESSURE: 183 MMHG | TEMPERATURE: 98 F

## 2023-01-18 DIAGNOSIS — E11.621 DIABETIC ULCER OF RIGHT MIDFOOT ASSOCIATED WITH TYPE 2 DIABETES MELLITUS, WITH NECROSIS OF MUSCLE: Primary | ICD-10-CM

## 2023-01-18 DIAGNOSIS — L97.413 DIABETIC ULCER OF RIGHT MIDFOOT ASSOCIATED WITH TYPE 2 DIABETES MELLITUS, WITH NECROSIS OF MUSCLE: Primary | ICD-10-CM

## 2023-01-18 PROCEDURE — 99214 OFFICE O/P EST MOD 30 MIN: CPT | Mod: PBBFAC | Performed by: NURSE PRACTITIONER

## 2023-01-18 PROCEDURE — 1160F RVW MEDS BY RX/DR IN RCRD: CPT | Mod: CPTII,,, | Performed by: NURSE PRACTITIONER

## 2023-01-18 PROCEDURE — 3077F SYST BP >= 140 MM HG: CPT | Mod: CPTII,,, | Performed by: NURSE PRACTITIONER

## 2023-01-18 PROCEDURE — 1159F MED LIST DOCD IN RCRD: CPT | Mod: CPTII,,, | Performed by: NURSE PRACTITIONER

## 2023-01-18 PROCEDURE — 3077F PR MOST RECENT SYSTOLIC BLOOD PRESSURE >= 140 MM HG: ICD-10-PCS | Mod: CPTII,,, | Performed by: NURSE PRACTITIONER

## 2023-01-18 PROCEDURE — 99213 PR OFFICE/OUTPT VISIT, EST, LEVL III, 20-29 MIN: ICD-10-PCS | Mod: S$PBB,,, | Performed by: NURSE PRACTITIONER

## 2023-01-18 PROCEDURE — 1159F PR MEDICATION LIST DOCUMENTED IN MEDICAL RECORD: ICD-10-PCS | Mod: CPTII,,, | Performed by: NURSE PRACTITIONER

## 2023-01-18 PROCEDURE — 3080F DIAST BP >= 90 MM HG: CPT | Mod: CPTII,,, | Performed by: NURSE PRACTITIONER

## 2023-01-18 PROCEDURE — 99213 OFFICE O/P EST LOW 20 MIN: CPT | Mod: S$PBB,,, | Performed by: NURSE PRACTITIONER

## 2023-01-18 PROCEDURE — 3080F PR MOST RECENT DIASTOLIC BLOOD PRESSURE >= 90 MM HG: ICD-10-PCS | Mod: CPTII,,, | Performed by: NURSE PRACTITIONER

## 2023-01-18 PROCEDURE — 1160F PR REVIEW ALL MEDS BY PRESCRIBER/CLIN PHARMACIST DOCUMENTED: ICD-10-PCS | Mod: CPTII,,, | Performed by: NURSE PRACTITIONER

## 2023-02-06 ENCOUNTER — OFFICE VISIT (OUTPATIENT)
Dept: SURGERY | Facility: CLINIC | Age: 63
End: 2023-02-06
Attending: SURGERY
Payer: MEDICAID

## 2023-02-06 VITALS
TEMPERATURE: 98 F | WEIGHT: 293 LBS | DIASTOLIC BLOOD PRESSURE: 90 MMHG | RESPIRATION RATE: 16 BRPM | BODY MASS INDEX: 44.41 KG/M2 | SYSTOLIC BLOOD PRESSURE: 140 MMHG | HEIGHT: 68 IN

## 2023-02-06 DIAGNOSIS — L97.413 DIABETIC ULCER OF RIGHT MIDFOOT ASSOCIATED WITH TYPE 2 DIABETES MELLITUS, WITH NECROSIS OF MUSCLE: Primary | ICD-10-CM

## 2023-02-06 DIAGNOSIS — E11.621 DIABETIC ULCER OF RIGHT MIDFOOT ASSOCIATED WITH TYPE 2 DIABETES MELLITUS, WITH NECROSIS OF MUSCLE: Primary | ICD-10-CM

## 2023-02-06 PROCEDURE — 99212 PR OFFICE/OUTPT VISIT, EST, LEVL II, 10-19 MIN: ICD-10-PCS | Mod: S$PBB,,, | Performed by: SURGERY

## 2023-02-06 PROCEDURE — 3051F PR MOST RECENT HEMOGLOBIN A1C LEVEL 7.0 - < 8.0%: ICD-10-PCS | Mod: CPTII,,, | Performed by: SURGERY

## 2023-02-06 PROCEDURE — 3008F PR BODY MASS INDEX (BMI) DOCUMENTED: ICD-10-PCS | Mod: CPTII,,, | Performed by: SURGERY

## 2023-02-06 PROCEDURE — 3080F PR MOST RECENT DIASTOLIC BLOOD PRESSURE >= 90 MM HG: ICD-10-PCS | Mod: CPTII,,, | Performed by: SURGERY

## 2023-02-06 PROCEDURE — 3008F BODY MASS INDEX DOCD: CPT | Mod: CPTII,,, | Performed by: SURGERY

## 2023-02-06 PROCEDURE — 99212 OFFICE O/P EST SF 10 MIN: CPT | Mod: S$PBB,,, | Performed by: SURGERY

## 2023-02-06 PROCEDURE — 99214 OFFICE O/P EST MOD 30 MIN: CPT | Mod: PBBFAC | Performed by: SURGERY

## 2023-02-06 PROCEDURE — 3080F DIAST BP >= 90 MM HG: CPT | Mod: CPTII,,, | Performed by: SURGERY

## 2023-02-06 PROCEDURE — 3051F HG A1C>EQUAL 7.0%<8.0%: CPT | Mod: CPTII,,, | Performed by: SURGERY

## 2023-02-06 PROCEDURE — 1159F PR MEDICATION LIST DOCUMENTED IN MEDICAL RECORD: ICD-10-PCS | Mod: CPTII,,, | Performed by: SURGERY

## 2023-02-06 PROCEDURE — 3077F PR MOST RECENT SYSTOLIC BLOOD PRESSURE >= 140 MM HG: ICD-10-PCS | Mod: CPTII,,, | Performed by: SURGERY

## 2023-02-06 PROCEDURE — 3077F SYST BP >= 140 MM HG: CPT | Mod: CPTII,,, | Performed by: SURGERY

## 2023-02-06 PROCEDURE — 4010F PR ACE/ARB THEARPY RXD/TAKEN: ICD-10-PCS | Mod: CPTII,,, | Performed by: SURGERY

## 2023-02-06 PROCEDURE — 4010F ACE/ARB THERAPY RXD/TAKEN: CPT | Mod: CPTII,,, | Performed by: SURGERY

## 2023-02-06 PROCEDURE — 1159F MED LIST DOCD IN RCRD: CPT | Mod: CPTII,,, | Performed by: SURGERY

## 2023-02-06 RX ORDER — FERROUS SULFATE TAB 325 MG (65 MG ELEMENTAL FE) 325 (65 FE) MG
TAB ORAL
COMMUNITY
Start: 2023-02-03

## 2023-02-06 RX ORDER — AMOXICILLIN AND CLAVULANATE POTASSIUM 500; 125 MG/1; MG/1
TABLET, FILM COATED ORAL
COMMUNITY
Start: 2023-02-02 | End: 2023-05-04 | Stop reason: ALTCHOICE

## 2023-02-06 RX ORDER — AMOXICILLIN 875 MG/1
TABLET, FILM COATED ORAL
COMMUNITY
Start: 2022-12-28 | End: 2023-02-17 | Stop reason: ALTCHOICE

## 2023-02-07 NOTE — PROGRESS NOTES
"Subjective:       Patient ID: Deborah Sotelo is a 62 y.o. female.    Chief Complaint: Post-op Evaluation (2 wk post-op right foot debridement//C/o foot pain when walking)    61 y.o. female presents to clinic for follow up on chronic-non healing wound on right TMA.  She reports foot pain.  She denies fever or purulent drainage    family history includes Appendicitis in her father; Asthma in her sister; Cancer in her mother; Diabetes in her brother.  Past Medical History:   Diagnosis Date    Anemia 7/5/2022    Diabetes mellitus, type 2     Hyperlipidemia     Hypertension     Obesity     Primary osteoarthritis of left shoulder 5/24/2022    Stroke       Past Surgical History:   Procedure Laterality Date    AMPUTATION      APPENDECTOMY      DEBRIDEMENT OF FOOT Right 1/12/2023    Procedure: DEBRIDEMENT, FOOT;  Surgeon: Ravi Ramirez MD;  Location: [facility];  Service: General;  Laterality: Right;    EYE SURGERY         reports that she has never smoked. She has never used smokeless tobacco. She reports current alcohol use. She reports that she does not use drugs.     Review of Systems   All other systems reviewed and are negative.       Objective:      BP (!) 140/90   Temp 98.4 °F (36.9 °C)   Resp 16   Ht 5' 8" (1.727 m)   Wt (!) 137.2 kg (302 lb 6.4 oz)   BMI 45.98 kg/m²    Physical Exam  Cardiovascular:      Rate and Rhythm: Normal rate.   Musculoskeletal:         General: No swelling.   Skin:     Coloration: Skin is not jaundiced.      Comments: Wound on plantar surface with chronic appearing granulation and hypertrophic callus circumferentially.  No purulent fluid, and no exposed bone.   Neurological:      General: No focal deficit present.      Mental Status: She is alert.   Psychiatric:         Mood and Affect: Mood normal.       Assessment/Plan:         Diabetic ulcer of right midfoot associated with type 2 diabetes mellitus, with necrosis of muscle         Problem List Items Addressed This Visit  "         Endocrine    Diabetic foot ulcer - Primary    Overview                                  Current Assessment & Plan     Continue current wound care regimen.      Follow-up with [facility] Care [facility] as you were doing.  See me if problems develop.      No narcotic prescriptions were given today, though requested by the patient.

## 2023-02-07 NOTE — ASSESSMENT & PLAN NOTE
Continue current wound care regimen.      Follow-up with Wound Care Center as you were doing.  See me if problems develop.      No narcotic prescriptions were given today, though requested by the patient.

## 2023-02-15 NOTE — PROGRESS NOTES
TOMASA Maldonado   [facility]  [address] [facility] [place] [address]  [phone]      PATIENT NAME: Deborah Sotelo  : 1960  DATE: 23  MRN: [id number]      Billing Provider: TOMASA Maldonado  Level of Service:   Patient PCP Information       Provider PCP Type    Ron Sanches MD General            Reason for Visit / Chief Complaint: Diabetic Foot Ulcer (Right foot)       History of Present Illness / Problem Focused Workflow     Deborah Sotelo is a 61 y.o. female presents to clinic for follow up on chronic-non healing wound on right TMA. Reports foot remains tender. She is taking augmentin as prescribed. She has seen Dr. Ramirez since last visit. Wound is healing well. She is complaining of increased pain to feet and left shoulder and requesting something for pain. Reports she has previously taking Gabapentin and Lyrica with no improvement. Prescriptions for anti inflammatory medication and zanaflex to pharmacy. Recommended follow up with orthopedics concerning shoulder pain.     Significant PMH diabetes, anemia, and CVA. Last HgA1C 9.3 in July, diabetes managed by PCP. Pertinent factors that delay wound healing include multiple co-morbidities, poor vascular supply, diabetes, edema, heavy drainage, decreased granulation tissue, tract(s), undermining and no protective sensation. Denies fever and chills.     Review of Systems     Review of Systems   Constitutional:  Positive for activity change. Negative for chills and fever.   Respiratory:  Negative for chest tightness and shortness of breath.    Cardiovascular:  Positive for leg swelling. Negative for chest pain and palpitations.   Musculoskeletal:  Positive for arthralgias, back pain and joint swelling.        Severe shoulder pain, 10 of 10 on pain scale   Skin:  Positive for color change and wound.        See wound assessment   Neurological:  Positive for weakness and numbness.    Psychiatric/Behavioral:  Negative for agitation, behavioral problems, confusion and self-injury.      Medical / Social / Family History     Past Medical History:   Diagnosis Date    Anemia 7/5/2022    Diabetes mellitus, type 2     Hyperlipidemia     Hypertension     Obesity     Primary osteoarthritis of left shoulder 5/24/2022    Stroke        Past Surgical History:   Procedure Laterality Date    AMPUTATION      APPENDECTOMY      DEBRIDEMENT OF FOOT Right 1/12/2023    Procedure: DEBRIDEMENT, FOOT;  Surgeon: Ravi Ramirez MD;  Location[facility];  Service: General;  Laterality: Right;    EYE SURGERY         Social History  Ms. Deborah Sotelo  reports that she has never smoked. She has never used smokeless tobacco. She reports current alcohol use. She reports that she does not use drugs.    Family History  Ms.'s Deborah Sotelo family history includes Appendicitis in her father; Asthma in her sister; Cancer in her mother; Diabetes in her brother.    Medications and Allergies     Medications  Outpatient Medications Marked as Taking for the 2/16/23 encounter (Office Visit) with TOMASA Maldonado   Medication Sig Dispense Refill    amitriptyline (ELAVIL) 50 MG tablet Take 0.5 tablets by mouth every evening.      amLODIPine (NORVASC) 5 MG tablet Take 1 tablet (5 mg total) by mouth once daily. 30 tablet 2    amoxicillin-clavulanate 500-125mg (AUGMENTIN) 500-125 mg Tab       atorvastatin (LIPITOR) 80 MG tablet TAKE 1 TABLET BY MOUTH EACH NIGHT AT BEDTIME FOR CHOLESTEROL ~~DO NOT EAT GRAPEFRUIT OR DRINK GRAPEFRUIT JUICE WHILE TAKING THIS MEDICATION~~ 30 tablet 2    FEROSUL 325 mg (65 mg iron) Tab tablet       tiZANidine (ZANAFLEX) 4 MG tablet Take 1 tablet (4 mg total) by mouth 3 (three) times daily. 90 tablet 2       Allergies  Review of patient's allergies indicates:   Allergen Reactions    Aspirin      Other reaction(s): UPSET STOMACH     Bactrim ds [sulfamethoxazole-trimethoprim] Itching        Physical Examination     Vitals:    02/16/23 1050   BP: (!) 158/97   Pulse: 88   Resp: 18   Temp: 97.8 °F (36.6 °C)     Physical Exam  Vitals and nursing note reviewed.   Constitutional:       Comments: Tearful during exam   HENT:      Head: Normocephalic.   Cardiovascular:      Rate and Rhythm: Normal rate and regular rhythm.      Pulses: Normal pulses.      Heart sounds: Normal heart sounds.   Pulmonary:      Effort: Pulmonary effort is normal. No respiratory distress.   Chest:      Chest wall: No tenderness.   Musculoskeletal:         General: Swelling, tenderness and deformity present.      Right lower leg: Edema present.      Comments: Left shoulder decreased ROM with weakness to left upper extremity   Skin:     General: Skin is warm.      Capillary Refill: Capillary refill takes 2 to 3 seconds.      Findings: Erythema present.      Comments: Wound, see LDA for measurements and picture   Neurological:      Mental Status: She is alert and oriented to person, place, and time.   Psychiatric:         Mood and Affect: Mood normal.         Behavior: Behavior normal.         Thought Content: Thought content normal.         Judgment: Judgment normal.     Assessment and Plan             Wound 01/18/21 1051 Diabetic Ulcer Right lateral Plantar #1 (Active)   01/18/21 1051    Pre-existing: Yes   Primary Wound Type: Diabetic ulc   Side: Right   Orientation: lateral   Location: Plantar   Wound Number: #1   Ankle-Brachial Index:    Pulses: normal   Removal Indication and Assessment:    Wound Outcome:    (Retired) Wound Type:    (Retired) Wound Length (cm):    (Retired) Wound Width (cm):    (Retired) Depth (cm):    Wound Description (Comments):    Removal Indications:    Wound Image    02/16/23 1014   Dressing Appearance Dry;Intact;Clean 02/16/23 1014   Drainage Amount Moderate 02/16/23 1014   Drainage Characteristics/Odor Yellow 02/16/23 1014   Appearance Intact;Pink;Red;Yellow;Slough;Granulating;Adipose 02/16/23 1014    Black (%), Wound Tissue Color 0 % 02/16/23 1014   Red (%), Wound Tissue Color 90 % 02/16/23 1014   Yellow (%), Wound Tissue Color 10 % 02/16/23 1014   Periwound Area Intact 02/16/23 1014   Wound Edges Callused 02/16/23 1014   Johnson Classification (diabetic foot ulcers only) Grade 1 02/16/23 1014   Wound Length (cm) 3.1 cm 02/16/23 1014   Wound Width (cm) 2.2 cm 02/16/23 1014   Wound Depth (cm) 0.2 cm 02/16/23 1014   Wound Volume (cm^3) 1.364 cm^3 02/16/23 1014   Wound Surface Area (cm^2) 6.82 cm^2 02/16/23 1014   Care Cleansed with:;Antimicrobial agent 02/16/23 1014   Dressing Applied;Gauze, wet to dry;Gauze;Rolled gauze 02/16/23 1014   Periwound Care Moisture barrier applied 02/16/23 1014     Problem List Items Addressed This Visit          Endocrine    Diabetic foot ulcer - Primary    Overview                                      Current Assessment & Plan     Clean with baby shampoo and water or vashe     Apply silver polymem to wound, cover with dry gauze, wrap with meng,secure with paper tape, change daily and as needed     Monitor closely for s/s of infection including fever, chills, increase in pain, odor from wound, and increased redness from foot. Go to ER if any complications develop.   Keep leg elevated and avoid pressure on wound  Diabetes:  Monitor glucose closely. Check fasting glucose and 2 hours after meals. HgA1C goal <7, fasting glucose , and 2 hours after meals <180  Hypertension:  Check blood pressure twice daily, goal <120/80  Diet:   Increase protein intake, avoid fried, fatty foods and foods high in simple carbs.   Vitamins:  Take vitamin C 1000 mg, zinc 50mg, vitamin d 5000 units, and a daily multivitamin. Dawson is a good source of protein and nutrients to aid in wound healing.      Prescriptions to pharmacy to help manage pain - diclofenac twice daily as needed, Voltaren gel apply four times daily, Lidocaine patch, and Zanaflex            Orthopedic    Left shoulder pain     Other  Visit Diagnoses       Other chronic pain        Arthritis                Future Appointments   Date Time Provider Department Center   3/16/2023  9:00 AM TOMASA Maldonado [facility]            Signature:  TOMASA Maldonado  [facility]  [address] [place] [address]  [phone]    Date of encounter: 2/16/23

## 2023-02-15 NOTE — PATIENT INSTRUCTIONS
Clean with baby shampoo and water or vashe     Apply silver polymem to wound, cover with dry gauze, wrap with meng,secure with paper tape, change daily and as needed     Monitor closely for s/s of infection including fever, chills, increase in pain, odor from wound, and increased redness from foot. Go to ER if any complications develop.   Keep leg elevated and avoid pressure on wound  Diabetes:  Monitor glucose closely. Check fasting glucose and 2 hours after meals. HgA1C goal <7, fasting glucose , and 2 hours after meals <180  Hypertension:  Check blood pressure twice daily, goal <120/80  Diet:   Increase protein intake, avoid fried, fatty foods and foods high in simple carbs.   Vitamins:  Take vitamin C 1000 mg, zinc 50mg, vitamin d 5000 units, and a daily multivitamin. Dawson is a good source of protein and nutrients to aid in wound healing.      Prescriptions to pharmacy to help manage pain - diclofenac twice daily as needed, Voltaren gel apply four times daily, Lidocaine patch, and Zanaflex

## 2023-02-16 ENCOUNTER — OFFICE VISIT (OUTPATIENT)
Dept: WOUND CARE | Facility: CLINIC | Age: 63
End: 2023-02-16
Attending: FAMILY MEDICINE
Payer: MEDICAID

## 2023-02-16 VITALS
TEMPERATURE: 98 F | SYSTOLIC BLOOD PRESSURE: 158 MMHG | DIASTOLIC BLOOD PRESSURE: 97 MMHG | RESPIRATION RATE: 18 BRPM | HEART RATE: 88 BPM

## 2023-02-16 DIAGNOSIS — L97.413 DIABETIC ULCER OF RIGHT MIDFOOT ASSOCIATED WITH TYPE 2 DIABETES MELLITUS, WITH NECROSIS OF MUSCLE: Primary | ICD-10-CM

## 2023-02-16 DIAGNOSIS — E11.621 DIABETIC ULCER OF RIGHT MIDFOOT ASSOCIATED WITH TYPE 2 DIABETES MELLITUS, WITH NECROSIS OF MUSCLE: Primary | ICD-10-CM

## 2023-02-16 DIAGNOSIS — G89.29 OTHER CHRONIC PAIN: ICD-10-CM

## 2023-02-16 DIAGNOSIS — M19.90 ARTHRITIS: ICD-10-CM

## 2023-02-16 DIAGNOSIS — M25.512 LEFT SHOULDER PAIN, UNSPECIFIED CHRONICITY: ICD-10-CM

## 2023-02-16 PROCEDURE — 99215 OFFICE O/P EST HI 40 MIN: CPT | Mod: PBBFAC | Performed by: NURSE PRACTITIONER

## 2023-02-16 PROCEDURE — 3051F PR MOST RECENT HEMOGLOBIN A1C LEVEL 7.0 - < 8.0%: ICD-10-PCS | Mod: CPTII,,, | Performed by: NURSE PRACTITIONER

## 2023-02-16 PROCEDURE — 3080F DIAST BP >= 90 MM HG: CPT | Mod: CPTII,,, | Performed by: NURSE PRACTITIONER

## 2023-02-16 PROCEDURE — 3080F PR MOST RECENT DIASTOLIC BLOOD PRESSURE >= 90 MM HG: ICD-10-PCS | Mod: CPTII,,, | Performed by: NURSE PRACTITIONER

## 2023-02-16 PROCEDURE — 4010F PR ACE/ARB THEARPY RXD/TAKEN: ICD-10-PCS | Mod: CPTII,,, | Performed by: NURSE PRACTITIONER

## 2023-02-16 PROCEDURE — 3077F SYST BP >= 140 MM HG: CPT | Mod: CPTII,,, | Performed by: NURSE PRACTITIONER

## 2023-02-16 PROCEDURE — 4010F ACE/ARB THERAPY RXD/TAKEN: CPT | Mod: CPTII,,, | Performed by: NURSE PRACTITIONER

## 2023-02-16 PROCEDURE — 1159F MED LIST DOCD IN RCRD: CPT | Mod: CPTII,,, | Performed by: NURSE PRACTITIONER

## 2023-02-16 PROCEDURE — 3077F PR MOST RECENT SYSTOLIC BLOOD PRESSURE >= 140 MM HG: ICD-10-PCS | Mod: CPTII,,, | Performed by: NURSE PRACTITIONER

## 2023-02-16 PROCEDURE — 99213 PR OFFICE/OUTPT VISIT, EST, LEVL III, 20-29 MIN: ICD-10-PCS | Mod: S$PBB,,, | Performed by: NURSE PRACTITIONER

## 2023-02-16 PROCEDURE — 3051F HG A1C>EQUAL 7.0%<8.0%: CPT | Mod: CPTII,,, | Performed by: NURSE PRACTITIONER

## 2023-02-16 PROCEDURE — 1159F PR MEDICATION LIST DOCUMENTED IN MEDICAL RECORD: ICD-10-PCS | Mod: CPTII,,, | Performed by: NURSE PRACTITIONER

## 2023-02-16 PROCEDURE — 1160F RVW MEDS BY RX/DR IN RCRD: CPT | Mod: CPTII,,, | Performed by: NURSE PRACTITIONER

## 2023-02-16 PROCEDURE — 99213 OFFICE O/P EST LOW 20 MIN: CPT | Mod: S$PBB,,, | Performed by: NURSE PRACTITIONER

## 2023-02-16 PROCEDURE — 1160F PR REVIEW ALL MEDS BY PRESCRIBER/CLIN PHARMACIST DOCUMENTED: ICD-10-PCS | Mod: CPTII,,, | Performed by: NURSE PRACTITIONER

## 2023-02-16 RX ORDER — DICLOFENAC SODIUM 10 MG/G
2 GEL TOPICAL 4 TIMES DAILY
Qty: 450 G | Refills: 2 | Status: SHIPPED | OUTPATIENT
Start: 2023-02-16 | End: 2023-02-28 | Stop reason: SDUPTHER

## 2023-02-16 RX ORDER — DICLOFENAC SODIUM 75 MG/1
75 TABLET, DELAYED RELEASE ORAL 2 TIMES DAILY
Qty: 60 TABLET | Refills: 1 | Status: SHIPPED | OUTPATIENT
Start: 2023-02-16 | End: 2023-04-17

## 2023-02-16 RX ORDER — TIZANIDINE 4 MG/1
4 TABLET ORAL 3 TIMES DAILY
Qty: 90 TABLET | Refills: 2 | Status: SHIPPED | OUTPATIENT
Start: 2023-02-16 | End: 2023-05-17

## 2023-02-16 RX ORDER — LIDOCAINE 50 MG/G
1 PATCH TOPICAL DAILY
Qty: 30 PATCH | Refills: 2 | Status: SHIPPED | OUTPATIENT
Start: 2023-02-16

## 2023-02-28 ENCOUNTER — OFFICE VISIT (OUTPATIENT)
Dept: FAMILY MEDICINE | Facility: CLINIC | Age: 63
End: 2023-02-28
Payer: MEDICAID

## 2023-02-28 VITALS
WEIGHT: 293 LBS | SYSTOLIC BLOOD PRESSURE: 138 MMHG | HEART RATE: 95 BPM | OXYGEN SATURATION: 96 % | BODY MASS INDEX: 44.41 KG/M2 | HEIGHT: 68 IN | DIASTOLIC BLOOD PRESSURE: 83 MMHG | RESPIRATION RATE: 20 BRPM | TEMPERATURE: 98 F

## 2023-02-28 DIAGNOSIS — E78.5 HYPERLIPIDEMIA, UNSPECIFIED HYPERLIPIDEMIA TYPE: ICD-10-CM

## 2023-02-28 DIAGNOSIS — G89.29 OTHER CHRONIC PAIN: Primary | ICD-10-CM

## 2023-02-28 DIAGNOSIS — K21.9 GASTROESOPHAGEAL REFLUX DISEASE, UNSPECIFIED WHETHER ESOPHAGITIS PRESENT: ICD-10-CM

## 2023-02-28 DIAGNOSIS — I10 HYPERTENSION, UNSPECIFIED TYPE: ICD-10-CM

## 2023-02-28 DIAGNOSIS — Z12.31 SCREENING MAMMOGRAM FOR BREAST CANCER: ICD-10-CM

## 2023-02-28 DIAGNOSIS — E11.49 TYPE 2 DIABETES MELLITUS WITH OTHER NEUROLOGIC COMPLICATION, WITH LONG-TERM CURRENT USE OF INSULIN: ICD-10-CM

## 2023-02-28 DIAGNOSIS — Z12.11 SCREENING FOR COLON CANCER: ICD-10-CM

## 2023-02-28 DIAGNOSIS — Z79.4 TYPE 2 DIABETES MELLITUS WITH OTHER NEUROLOGIC COMPLICATION, WITH LONG-TERM CURRENT USE OF INSULIN: ICD-10-CM

## 2023-02-28 PROCEDURE — 3075F PR MOST RECENT SYSTOLIC BLOOD PRESS GE 130-139MM HG: ICD-10-PCS | Mod: CPTII,,, | Performed by: FAMILY MEDICINE

## 2023-02-28 PROCEDURE — 1159F MED LIST DOCD IN RCRD: CPT | Mod: CPTII,,, | Performed by: FAMILY MEDICINE

## 2023-02-28 PROCEDURE — 3079F PR MOST RECENT DIASTOLIC BLOOD PRESSURE 80-89 MM HG: ICD-10-PCS | Mod: CPTII,,, | Performed by: FAMILY MEDICINE

## 2023-02-28 PROCEDURE — 3075F SYST BP GE 130 - 139MM HG: CPT | Mod: CPTII,,, | Performed by: FAMILY MEDICINE

## 2023-02-28 PROCEDURE — 3051F PR MOST RECENT HEMOGLOBIN A1C LEVEL 7.0 - < 8.0%: ICD-10-PCS | Mod: CPTII,,, | Performed by: FAMILY MEDICINE

## 2023-02-28 PROCEDURE — 3008F BODY MASS INDEX DOCD: CPT | Mod: CPTII,,, | Performed by: FAMILY MEDICINE

## 2023-02-28 PROCEDURE — 3051F HG A1C>EQUAL 7.0%<8.0%: CPT | Mod: CPTII,,, | Performed by: FAMILY MEDICINE

## 2023-02-28 PROCEDURE — 99213 OFFICE O/P EST LOW 20 MIN: CPT | Mod: ,,, | Performed by: FAMILY MEDICINE

## 2023-02-28 PROCEDURE — 4010F ACE/ARB THERAPY RXD/TAKEN: CPT | Mod: CPTII,,, | Performed by: FAMILY MEDICINE

## 2023-02-28 PROCEDURE — 3008F PR BODY MASS INDEX (BMI) DOCUMENTED: ICD-10-PCS | Mod: CPTII,,, | Performed by: FAMILY MEDICINE

## 2023-02-28 PROCEDURE — 99213 PR OFFICE/OUTPT VISIT, EST, LEVL III, 20-29 MIN: ICD-10-PCS | Mod: ,,, | Performed by: FAMILY MEDICINE

## 2023-02-28 PROCEDURE — 4010F PR ACE/ARB THEARPY RXD/TAKEN: ICD-10-PCS | Mod: CPTII,,, | Performed by: FAMILY MEDICINE

## 2023-02-28 PROCEDURE — 1159F PR MEDICATION LIST DOCUMENTED IN MEDICAL RECORD: ICD-10-PCS | Mod: CPTII,,, | Performed by: FAMILY MEDICINE

## 2023-02-28 PROCEDURE — 3079F DIAST BP 80-89 MM HG: CPT | Mod: CPTII,,, | Performed by: FAMILY MEDICINE

## 2023-02-28 RX ORDER — DICLOFENAC SODIUM 10 MG/G
2 GEL TOPICAL 4 TIMES DAILY
Qty: 450 G | Refills: 2 | Status: SHIPPED | OUTPATIENT
Start: 2023-02-28

## 2023-02-28 RX ORDER — AMLODIPINE BESYLATE 5 MG/1
5 TABLET ORAL DAILY
Qty: 30 TABLET | Refills: 5 | Status: SHIPPED | OUTPATIENT
Start: 2023-02-28

## 2023-02-28 RX ORDER — ATORVASTATIN CALCIUM 80 MG/1
TABLET, FILM COATED ORAL
Qty: 30 TABLET | Refills: 5 | Status: SHIPPED | OUTPATIENT
Start: 2023-02-28

## 2023-02-28 RX ORDER — INSULIN GLARGINE 100 [IU]/ML
20 INJECTION, SOLUTION SUBCUTANEOUS DAILY
Qty: 6 ML | Refills: 11 | Status: SHIPPED | OUTPATIENT
Start: 2023-02-28 | End: 2024-02-28

## 2023-02-28 RX ORDER — LOSARTAN POTASSIUM 100 MG/1
100 TABLET ORAL NIGHTLY
Qty: 30 TABLET | Refills: 5 | Status: SHIPPED | OUTPATIENT
Start: 2023-02-28 | End: 2023-08-27

## 2023-02-28 RX ORDER — OMEPRAZOLE 20 MG/1
20 CAPSULE, DELAYED RELEASE ORAL DAILY
Qty: 30 CAPSULE | Refills: 5 | Status: SHIPPED | OUTPATIENT
Start: 2023-02-28 | End: 2023-08-27

## 2023-02-28 RX ORDER — INSULIN ASPART 100 [IU]/ML
INJECTION, SUSPENSION SUBCUTANEOUS
Qty: 30 ML | Refills: 11 | Status: SHIPPED | OUTPATIENT
Start: 2023-02-28

## 2023-02-28 NOTE — PROGRESS NOTES
Subjective:       Patient ID: Deborah Sotelo is a 62 y.o. female.    Chief Complaint: Follow-up, Hypertension, Diabetes, and Hyperlipidemia    62 year old female with PMHx of HTN, diabetic wound and DM2 came in for a follow up. Patient was recently hospitalized due to a infectious diabetic wound with I&D done by Dr. Ramirez. Patient follows up with the wound clinic on routine basis to take care of her wound which is located on the right foot.   Patient is here for med refills  Patient would want a referral to a specific pain management clinic at the [facility] in [place]   Patient reports she has not been compliance with her colonoscopy and mammogram appointments and would need another referral.   Review of Systems   All other systems reviewed and are negative.      Objective:      Physical Exam  Vitals and nursing note reviewed.   Constitutional:       General: She is not in acute distress.     Appearance: Normal appearance. She is obese. She is not ill-appearing or toxic-appearing.   HENT:      Head: Normocephalic.      Right Ear: External ear normal.      Left Ear: External ear normal.      Nose: Nose normal. No congestion or rhinorrhea.      Mouth/Throat:      Mouth: Mucous membranes are moist.   Eyes:      General:         Right eye: No discharge.         Left eye: No discharge.      Conjunctiva/sclera: Conjunctivae normal.   Cardiovascular:      Rate and Rhythm: Normal rate and regular rhythm.      Pulses: Normal pulses.      Heart sounds: Normal heart sounds. No murmur heard.  Pulmonary:      Effort: Pulmonary effort is normal. No respiratory distress.      Breath sounds: Normal breath sounds. No rhonchi.   Abdominal:      General: Bowel sounds are normal. There is no distension.      Palpations: Abdomen is soft.      Tenderness: There is no abdominal tenderness.   Musculoskeletal:      Left shoulder: Tenderness present. Decreased range of motion.      Cervical back: Neck supple.   Skin:      General: Skin is warm.      Coloration: Skin is not pale.   Neurological:      Mental Status: She is alert and oriented to person, place, and time.       Assessment:       Problem List Items Addressed This Visit          Neuro    Other chronic pain - Primary    Relevant Medications    diclofenac sodium (VOLTAREN) 1 % Gel    Other Relevant Orders    Ambulatory referral/consult to Pain Clinic       Cardiac/Vascular    Hypertension    Relevant Medications    losartan (COZAAR) 100 MG tablet    amLODIPine (NORVASC) 5 MG tablet    Hyperlipidemia    Relevant Medications    atorvastatin (LIPITOR) 80 MG tablet       Renal/    Screening mammogram for breast cancer    Relevant Orders    Mammo Digital Screening Bilat       GI    Gastroesophageal reflux disease    Relevant Medications    omeprazole (PRILOSEC) 20 MG capsule    Screening for colon cancer    Relevant Orders    Ambulatory referral/consult to Gastroenterology     Other Visit Diagnoses       Type 2 diabetes mellitus with other neurologic complication, with long-term current use of insulin        Relevant Medications    LANTUS U-100 INSULIN 100 unit/mL injection    insulin asp prt-insulin aspart, NovoLOG 70/30, (NOVOLOG 70/30) 100 unit/mL (70-30) Soln              Plan:       Other chronic pain  -     Ambulatory referral/consult to Pain Clinic; Future; Expected date: 03/07/2023  -     diclofenac sodium (VOLTAREN) 1 % Gel; Apply 2 g topically 4 (four) times daily.  Dispense: 450 g; Refill: 2    Type 2 diabetes mellitus with other neurologic complication, with long-term current use of insulin  -     LANTUS U-100 INSULIN 100 unit/mL injection; Inject 20 Units into the skin once daily. Take at night  Dispense: 6 mL; Refill: 11  -     insulin asp prt-insulin aspart, NovoLOG 70/30, (NOVOLOG 70/30) 100 unit/mL (70-30) Soln; INJECT 50 UNITS SUB-Q EACH MORNING AND 35  UNITS EACH EVENING ...KEEP IN REFRIGERATOR ...USE WITHIN 28 DAYS AFTER OPENING EACH VIAL  Dispense: 30 mL;  Refill: 11    Gastroesophageal reflux disease, unspecified whether esophagitis present  -     omeprazole (PRILOSEC) 20 MG capsule; Take 1 capsule (20 mg total) by mouth once daily.  Dispense: 30 capsule; Refill: 5    Hypertension, unspecified type  -     losartan (COZAAR) 100 MG tablet; Take 1 tablet (100 mg total) by mouth every evening.  Dispense: 30 tablet; Refill: 5  -     amLODIPine (NORVASC) 5 MG tablet; Take 1 tablet (5 mg total) by mouth once daily.  Dispense: 30 tablet; Refill: 5    Hyperlipidemia, unspecified hyperlipidemia type  -     atorvastatin (LIPITOR) 80 MG tablet; TAKE 1 TABLET BY MOUTH EACH NIGHT AT BEDTIME FOR CHOLESTEROL ~~DO NOT EAT GRAPEFRUIT OR DRINK GRAPEFRUIT JUICE WHILE TAKING THIS MEDICATION~~  Dispense: 30 tablet; Refill: 5    Screening mammogram for breast cancer  -     Mammo Digital Screening Bilat; Future; Expected date: 02/28/2023    Screening for colon cancer  -     Ambulatory referral/consult to Gastroenterology; Future; Expected date: 03/07/2023

## 2023-03-13 DIAGNOSIS — Z12.11 COLON CANCER SCREENING: Primary | ICD-10-CM

## 2023-03-18 PROCEDURE — G0179 PR HOME HEALTH MD RECERTIFICATION: ICD-10-PCS | Mod: ,,, | Performed by: SURGERY

## 2023-03-18 PROCEDURE — G0179 MD RECERTIFICATION HHA PT: HCPCS | Mod: ,,, | Performed by: SURGERY

## 2023-03-20 ENCOUNTER — OFFICE VISIT (OUTPATIENT)
Dept: WOUND CARE | Facility: CLINIC | Age: 63
End: 2023-03-20
Attending: FAMILY MEDICINE
Payer: MEDICAID

## 2023-03-20 VITALS
DIASTOLIC BLOOD PRESSURE: 94 MMHG | HEART RATE: 94 BPM | RESPIRATION RATE: 20 BRPM | SYSTOLIC BLOOD PRESSURE: 152 MMHG | TEMPERATURE: 97 F

## 2023-03-20 DIAGNOSIS — E11.621 DIABETIC ULCER OF RIGHT MIDFOOT ASSOCIATED WITH TYPE 2 DIABETES MELLITUS, WITH NECROSIS OF MUSCLE: Primary | ICD-10-CM

## 2023-03-20 DIAGNOSIS — L97.413 DIABETIC ULCER OF RIGHT MIDFOOT ASSOCIATED WITH TYPE 2 DIABETES MELLITUS, WITH NECROSIS OF MUSCLE: Primary | ICD-10-CM

## 2023-03-20 PROCEDURE — 4010F ACE/ARB THERAPY RXD/TAKEN: CPT | Mod: CPTII,,, | Performed by: NURSE PRACTITIONER

## 2023-03-20 PROCEDURE — 3051F HG A1C>EQUAL 7.0%<8.0%: CPT | Mod: CPTII,,, | Performed by: NURSE PRACTITIONER

## 2023-03-20 PROCEDURE — 1159F MED LIST DOCD IN RCRD: CPT | Mod: CPTII,,, | Performed by: NURSE PRACTITIONER

## 2023-03-20 PROCEDURE — 4010F PR ACE/ARB THEARPY RXD/TAKEN: ICD-10-PCS | Mod: CPTII,,, | Performed by: NURSE PRACTITIONER

## 2023-03-20 PROCEDURE — 3077F PR MOST RECENT SYSTOLIC BLOOD PRESSURE >= 140 MM HG: ICD-10-PCS | Mod: CPTII,,, | Performed by: NURSE PRACTITIONER

## 2023-03-20 PROCEDURE — 3051F PR MOST RECENT HEMOGLOBIN A1C LEVEL 7.0 - < 8.0%: ICD-10-PCS | Mod: CPTII,,, | Performed by: NURSE PRACTITIONER

## 2023-03-20 PROCEDURE — 1160F PR REVIEW ALL MEDS BY PRESCRIBER/CLIN PHARMACIST DOCUMENTED: ICD-10-PCS | Mod: CPTII,,, | Performed by: NURSE PRACTITIONER

## 2023-03-20 PROCEDURE — 99499 UNLISTED E&M SERVICE: CPT | Mod: S$PBB,,, | Performed by: NURSE PRACTITIONER

## 2023-03-20 PROCEDURE — 99215 OFFICE O/P EST HI 40 MIN: CPT | Mod: PBBFAC | Performed by: NURSE PRACTITIONER

## 2023-03-20 PROCEDURE — 99499 NO LOS: ICD-10-PCS | Mod: S$PBB,,, | Performed by: NURSE PRACTITIONER

## 2023-03-20 PROCEDURE — 11042 DBRDMT SUBQ TIS 1ST 20SQCM/<: CPT | Mod: PBBFAC | Performed by: NURSE PRACTITIONER

## 2023-03-20 PROCEDURE — 3080F DIAST BP >= 90 MM HG: CPT | Mod: CPTII,,, | Performed by: NURSE PRACTITIONER

## 2023-03-20 PROCEDURE — 1160F RVW MEDS BY RX/DR IN RCRD: CPT | Mod: CPTII,,, | Performed by: NURSE PRACTITIONER

## 2023-03-20 PROCEDURE — 3077F SYST BP >= 140 MM HG: CPT | Mod: CPTII,,, | Performed by: NURSE PRACTITIONER

## 2023-03-20 PROCEDURE — 3080F PR MOST RECENT DIASTOLIC BLOOD PRESSURE >= 90 MM HG: ICD-10-PCS | Mod: CPTII,,, | Performed by: NURSE PRACTITIONER

## 2023-03-20 PROCEDURE — 1159F PR MEDICATION LIST DOCUMENTED IN MEDICAL RECORD: ICD-10-PCS | Mod: CPTII,,, | Performed by: NURSE PRACTITIONER

## 2023-03-20 PROCEDURE — 11042 DEBRIDEMENT: ICD-10-PCS | Mod: S$PBB,,, | Performed by: NURSE PRACTITIONER

## 2023-03-20 NOTE — PROGRESS NOTES
TOMASA Maldonado   [facility]  [address] [facility] [place] [address]  [phone]      PATIENT NAME: Deborah Sotelo  : 1960  DATE: 3/20/23  MRN: [id number]      Billing Provider: TOMASA Maldonado  Level of Service:   Patient PCP Information       Provider PCP Type    Ron Sanches MD General            Reason for Visit / Chief Complaint: Non-healing Wound Follow Up (R DFU)       History of Present Illness / Problem Focused Workflow     Deborah Sotelo is a 61 y.o. female presents to clinic for follow up on chronic-non healing wound on right TMA. Wound continues to improve. Denies pain or tenderness. Callus jonathon-wound and biofilm to wound bed, bedside debridement today. Continue current POC.    Significant PMH diabetes, anemia, and CVA. Last HgA1C 9.3 in July, diabetes managed by PCP. Pertinent factors that delay wound healing include multiple co-morbidities, poor vascular supply, diabetes, edema, heavy drainage, decreased granulation tissue, tract(s), undermining and no protective sensation. Denies fever and chills.     Review of Systems     Review of Systems   Constitutional:  Positive for activity change. Negative for chills and fever.   Respiratory:  Negative for chest tightness and shortness of breath.    Cardiovascular:  Positive for leg swelling. Negative for chest pain and palpitations.   Musculoskeletal:  Positive for arthralgias, back pain and joint swelling.        Severe shoulder pain, 10 of 10 on pain scale   Skin:  Positive for color change and wound.        See wound assessment   Neurological:  Positive for weakness and numbness.   Psychiatric/Behavioral:  Negative for agitation, behavioral problems, confusion and self-injury.      Medical / Social / Family History     Past Medical History:   Diagnosis Date    Anemia 2022    Diabetes mellitus, type 2     Hyperlipidemia     Hypertension     Obesity     Primary osteoarthritis of  left shoulder 5/24/2022    Stroke        Past Surgical History:   Procedure Laterality Date    AMPUTATION      APPENDECTOMY      DEBRIDEMENT OF FOOT Right 1/12/2023    Procedure: DEBRIDEMENT, FOOT;  Surgeon: Ravi Ramirez MD;  Location: [facility];  Service: General;  Laterality: Right;    EYE SURGERY         Social History  Ms. Deborha Sotelo  reports that she has never smoked. She has never used smokeless tobacco. She reports current alcohol use. She reports that she does not use drugs.    Family History  Ms.'s Deborah Sotelo family history includes Appendicitis in her father; Asthma in her sister; Cancer in her mother; Diabetes in her brother.    Medications and Allergies     Medications  Outpatient Medications Marked as Taking for the 3/20/23 encounter (Office Visit) with TOMASA Maldonado   Medication Sig Dispense Refill    amLODIPine (NORVASC) 5 MG tablet Take 1 tablet (5 mg total) by mouth once daily. 30 tablet 5    amoxicillin-clavulanate 500-125mg (AUGMENTIN) 500-125 mg Tab       atorvastatin (LIPITOR) 80 MG tablet TAKE 1 TABLET BY MOUTH EACH NIGHT AT BEDTIME FOR CHOLESTEROL ~~DO NOT EAT GRAPEFRUIT OR DRINK GRAPEFRUIT JUICE WHILE TAKING THIS MEDICATION~~ 30 tablet 5    diclofenac (VOLTAREN) 75 MG EC tablet Take 1 tablet (75 mg total) by mouth 2 (two) times daily. 60 tablet 1    diclofenac sodium (VOLTAREN) 1 % Gel Apply 2 g topically 4 (four) times daily. 450 g 2    FEROSUL 325 mg (65 mg iron) Tab tablet       HYDROcodone-acetaminophen (NORCO) 7.5-325 mg per tablet Take 1 tablet by mouth every 6 (six) hours as needed for Pain. 28 tablet 0    insulin asp prt-insulin aspart, NovoLOG 70/30, (NOVOLOG 70/30) 100 unit/mL (70-30) Soln INJECT 50 UNITS SUB-Q EACH MORNING AND 35  UNITS EACH EVENING ...KEEP IN REFRIGERATOR ...USE WITHIN 28 DAYS AFTER OPENING EACH VIAL 30 mL 11    LANTUS U-100 INSULIN 100 unit/mL injection Inject 20 Units into the skin once daily. Take at night 6 mL 11    LIDOcaine  (LIDODERM) 5 % Place 1 patch onto the skin once daily. Remove & Discard patch within 12 hours or as directed by MD 30 patch 2    losartan (COZAAR) 100 MG tablet Take 1 tablet (100 mg total) by mouth every evening. 30 tablet 5    omeprazole (PRILOSEC) 20 MG capsule Take 1 capsule (20 mg total) by mouth once daily. 30 capsule 5    sodium hypochlorite 0.5 % (DAKIN'S SOLUTION) external solution Apply topically once daily. Pack wound with dakin's moistened nuguaze daily 473 mL 0    tiZANidine (ZANAFLEX) 4 MG tablet Take 1 tablet (4 mg total) by mouth 3 (three) times daily. 90 tablet 2       Allergies  Review of patient's allergies indicates:   Allergen Reactions    Aspirin      Other reaction(s): UPSET STOMACH     Bactrim ds [sulfamethoxazole-trimethoprim] Itching       Physical Examination     Vitals:    03/20/23 0926   BP: (!) 152/94   Pulse: 94   Resp: 20   Temp: 97.4 °F (36.3 °C)     Physical Exam  Vitals and nursing note reviewed.   Constitutional:       Comments: Tearful during exam   HENT:      Head: Normocephalic.   Cardiovascular:      Rate and Rhythm: Normal rate and regular rhythm.      Pulses: Normal pulses.      Heart sounds: Normal heart sounds.   Pulmonary:      Effort: Pulmonary effort is normal. No respiratory distress.   Chest:      Chest wall: No tenderness.   Musculoskeletal:         General: Swelling, tenderness and deformity present.      Right lower leg: Edema present.      Comments: Left shoulder decreased ROM with weakness to left upper extremity   Skin:     General: Skin is warm.      Capillary Refill: Capillary refill takes 2 to 3 seconds.      Findings: Erythema present.      Comments: Wound, see LDA for measurements and picture   Neurological:      Mental Status: She is alert and oriented to person, place, and time.   Psychiatric:         Mood and Affect: Mood normal.         Behavior: Behavior normal.         Thought Content: Thought content normal.         Judgment: Judgment normal.      Assessment and Plan             Wound 01/21/22 Diabetic Ulcer Right medial Foot #5 (Active)   01/21/22     Pre-existing: Yes   Primary Wound Type: Diabetic ulc   Side: Right   Orientation: medial   Location: Foot   Wound Number: #5   Ankle-Brachial Index:    Pulses:    Removal Indication and Assessment:    Wound Outcome:    (Retired) Wound Type:    (Retired) Wound Length (cm):    (Retired) Wound Width (cm):    (Retired) Depth (cm):    Wound Description (Comments):    Removal Indications:    Wound Image    03/20/23 0934   Dressing Appearance Open to air 03/20/23 0934   Drainage Amount None 03/20/23 0934   Appearance Maroon;Intact 03/20/23 0934   Tissue loss description Not applicable 03/20/23 0934   Black (%), Wound Tissue Color 0 % 03/20/23 0934   Red (%), Wound Tissue Color 0 % 03/20/23 0934   Yellow (%), Wound Tissue Color 0 % 03/20/23 0934   Periwound Area Intact 03/20/23 0934   Wound Edges Rolled/closed 03/20/23 0934   Wound Length (cm) 0 cm 03/20/23 0934   Wound Width (cm) 0 cm 03/20/23 0934   Wound Depth (cm) 0 cm 03/20/23 0934   Wound Volume (cm^3) 0 cm^3 03/20/23 0934   Wound Surface Area (cm^2) 0 cm^2 03/20/23 0934   Care Cleansed with:;Antimicrobial agent 03/20/23 0934   Periwound Care Moisturizer applied 03/20/23 0934   Dressing Change Due 03/21/23 03/20/23 0934            Incision/Site 01/12/23 1251 Right Foot (Active)   01/12/23 1251   Present Prior to Hospital Arrival?:    Side: Right   Location: Foot   Orientation:    Incision Type:    Closure Method:    Additional Comments:    Removal Indication and Assessment:    Wound Outcome:    Removal Indications:    Wound Image     03/20/23 0932   Dressing Appearance Moist drainage 03/20/23 0932   Drainage Amount Moderate 03/20/23 0932   Drainage Characteristics/Odor Serosanguineous 03/20/23 0932   Appearance Pink;Moist;Granulating 03/20/23 0932   Black (%), Wound Tissue Color 0 % 03/20/23 0932   Red (%), Wound Tissue Color 100 % 03/20/23 0948   Yellow (%),  Wound Tissue Color 0 % 03/20/23 0932   Periwound Area Intact;Dry 03/20/23 0932   Wound Edges Callused;Open 03/20/23 0932   Wound Length (cm) 1.8 cm 03/20/23 0932   Wound Width (cm) 1 cm 03/20/23 0932   Wound Depth (cm) 0.4 cm 03/20/23 0932   Wound Volume (cm^3) 0.72 cm^3 03/20/23 0932   Wound Surface Area (cm^2) 1.8 cm^2 03/20/23 0932   Care Cleansed with:;Antimicrobial agent 03/20/23 0932   Dressing Applied;Gauze, wet to dry;Gauze;Rolled gauze 03/20/23 0932   Periwound Care Moisturizer applied 03/20/23 0932   Off Loading Off loading shoe 03/20/23 0932   Dressing Change Due 03/21/23 03/20/23 0932     Problem List Items Addressed This Visit          Endocrine    Diabetic foot ulcer - Primary    Overview                                              Current Assessment & Plan     Clean with baby shampoo and water or vashe     Apply silver polymem to wound, cover with dry gauze, wrap with meng,secure with paper tape, change daily and as needed     Monitor closely for s/s of infection including fever, chills, increase in pain, odor from wound, and increased redness from foot. Go to ER if any complications develop.   Keep leg elevated and avoid pressure on wound  Diabetes:  Monitor glucose closely. Check fasting glucose and 2 hours after meals. HgA1C goal <7, fasting glucose , and 2 hours after meals <180  Hypertension:  Check blood pressure twice daily, goal <120/80  Diet:   Increase protein intake, avoid fried, fatty foods and foods high in simple carbs.   Vitamins:  Take vitamin C 1000 mg, zinc 50mg, vitamin d 5000 units, and a daily multivitamin. Dawson is a good source of protein and nutrients to aid in wound healing.             Future Appointments   Date Time Provider Department Center   4/13/2023  9:00 AM TOMASA Maldonado [facility]    4/17/2023  1:15 PM [facility] [place]   7/27/2023  9:00 AM [facility] GI ROOM 01 [facility]            Signature:  Felisha  TOMASA Love  [facility]  [address] [facility] [address]  [phone]    Date of encounter: 3/20/23

## 2023-03-20 NOTE — PROCEDURES
Debridement    Date/Time: 3/20/2023 9:00 AM  Performed by: TOMASA Maldonado  Authorized by: TOMASA Maldonado     Consent Done?:  Yes (Written)    Wound Details:    Location:  Right foot    Location:  Right Plantar    Type of Debridement:  Excisional       Length (cm):  2.3       Area (sq cm):  3.68       Width (cm):  1.6       Percent Debrided (%):  100       Depth (cm):  0.4       Total Area Debrided (sq cm):  3.68    Depth of debridement:  Subcutaneous tissue    Tissue debrided:  Adipose, Dermis, Epidermis and Subcutaneous    Devitalized tissue debrided:  Biofilm, Callus, Exudate and Fibrin    Instruments:  Curette  Bleeding:  Minimal  Hemostasis Achieved: Yes  Method Used:  Pressure  Patient tolerance:  Patient tolerated the procedure well with no immediate complications     Assistant TIFFANI Duvall LPN

## 2023-03-20 NOTE — PROGRESS NOTES
Debridement Performed for Assessment: Wound# Right plantar foot  Performed By: Provider: Felisha Sun NP  Assistant:  Cici Duvall LPN    Debridement: Surgical    Photo taken post procedure:    Time-Out Taken: Yes  Level: Skin/Subcutaneous Tissue  Post Debridement Measurements  Length: (cm) 2.3  Width: (cm) 1.6  Depth: (cm) 0.4      Area: (cm²) 3.68  Percent Debrided: 100%  Total Area Debrided: (sq cm)     Tissue and other material debrided:  Adipose, Dermis, Epidermis, Subcutaneous  Devitalized Tissue Debrided:Biofilm, callus  Instrument: Curette  Bleeding: Moderate  Hemostasis Achieved: Pressure  Procedural Pain: Insensate  Post Procedural Pain: Insensate  Response to Treatment: Procedure was tolerated well    Devitalized materials/tissue Removed  the following was removed during debridement  subcutaneous      Post Debridement Diagnosis  chronic right diabetic foot ulcer  Post debridement diagnosis  Same as Pre-operative debridement diagnosis, No Complications noted.      Grafts or implants applied  Was a graft or implant applied?  No      Procedure assistant  Procedure assisted by:  Cici Duvall LPN  Assistant is the same as nurse listed above      Complications related to procedure  Did any complication occure during procedure?  No complications noted during or after procedure.      Specimen  Specimen collect during procedure?  No specimen collected      Anaesthesia:  Anesthesia used  None      Blood Loss:  Blood loss during procedure  less than 5 cc

## 2023-04-14 ENCOUNTER — EXTERNAL HOME HEALTH (OUTPATIENT)
Dept: HOME HEALTH SERVICES | Facility: HOSPITAL | Age: 63
End: 2023-04-14
Payer: MEDICAID

## 2023-05-03 NOTE — PROGRESS NOTES
TOMASA Maldonado   [facility]  [address] [facility] [place] [address]  [phone]      PATIENT NAME: Deborah Sotelo  : 1960  DATE: 23  MRN: [id number]      Billing Provider: TOMASA Maldonado  Level of Service:   Patient PCP Information       Provider PCP Type    Ron Sanches MD General            Reason for Visit / Chief Complaint: Diabetic Foot Ulcer (Right diabetic foot ulcer)       History of Present Illness / Problem Focused Workflow     Deborah Sotelo is a 61 y.o. female presents to clinic for follow up on chronic-non healing wound on right TMA. Wound continues to improve. Callus jonathon-wound and biofilm to wound bed, bedside debridement today. Continue current POC.    Significant PMH diabetes, anemia, and CVA. Last HgA1C 7 in July, diabetes managed by PCP. Pertinent factors that delay wound healing include multiple co-morbidities, poor vascular supply, diabetes, edema, heavy drainage, decreased granulation tissue, tract(s), undermining and no protective sensation.   Last arterial doppler , Impression: Ankle brachial indices measure 1.50 right and 1.37 left. No areas of occlusion on the color Doppler images. Consider referral for vascular evaluation if wound does not heal.   Patient reports she is seeing orthopedic doctor in [place] and has follow up next week to discuss surgical options for her shoulder.     Review of Systems     Review of Systems   Constitutional:  Positive for activity change. Negative for chills and fever.   Respiratory:  Negative for chest tightness and shortness of breath.    Cardiovascular:  Positive for leg swelling. Negative for chest pain and palpitations.   Musculoskeletal:  Positive for arthralgias, back pain and joint swelling.        Severe shoulder pain, 10 of 10 on pain scale   Skin:  Positive for color change and wound.        See wound assessment   Neurological:  Positive for weakness and numbness.    Psychiatric/Behavioral:  Negative for agitation, behavioral problems, confusion and self-injury.      Medical / Social / Family History     Past Medical History:   Diagnosis Date    Anemia 7/5/2022    Diabetes mellitus, type 2     Hyperlipidemia     Hypertension     Obesity     Primary osteoarthritis of left shoulder 5/24/2022    Stroke        Past Surgical History:   Procedure Laterality Date    AMPUTATION      APPENDECTOMY      DEBRIDEMENT OF FOOT Right 1/12/2023    Procedure: DEBRIDEMENT, FOOT;  Surgeon: Ravi Ramirez MD;  Location: [facility];  Service: General;  Laterality: Right;    EYE SURGERY         Social History  Ms. Debroah Sotelo  reports that she has never smoked. She has never used smokeless tobacco. She reports current alcohol use. She reports that she does not use drugs.    Family History  Ms.'s Deborah Sotelo family history includes Appendicitis in her father; Asthma in her sister; Cancer in her mother; Diabetes in her brother.    Medications and Allergies     Medications  Outpatient Medications Marked as Taking for the 5/4/23 encounter (Office Visit) with TOMASA Maldonado   Medication Sig Dispense Refill    amLODIPine (NORVASC) 5 MG tablet Take 1 tablet (5 mg total) by mouth once daily. 30 tablet 5    atorvastatin (LIPITOR) 80 MG tablet TAKE 1 TABLET BY MOUTH EACH NIGHT AT BEDTIME FOR CHOLESTEROL ~~DO NOT EAT GRAPEFRUIT OR DRINK GRAPEFRUIT JUICE WHILE TAKING THIS MEDICATION~~ 30 tablet 5    diclofenac sodium (VOLTAREN) 1 % Gel Apply 2 g topically 4 (four) times daily. 450 g 2    FEROSUL 325 mg (65 mg iron) Tab tablet       insulin asp prt-insulin aspart, NovoLOG 70/30, (NOVOLOG 70/30) 100 unit/mL (70-30) Soln INJECT 50 UNITS SUB-Q EACH MORNING AND 35  UNITS EACH EVENING ...KEEP IN REFRIGERATOR ...USE WITHIN 28 DAYS AFTER OPENING EACH VIAL 30 mL 11    losartan (COZAAR) 100 MG tablet Take 1 tablet (100 mg total) by mouth every evening. 30 tablet 5    omeprazole (PRILOSEC)  20 MG capsule Take 1 capsule (20 mg total) by mouth once daily. 30 capsule 5    sodium hypochlorite 0.5 % (DAKIN'S SOLUTION) external solution Apply topically once daily. Pack wound with dakin's moistened nuguaze daily 473 mL 0    tiZANidine (ZANAFLEX) 4 MG tablet Take 1 tablet (4 mg total) by mouth 3 (three) times daily. 90 tablet 2       Allergies  Review of patient's allergies indicates:   Allergen Reactions    Aspirin      Other reaction(s): UPSET STOMACH     Bactrim ds [sulfamethoxazole-trimethoprim] Itching       Physical Examination     Vitals:    05/04/23 0923   BP: (!) 147/91   Pulse: 86   Resp: 20   Temp: 97.5 °F (36.4 °C)     Physical Exam  Vitals and nursing note reviewed.   Constitutional:       Comments: Tearful during exam   HENT:      Head: Normocephalic.   Cardiovascular:      Rate and Rhythm: Normal rate and regular rhythm.      Pulses: Normal pulses.      Heart sounds: Normal heart sounds.   Pulmonary:      Effort: Pulmonary effort is normal. No respiratory distress.   Chest:      Chest wall: No tenderness.   Musculoskeletal:         General: Swelling, tenderness and deformity present.      Right lower leg: Edema present.      Comments: Left shoulder decreased ROM with weakness to left upper extremity   Skin:     General: Skin is warm.      Capillary Refill: Capillary refill takes 2 to 3 seconds.      Findings: Erythema present.      Comments: Wound, see LDA for measurements and picture   Neurological:      Mental Status: She is alert and oriented to person, place, and time.   Psychiatric:         Mood and Affect: Mood normal.         Behavior: Behavior normal.         Thought Content: Thought content normal.         Judgment: Judgment normal.     Assessment and Plan             Wound 01/18/21 1051 Diabetic Ulcer Right lateral Plantar #1 (Active)   01/18/21 1051    Pre-existing: Yes   Primary Wound Type: Diabetic ul   Side: Right   Orientation: lateral   Location: Plantar   Wound Number: #1    Ankle-Brachial Index:    Pulses: normal   Removal Indication and Assessment:    Wound Outcome:    (Retired) Wound Type:    (Retired) Wound Length (cm):    (Retired) Wound Width (cm):    (Retired) Depth (cm):    Wound Description (Comments):    Removal Indications:    Wound Image    05/04/23 1032   Dressing Appearance Intact;Dry 05/04/23 1032   Drainage Amount Moderate 05/04/23 1032   Drainage Characteristics/Odor Serosanguineous 05/04/23 1032   Appearance Pink;Granulating 05/04/23 1032   Tissue loss description Full thickness 05/04/23 1032   Red (%), Wound Tissue Color 90 % 05/04/23 1032   Yellow (%), Wound Tissue Color 10 % 05/04/23 1032   Periwound Area Dry 05/04/23 1032   Wound Edges Callused 05/04/23 1032   Johnson Classification (diabetic foot ulcers only) Grade 3 05/04/23 1032   Wound Length (cm) 0.7 cm 05/04/23 1032   Wound Width (cm) 0.5 cm 05/04/23 1032   Wound Depth (cm) 0.2 cm 05/04/23 1032   Wound Volume (cm^3) 0.07 cm^3 05/04/23 1032   Wound Surface Area (cm^2) 0.35 cm^2 05/04/23 1032   Care Soap and water 05/04/23 1032   Dressing Changed;Hydrocolloid;Rolled gauze 05/04/23 1032            Incision/Site 01/12/23 1251 Right Foot (Active)   01/12/23 1251   Present Prior to Hospital Arrival?:    Side: Right   Location: Foot   Orientation:    Incision Type:    Closure Method:    Additional Comments:    Removal Indication and Assessment:    Wound Outcome:    Removal Indications:    Wound Image    05/04/23 0924   Dressing Appearance Dry;Intact;Clean 05/04/23 0924   Drainage Amount Moderate 05/04/23 0924   Drainage Characteristics/Odor Serosanguineous 05/04/23 0924   Appearance Pink;Moist;Granulating 05/04/23 0924   Black (%), Wound Tissue Color 0 % 05/04/23 0924   Red (%), Wound Tissue Color 90 % 05/04/23 0924   Yellow (%), Wound Tissue Color 10 % 05/04/23 0924   Periwound Area Intact;Dry 05/04/23 0924   Wound Edges Open 05/04/23 0924   Wound Length (cm) 0.3 cm 05/04/23 0924   Wound Width (cm) 0.3 cm 05/04/23  0924   Wound Depth (cm) 0.3 cm 05/04/23 0924   Wound Volume (cm^3) 0.027 cm^3 05/04/23 0924   Wound Surface Area (cm^2) 0.09 cm^2 05/04/23 0924   Care Cleansed with:;Antimicrobial agent;Soap and water 05/04/23 0924   Dressing Applied;Hydrofiber;Gauze;Rolled gauze 05/04/23 0924   Periwound Care Moisturizer applied 05/04/23 0924   Dressing Change Due 05/05/23 05/04/23 0924     Problem List Items Addressed This Visit          Endocrine    Diabetic foot ulcer - Primary    Overview                                                    Current Assessment & Plan     Clean with baby shampoo and water or vashe     Apply optifoam ag gentle to wound, cover with dry gauze, wrap with meng,secure with paper tape, change daily and as needed     Monitor closely for s/s of infection including fever, chills, increase in pain, odor from wound, and increased redness from foot. Go to ER if any complications develop.   Keep leg elevated and avoid pressure on wound  Diabetes:  Monitor glucose closely. Check fasting glucose and 2 hours after meals. HgA1C goal <7, fasting glucose , and 2 hours after meals <180  Hypertension:  Check blood pressure twice daily, goal <120/80  Diet:   Increase protein intake, avoid fried, fatty foods and foods high in simple carbs.   Vitamins:  Take vitamin C 1000 mg, zinc 50mg, vitamin d 5000 units, and a daily multivitamin. Dawson is a good source of protein and nutrients to aid in wound healing.             Future Appointments   Date Time Provider Department Center   5/25/2023  9:00 AM TOMASA Maldonado [facility]   7/27/2023  9:00 AM [facility] GI ROOM 01 [facility]            Signature:  TOMASA Maldonado  [facility]  [address]  [place] [address]  [phone]    Date of encounter: 5/4/23

## 2023-05-03 NOTE — PATIENT INSTRUCTIONS
Clean with baby shampoo and water or vashe     Apply optifoam ag gentle to wound, cover with dry gauze, wrap with meng,secure with paper tape, change daily and as needed     Monitor closely for s/s of infection including fever, chills, increase in pain, odor from wound, and increased redness from foot. Go to ER if any complications develop.   Keep leg elevated and avoid pressure on wound  Diabetes:  Monitor glucose closely. Check fasting glucose and 2 hours after meals. HgA1C goal <7, fasting glucose , and 2 hours after meals <180  Hypertension:  Check blood pressure twice daily, goal <120/80  Diet:   Increase protein intake, avoid fried, fatty foods and foods high in simple carbs.   Vitamins:  Take vitamin C 1000 mg, zinc 50mg, vitamin d 5000 units, and a daily multivitamin. Dawson is a good source of protein and nutrients to aid in wound healing.

## 2023-05-04 ENCOUNTER — OFFICE VISIT (OUTPATIENT)
Dept: WOUND CARE | Facility: CLINIC | Age: 63
End: 2023-05-04
Attending: FAMILY MEDICINE
Payer: MEDICAID

## 2023-05-04 VITALS
SYSTOLIC BLOOD PRESSURE: 147 MMHG | RESPIRATION RATE: 20 BRPM | HEART RATE: 86 BPM | DIASTOLIC BLOOD PRESSURE: 91 MMHG | TEMPERATURE: 98 F

## 2023-05-04 DIAGNOSIS — E11.621 DIABETIC ULCER OF RIGHT MIDFOOT ASSOCIATED WITH TYPE 2 DIABETES MELLITUS, WITH NECROSIS OF MUSCLE: Primary | ICD-10-CM

## 2023-05-04 DIAGNOSIS — L97.413 DIABETIC ULCER OF RIGHT MIDFOOT ASSOCIATED WITH TYPE 2 DIABETES MELLITUS, WITH NECROSIS OF MUSCLE: Primary | ICD-10-CM

## 2023-05-04 PROCEDURE — 3080F PR MOST RECENT DIASTOLIC BLOOD PRESSURE >= 90 MM HG: ICD-10-PCS | Mod: CPTII,,, | Performed by: NURSE PRACTITIONER

## 2023-05-04 PROCEDURE — 1160F RVW MEDS BY RX/DR IN RCRD: CPT | Mod: CPTII,,, | Performed by: NURSE PRACTITIONER

## 2023-05-04 PROCEDURE — 3051F PR MOST RECENT HEMOGLOBIN A1C LEVEL 7.0 - < 8.0%: ICD-10-PCS | Mod: CPTII,,, | Performed by: NURSE PRACTITIONER

## 2023-05-04 PROCEDURE — 99214 OFFICE O/P EST MOD 30 MIN: CPT | Mod: PBBFAC,25 | Performed by: NURSE PRACTITIONER

## 2023-05-04 PROCEDURE — 3051F HG A1C>EQUAL 7.0%<8.0%: CPT | Mod: CPTII,,, | Performed by: NURSE PRACTITIONER

## 2023-05-04 PROCEDURE — 4010F PR ACE/ARB THEARPY RXD/TAKEN: ICD-10-PCS | Mod: CPTII,,, | Performed by: NURSE PRACTITIONER

## 2023-05-04 PROCEDURE — 3080F DIAST BP >= 90 MM HG: CPT | Mod: CPTII,,, | Performed by: NURSE PRACTITIONER

## 2023-05-04 PROCEDURE — 1159F PR MEDICATION LIST DOCUMENTED IN MEDICAL RECORD: ICD-10-PCS | Mod: CPTII,,, | Performed by: NURSE PRACTITIONER

## 2023-05-04 PROCEDURE — 1159F MED LIST DOCD IN RCRD: CPT | Mod: CPTII,,, | Performed by: NURSE PRACTITIONER

## 2023-05-04 PROCEDURE — 11042 DBRDMT SUBQ TIS 1ST 20SQCM/<: CPT | Mod: PBBFAC | Performed by: NURSE PRACTITIONER

## 2023-05-04 PROCEDURE — 1160F PR REVIEW ALL MEDS BY PRESCRIBER/CLIN PHARMACIST DOCUMENTED: ICD-10-PCS | Mod: CPTII,,, | Performed by: NURSE PRACTITIONER

## 2023-05-04 PROCEDURE — 4010F ACE/ARB THERAPY RXD/TAKEN: CPT | Mod: CPTII,,, | Performed by: NURSE PRACTITIONER

## 2023-05-04 PROCEDURE — 99499 NO LOS: ICD-10-PCS | Mod: S$PBB,,, | Performed by: NURSE PRACTITIONER

## 2023-05-04 PROCEDURE — 11042 DEBRIDEMENT: ICD-10-PCS | Mod: S$PBB,,, | Performed by: NURSE PRACTITIONER

## 2023-05-04 PROCEDURE — 99499 UNLISTED E&M SERVICE: CPT | Mod: S$PBB,,, | Performed by: NURSE PRACTITIONER

## 2023-05-04 PROCEDURE — 3077F SYST BP >= 140 MM HG: CPT | Mod: CPTII,,, | Performed by: NURSE PRACTITIONER

## 2023-05-04 PROCEDURE — 3077F PR MOST RECENT SYSTOLIC BLOOD PRESSURE >= 140 MM HG: ICD-10-PCS | Mod: CPTII,,, | Performed by: NURSE PRACTITIONER

## 2023-05-04 RX ORDER — RIFAMPIN 300 MG/1
300 CAPSULE ORAL EVERY 12 HOURS
Qty: 28 CAPSULE | Refills: 0 | Status: SHIPPED | OUTPATIENT
Start: 2023-05-04 | End: 2023-05-18

## 2023-05-04 NOTE — PROGRESS NOTES
Debridement Performed for Assessment: Wound# Right plantar foot  Performed By: Provider: Felisha Sun NP  Assistant:  Cici Duvall LPN    Debridement: Surgical    Photo taken post procedure:    Time-Out Taken: Yes  Level: Skin/Subcutaneous Tissue/ Muscle  Post Debridement Measurements  Length: (cm) 1.0  Width: (cm) 0.9  Depth: (cm) 0.4      Area: (cm²) 1.9  Percent Debrided: 100%  Total Area Debrided: (sq cm)     Tissue and other material debrided:  Adipose, Dermis, Epidermis, Subcutaneous  Devitalized Tissue Debrided:Biofilm, callous  Instrument: Curette  Bleeding: Moderate  Hemostasis Achieved: Pressure  Procedural Pain: Insensate  Post Procedural Pain: Insensate  Response to Treatment: Procedure was tolerated well    Devitalized materials/tissue Removed  the following was removed during debridement  subcutaneous      Post Debridement Diagnosis  chronic right diabetic foot ulcer  Post debridement diagnosis  Same as Pre-operative debridement diagnosis, No Complications noted.      Grafts or implants applied  Was a graft or implant applied?  No      Procedure assistant  Procedure assisted by:  Cici Duvall LPN  Assistant is the same as nurse listed above      Complications related to procedure  Did any complication occure during procedure?  No complications noted during or after procedure.      Specimen  Specimen collect during procedure?  No specimen collected      Anaesthesia:  Anesthesia used  None      Blood Loss:  Blood loss during procedure  less than 5 cc

## 2023-05-04 NOTE — PROCEDURES
"Debridement    Date/Time: 5/4/2023 9:00 AM  Performed by: TOMASA Maldonado  Authorized by: TOMASA Maldonado     Time out: Immediately prior to procedure a "time out" was called to verify the correct patient, procedure, equipment, support staff and site/side marked as required.    Consent Done?:  Yes (Written)    Type of Debridement:  Excisional       Length (cm):  1       Area (sq cm):  0.9       Width (cm):  0.9       Percent Debrided (%):  100       Depth (cm):  0.4       Total Area Debrided (sq cm):  0.9    Depth of debridement:  Subcutaneous tissue    Tissue debrided:  Adipose, Dermis, Epidermis and Subcutaneous    Devitalized tissue debrided:  Biofilm, Callus, Clots, Exudate and Slough    Instruments:  Curette  Bleeding:  Minimal  Hemostasis Achieved: Yes  Method Used:  Pressure  Patient tolerance:  Patient tolerated the procedure well with no immediate complications  1st Wound Pain Assessment: 0     Assistant TIFFANI Duvall LPN  "

## 2023-05-04 NOTE — ASSESSMENT & PLAN NOTE
Clean with baby shampoo and water or vashe     Apply optifoam ag gentle to wound, cover with dry gauze, wrap with megn,secure with paper tape, change daily and as needed     Monitor closely for s/s of infection including fever, chills, increase in pain, odor from wound, and increased redness from foot. Go to ER if any complications develop.   Keep leg elevated and avoid pressure on wound  Diabetes:  Monitor glucose closely. Check fasting glucose and 2 hours after meals. HgA1C goal <7, fasting glucose , and 2 hours after meals <180  Hypertension:  Check blood pressure twice daily, goal <120/80  Diet:   Increase protein intake, avoid fried, fatty foods and foods high in simple carbs.   Vitamins:  Take vitamin C 1000 mg, zinc 50mg, vitamin d 5000 units, and a daily multivitamin. Dawson is a good source of protein and nutrients to aid in wound healing.

## 2023-06-06 DIAGNOSIS — Z01.00 EYE EXAM, ROUTINE: Primary | ICD-10-CM

## 2023-06-11 ENCOUNTER — HOSPITAL ENCOUNTER (EMERGENCY)
Facility: HOSPITAL | Age: 63
Discharge: HOME | End: 2023-06-11
Payer: MEDICAID

## 2023-06-11 VITALS
HEART RATE: 76 BPM | WEIGHT: 280 LBS | TEMPERATURE: 98 F | OXYGEN SATURATION: 98 % | SYSTOLIC BLOOD PRESSURE: 144 MMHG | BODY MASS INDEX: 42.44 KG/M2 | RESPIRATION RATE: 14 BRPM | DIASTOLIC BLOOD PRESSURE: 83 MMHG | HEIGHT: 68 IN

## 2023-06-11 DIAGNOSIS — N39.0 ACUTE URINARY TRACT INFECTION: ICD-10-CM

## 2023-06-11 DIAGNOSIS — A08.4 VIRAL GASTROENTERITIS: Primary | ICD-10-CM

## 2023-06-11 DIAGNOSIS — N95.0 POST-MENOPAUSAL BLEEDING: ICD-10-CM

## 2023-06-11 LAB
ALBUMIN SERPL BCP-MCNC: 3.4 G/DL (ref 3.5–5)
ALBUMIN/GLOB SERPL: 0.8 {RATIO}
ALP SERPL-CCNC: 152 U/L (ref 50–130)
ALT SERPL W P-5'-P-CCNC: 39 U/L (ref 13–56)
AMYLASE SERPL-CCNC: 89 U/L (ref 25–115)
ANION GAP SERPL CALCULATED.3IONS-SCNC: 16 MMOL/L (ref 7–16)
AST SERPL W P-5'-P-CCNC: 26 U/L (ref 15–37)
BACTERIA #/AREA URNS HPF: ABNORMAL /HPF
BASOPHILS # BLD AUTO: 0.04 K/UL (ref 0–0.2)
BASOPHILS NFR BLD AUTO: 0.6 % (ref 0–1)
BILIRUB SERPL-MCNC: 0.2 MG/DL (ref ?–1.2)
BILIRUB UR QL STRIP: NEGATIVE
BUN SERPL-MCNC: 45 MG/DL (ref 7–18)
BUN/CREAT SERPL: 36 (ref 6–20)
CALCIUM SERPL-MCNC: 9.1 MG/DL (ref 8.5–10.1)
CHLORIDE SERPL-SCNC: 109 MMOL/L (ref 98–107)
CLARITY UR: CLEAR
CO2 SERPL-SCNC: 20 MMOL/L (ref 21–32)
COLOR UR: ABNORMAL
CREAT SERPL-MCNC: 1.26 MG/DL (ref 0.55–1.02)
DIFFERENTIAL METHOD BLD: ABNORMAL
EGFR (NO RACE VARIABLE) (RUSH/TITUS): 49 ML/MIN/1.73M2
EOSINOPHIL # BLD AUTO: 0.41 K/UL (ref 0–0.5)
EOSINOPHIL NFR BLD AUTO: 5.8 % (ref 1–4)
ERYTHROCYTE [DISTWIDTH] IN BLOOD BY AUTOMATED COUNT: 15.2 % (ref 11.5–14.5)
FLUAV AG UPPER RESP QL IA.RAPID: NEGATIVE
FLUBV AG UPPER RESP QL IA.RAPID: NEGATIVE
GLOBULIN SER-MCNC: 4.5 G/DL (ref 2–4)
GLUCOSE SERPL-MCNC: 161 MG/DL (ref 74–106)
GLUCOSE UR STRIP-MCNC: NORMAL MG/DL
HCT VFR BLD AUTO: 35.4 % (ref 38–47)
HGB BLD-MCNC: 11.6 G/DL (ref 12–16)
IMM GRANULOCYTES # BLD AUTO: 0.02 K/UL (ref 0–0.04)
IMM GRANULOCYTES NFR BLD: 0.3 % (ref 0–0.4)
KETONES UR STRIP-SCNC: NEGATIVE MG/DL
LEUKOCYTE ESTERASE UR QL STRIP: ABNORMAL
LIPASE SERPL-CCNC: 605 U/L (ref 73–393)
LYMPHOCYTES # BLD AUTO: 2.6 K/UL (ref 1–4.8)
LYMPHOCYTES NFR BLD AUTO: 36.6 % (ref 27–41)
MCH RBC QN AUTO: 26.1 PG (ref 27–31)
MCHC RBC AUTO-ENTMCNC: 32.8 G/DL (ref 32–36)
MCV RBC AUTO: 79.6 FL (ref 80–96)
MONOCYTES # BLD AUTO: 0.44 K/UL (ref 0–0.8)
MONOCYTES NFR BLD AUTO: 6.2 % (ref 2–6)
MPC BLD CALC-MCNC: 11.5 FL (ref 9.4–12.4)
NEUTROPHILS # BLD AUTO: 3.59 K/UL (ref 1.8–7.7)
NEUTROPHILS NFR BLD AUTO: 50.5 % (ref 53–65)
NITRITE UR QL STRIP: NEGATIVE
NRBC # BLD AUTO: 0 X10E3/UL
NRBC, AUTO (.00): 0 %
PH UR STRIP: 5.5 PH UNITS
PLATELET # BLD AUTO: 188 K/UL (ref 150–400)
POC OCCULT BLOOD STOOL: NEGATIVE
POTASSIUM SERPL-SCNC: 4.4 MMOL/L (ref 3.5–5.1)
PROT SERPL-MCNC: 7.9 G/DL (ref 6.4–8.2)
PROT UR QL STRIP: NEGATIVE
RBC # BLD AUTO: 4.45 M/UL (ref 4.2–5.4)
RBC # UR STRIP: NEGATIVE /UL
RBC #/AREA URNS HPF: ABNORMAL /HPF
SARS-COV+SARS-COV-2 AG RESP QL IA.RAPID: NEGATIVE
SODIUM SERPL-SCNC: 141 MMOL/L (ref 136–145)
SP GR UR STRIP: 1.02
SQUAMOUS #/AREA URNS LPF: ABNORMAL /LPF
UROBILINOGEN UR STRIP-ACNC: NORMAL MG/DL
WBC # BLD AUTO: 7.1 K/UL (ref 4.5–11)
WBC #/AREA URNS HPF: ABNORMAL /HPF

## 2023-06-11 PROCEDURE — 83690 ASSAY OF LIPASE: CPT | Performed by: NURSE PRACTITIONER

## 2023-06-11 PROCEDURE — 82272 OCCULT BLD FECES 1-3 TESTS: CPT

## 2023-06-11 PROCEDURE — 82150 ASSAY OF AMYLASE: CPT | Performed by: NURSE PRACTITIONER

## 2023-06-11 PROCEDURE — 25000003 PHARM REV CODE 250: Performed by: NURSE PRACTITIONER

## 2023-06-11 PROCEDURE — 80053 COMPREHEN METABOLIC PANEL: CPT | Performed by: NURSE PRACTITIONER

## 2023-06-11 PROCEDURE — 85025 COMPLETE CBC W/AUTO DIFF WBC: CPT | Performed by: NURSE PRACTITIONER

## 2023-06-11 PROCEDURE — 99284 PR EMERGENCY DEPT VISIT,LEVEL IV: ICD-10-PCS | Mod: ,,, | Performed by: NURSE PRACTITIONER

## 2023-06-11 PROCEDURE — 81001 URINALYSIS AUTO W/SCOPE: CPT | Performed by: NURSE PRACTITIONER

## 2023-06-11 PROCEDURE — 96361 HYDRATE IV INFUSION ADD-ON: CPT

## 2023-06-11 PROCEDURE — 87428 SARSCOV & INF VIR A&B AG IA: CPT | Performed by: NURSE PRACTITIONER

## 2023-06-11 PROCEDURE — 99284 EMERGENCY DEPT VISIT MOD MDM: CPT | Mod: 25

## 2023-06-11 PROCEDURE — 99284 EMERGENCY DEPT VISIT MOD MDM: CPT | Mod: ,,, | Performed by: NURSE PRACTITIONER

## 2023-06-11 PROCEDURE — 96374 THER/PROPH/DIAG INJ IV PUSH: CPT

## 2023-06-11 RX ORDER — NITROFURANTOIN 25; 75 MG/1; MG/1
100 CAPSULE ORAL 2 TIMES DAILY
Qty: 20 CAPSULE | Refills: 0 | Status: SHIPPED | OUTPATIENT
Start: 2023-06-11 | End: 2023-06-21

## 2023-06-11 RX ORDER — FAMOTIDINE 10 MG/ML
20 INJECTION INTRAVENOUS
Status: COMPLETED | OUTPATIENT
Start: 2023-06-11 | End: 2023-06-11

## 2023-06-11 RX ORDER — ONDANSETRON 4 MG/1
4 TABLET, FILM COATED ORAL EVERY 6 HOURS
Qty: 12 TABLET | Refills: 0 | Status: SHIPPED | OUTPATIENT
Start: 2023-06-11

## 2023-06-11 RX ADMIN — SODIUM CHLORIDE 1000 ML: 9 INJECTION, SOLUTION INTRAVENOUS at 05:06

## 2023-06-11 RX ADMIN — FAMOTIDINE 20 MG: 10 INJECTION, SOLUTION INTRAVENOUS at 06:06

## 2023-06-11 NOTE — DISCHARGE INSTRUCTIONS
Take medication as prescribed for urinary tract infection and as needed for nausea.  Call Rosario Chambers CNM's office to schedule follow up appointment for postmenopausal bleeding.  Follow up with your primary care provider in 2 days for recheck.  Return to the ER with new or worsening symptoms.

## 2023-06-11 NOTE — ED PROVIDER NOTES
Encounter Date: 6/11/2023       History     Chief Complaint   Patient presents with    Abdominal Pain     Patient presents to the ER with complaint of abdominal pain and diarrhea.  Patient reports her symptoms have been ongoing x1 week today.  She denies fever.  She reports being nauseated but has not vomited.  She reports generalized abdominal pain that she describes as cramping in nature.  She denies blood in her stool.  She reports urgency and frequency, but denies dysuria.  She reports over the last 24 hours she was noted vaginal bleeding.  She reports she was only seen the blood on tissue when she wipes.  She reports she was not had menstrual cycles for 4 years.  Denies weakness or dizziness.    The history is provided by the patient. No  was used.   Review of patient's allergies indicates:   Allergen Reactions    Aspirin      Other reaction(s): UPSET STOMACH     Bactrim ds [sulfamethoxazole-trimethoprim] Itching     Past Medical History:   Diagnosis Date    Anemia 7/5/2022    Diabetes mellitus, type 2     Hyperlipidemia     Hypertension     Obesity     Primary osteoarthritis of left shoulder 5/24/2022    Stroke      Past Surgical History:   Procedure Laterality Date    AMPUTATION      APPENDECTOMY      DEBRIDEMENT OF FOOT Right 1/12/2023    Procedure: DEBRIDEMENT, FOOT;  Surgeon: Ravi Ramirez MD;  Location: [facility];  Service: General;  Laterality: Right;    EYE SURGERY       Family History   Problem Relation Age of Onset    Cancer Mother     Appendicitis Father     Asthma Sister     Diabetes Brother      Social History     Tobacco Use    Smoking status: Never    Smokeless tobacco: Never   Substance Use Topics    Alcohol use: Yes    Drug use: Never     Review of Systems    Physical Exam     Initial Vitals [06/11/23 1401]   BP Pulse Resp Temp SpO2   (!) 88/58 101 19 97.7 °F (36.5 °C) 99 %      MAP       --         Physical Exam    Medical Screening Exam   See Full Note    ED Course    Procedures  Labs Reviewed   COMPREHENSIVE METABOLIC PANEL - Abnormal; Notable for the following components:       Result Value    Chloride 109 (*)     CO2 20 (*)     Glucose 161 (*)     BUN 45 (*)     Creatinine 1.26 (*)     BUN/Creatinine Ratio 36 (*)     Albumin 3.4 (*)     Globulin 4.5 (*)     Alk Phos 152 (*)     eGFR 49 (*)     All other components within normal limits   LIPASE - Abnormal; Notable for the following components:    Lipase 605 (*)     All other components within normal limits   URINALYSIS, REFLEX TO URINE CULTURE - Abnormal; Notable for the following components:    Leukocytes, UA Trace (*)     All other components within normal limits   CBC WITH DIFFERENTIAL - Abnormal; Notable for the following components:    Hemoglobin 11.6 (*)     Hematocrit 35.4 (*)     MCV 79.6 (*)     MCH 26.1 (*)     RDW 15.2 (*)     Neutrophils % 50.5 (*)     Monocytes % 6.2 (*)     Eosinophils % 5.8 (*)     All other components within normal limits   URINALYSIS, MICROSCOPIC - Abnormal; Notable for the following components:    WBC, UA 5-10 (*)     Bacteria, UA Few (*)     Squamous Epithelial Cells, UA Few (*)     All other components within normal limits   AMYLASE - Normal   SARS-COV2 (COVID) W/ FLU ANTIGEN - Normal    Narrative:     Negative SARS-CoV results should not be used as the sole basis for treatment or patient management decisions; negative results should be considered in the context of a patient's recent exposures, history and the presene of clinical signs and symptoms consistent with COVID-19.  Negative results should be treated as presumptive and confirmed by molecular assay, if necessary for patient management.   POCT OCCULT BLOOD (STOOL) - Normal   CBC W/ AUTO DIFFERENTIAL    Narrative:     The following orders were created for panel order CBC auto differential.  Procedure                               Abnormality         Status                     ---------                               -----------          ------                     CBC with Differential[[id number]]        Abnormal            Final result                 Please view results for these tests on the individual orders.          Imaging Results              XR ABDOMEN, ACUTE 2 OR MORE VIEWS WITH CHEST (Final result)  Result time 06/11/23 17:27:01      Final result by Kun Villa DO (06/11/23 17:27:01)                   Impression:      No acute pulmonary disease.    No bowel obstruction or renal calculi.      Electronically signed by: Kun Villa  Date:    06/11/2023  Time:    17:27               Narrative:    EXAMINATION:  XR ABDOMEN ACUTE 2 OR MORE VIEWS WITH CHEST    CLINICAL HISTORY:  abd pain;    TECHNIQUE:  XR ABDOMEN ACUTE 2 OR MORE VIEWS WITH CHEST    COMPARISON:  2021    FINDINGS:  The lungs, pleural, and heart are normal.    There is no bowel obstruction.  No free air.  No renal calculi.    Mild colonic stool.    No acute bone findings.                                       Medications   sodium chloride 0.9% bolus 1,000 mL 1,000 mL (0 mLs Intravenous Stopped 6/11/23 1815)   famotidine (PF) injection 20 mg (20 mg Intravenous Given 6/11/23 1837)     Medical Decision Making:   History:   Old Records Summarized: records from previous admission(s).  Initial Assessment:   Patient presents to the ER with complaint of abdominal pain and diarrhea.  Patient reports her symptoms have been ongoing x1 week today.  She denies fever.  She reports being nauseated but has not vomited.  She reports generalized abdominal pain that she describes as cramping in nature.  She denies blood in her stool.  She reports urgency and frequency, but denies dysuria.  She reports over the last 24 hours she was noted vaginal bleeding.  She reports she was only seen the blood on tissue when she wipes.  She reports she was not had menstrual cycles for 4 years.  Denies weakness or dizziness.    Differential Diagnosis:   Viral gastroenteritis  Pancreatitis  Bowel  obstruction  Constipation  Urinary tract infection    Clinical Tests:   Lab Tests: Ordered and Reviewed  The following lab test(s) were unremarkable: CBC and CMP       <> Summary of Lab: Lipase 605  Alk-phos 152  Creatinine 1.25  BUN 45    Radiological Study: Ordered and Reviewed  ED Management:  Acute abdominal x-ray: Mild colonic stool, no acute abnormalities    Initiate IV collect lab work  1 L normal saline bolus  Famotidine 20 mg IV    Patient is discussed with Dr. Call, who also assist in the medical management of this patient.    Patient reports she was feeling better after receiving IV fluids.  She was not had diarrhea since she is been in the ER.    Lab results and x-ray results discussed with patient in detail.    Patient was discharged home with diagnosis of viral gastroenteritis, acute urinary tract infection, and postmenopausal bleeding.  Patient was given prescription for Macrobid and Zofran ODT to take as prescribed.  She was given referral to follow up in OBGYN clinic with Rosario Chambers.  Patient was told to call the office to schedule appointment.  Patient was instructed to use a bland diet over the next 24-48 hours and progress as tolerated.  She was told to return to the ER with new or worsening symptoms.                       Clinical Impression:   Final diagnoses:  [A08.4] Viral gastroenteritis (Primary)  [N39.0] Acute urinary tract infection  [N95.0] Post-menopausal bleeding        ED Disposition Condition    Discharge Stable          ED Prescriptions       Medication Sig Dispense Start Date End Date Auth. Provider    nitrofurantoin, macrocrystal-monohydrate, (MACROBID) 100 MG capsule Take 1 capsule (100 mg total) by mouth 2 (two) times daily. for 10 days 20 capsule 6/11/2023 6/21/2023 TOMASA Ovalle    ondansetron (ZOFRAN) 4 MG tablet Take 1 tablet (4 mg total) by mouth every 6 (six) hours. 12 tablet 6/11/2023 -- TOMASA Ovalle          Follow-up Information       Follow up With  Specialties Details Why Contact Info    Ron Sanches MD Family Medicine Schedule an appointment as soon as possible for a visit in 2 days  [address] [facility] [address]  [phone]               TOMASA Ovalle  06/11/23 1910

## 2023-06-13 NOTE — PATIENT INSTRUCTIONS
Clean with baby shampoo and water or vashe     Apply vashe moistened drawtex to wound, cover with dry gauze, wrap with meng,secure with paper tape, change daily and as needed     Monitor closely for s/s of infection including fever, chills, increase in pain, odor from wound, and increased redness from foot. Go to ER if any complications develop.   Keep leg elevated and avoid pressure on wound  Diabetes:  Monitor glucose closely. Check fasting glucose and 2 hours after meals. HgA1C goal <7, fasting glucose , and 2 hours after meals <180  Hypertension:  Check blood pressure twice daily, goal <120/80  Diet:   Increase protein intake, avoid fried, fatty foods and foods high in simple carbs.   Vitamins:  Take vitamin C 1000 mg, zinc 50mg, vitamin d 5000 units, and a daily multivitamin. Dawson is a good source of protein and nutrients to aid in wound healing.

## 2023-06-13 NOTE — PROGRESS NOTES
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 female presents to clinic for follow up on chronic-non healing wound on right TMA. Wound continues to improve. Callus jonathon-wound, removed with curette. Continue current POC.    Significant PMH diabetes, anemia, and CVA. Last HgA1C 7 in July, diabetes managed by PCP. Pertinent factors that delay wound healing include multiple co-morbidities, poor vascular supply, diabetes, edema, heavy drainage, decreased granulation tissue, tract(s), undermining and no protective sensation.   Last arterial doppler , Impression: Ankle brachial indices measure 1.50 right and 1.37 left. No areas of occlusion on the color Doppler images. Consider referral for vascular evaluation if wound does not heal.   Patient reports she is seeing orthopedic doctor in [place] and has follow up next week to discuss surgical options for her shoulder.     Review of Systems     Review of Systems   Constitutional:  Positive for activity change. Negative for chills and fever.   Respiratory:  Negative for chest tightness and shortness of breath.    Cardiovascular:  Positive for leg swelling. Negative for chest pain and palpitations.   Musculoskeletal:  Positive for arthralgias, back pain and joint swelling.        Severe shoulder pain, 10 of 10 on pain scale   Skin:  Positive for color change and wound.        See wound assessment   Neurological:  Positive for weakness and numbness.    Psychiatric/Behavioral:  Negative for agitation, behavioral problems, confusion and self-injury.      Medical / Social / Family History     Past Medical History:   Diagnosis Date    Anemia 7/5/2022    Diabetes mellitus, type 2     Hyperlipidemia     Hypertension     Obesity     Primary osteoarthritis of left shoulder 5/24/2022    Stroke        Past Surgical History:   Procedure Laterality Date    AMPUTATION      APPENDECTOMY      DEBRIDEMENT OF FOOT Right 1/12/2023    Procedure: DEBRIDEMENT, FOOT;  Surgeon: Ravi Ramirez MD;  Location: [facility];  Service: General;  Laterality: Right;    EYE SURGERY         Social History  Ms. Deborah Sotelo  reports that she has never smoked. She has never used smokeless tobacco. She reports current alcohol use. She reports that she does not use drugs.    Family History  Ms.'s Deborah Sotelo family history includes Appendicitis in her father; Asthma in her sister; Cancer in her mother; Diabetes in her brother.    Medications and Allergies     Medications  Outpatient Medications Marked as Taking for the 6/16/23 encounter (Office Visit) with TOMASA Maldonado   Medication Sig Dispense Refill    amLODIPine (NORVASC) 5 MG tablet Take 1 tablet (5 mg total) by mouth once daily. 30 tablet 5    atorvastatin (LIPITOR) 80 MG tablet TAKE 1 TABLET BY MOUTH EACH NIGHT AT BEDTIME FOR CHOLESTEROL ~~DO NOT EAT GRAPEFRUIT OR DRINK GRAPEFRUIT JUICE WHILE TAKING THIS MEDICATION~~ 30 tablet 5    diclofenac sodium (VOLTAREN) 1 % Gel Apply 2 g topically 4 (four) times daily. 450 g 2    FEROSUL 325 mg (65 mg iron) Tab tablet       insulin asp prt-insulin aspart, NovoLOG 70/30, (NOVOLOG 70/30) 100 unit/mL (70-30) Soln INJECT 50 UNITS SUB-Q EACH MORNING AND 35  UNITS EACH EVENING ...KEEP IN REFRIGERATOR ...USE WITHIN 28 DAYS AFTER OPENING EACH VIAL 30 mL 11    LANTUS U-100 INSULIN 100 unit/mL injection Inject 20 Units into the skin once daily. Take at night 6 mL 11    LIDOcaine  (LIDODERM) 5 % Place 1 patch onto the skin once daily. Remove & Discard patch within 12 hours or as directed by MD 30 patch 2    losartan (COZAAR) 100 MG tablet Take 1 tablet (100 mg total) by mouth every evening. 30 tablet 5    nitrofurantoin, macrocrystal-monohydrate, (MACROBID) 100 MG capsule Take 1 capsule (100 mg total) by mouth 2 (two) times daily. for 10 days 20 capsule 0    omeprazole (PRILOSEC) 20 MG capsule Take 1 capsule (20 mg total) by mouth once daily. 30 capsule 5    ondansetron (ZOFRAN) 4 MG tablet Take 1 tablet (4 mg total) by mouth every 6 (six) hours. 12 tablet 0    sodium hypochlorite 0.5 % (DAKIN'S SOLUTION) external solution Apply topically once daily. Pack wound with dakin's moistened nuguaze daily 473 mL 0       Allergies  Review of patient's allergies indicates:   Allergen Reactions    Aspirin      Other reaction(s): UPSET STOMACH     Bactrim ds [sulfamethoxazole-trimethoprim] Itching       Physical Examination     Vitals:    06/16/23 0923   BP: (!) 140/83   Pulse: 84   Resp: 18   Temp: 98.4 °F (36.9 °C)     Physical Exam  Vitals and nursing note reviewed.   Constitutional:       Comments: Tearful during exam   HENT:      Head: Normocephalic.   Cardiovascular:      Rate and Rhythm: Normal rate and regular rhythm.      Pulses: Normal pulses.      Heart sounds: Normal heart sounds.   Pulmonary:      Effort: Pulmonary effort is normal. No respiratory distress.   Chest:      Chest wall: No tenderness.   Musculoskeletal:         General: Swelling, tenderness and deformity present.      Right lower leg: Edema present.      Comments: Left shoulder decreased ROM with weakness to left upper extremity   Skin:     General: Skin is warm.      Capillary Refill: Capillary refill takes 2 to 3 seconds.      Findings: Erythema present.      Comments: Wound, see LDA for measurements and picture   Neurological:      Mental Status: She is alert and oriented to person, place, and time.   Psychiatric:          Mood and Affect: Mood normal.         Behavior: Behavior normal.         Thought Content: Thought content normal.         Judgment: Judgment normal.     Assessment and Plan             Wound 01/18/21 1051 Diabetic Ulcer Right lateral Plantar #1 (Active)   01/18/21 1051    Pre-existing: Yes   Primary Wound Type: Diabetic ulc   Side: Right   Orientation: lateral   Location: Plantar   Wound Number: #1   Ankle-Brachial Index:    Pulses: normal   Removal Indication and Assessment:    Wound Outcome:    (Retired) Wound Type:    (Retired) Wound Length (cm):    (Retired) Wound Width (cm):    (Retired) Depth (cm):    Wound Description (Comments):    Removal Indications:    Wound Image    06/16/23 0925   Dressing Appearance Dried drainage 06/16/23 0925   Drainage Amount Small 06/16/23 0925   Drainage Characteristics/Odor Sanguineous 06/16/23 0925   Appearance Pink;Red;Dry 06/16/23 0925   Tissue loss description Full thickness 06/16/23 0925   Black (%), Wound Tissue Color 0 % 06/16/23 0925   Red (%), Wound Tissue Color 100 % 06/16/23 0925   Yellow (%), Wound Tissue Color 0 % 06/16/23 0925   Periwound Area Dry 06/16/23 0925   Wound Edges Callused;Open 06/16/23 0925   Wound Length (cm) 0.1 cm 06/16/23 0925   Wound Width (cm) 0.1 cm 06/16/23 0925   Wound Depth (cm) 0.1 cm 06/16/23 0925   Wound Volume (cm^3) 0.001 cm^3 06/16/23 0925   Wound Surface Area (cm^2) 0.01 cm^2 06/16/23 0925   Care Cleansed with:;Soap and water;Applied:;Moisturizing agent;Debrided 06/16/23 0925   Dressing Applied;Foam;Silver;Island/border 06/16/23 0925   Periwound Care Moisturizer applied 06/16/23 0925   Off Loading Off loading shoe 06/16/23 0925   Dressing Change Due 06/17/23 06/16/23 0925     Problem List Items Addressed This Visit          Endocrine    Diabetic foot ulcer - Primary    Overview                                                  Current Assessment & Plan     Clean with baby shampoo and water or vashe     Apply vashe moistened drawtex  to wound, cover with dry gauze, wrap with meng,secure with paper tape, change daily and as needed     Monitor closely for s/s of infection including fever, chills, increase in pain, odor from wound, and increased redness from foot. Go to ER if any complications develop.   Keep leg elevated and avoid pressure on wound  Diabetes:  Monitor glucose closely. Check fasting glucose and 2 hours after meals. HgA1C goal <7, fasting glucose , and 2 hours after meals <180  Hypertension:  Check blood pressure twice daily, goal <120/80  Diet:   Increase protein intake, avoid fried, fatty foods and foods high in simple carbs.   Vitamins:  Take vitamin C 1000 mg, zinc 50mg, vitamin d 5000 units, and a daily multivitamin. Dawson is a good source of protein and nutrients to aid in wound healing.                Future Appointments   Date Time Provider Department Center   7/14/2023  9:00 AM TOMASA Maldonado [facility]            Signature:  TOMASA Maldonado  [facility]  [address] [facility] [place] [address]  [phone]    Date of encounter: 6/16/23

## 2023-06-16 ENCOUNTER — OFFICE VISIT (OUTPATIENT)
Dept: WOUND CARE | Facility: CLINIC | Age: 63
End: 2023-06-16
Attending: FAMILY MEDICINE
Payer: MEDICAID

## 2023-06-16 VITALS
RESPIRATION RATE: 18 BRPM | SYSTOLIC BLOOD PRESSURE: 140 MMHG | TEMPERATURE: 98 F | HEART RATE: 84 BPM | DIASTOLIC BLOOD PRESSURE: 83 MMHG

## 2023-06-16 DIAGNOSIS — L97.415 DIABETIC ULCER OF RIGHT MIDFOOT ASSOCIATED WITH TYPE 2 DIABETES MELLITUS, WITH MUSCLE INVOLVEMENT WITHOUT EVIDENCE OF NECROSIS: Primary | ICD-10-CM

## 2023-06-16 DIAGNOSIS — E11.621 DIABETIC ULCER OF RIGHT MIDFOOT ASSOCIATED WITH TYPE 2 DIABETES MELLITUS, WITH MUSCLE INVOLVEMENT WITHOUT EVIDENCE OF NECROSIS: Primary | ICD-10-CM

## 2023-06-16 PROCEDURE — 3077F PR MOST RECENT SYSTOLIC BLOOD PRESSURE >= 140 MM HG: ICD-10-PCS | Mod: CPTII,,, | Performed by: NURSE PRACTITIONER

## 2023-06-16 PROCEDURE — 3051F HG A1C>EQUAL 7.0%<8.0%: CPT | Mod: CPTII,,, | Performed by: NURSE PRACTITIONER

## 2023-06-16 PROCEDURE — 4010F PR ACE/ARB THEARPY RXD/TAKEN: ICD-10-PCS | Mod: CPTII,,, | Performed by: NURSE PRACTITIONER

## 2023-06-16 PROCEDURE — 1159F MED LIST DOCD IN RCRD: CPT | Mod: CPTII,,, | Performed by: NURSE PRACTITIONER

## 2023-06-16 PROCEDURE — 1160F PR REVIEW ALL MEDS BY PRESCRIBER/CLIN PHARMACIST DOCUMENTED: ICD-10-PCS | Mod: CPTII,,, | Performed by: NURSE PRACTITIONER

## 2023-06-16 PROCEDURE — 99212 PR OFFICE/OUTPT VISIT, EST, LEVL II, 10-19 MIN: ICD-10-PCS | Mod: S$PBB,,, | Performed by: NURSE PRACTITIONER

## 2023-06-16 PROCEDURE — 3051F PR MOST RECENT HEMOGLOBIN A1C LEVEL 7.0 - < 8.0%: ICD-10-PCS | Mod: CPTII,,, | Performed by: NURSE PRACTITIONER

## 2023-06-16 PROCEDURE — 1159F PR MEDICATION LIST DOCUMENTED IN MEDICAL RECORD: ICD-10-PCS | Mod: CPTII,,, | Performed by: NURSE PRACTITIONER

## 2023-06-16 PROCEDURE — 99214 OFFICE O/P EST MOD 30 MIN: CPT | Mod: PBBFAC | Performed by: NURSE PRACTITIONER

## 2023-06-16 PROCEDURE — 3077F SYST BP >= 140 MM HG: CPT | Mod: CPTII,,, | Performed by: NURSE PRACTITIONER

## 2023-06-16 PROCEDURE — 99212 OFFICE O/P EST SF 10 MIN: CPT | Mod: S$PBB,,, | Performed by: NURSE PRACTITIONER

## 2023-06-16 PROCEDURE — 4010F ACE/ARB THERAPY RXD/TAKEN: CPT | Mod: CPTII,,, | Performed by: NURSE PRACTITIONER

## 2023-06-16 PROCEDURE — 3079F DIAST BP 80-89 MM HG: CPT | Mod: CPTII,,, | Performed by: NURSE PRACTITIONER

## 2023-06-16 PROCEDURE — 3079F PR MOST RECENT DIASTOLIC BLOOD PRESSURE 80-89 MM HG: ICD-10-PCS | Mod: CPTII,,, | Performed by: NURSE PRACTITIONER

## 2023-06-16 PROCEDURE — 1160F RVW MEDS BY RX/DR IN RCRD: CPT | Mod: CPTII,,, | Performed by: NURSE PRACTITIONER

## 2023-07-18 ENCOUNTER — PATIENT OUTREACH (OUTPATIENT)
Dept: ADMINISTRATIVE | Facility: HOSPITAL | Age: 63
End: 2023-07-18

## 2023-07-18 NOTE — PROGRESS NOTES
07/18/2023   --Chart accessed for: care gaps  --Care Gaps addressed:cervical cancer screening  Outreach made to patient via phone . (Success) (Left Message) (Unavailable)   Patient stated N/A. Not available at this time  Care Everywhere updates requested and reviewed.  Media reports reviewed.  [facility] and [facility] reviewed.  Immunization Database ([employer]/MIXX) reviewed. Vaccinations uploaded:N/A   updated with external NO Report  NO Requested records     Next appointment 07/25/2023 . Appointment notes updated to include: wound check    Health Maintenance Due   Topic Date Due    Cervical Cancer Screening  Never done    Foot Exam  Never done    Eye Exam  Never done    COVID-19 Vaccine (3 - Pfizer series) 06/23/2021    Colorectal Cancer Screening  12/15/2021    Mammogram  12/06/2022    Hemoglobin A1c  04/30/2023    Diabetes Urine Screening  09/13/2023

## 2023-07-24 NOTE — PROGRESS NOTES
TOMASA Maldonado   [facility]  [address] [facility] [place] [address]  [phone]      PATIENT NAME: Deborah Sotelo  : 1960  DATE: 23  MRN: [id number]      Billing Provider: TOMASA Maldonado  Level of Service:   Patient PCP Information       Provider PCP Type    Ron Sanches MD General            Reason for Visit / Chief Complaint: Diabetic ulcer of right midfoot associated with type 2 diab       History of Present Illness / Problem Focused Workflow     Deborah Sotelo is a 62 y.o. female presents to clinic for follow up on chronic-non healing wound on right TMA. Macerated tissue and callus jonathon-wound, bedside debridement today. Small open area with granulation tissue. Discussed new plan of care with patient.      Reinforced importance of local wound care, keeping pressure off foot, off-loading shoe, elevating legs, adherence to diabetic diet and strict glycemic control, and increasing protein intake.     Significant PMH diabetes, anemia, and CVA. Last HgA1C 7 in July, diabetes managed by PCP. Pertinent factors that delay wound healing include multiple co-morbidities, poor vascular supply, diabetes, edema, heavy drainage, decreased granulation tissue, tract(s), undermining and no protective sensation.       Review of Systems     Review of Systems   Constitutional:  Positive for activity change. Negative for chills and fever.   Respiratory:  Negative for chest tightness and shortness of breath.    Cardiovascular:  Positive for leg swelling. Negative for chest pain and palpitations.   Musculoskeletal:  Positive for arthralgias, back pain and joint swelling.        Severe shoulder pain, 10 of 10 on pain scale   Skin:  Positive for color change and wound.        See wound assessment   Neurological:  Positive for weakness and numbness.   Psychiatric/Behavioral:  Negative for agitation, behavioral problems, confusion and self-injury.      Medical /  Social / Family History     Past Medical History:   Diagnosis Date    Anemia 7/5/2022    Diabetes mellitus, type 2     Hyperlipidemia     Hypertension     Obesity     Primary osteoarthritis of left shoulder 5/24/2022    Stroke        Past Surgical History:   Procedure Laterality Date    AMPUTATION      APPENDECTOMY      DEBRIDEMENT OF FOOT Right 1/12/2023    Procedure: DEBRIDEMENT, FOOT;  Surgeon: Ravi Ramirez MD;  Location: [facility];  Service: General;  Laterality: Right;    EYE SURGERY         Social History  Ms. Deborah Sotelo  reports that she has never smoked. She has never used smokeless tobacco. She reports current alcohol use. She reports that she does not use drugs.    Family History  Ms.'srinath Sotelo family history includes Appendicitis in her father; Asthma in her sister; Cancer in her mother; Diabetes in her brother.    Medications and Allergies     Medications  Outpatient Medications Marked as Taking for the 7/25/23 encounter (Office Visit) with TOMASA Maldonado   Medication Sig Dispense Refill    amLODIPine (NORVASC) 5 MG tablet Take 1 tablet (5 mg total) by mouth once daily. 30 tablet 5    atorvastatin (LIPITOR) 80 MG tablet TAKE 1 TABLET BY MOUTH EACH NIGHT AT BEDTIME FOR CHOLESTEROL ~~DO NOT EAT GRAPEFRUIT OR DRINK GRAPEFRUIT JUICE WHILE TAKING THIS MEDICATION~~ 30 tablet 5    diclofenac sodium (VOLTAREN) 1 % Gel Apply 2 g topically 4 (four) times daily. 450 g 2    FEROSUL 325 mg (65 mg iron) Tab tablet       insulin asp prt-insulin aspart, NovoLOG 70/30, (NOVOLOG 70/30) 100 unit/mL (70-30) Soln INJECT 50 UNITS SUB-Q EACH MORNING AND 35  UNITS EACH EVENING ...KEEP IN REFRIGERATOR ...USE WITHIN 28 DAYS AFTER OPENING EACH VIAL 30 mL 11    LANTUS U-100 INSULIN 100 unit/mL injection Inject 20 Units into the skin once daily. Take at night 6 mL 11    LIDOcaine (LIDODERM) 5 % Place 1 patch onto the skin once daily. Remove & Discard patch within 12 hours or as directed by MD  30 patch 2    losartan (COZAAR) 100 MG tablet Take 1 tablet (100 mg total) by mouth every evening. 30 tablet 5    omeprazole (PRILOSEC) 20 MG capsule Take 1 capsule (20 mg total) by mouth once daily. 30 capsule 5    ondansetron (ZOFRAN) 4 MG tablet Take 1 tablet (4 mg total) by mouth every 6 (six) hours. 12 tablet 0       Allergies  Review of patient's allergies indicates:   Allergen Reactions    Aspirin      Other reaction(s): UPSET STOMACH   Other reaction(s): Unknown    Bactrim ds [sulfamethoxazole-trimethoprim] Itching       Physical Examination     Vitals:    07/25/23 0845   BP: 111/73   Pulse: 85   Resp: 20   Temp: 97.3 °F (36.3 °C)     Physical Exam  Vitals and nursing note reviewed.   Constitutional:       Comments: Tearful during exam   HENT:      Head: Normocephalic.   Cardiovascular:      Rate and Rhythm: Normal rate and regular rhythm.      Pulses: Normal pulses.      Heart sounds: Normal heart sounds.   Pulmonary:      Effort: Pulmonary effort is normal. No respiratory distress.   Chest:      Chest wall: No tenderness.   Musculoskeletal:         General: Swelling, tenderness and deformity present.      Right lower leg: Edema present.      Comments: Left shoulder decreased ROM with weakness to left upper extremity   Skin:     General: Skin is warm.      Capillary Refill: Capillary refill takes 2 to 3 seconds.      Findings: Erythema present.      Comments: Wound, see LDA for measurements and picture   Neurological:      Mental Status: She is alert and oriented to person, place, and time.   Psychiatric:         Mood and Affect: Mood normal.         Behavior: Behavior normal.         Thought Content: Thought content normal.         Judgment: Judgment normal.     Assessment and Plan             Altered Skin Integrity 07/25/23 0852 Left plantar Foot (Active)   07/25/23 0852   Altered Skin Integrity Present on Admission - Did Patient arrive to the hospital with altered skin?:    Side: Left   Orientation:  plantar   Location: Foot   Wound Number:    Is this injury device related?:    Primary Wound Type:    Description of Altered Skin Integrity:    Ankle-Brachial Index:    Pulses:    Removal Indication and Assessment:    Wound Outcome:    (Retired) Wound Length (cm):    (Retired) Wound Width (cm):    (Retired) Depth (cm):    Wound Description (Comments):    Removal Indications:    Wound Image   07/25/23 0949   Description of Altered Skin Integrity Intact skin with non-blanchable redness of localized area 07/25/23 0852   Dressing Appearance Open to air 07/25/23 0852   Drainage Amount None 07/25/23 0852   Appearance Intact;Dry 07/25/23 0852   Tissue loss description Not applicable 07/25/23 0852   Black (%), Wound Tissue Color 0 % 07/25/23 0852   Red (%), Wound Tissue Color 0 % 07/25/23 0852   Yellow (%), Wound Tissue Color 0 % 07/25/23 0852   Periwound Area Dry 07/25/23 0852   Wound Edges Callused;Rolled/closed 07/25/23 0852   Wound Length (cm) 0 cm 07/25/23 0949   Wound Width (cm) 0 cm 07/25/23 0949   Wound Depth (cm) 0 cm 07/25/23 0949   Wound Volume (cm^3) 0 cm^3 07/25/23 0949   Wound Surface Area (cm^2) 0 cm^2 07/25/23 0949   Care Cleansed with:;Soap and water;Applied:;Moisturizing agent;Debrided 07/25/23 0852   Dressing Applied;Silver;Foam;Island/border 07/25/23 0852   Periwound Care Moisturizer applied 07/25/23 0852   Off Loading Off loading shoe 07/25/23 0852   Dressing Change Due 07/26/23 07/25/23 0852            Wound 01/18/21 1051 Diabetic Ulcer Right lateral Plantar #1 (Active)   01/18/21 1051    Pre-existing: Yes   Primary Wound Type: Diabetic ulc   Side: Right   Orientation: lateral   Location: Plantar   Wound Number: #1   Ankle-Brachial Index:    Pulses: normal   Removal Indication and Assessment:    Wound Outcome:    (Retired) Wound Type:    (Retired) Wound Length (cm):    (Retired) Wound Width (cm):    (Retired) Depth (cm):    Wound Description (Comments):    Removal Indications:    Wound Image   07/25/23  0949   Dressing Appearance Dried drainage 07/25/23 0849   Drainage Amount Moderate 07/25/23 0849   Drainage Characteristics/Odor Serosanguineous;No odor 07/25/23 0849   Appearance Pink;Red;Dry 07/25/23 0849   Tissue loss description Full thickness 07/25/23 0849   Black (%), Wound Tissue Color 0 % 07/25/23 0849   Red (%), Wound Tissue Color 100 % 07/25/23 0849   Yellow (%), Wound Tissue Color 0 % 07/25/23 0849   Periwound Area Dry;Pink;Purple 07/25/23 0849   Wound Edges Open;Callused 07/25/23 0849   Wound Length (cm) 0.4 cm 07/25/23 0949   Wound Width (cm) 0.4 cm 07/25/23 0949   Wound Depth (cm) 0.2 cm 07/25/23 0949   Wound Volume (cm^3) 0.032 cm^3 07/25/23 0949   Wound Surface Area (cm^2) 0.16 cm^2 07/25/23 0949   Care Cleansed with:;Soap and water;Applied:;Moisturizing agent;Debrided 07/25/23 0849   Dressing Applied;Silver;Foam;Rolled gauze 07/25/23 0849   Periwound Care Moisturizer applied 07/25/23 0849   Off Loading Off loading shoe 07/25/23 0849   Dressing Change Due 07/26/23 07/25/23 0849     Problem List Items Addressed This Visit          Endocrine    Diabetic foot ulcer - Primary    Overview                                                      Current Assessment & Plan       Clean with baby shampoo and water or vashe     Apply urea cream to bilateral feet to soften the calluses    Apply aquacel ag to wound, cover with dry gauze, wrap with meng,secure with paper tape, change daily and as needed     Monitor closely for s/s of infection including fever, chills, increase in pain, odor from wound, and increased redness from foot. Go to ER if any complications develop.   Keep leg elevated and avoid pressure on wound  Diabetes:  Monitor glucose closely. Check fasting glucose and 2 hours after meals. HgA1C goal <7, fasting glucose , and 2 hours after meals <180  Hypertension:  Check blood pressure twice daily, goal <120/80  Diet:   Increase protein intake, avoid fried, fatty foods and foods high in simple  carbs.   Vitamins:  Take vitamin C 1000 mg, zinc 50mg, vitamin d 5000 units, and a daily multivitamin. Dawson is a good source of protein and nutrients to aid in wound healing.              Future Appointments   Date Time Provider Department Center   8/22/2023  9:00 AM TOMASA Maldonado [facility] [place] [facility]            Signature:  TOMASA Maldonado  [facility]  [address] [facility] [address]  [phone]    Date of encounter: 7/25/23

## 2023-07-24 NOTE — PATIENT INSTRUCTIONS
Clean with baby shampoo and water or vashe     Apply urea cream to bilateral feet to soften the calluses    Apply aquacel ag to wound, cover with dry gauze, wrap with meng,secure with paper tape, change daily and as needed     Monitor closely for s/s of infection including fever, chills, increase in pain, odor from wound, and increased redness from foot. Go to ER if any complications develop.   Keep leg elevated and avoid pressure on wound  Diabetes:  Monitor glucose closely. Check fasting glucose and 2 hours after meals. HgA1C goal <7, fasting glucose , and 2 hours after meals <180  Hypertension:  Check blood pressure twice daily, goal <120/80  Diet:   Increase protein intake, avoid fried, fatty foods and foods high in simple carbs.   Vitamins:  Take vitamin C 1000 mg, zinc 50mg, vitamin d 5000 units, and a daily multivitamin. Dawson is a good source of protein and nutrients to aid in wound healing.

## 2023-07-25 ENCOUNTER — OFFICE VISIT (OUTPATIENT)
Dept: WOUND CARE | Facility: CLINIC | Age: 63
End: 2023-07-25
Attending: FAMILY MEDICINE
Payer: MEDICAID

## 2023-07-25 VITALS
TEMPERATURE: 97 F | DIASTOLIC BLOOD PRESSURE: 73 MMHG | SYSTOLIC BLOOD PRESSURE: 111 MMHG | RESPIRATION RATE: 20 BRPM | HEART RATE: 85 BPM

## 2023-07-25 DIAGNOSIS — L97.415 DIABETIC ULCER OF RIGHT MIDFOOT ASSOCIATED WITH TYPE 2 DIABETES MELLITUS, WITH MUSCLE INVOLVEMENT WITHOUT EVIDENCE OF NECROSIS: Primary | ICD-10-CM

## 2023-07-25 DIAGNOSIS — E11.621 DIABETIC ULCER OF RIGHT MIDFOOT ASSOCIATED WITH TYPE 2 DIABETES MELLITUS, WITH MUSCLE INVOLVEMENT WITHOUT EVIDENCE OF NECROSIS: Primary | ICD-10-CM

## 2023-07-25 PROCEDURE — 11042 DBRDMT SUBQ TIS 1ST 20SQCM/<: CPT | Mod: PBBFAC | Performed by: NURSE PRACTITIONER

## 2023-07-25 PROCEDURE — 11042 DEBRIDEMENT: ICD-10-PCS | Mod: S$PBB,,, | Performed by: NURSE PRACTITIONER

## 2023-07-25 PROCEDURE — 99214 OFFICE O/P EST MOD 30 MIN: CPT | Mod: PBBFAC,25 | Performed by: NURSE PRACTITIONER

## 2023-07-25 PROCEDURE — 99499 NO LOS: ICD-10-PCS | Mod: S$PBB,,, | Performed by: NURSE PRACTITIONER

## 2023-07-25 PROCEDURE — 99499 UNLISTED E&M SERVICE: CPT | Mod: S$PBB,,, | Performed by: NURSE PRACTITIONER

## 2023-07-25 NOTE — PROCEDURES
"Debridement    Date/Time: 7/25/2023 9:00 AM  Performed by: TOMASA Maldonado  Authorized by: TOMASA Maldonado     Time out: Immediately prior to procedure a "time out" was called to verify the correct patient, procedure, equipment, support staff and site/side marked as required.    Consent Done?:  Yes (Written)    Wound Details:    Location:  Right foot    Location:  Right Plantar    Type of Debridement:  Excisional       Length (cm):  0.4       Area (sq cm):  0.16       Width (cm):  0.4       Percent Debrided (%):  100       Depth (cm):  0.2       Total Area Debrided (sq cm):  0.16    Depth of debridement:  Subcutaneous tissue    Tissue debrided:  Adipose, Dermis, Epidermis and Subcutaneous    Devitalized tissue debrided:  Biofilm, Clots, Callus and Exudate    Instruments:  Curette  Bleeding:  Minimal  Hemostasis Achieved: Yes  Method Used:  Pressure  Patient tolerance:  Patient tolerated the procedure well with no immediate complications  1st Wound Pain Assessment: 0     Assistant TIFFANI Duvall LPN  "

## 2023-07-25 NOTE — PROGRESS NOTES
Debridement Performed for Assessment: Wound# right plantar foot  Performed By: Provider: Felisha Love NP  Assistant:Cassidy Gentile RN    Debridement: Surgical    Photo taken post procedure:    Time-Out Taken: Yes  Level: Skin/Subcutaneous Tissue  Post Debridement Measurements  Length: (cm) 0.4  Width: (cm) 0.4  Depth: (cm) 0.2      Area: (cm²) 0.16  Percent Debrided: 100%  Total Area Debrided: (sq cm) 0.16    Tissue and other material debrided:  Adipose, Dermis, Epidermis, Subcutaneous,  Devitalized Tissue Debrided:Biofilm, Slough,  Instrument: Curette  Bleeding: Moderate  Hemostasis Achieved: Pressure  Procedural Pain: Insensate  Post Procedural Pain: Insensate  Response to Treatment: Procedure was tolerated well    Devitalized materials/tissue Removed  the following was removed during debridement  subcutaneous,      Post Debridement Diagnosis  Post debridement diagnosis  Same as Pre-operative debridement diagnosis, No Complications noted.      Grafts or implants applied  Was a graft or implant applied?  No      Procedure assistant  Procedure assisted by:  Assistant is the same as nurse listed above      Complications related to procedure  Did any complication occure during procedure?  No complications noted during or after procedure.      Specimen  Specimen collect during procedure?  No specimen collected      Anaesthesia:  Anesthesia used  None      Blood Loss:  Blood loss during procedure  less than 5 cc

## 2023-10-04 ENCOUNTER — OFFICE VISIT (OUTPATIENT)
Dept: WOUND CARE | Facility: CLINIC | Age: 63
End: 2023-10-04
Attending: FAMILY MEDICINE
Payer: MEDICAID

## 2023-10-04 VITALS
SYSTOLIC BLOOD PRESSURE: 160 MMHG | HEART RATE: 68 BPM | TEMPERATURE: 98 F | RESPIRATION RATE: 20 BRPM | DIASTOLIC BLOOD PRESSURE: 89 MMHG

## 2023-10-04 DIAGNOSIS — E11.621 DIABETIC ULCER OF RIGHT MIDFOOT ASSOCIATED WITH TYPE 2 DIABETES MELLITUS, WITH MUSCLE INVOLVEMENT WITHOUT EVIDENCE OF NECROSIS: Primary | ICD-10-CM

## 2023-10-04 DIAGNOSIS — L97.415 DIABETIC ULCER OF RIGHT MIDFOOT ASSOCIATED WITH TYPE 2 DIABETES MELLITUS, WITH MUSCLE INVOLVEMENT WITHOUT EVIDENCE OF NECROSIS: Primary | ICD-10-CM

## 2023-10-04 PROCEDURE — 3051F HG A1C>EQUAL 7.0%<8.0%: CPT | Mod: CPTII,,, | Performed by: NURSE PRACTITIONER

## 2023-10-04 PROCEDURE — 3077F SYST BP >= 140 MM HG: CPT | Mod: CPTII,,, | Performed by: NURSE PRACTITIONER

## 2023-10-04 PROCEDURE — 99213 OFFICE O/P EST LOW 20 MIN: CPT | Mod: S$PBB,,, | Performed by: NURSE PRACTITIONER

## 2023-10-04 PROCEDURE — 1160F RVW MEDS BY RX/DR IN RCRD: CPT | Mod: CPTII,,, | Performed by: NURSE PRACTITIONER

## 2023-10-04 PROCEDURE — 3079F PR MOST RECENT DIASTOLIC BLOOD PRESSURE 80-89 MM HG: ICD-10-PCS | Mod: CPTII,,, | Performed by: NURSE PRACTITIONER

## 2023-10-04 PROCEDURE — 1159F PR MEDICATION LIST DOCUMENTED IN MEDICAL RECORD: ICD-10-PCS | Mod: CPTII,,, | Performed by: NURSE PRACTITIONER

## 2023-10-04 PROCEDURE — 3051F PR MOST RECENT HEMOGLOBIN A1C LEVEL 7.0 - < 8.0%: ICD-10-PCS | Mod: CPTII,,, | Performed by: NURSE PRACTITIONER

## 2023-10-04 PROCEDURE — 1160F PR REVIEW ALL MEDS BY PRESCRIBER/CLIN PHARMACIST DOCUMENTED: ICD-10-PCS | Mod: CPTII,,, | Performed by: NURSE PRACTITIONER

## 2023-10-04 PROCEDURE — 99214 OFFICE O/P EST MOD 30 MIN: CPT | Mod: PBBFAC | Performed by: NURSE PRACTITIONER

## 2023-10-04 PROCEDURE — 4010F PR ACE/ARB THEARPY RXD/TAKEN: ICD-10-PCS | Mod: CPTII,,, | Performed by: NURSE PRACTITIONER

## 2023-10-04 PROCEDURE — 1159F MED LIST DOCD IN RCRD: CPT | Mod: CPTII,,, | Performed by: NURSE PRACTITIONER

## 2023-10-04 PROCEDURE — 99213 PR OFFICE/OUTPT VISIT, EST, LEVL III, 20-29 MIN: ICD-10-PCS | Mod: S$PBB,,, | Performed by: NURSE PRACTITIONER

## 2023-10-04 PROCEDURE — 3077F PR MOST RECENT SYSTOLIC BLOOD PRESSURE >= 140 MM HG: ICD-10-PCS | Mod: CPTII,,, | Performed by: NURSE PRACTITIONER

## 2023-10-04 PROCEDURE — 3079F DIAST BP 80-89 MM HG: CPT | Mod: CPTII,,, | Performed by: NURSE PRACTITIONER

## 2023-10-04 PROCEDURE — 4010F ACE/ARB THERAPY RXD/TAKEN: CPT | Mod: CPTII,,, | Performed by: NURSE PRACTITIONER

## 2023-10-04 NOTE — PATIENT INSTRUCTIONS
Clean with baby shampoo and water or vashe     Apply urea cream to bilateral feet to soften the calluses         Monitor closely for s/s of infection including fever, chills, increase in pain, odor from wound, and increased redness from foot. Go to ER if any complications develop.   Keep leg elevated and avoid pressure on wound  Diabetes:  Monitor glucose closely. Check fasting glucose and 2 hours after meals. HgA1C goal <7, fasting glucose , and 2 hours after meals <180  Hypertension:  Check blood pressure twice daily, goal <120/80  Diet:   Increase protein intake, avoid fried, fatty foods and foods high in simple carbs.   Vitamins:  Take vitamin C 1000 mg, zinc 50mg, vitamin d 5000 units, and a daily multivitamin. Dawson is a good source of protein and nutrients to aid in wound healing.

## 2023-10-04 NOTE — PROGRESS NOTES
TOMASA Maldonado   [facility]  [address] [facility] [place] [address]  [phone]      PATIENT NAME: Deborah Sotelo  : 1960  DATE: 10/4/23  MRN: [id number]      Billing Provider: TOMASA Maldonado  Level of Service:   Patient PCP Information       Provider PCP Type    Ron Sanches MD General            Reason for Visit / Chief Complaint: Diabetic Foot Ulcer (R DFU)       History of Present Illness / Problem Focused Workflow     Deborah Sotelo is a 63 y.o. female presents to clinic for follow up on chronic-non healing wound on right TMA. Wounds are healing well. Continue using urea on callused area to keep soft. Callus removed with curette.     Reinforced importance of local wound care, keeping pressure off foot, off-loading shoe, elevating legs, adherence to diabetic diet and strict glycemic control, and increasing protein intake.     Significant PMH diabetes, anemia, and CVA. Last HgA1C 7 in July, diabetes managed by PCP. Pertinent factors that delay wound healing include multiple co-morbidities, poor vascular supply, diabetes, edema, heavy drainage, decreased granulation tissue, tract(s), undermining and no protective sensation.       Review of Systems     Review of Systems   Constitutional:  Positive for activity change. Negative for chills and fever.   Respiratory:  Negative for chest tightness and shortness of breath.    Cardiovascular:  Positive for leg swelling. Negative for chest pain and palpitations.   Musculoskeletal:  Positive for arthralgias, back pain and joint swelling.        Severe shoulder pain, 10 of 10 on pain scale   Skin:  Positive for color change and wound.        See wound assessment   Neurological:  Positive for weakness and numbness.   Psychiatric/Behavioral:  Negative for agitation, behavioral problems, confusion and self-injury.        Medical / Social / Family History     Past Medical History:   Diagnosis Date    Anemia  7/5/2022    Diabetes mellitus, type 2     Hyperlipidemia     Hypertension     Obesity     Primary osteoarthritis of left shoulder 5/24/2022    Stroke        Past Surgical History:   Procedure Laterality Date    AMPUTATION      APPENDECTOMY      DEBRIDEMENT OF FOOT Right 1/12/2023    Procedure: DEBRIDEMENT, FOOT;  Surgeon: Ravi Ramirez MD;  Location: [facility];  Service: General;  Laterality: Right;    EYE SURGERY         Social History  Ms. Deborah Sotelo  reports that she has never smoked. She has never used smokeless tobacco. She reports current alcohol use. She reports that she does not use drugs.    Family History  Ms.'s Deborah Sotelo family history includes Appendicitis in her father; Asthma in her sister; Cancer in her mother; Diabetes in her brother.    Medications and Allergies     Medications  Outpatient Medications Marked as Taking for the 10/4/23 encounter (Office Visit) with Felisha Love FNP   Medication Sig Dispense Refill    amLODIPine (NORVASC) 5 MG tablet Take 1 tablet (5 mg total) by mouth once daily. 30 tablet 5    atorvastatin (LIPITOR) 80 MG tablet TAKE 1 TABLET BY MOUTH EACH NIGHT AT BEDTIME FOR CHOLESTEROL ~~DO NOT EAT GRAPEFRUIT OR DRINK GRAPEFRUIT JUICE WHILE TAKING THIS MEDICATION~~ 30 tablet 5    diclofenac sodium (VOLTAREN) 1 % Gel Apply 2 g topically 4 (four) times daily. 450 g 2    FEROSUL 325 mg (65 mg iron) Tab tablet       insulin asp prt-insulin aspart, NovoLOG 70/30, (NOVOLOG 70/30) 100 unit/mL (70-30) Soln INJECT 50 UNITS SUB-Q EACH MORNING AND 35  UNITS EACH EVENING ...KEEP IN REFRIGERATOR ...USE WITHIN 28 DAYS AFTER OPENING EACH VIAL 30 mL 11    LANTUS U-100 INSULIN 100 unit/mL injection Inject 20 Units into the skin once daily. Take at night 6 mL 11    LIDOcaine (LIDODERM) 5 % Place 1 patch onto the skin once daily. Remove & Discard patch within 12 hours or as directed by MD 30 patch 2    ondansetron (ZOFRAN) 4 MG tablet Take 1 tablet (4 mg total) by  mouth every 6 (six) hours. 12 tablet 0    sodium hypochlorite 0.5 % (DAKIN'S SOLUTION) external solution Apply topically once daily. Pack wound with dakin's moistened nuguaze daily 473 mL 0       Allergies  Review of patient's allergies indicates:   Allergen Reactions    Aspirin      Other reaction(s): UPSET STOMACH   Other reaction(s): Unknown    Bactrim ds [sulfamethoxazole-trimethoprim] Itching       Physical Examination     Vitals:    10/04/23 1014   BP: (!) 160/89   Pulse: 68   Resp: 20   Temp: 97.8 °F (36.6 °C)     Physical Exam  Vitals and nursing note reviewed.   Constitutional:       Comments: Tearful during exam   HENT:      Head: Normocephalic.   Cardiovascular:      Rate and Rhythm: Normal rate and regular rhythm.      Pulses: Normal pulses.      Heart sounds: Normal heart sounds.   Pulmonary:      Effort: Pulmonary effort is normal. No respiratory distress.   Chest:      Chest wall: No tenderness.   Musculoskeletal:         General: Swelling, tenderness and deformity present.      Right lower leg: Edema present.      Comments: Left shoulder decreased ROM with weakness to left upper extremity   Skin:     General: Skin is warm.      Capillary Refill: Capillary refill takes 2 to 3 seconds.      Findings: Erythema present.      Comments: Wound, see LDA for measurements and picture   Neurological:      Mental Status: She is alert and oriented to person, place, and time.   Psychiatric:         Mood and Affect: Mood normal.         Behavior: Behavior normal.         Thought Content: Thought content normal.         Judgment: Judgment normal.     Assessment and Plan             Wound 01/18/21 1051 Diabetic Ulcer Right lateral Plantar #1 (Active)   01/18/21 1051    Pre-existing: Yes   Primary Wound Type: Diabetic ulc   Side: Right   Orientation: lateral   Location: Plantar   Wound Number: #1   Ankle-Brachial Index:    Pulses: normal   Removal Indication and Assessment:    Wound Outcome:    (Retired) Wound  Type:    (Retired) Wound Length (cm):    (Retired) Wound Width (cm):    (Retired) Depth (cm):    Wound Description (Comments):    Removal Indications:    Wound Image     10/04/23 1022   Dressing Appearance Open to air 10/04/23 1022   Drainage Amount None 10/04/23 1022   Appearance Intact 10/04/23 1022   Tissue loss description Not applicable 10/04/23 1022   Black (%), Wound Tissue Color 0 % 10/04/23 1022   Red (%), Wound Tissue Color 0 % 10/04/23 1022   Yellow (%), Wound Tissue Color 0 % 10/04/23 1022   Periwound Area Intact;Dry 10/04/23 1022   Wound Edges Callused 10/04/23 1022   Wound Length (cm) 0 cm 10/04/23 1022   Wound Width (cm) 0 cm 10/04/23 1022   Wound Depth (cm) 0 cm 10/04/23 1022   Wound Volume (cm^3) 0 cm^3 10/04/23 1022   Wound Surface Area (cm^2) 0 cm^2 10/04/23 1022   Care Cleansed with:;Antimicrobial agent 10/04/23 1022   Periwound Care Moisturizer applied 10/04/23 1022   Dressing Change Due 10/05/23 10/04/23 1022            Incision/Site 01/12/23 1251 Right Foot (Active)   01/12/23 1251   Present Prior to Hospital Arrival?:    Side: Right   Location: Foot   Orientation:    Incision Type:    Closure Method:    Additional Comments:    Removal Indication and Assessment:    Wound Outcome:    Removal Indications:    Wound Image     10/04/23 1024   Dressing Appearance Dry;Intact;Clean 10/04/23 1024   Drainage Amount None 10/04/23 1024   Appearance Maroon;Dry;Fibrin 10/04/23 1024   Black (%), Wound Tissue Color 0 % 10/04/23 1024   Red (%), Wound Tissue Color 0 % 10/04/23 1024   Yellow (%), Wound Tissue Color 0 % 10/04/23 1024   Periwound Area Intact;Dry 10/04/23 1024   Wound Edges Rolled/closed 10/04/23 1024   Wound Length (cm) 1.2 cm 10/04/23 1024   Wound Width (cm) 3 cm 10/04/23 1024   Wound Depth (cm) 0 cm 10/04/23 1024   Wound Volume (cm^3) 0 cm^3 10/04/23 1024   Wound Surface Area (cm^2) 3.6 cm^2 10/04/23 1024   Care Cleansed with:;Antimicrobial agent 10/04/23 1024   Dressing Applied;Gauze;Rolled  gauze 10/04/23 1024   Periwound Care Moisture barrier applied 10/04/23 1024   Dressing Change Due 10/05/23 10/04/23 1024     Problem List Items Addressed This Visit          Endocrine    Diabetic foot ulcer - Primary    Overview                                                              Current Assessment & Plan       Clean with baby shampoo and water or vashe     Apply urea cream to bilateral feet to soften the calluses         Monitor closely for s/s of infection including fever, chills, increase in pain, odor from wound, and increased redness from foot. Go to ER if any complications develop.   Keep leg elevated and avoid pressure on wound  Diabetes:  Monitor glucose closely. Check fasting glucose and 2 hours after meals. HgA1C goal <7, fasting glucose , and 2 hours after meals <180  Hypertension:  Check blood pressure twice daily, goal <120/80  Diet:   Increase protein intake, avoid fried, fatty foods and foods high in simple carbs.   Vitamins:  Take vitamin C 1000 mg, zinc 50mg, vitamin d 5000 units, and a daily multivitamin. Dawson is a good source of protein and nutrients to aid in wound healing.              Future Appointments   Date Time Provider Department Center   11/15/2023 10:00 AM Felisha Love FNP [place] [facility]            Signature:  TOMASA Maldonado  [facility]  [address] [facility] [place] [address]  [phone]    Date of encounter: 10/4/23

## 2023-11-29 NOTE — PROGRESS NOTES
TOMASA Maldonado   [facility]  [address] [facility] [place] [address]  [phone]      PATIENT NAME: Deborah Sotelo  : 1960  DATE: 23  MRN: [id number]      Billing Provider: TOMASA Maldonado  Level of Service:   Patient PCP Information       Provider PCP Type    Ron Sanches MD General            Reason for Visit / Chief Complaint: Non-healing Wound Follow Up (R DFU)       History of Present Illness / Problem Focused Workflow     Deborah Sotelo is a 63 y.o. female presents to clinic for follow up on chronic-non healing wound on right TMA. Medial aspect of foot with thick callus and drainage, bedside debridement today. Continue using urea on callused area to keep soft, optifoam ag to wound bed.     Reinforced importance of local wound care, keeping pressure off foot, off-loading shoe, elevating legs, adherence to diabetic diet and strict glycemic control, and increasing protein intake.     Significant PMH diabetes, anemia, and CVA. Last HgA1C 7 in July, diabetes managed by PCP. Pertinent factors that delay wound healing include multiple co-morbidities, poor vascular supply, diabetes, edema, heavy drainage, decreased granulation tissue, tract(s), undermining and no protective sensation.       Review of Systems     Review of Systems   Constitutional:  Positive for activity change. Negative for chills and fever.   Respiratory:  Negative for chest tightness and shortness of breath.    Cardiovascular:  Positive for leg swelling. Negative for chest pain and palpitations.   Musculoskeletal:  Positive for arthralgias, back pain and joint swelling.        Severe shoulder pain, 10 of 10 on pain scale   Skin:  Positive for color change and wound.        See wound assessment   Neurological:  Positive for weakness and numbness.   Psychiatric/Behavioral:  Negative for agitation, behavioral problems, confusion and self-injury.        Medical / Social / Family  History     Past Medical History:   Diagnosis Date    Anemia 7/5/2022    Diabetes mellitus, type 2     Hyperlipidemia     Hypertension     Obesity     Primary osteoarthritis of left shoulder 5/24/2022    Stroke        Past Surgical History:   Procedure Laterality Date    AMPUTATION      APPENDECTOMY      DEBRIDEMENT OF FOOT Right 1/12/2023    Procedure: DEBRIDEMENT, FOOT;  Surgeon: Ravi Ramirez MD;  Location: [facility];  Service: General;  Laterality: Right;    EYE SURGERY         Social History  Ms. Deborah Sotelo  reports that she has never smoked. She has never used smokeless tobacco. She reports current alcohol use. She reports that she does not use drugs.    Family History  Ms.'srinath Sotelo family history includes Appendicitis in her father; Asthma in her sister; Cancer in her mother; Diabetes in her brother.    Medications and Allergies     Medications  No outpatient medications have been marked as taking for the 12/5/23 encounter (Office Visit) with Felisha Love FNP.       Allergies  Review of patient's allergies indicates:   Allergen Reactions    Aspirin      Other reaction(s): UPSET STOMACH   Other reaction(s): Unknown    Bactrim ds [sulfamethoxazole-trimethoprim] Itching       Physical Examination     Vitals:    12/05/23 0903   BP: (!) 145/80   Pulse: 93   Resp: 20   Temp: 97 °F (36.1 °C)     Physical Exam  Vitals and nursing note reviewed.   Constitutional:       Comments: Tearful during exam   HENT:      Head: Normocephalic.   Cardiovascular:      Rate and Rhythm: Normal rate and regular rhythm.      Pulses: Normal pulses.      Heart sounds: Normal heart sounds.   Pulmonary:      Effort: Pulmonary effort is normal. No respiratory distress.   Chest:      Chest wall: No tenderness.   Musculoskeletal:         General: Swelling, tenderness and deformity present.      Right lower leg: Edema present.      Comments: Left shoulder decreased ROM with weakness to left upper extremity    Skin:     General: Skin is warm.      Capillary Refill: Capillary refill takes 2 to 3 seconds.      Findings: Erythema present.      Comments: Wound, see LDA for measurements and picture   Neurological:      Mental Status: She is alert and oriented to person, place, and time.   Psychiatric:         Mood and Affect: Mood normal.         Behavior: Behavior normal.         Thought Content: Thought content normal.         Judgment: Judgment normal.     Assessment and Plan             Altered Skin Integrity 07/25/23 0852 Left plantar Foot (Active)   07/25/23 0852   Altered Skin Integrity Present on Admission - Did Patient arrive to the hospital with altered skin?:    Side: Left   Orientation: plantar   Location: Foot   Wound Number:    Is this injury device related?:    Primary Wound Type:    Description of Altered Skin Integrity:    Ankle-Brachial Index:    Pulses:    Removal Indication and Assessment:    Wound Outcome:    (Retired) Wound Length (cm):    (Retired) Wound Width (cm):    (Retired) Depth (cm):    Wound Description (Comments):    Removal Indications:    Wound Image    12/05/23 0911   Description of Altered Skin Integrity Full thickness tissue loss. Subcutaneous fat may be visible but bone, tendon or muscle are not exposed 12/05/23 0911   Dressing Appearance Moist drainage 12/05/23 0911   Drainage Amount Moderate 12/05/23 0911   Drainage Characteristics/Odor Serosanguineous 12/05/23 0911   Appearance Maroon;Dry;Not granulating 12/05/23 0911   Tissue loss description Full thickness 12/05/23 0911   Black (%), Wound Tissue Color 0 % 12/05/23 0911   Red (%), Wound Tissue Color 100 % 12/05/23 0911   Yellow (%), Wound Tissue Color 0 % 12/05/23 0911   Periwound Area Intact;Dry 12/05/23 0911   Wound Edges Callused 12/05/23 0911   Wound Length (cm) 0.3 cm 12/05/23 0911   Wound Width (cm) 0.5 cm 12/05/23 0911   Wound Depth (cm) 0.2 cm 12/05/23 0911   Wound Volume (cm^3) 0.03 cm^3 12/05/23 0911   Wound Surface Area  (cm^2) 0.15 cm^2 12/05/23 0911   Care Cleansed with:;Antimicrobial agent 12/05/23 0911   Dressing Applied;Gauze;Rolled gauze 12/05/23 0911   Periwound Care Moisturizer applied 12/05/23 0911   Dressing Change Due 12/06/23 12/05/23 0911            Wound 01/18/21 1051 Diabetic Ulcer Right lateral Plantar #1 (Active)   01/18/21 1051    Pre-existing: Yes   Primary Wound Type: Diabetic ulc   Side: Right   Orientation: lateral   Location: Plantar   Wound Number: #1   Ankle-Brachial Index:    Pulses: normal   Removal Indication and Assessment:    Wound Outcome:    (Retired) Wound Type:    (Retired) Wound Length (cm):    (Retired) Wound Width (cm):    (Retired) Depth (cm):    Wound Description (Comments):    Removal Indications:    Wound Image    12/05/23 0908   Dressing Appearance Dry;Intact;Clean 12/05/23 0908   Drainage Amount None 12/05/23 0908   Appearance Intact 12/05/23 0908   Tissue loss description Not applicable 12/05/23 0908   Black (%), Wound Tissue Color 0 % 12/05/23 0908   Red (%), Wound Tissue Color 0 % 12/05/23 0908   Yellow (%), Wound Tissue Color 0 % 12/05/23 0908   Periwound Area Intact;Dry 12/05/23 0908   Wound Edges Rolled/closed 12/05/23 0908   Wound Length (cm) 0 cm 12/05/23 0908   Wound Width (cm) 0 cm 12/05/23 0908   Wound Depth (cm) 0 cm 12/05/23 0908   Wound Volume (cm^3) 0 cm^3 12/05/23 0908   Wound Surface Area (cm^2) 0 cm^2 12/05/23 0908   Care Cleansed with:;Soap and water 12/05/23 0908   Dressing Applied;Gauze 12/05/23 0908   Periwound Care Moisturizer applied 12/05/23 0908   Dressing Change Due 12/06/23 12/05/23 0908            Incision/Site 01/12/23 1251 Right Foot (Active)   01/12/23 1251   Present Prior to Hospital Arrival?:    Side: Right   Location: Foot   Orientation:    Incision Type:    Closure Method:    Additional Comments:    Removal Indication and Assessment:    Wound Outcome:    Removal Indications:    Wound Image      12/05/23 0909   Dressing Appearance Moist drainage 12/05/23  0909   Drainage Amount Moderate 12/05/23 0909   Drainage Characteristics/Odor Serosanguineous 12/05/23 0909   Appearance Maroon;Moist;Granulating 12/05/23 0909   Black (%), Wound Tissue Color 0 % 12/05/23 0909   Red (%), Wound Tissue Color 100 % 12/05/23 0909   Yellow (%), Wound Tissue Color 0 % 12/05/23 0909   Periwound Area Intact 12/05/23 0909   Wound Edges Callused 12/05/23 0909   Wound Length (cm) 0.2 cm 12/05/23 0909   Wound Width (cm) 0.4 cm 12/05/23 0909   Wound Depth (cm) 0.2 cm 12/05/23 0909   Wound Volume (cm^3) 0.016 cm^3 12/05/23 0909   Wound Surface Area (cm^2) 0.08 cm^2 12/05/23 0909   Care Cleansed with:;Antimicrobial agent;Soap and water 12/05/23 0909   Dressing Applied;Gauze;Rolled gauze 12/05/23 0909   Periwound Care Moisturizer applied 12/05/23 0909   Dressing Change Due 12/06/23 12/05/23 0909     Problem List Items Addressed This Visit          Endocrine    Diabetic foot ulcer - Primary    Overview                                                            Current Assessment & Plan       Clean with baby shampoo and water or vashe     Apply urea cream to bilateral feet to soften the calluses  May apply border gauze or apply gauze and wrap with meng if needed       Monitor closely for s/s of infection including fever, chills, increase in pain, odor from wound, and increased redness from foot. Go to ER if any complications develop.   Keep leg elevated and avoid pressure on wound  Diabetes:  Monitor glucose closely. Check fasting glucose and 2 hours after meals. HgA1C goal <7, fasting glucose , and 2 hours after meals <180  Hypertension:  Check blood pressure twice daily, goal <120/80  Diet:   Increase protein intake, avoid fried, fatty foods and foods high in simple carbs.   Vitamins:  Take vitamin C 1000 mg, zinc 50mg, vitamin d 5000 units, and a daily multivitamin. Dawson is a good source of protein and nutrients to aid in wound healing.           Relevant Orders    Culture, Wound     Culture, Anaerobe       Future Appointments   Date Time Provider Department Center   12/12/2023  2:30 PM [facility]   1/3/2024  9:00 AM [facility]   1/31/2024 10:20 AM [facility]            Signature:  TOMASA Maldonado  [facility]  [address]  [place] [address]  [phone]    Date of encounter: 12/5/23

## 2023-11-29 NOTE — PATIENT INSTRUCTIONS
Clean with baby shampoo and water or vashe     Apply urea cream to bilateral feet to soften the calluses  May apply border gauze or apply gauze and wrap with meng if needed       Monitor closely for s/s of infection including fever, chills, increase in pain, odor from wound, and increased redness from foot. Go to ER if any complications develop.   Keep leg elevated and avoid pressure on wound  Diabetes:  Monitor glucose closely. Check fasting glucose and 2 hours after meals. HgA1C goal <7, fasting glucose , and 2 hours after meals <180  Hypertension:  Check blood pressure twice daily, goal <120/80  Diet:   Increase protein intake, avoid fried, fatty foods and foods high in simple carbs.   Vitamins:  Take vitamin C 1000 mg, zinc 50mg, vitamin d 5000 units, and a daily multivitamin. Dawson is a good source of protein and nutrients to aid in wound healing.

## 2023-12-05 ENCOUNTER — OFFICE VISIT (OUTPATIENT)
Dept: WOUND CARE | Facility: CLINIC | Age: 63
End: 2023-12-05
Attending: FAMILY MEDICINE
Payer: MEDICAID

## 2023-12-05 VITALS
HEART RATE: 93 BPM | RESPIRATION RATE: 20 BRPM | DIASTOLIC BLOOD PRESSURE: 80 MMHG | TEMPERATURE: 97 F | SYSTOLIC BLOOD PRESSURE: 145 MMHG

## 2023-12-05 DIAGNOSIS — L97.415 DIABETIC ULCER OF RIGHT MIDFOOT ASSOCIATED WITH TYPE 2 DIABETES MELLITUS, WITH MUSCLE INVOLVEMENT WITHOUT EVIDENCE OF NECROSIS: Primary | ICD-10-CM

## 2023-12-05 DIAGNOSIS — E11.621 DIABETIC ULCER OF RIGHT MIDFOOT ASSOCIATED WITH TYPE 2 DIABETES MELLITUS, WITH MUSCLE INVOLVEMENT WITHOUT EVIDENCE OF NECROSIS: Primary | ICD-10-CM

## 2023-12-05 PROCEDURE — 11042 DEBRIDEMENT: ICD-10-PCS | Mod: S$PBB,,, | Performed by: NURSE PRACTITIONER

## 2023-12-05 PROCEDURE — 87070 CULTURE OTHR SPECIMN AEROBIC: CPT | Performed by: NURSE PRACTITIONER

## 2023-12-05 PROCEDURE — 87075 CULTR BACTERIA EXCEPT BLOOD: CPT | Performed by: NURSE PRACTITIONER

## 2023-12-05 PROCEDURE — 99499 NO LOS: ICD-10-PCS | Mod: S$PBB,,, | Performed by: NURSE PRACTITIONER

## 2023-12-05 PROCEDURE — 11042 DBRDMT SUBQ TIS 1ST 20SQCM/<: CPT | Mod: PBBFAC | Performed by: NURSE PRACTITIONER

## 2023-12-05 PROCEDURE — 99214 OFFICE O/P EST MOD 30 MIN: CPT | Mod: PBBFAC | Performed by: NURSE PRACTITIONER

## 2023-12-05 PROCEDURE — 99499 UNLISTED E&M SERVICE: CPT | Mod: S$PBB,,, | Performed by: NURSE PRACTITIONER

## 2023-12-05 NOTE — PROCEDURES
"Debridement    Date/Time: 12/5/2023 9:00 AM    Performed by: Felisha Love FNP  Authorized by: Felisha Love FNP    Time out: Immediately prior to procedure a "time out" was called to verify the correct patient, procedure, equipment, support staff and site/side marked as required.    Consent Done?:  Yes (Written)    Wound Details:    Location:  Right foot    Location:  Right Plantar    Type of Debridement:  Excisional       Length (cm):  0.6       Area (sq cm):  0.48       Width (cm):  0.8       Percent Debrided (%):  100       Depth (cm):  0.2       Total Area Debrided (sq cm):  0.48    Depth of debridement:  Subcutaneous tissue    Tissue debrided:  Adipose, Dermis, Epidermis and Subcutaneous    Devitalized tissue debrided:  Biofilm, Callus, Clots and Exudate    Instruments:  Curette  Bleeding:  Minimal  Hemostasis Achieved: Yes  Method Used:  Pressure  Patient tolerance:  Patient tolerated the procedure well with no immediate complications  1st Wound Pain Assessment: 0     Assistant TIFFANI Duvall LPN    "

## 2023-12-05 NOTE — PROGRESS NOTES
Debridement Performed for Assessment: Wound# Right plantar foot  Performed By: Provider: Felisha Sun NP  Assistant:  Cici Duvall LPN    Debridement: Surgical    Photo taken post procedure:    Time-Out Taken: Yes  Level: Skin/Subcutaneous Tissue  Post Debridement Measurements  Length: (cm) 0.6  Width: (cm) 0.8  Depth: (cm) 0.3      Area: (cm²) 4.8  Percent Debrided: 100%  Total Area Debrided: (sq cm)     Tissue and other material debrided:  Adipose, Dermis, Epidermis, Subcutaneous  Devitalized Tissue Debrided:Biofilm, callus  Instrument: Curette  Bleeding: Moderate  Hemostasis Achieved: Pressure  Procedural Pain: Insensate  Post Procedural Pain: Insensate  Response to Treatment: Procedure was tolerated well    Devitalized materials/tissue Removed  the following was removed during debridement  subcutaneous      Post Debridement Diagnosis  chronic right diabetic foot ulcer  Post debridement diagnosis  Same as Pre-operative debridement diagnosis, No Complications noted.      Grafts or implants applied  Was a graft or implant applied?  No      Procedure assistant  Procedure assisted by:  Assistant is the same as nurse listed above      Complications related to procedure  Did any complication occure during procedure?  No complications noted during or after procedure.      Specimen  Specimen collect during procedure?  No specimen collected      Anaesthesia:  Anesthesia used  None      Blood Loss:  Blood loss during procedure  less than 5 cc

## 2023-12-07 LAB — MICROORGANISM SPEC CULT: NORMAL

## 2023-12-10 LAB — BACTERIA SPEC ANAEROBE CULT: NORMAL

## 2023-12-12 ENCOUNTER — HOSPITAL ENCOUNTER (OUTPATIENT)
Dept: RADIOLOGY | Facility: HOSPITAL | Age: 63
Discharge: HOME | End: 2023-12-12
Attending: NURSE PRACTITIONER
Payer: MEDICAID

## 2023-12-12 DIAGNOSIS — N95.0 POST-MENOPAUSAL BLEEDING: ICD-10-CM

## 2023-12-12 PROCEDURE — 76830 TRANSVAGINAL US NON-OB: CPT | Mod: 26,,, | Performed by: RADIOLOGY

## 2023-12-12 PROCEDURE — 76830 US PELVIS COMP WITH TRANSVAG NON-OB (XPD): ICD-10-PCS | Mod: 26,,, | Performed by: RADIOLOGY

## 2023-12-12 PROCEDURE — 76830 TRANSVAGINAL US NON-OB: CPT | Mod: TC

## 2023-12-12 PROCEDURE — 76856 US EXAM PELVIC COMPLETE: CPT | Mod: 26,,, | Performed by: RADIOLOGY

## 2023-12-12 PROCEDURE — 76856 US PELVIS COMP WITH TRANSVAG NON-OB (XPD): ICD-10-PCS | Mod: 26,,, | Performed by: RADIOLOGY

## 2023-12-18 NOTE — PATIENT INSTRUCTIONS
Clean with baby shampoo and water or vashe     Apply silvadene cream to wound,apply gauze and wrap with meng if needed       Monitor closely for s/s of infection including fever, chills, increase in pain, odor from wound, and increased redness from foot. Go to ER if any complications develop.   Keep leg elevated and avoid pressure on wound  Diabetes:  Monitor glucose closely. Check fasting glucose and 2 hours after meals. HgA1C goal <7, fasting glucose , and 2 hours after meals <180  Hypertension:  Check blood pressure twice daily, goal <120/80  Diet:   Increase protein intake, avoid fried, fatty foods and foods high in simple carbs.   Vitamins:  Take vitamin C 1000 mg, zinc 50mg, vitamin d 5000 units, and a daily multivitamin. Dawson is a good source of protein and nutrients to aid in wound healing.       Was this taken care of ?

## 2023-12-18 NOTE — PROGRESS NOTES
Don Tinajero III, DO   [facility]  [address]  [place] [address]  [phone]      PATIENT NAME: Deborah Sotelo  : 1960  DATE: 1/3/24  MRN: [id number]      Billing Provider: oDn Tinajero III, DO  Level of Service:   Patient PCP Information       Provider PCP Type    Ron Sanches MD General            Reason for Visit / Chief Complaint: Non-healing Wound Follow Up (R [facility])       History of Present Illness / Problem Focused Workflow     Deborah Sotelo is a 63 y.o. female presents to clinic for follow up on chronic-non healing wound on right TMA. Medial aspect of foot with thick callus and drainage, bedside debridement today. Continue using urea on callused area to keep soft, optifoam ag to wound bed.     Reinforced importance of local wound care, keeping pressure off foot, off-loading shoe, elevating legs, adherence to diabetic diet and strict glycemic control, and increasing protein intake.     Significant PMH diabetes, anemia, and CVA. Last HgA1C 7 in July, diabetes managed by PCP. Pertinent factors that delay wound healing include multiple co-morbidities, poor vascular supply, diabetes, edema, heavy drainage, decreased granulation tissue, tract(s), undermining and no protective sensation.         Review of Systems     Review of Systems   Constitutional:  Positive for activity change. Negative for appetite change, chills, diaphoresis and fever.   HENT: Negative.  Negative for congestion, ear pain, hearing loss and postnasal drip.    Eyes:  Negative for itching.   Respiratory:  Negative for chest tightness and shortness of breath.    Cardiovascular:  Positive for leg swelling. Negative for chest pain and palpitations.   Gastrointestinal:  Negative for abdominal pain.   Endocrine: Negative for polydipsia.   Genitourinary:  Negative for frequency.   Musculoskeletal:  Positive for arthralgias, back pain and joint swelling.        Severe shoulder pain,  10 of 10 on pain scale   Skin:  Positive for color change and wound.        See wound assessment   Neurological:  Positive for weakness and numbness. Negative for headaches.   Psychiatric/Behavioral:  Negative for agitation, behavioral problems, confusion and self-injury.        Medical / Social / Family History     Past Medical History:   Diagnosis Date    Anemia 7/5/2022    Diabetes mellitus, type 2     Hyperlipidemia     Hypertension     Obesity     Primary osteoarthritis of left shoulder 5/24/2022    Stroke        Past Surgical History:   Procedure Laterality Date    AMPUTATION      APPENDECTOMY      DEBRIDEMENT OF FOOT Right 1/12/2023    Procedure: DEBRIDEMENT, FOOT;  Surgeon: Ravi Ramirez MD;  Location: [facility];  Service: General;  Laterality: Right;    EYE SURGERY         Social History  Ms. Deborah Sotelo  reports that she has never smoked. She has never used smokeless tobacco. She reports current alcohol use. She reports that she does not use drugs.    Family History  Ms.'srinath Sotelo family history includes Appendicitis in her father; Asthma in her sister; Cancer in her mother; Diabetes in her brother.    Medications and Allergies     Medications  Outpatient Medications Marked as Taking for the 1/3/24 encounter (Office Visit) with Don Tinajero III, DO   Medication Sig Dispense Refill    amLODIPine (NORVASC) 5 MG tablet Take 1 tablet (5 mg total) by mouth once daily. 30 tablet 5    atorvastatin (LIPITOR) 80 MG tablet TAKE 1 TABLET BY MOUTH EACH NIGHT AT BEDTIME FOR CHOLESTEROL ~~DO NOT EAT GRAPEFRUIT OR DRINK GRAPEFRUIT JUICE WHILE TAKING THIS MEDICATION~~ 30 tablet 5    diclofenac sodium (VOLTAREN) 1 % Gel Apply 2 g topically 4 (four) times daily. 450 g 2    FEROSUL 325 mg (65 mg iron) Tab tablet       insulin asp prt-insulin aspart, NovoLOG 70/30, (NOVOLOG 70/30) 100 unit/mL (70-30) Soln INJECT 50 UNITS SUB-Q EACH MORNING AND 35  UNITS EACH EVENING ...KEEP IN REFRIGERATOR ...USE WITHIN 28  DAYS AFTER OPENING EACH VIAL 30 mL 11    LANTUS U-100 INSULIN 100 unit/mL injection Inject 20 Units into the skin once daily. Take at night 6 mL 11    LIDOcaine (LIDODERM) 5 % Place 1 patch onto the skin once daily. Remove & Discard patch within 12 hours or as directed by MD 30 patch 2    ondansetron (ZOFRAN) 4 MG tablet Take 1 tablet (4 mg total) by mouth every 6 (six) hours. 12 tablet 0    pantoprazole (PROTONIX) 40 MG tablet Take 40 mg by mouth once daily.      sodium hypochlorite 0.5 % (DAKIN'S SOLUTION) external solution Apply topically once daily. Pack wound with dakin's moistened nuguaze daily 473 mL 0    sulfamethoxazole-trimethoprim 800-160mg (BACTRIM DS) 800-160 mg Tab Take 1 tablet by mouth 2 (two) times daily.      TRULICITY 0.75 mg/0.5 mL pen injector Inject 0.75 mg into the skin every 7 days.         Allergies  Review of patient's allergies indicates:   Allergen Reactions    Aspirin      Other reaction(s): UPSET STOMACH   Other reaction(s): Unknown    Bactrim ds [sulfamethoxazole-trimethoprim] Itching       Physical Examination     Vitals:    01/03/24 0847   Resp: 18     Physical Exam  Vitals and nursing note reviewed.   Constitutional:       Comments: Tearful during exam   HENT:      Head: Normocephalic.   Cardiovascular:      Rate and Rhythm: Normal rate and regular rhythm.      Pulses: Normal pulses.      Heart sounds: Normal heart sounds.   Pulmonary:      Effort: Pulmonary effort is normal. No respiratory distress.   Chest:      Chest wall: No tenderness.   Musculoskeletal:         General: Swelling, tenderness and deformity present.      Right lower leg: Edema present.      Comments: Left shoulder decreased ROM with weakness to left upper extremity   Skin:     General: Skin is warm.      Capillary Refill: Capillary refill takes 2 to 3 seconds.      Findings: Erythema and lesion present.      Comments: Wound, see LDA for measurements and picture   Neurological:      Mental Status: She is alert and  oriented to person, place, and time.   Psychiatric:         Mood and Affect: Mood normal.         Behavior: Behavior normal.         Thought Content: Thought content normal.         Judgment: Judgment normal.       Assessment and Plan             Wound 01/18/21 1051 Diabetic Ulcer Right lateral Plantar #1 (Active)   01/18/21 1051    Pre-existing: Yes   Primary Wound Type: Diabetic ulc   Side: Right   Orientation: lateral   Location: Plantar   Wound Number: #1   Ankle-Brachial Index:    Pulses: normal   Removal Indication and Assessment:    Wound Outcome:    (Retired) Wound Type:    (Retired) Wound Length (cm):    (Retired) Wound Width (cm):    (Retired) Depth (cm):    Wound Description (Comments):    Removal Indications:    Wound Image    01/03/24 0907   Dressing Appearance Dried drainage 01/03/24 0907   Drainage Amount Moderate 01/03/24 0907   Drainage Characteristics/Odor Serosanguineous 01/03/24 0907   Appearance Maroon;Moist;Granulating 01/03/24 0907   Tissue loss description Full thickness 01/03/24 0907   Black (%), Wound Tissue Color 0 % 01/03/24 0907   Red (%), Wound Tissue Color 100 % 01/03/24 0907   Yellow (%), Wound Tissue Color 0 % 01/03/24 0907   Periwound Area Dry 01/03/24 0907   Wound Edges Callused 01/03/24 0907   Wound Length (cm) 1.6 cm 01/03/24 0907   Wound Width (cm) 1.5 cm 01/03/24 0907   Wound Depth (cm) 0.2 cm 01/03/24 0907   Wound Volume (cm^3) 0.48 cm^3 01/03/24 0907   Wound Surface Area (cm^2) 2.4 cm^2 01/03/24 0907   Care Cleansed with:;Antimicrobial agent;Soap and water 01/03/24 0907   Dressing Applied;Gauze;Rolled gauze 01/03/24 0907   Periwound Care Moisturizer applied 01/03/24 0907   Dressing Change Due 01/04/24 01/03/24 0907            Incision/Site 01/12/23 1251 Right Foot (Active)   01/12/23 1251   Present Prior to Hospital Arrival?:    Side: Right   Location: Foot   Orientation:    Incision Type:    Closure Method:    Additional Comments:    Removal Indication and Assessment:     Wound Outcome:    Removal Indications:    Wound Image    01/03/24 0909   Dressing Appearance Open to air 01/03/24 0909   Drainage Amount None 01/03/24 0909   Appearance Intact;Pink 01/03/24 0909   Black (%), Wound Tissue Color 0 % 01/03/24 0909   Red (%), Wound Tissue Color 0 % 01/03/24 0909   Yellow (%), Wound Tissue Color 0 % 01/03/24 0909   Periwound Area Intact;Dry 01/03/24 0909   Wound Edges Rolled/closed 01/03/24 0909   Wound Length (cm) 0 cm 01/03/24 0909   Wound Width (cm) 0 cm 01/03/24 0909   Wound Depth (cm) 0 cm 01/03/24 0909   Wound Volume (cm^3) 0 cm^3 01/03/24 0909   Wound Surface Area (cm^2) 0 cm^2 01/03/24 0909   Care Cleansed with:;Soap and water 01/03/24 0909   Periwound Care Moisturizer applied 01/03/24 0909   Dressing Change Due 01/04/24 01/03/24 0909     Problem List Items Addressed This Visit          Endocrine    Type 2 diabetes mellitus with other specified complication    Overview                      Relevant Medications    TRULICITY 0.75 mg/0.5 mL pen injector    Diabetic foot ulcer - Primary    Overview                                                            Relevant Medications    TRULICITY 0.75 mg/0.5 mL pen injector    Diabetic foot infection    Relevant Medications    TRULICITY 0.75 mg/0.5 mL pen injector       GI    Gastroesophageal reflux disease       Orthopedic    Ulcer of right foot with fat layer exposed       Other    Lower extremity edema       Future Appointments   Date Time Provider Department Center   1/24/2024  9:00 AM [facility]    1/31/2024 10:20 AM [facility]            Signature:  Don Tinajero III,   [facility]  [address]  [facility] [place] [address]  [phone]    Date of encounter: 1/3/24

## 2024-01-03 ENCOUNTER — OFFICE VISIT (OUTPATIENT)
Dept: WOUND CARE | Facility: CLINIC | Age: 64
End: 2024-01-03
Attending: FAMILY MEDICINE
Payer: MEDICAID

## 2024-01-03 VITALS — RESPIRATION RATE: 18 BRPM

## 2024-01-03 DIAGNOSIS — E11.628 DIABETIC FOOT INFECTION: ICD-10-CM

## 2024-01-03 DIAGNOSIS — K21.9 GASTROESOPHAGEAL REFLUX DISEASE, UNSPECIFIED WHETHER ESOPHAGITIS PRESENT: ICD-10-CM

## 2024-01-03 DIAGNOSIS — E11.69 TYPE 2 DIABETES MELLITUS WITH OTHER SPECIFIED COMPLICATION, WITHOUT LONG-TERM CURRENT USE OF INSULIN: ICD-10-CM

## 2024-01-03 DIAGNOSIS — L97.415 DIABETIC ULCER OF RIGHT MIDFOOT ASSOCIATED WITH TYPE 2 DIABETES MELLITUS, WITH MUSCLE INVOLVEMENT WITHOUT EVIDENCE OF NECROSIS: Primary | ICD-10-CM

## 2024-01-03 DIAGNOSIS — L97.512 ULCER OF RIGHT FOOT WITH FAT LAYER EXPOSED: ICD-10-CM

## 2024-01-03 DIAGNOSIS — L08.9 DIABETIC FOOT INFECTION: ICD-10-CM

## 2024-01-03 DIAGNOSIS — E11.621 DIABETIC ULCER OF RIGHT MIDFOOT ASSOCIATED WITH TYPE 2 DIABETES MELLITUS, WITH MUSCLE INVOLVEMENT WITHOUT EVIDENCE OF NECROSIS: Primary | ICD-10-CM

## 2024-01-03 DIAGNOSIS — R60.0 LOWER EXTREMITY EDEMA: ICD-10-CM

## 2024-01-03 PROCEDURE — 1159F MED LIST DOCD IN RCRD: CPT | Mod: CPTII,,, | Performed by: FAMILY MEDICINE

## 2024-01-03 PROCEDURE — 99213 OFFICE O/P EST LOW 20 MIN: CPT | Mod: PBBFAC | Performed by: FAMILY MEDICINE

## 2024-01-03 PROCEDURE — 99213 OFFICE O/P EST LOW 20 MIN: CPT | Mod: S$PBB,,, | Performed by: FAMILY MEDICINE

## 2024-01-03 RX ORDER — PANTOPRAZOLE SODIUM 40 MG/1
40 TABLET, DELAYED RELEASE ORAL DAILY
COMMUNITY
Start: 2023-11-28

## 2024-01-03 RX ORDER — DULAGLUTIDE 0.75 MG/.5ML
0.75 INJECTION, SOLUTION SUBCUTANEOUS
COMMUNITY
Start: 2023-11-29

## 2024-01-03 RX ORDER — SULFAMETHOXAZOLE AND TRIMETHOPRIM 800; 160 MG/1; MG/1
1 TABLET ORAL 2 TIMES DAILY
COMMUNITY
Start: 2023-11-27 | End: 2024-02-02 | Stop reason: ALTCHOICE

## 2024-01-29 NOTE — PROGRESS NOTES
TOMASA Maldonado   [facility]  [address] [facility] [place] [address]  [phone]      PATIENT NAME: Deborah Sotelo  : 1960  DATE: 24  MRN: [id number]      Billing Provider: TOMASA Maldonado  Level of Service:   Patient PCP Information       Provider PCP Type    Ron Sanches MD General            Reason for Visit / Chief Complaint: Diabetic Foot Ulcer (Left foot)       History of Present Illness / Problem Focused Workflow     Deborah Sotelo is a 63 y.o. female presents to clinic for follow up on chronic-non healing wound on right TMA. Medial aspect has healed. She has some drainage from lateral aspect of foot and callus. Reports area is tender to palpate. Continue to keep foot moisturized and apply silvadene to lateral aspect of foot. Take antibiotics as prescribed.     Reinforced importance of local wound care, keeping pressure off foot, off-loading shoe, elevating legs, adherence to diabetic diet and strict glycemic control, and increasing protein intake.     Significant PMH diabetes, anemia, and CVA. Last HgA1C 7 in July, diabetes managed by PCP. Pertinent factors that delay wound healing include multiple co-morbidities, poor vascular supply, diabetes, edema, heavy drainage, decreased granulation tissue, tract(s), undermining and no protective sensation.       Review of Systems     Review of Systems   Constitutional:  Positive for activity change. Negative for appetite change, chills, diaphoresis and fever.   HENT: Negative.  Negative for congestion, ear pain, hearing loss and postnasal drip.    Eyes:  Negative for itching.   Respiratory:  Negative for chest tightness and shortness of breath.    Cardiovascular:  Positive for leg swelling. Negative for chest pain and palpitations.   Gastrointestinal:  Negative for abdominal pain.   Endocrine: Negative for polydipsia.   Genitourinary:  Negative for frequency.   Musculoskeletal:  Positive for  arthralgias, back pain and joint swelling.        Severe shoulder pain, 10 of 10 on pain scale   Skin:  Positive for color change and wound.        See wound assessment   Neurological:  Positive for weakness and numbness. Negative for headaches.   Psychiatric/Behavioral:  Negative for agitation, behavioral problems, confusion and self-injury.        Medical / Social / Family History     Past Medical History:   Diagnosis Date    Anemia 7/5/2022    Diabetes mellitus, type 2     Hyperlipidemia     Hypertension     Obesity     Primary osteoarthritis of left shoulder 5/24/2022    Stroke        Past Surgical History:   Procedure Laterality Date    AMPUTATION      APPENDECTOMY      DEBRIDEMENT OF FOOT Right 1/12/2023    Procedure: DEBRIDEMENT, FOOT;  Surgeon: Ravi Ramirez MD;  Location: [facility];  Service: General;  Laterality: Right;    EYE SURGERY         Social History  Ms. Deborah Sotelo  reports that she has never smoked. She has never used smokeless tobacco. She reports current alcohol use. She reports that she does not use drugs.    Family History  Ms.'s Deborah Sotelo family history includes Appendicitis in her father; Asthma in her sister; Cancer in her mother; Diabetes in her brother.    Medications and Allergies     Medications  No outpatient medications have been marked as taking for the 2/2/24 encounter (Office Visit) with Felisha Love FNP.       Allergies  Review of patient's allergies indicates:   Allergen Reactions    Aspirin      Other reaction(s): UPSET STOMACH   Other reaction(s): Unknown    Bactrim ds [sulfamethoxazole-trimethoprim] Itching       Physical Examination   There were no vitals filed for this visit.  Physical Exam  Vitals and nursing note reviewed.   Constitutional:       Comments: Tearful during exam   HENT:      Head: Normocephalic.   Cardiovascular:      Rate and Rhythm: Normal rate and regular rhythm.      Pulses: Normal pulses.      Heart sounds: Normal heart  sounds.   Pulmonary:      Effort: Pulmonary effort is normal. No respiratory distress.   Chest:      Chest wall: No tenderness.   Musculoskeletal:         General: Swelling, tenderness and deformity present.      Right lower leg: Edema present.      Comments: Left shoulder decreased ROM with weakness to left upper extremity   Skin:     General: Skin is warm.      Capillary Refill: Capillary refill takes 2 to 3 seconds.      Findings: Erythema and lesion present.      Comments: Wound, see LDA for measurements and picture   Neurological:      Mental Status: She is alert and oriented to person, place, and time.   Psychiatric:         Mood and Affect: Mood normal.         Behavior: Behavior normal.         Thought Content: Thought content normal.         Judgment: Judgment normal.     Assessment and Plan             Wound 01/18/21 1051 Diabetic Ulcer Right lateral Plantar #1 (Active)   01/18/21 1051    Pre-existing: Yes   Primary Wound Type: Diabetic ulc   Side: Right   Orientation: lateral   Location: Plantar   Wound Number: #1   Ankle-Brachial Index:    Pulses: normal   Removal Indication and Assessment:    Wound Outcome:    (Retired) Wound Type:    (Retired) Wound Length (cm):    (Retired) Wound Width (cm):    (Retired) Depth (cm):    Wound Description (Comments):    Removal Indications:    Wound Image    02/02/24 1007   Dressing Appearance Dry;Intact;Clean 02/02/24 1007   Drainage Amount Moderate 02/02/24 1007   Drainage Characteristics/Odor Yellow 02/02/24 1007   Appearance Pink;Red;Granulating;Epithelialization 02/02/24 1007   Black (%), Wound Tissue Color 0 % 02/02/24 1007   Red (%), Wound Tissue Color 80 % 02/02/24 1007   Yellow (%), Wound Tissue Color 20 % 02/02/24 1007   Periwound Area Intact 02/02/24 1007   Wound Edges Callused 02/02/24 1007   Johnson Classification (diabetic foot ulcers only) Grade 1 02/02/24 1007   Wound Length (cm) 0.2 cm 02/02/24 1007   Wound Width (cm) 0.2 cm 02/02/24 1007   Wound Depth  (cm) 0.1 cm 02/02/24 1007   Wound Volume (cm^3) 0.004 cm^3 02/02/24 1007   Wound Surface Area (cm^2) 0.04 cm^2 02/02/24 1007   Care Cleansed with:;Antimicrobial agent 02/02/24 1007   Dressing Applied;Silver;Gauze;Rolled gauze 02/02/24 1007     Problem List Items Addressed This Visit          Endocrine    Diabetic foot ulcer - Primary    Overview                                                    Current Assessment & Plan       Clean with baby shampoo and water or vashe     Apply silvadene cream to wound,apply gauze and wrap with meng if needed       Monitor closely for s/s of infection including fever, chills, increase in pain, odor from wound, and increased redness from foot. Go to ER if any complications develop.   Keep leg elevated and avoid pressure on wound  Diabetes:  Monitor glucose closely. Check fasting glucose and 2 hours after meals. HgA1C goal <7, fasting glucose , and 2 hours after meals <180  Hypertension:  Check blood pressure twice daily, goal <120/80  Diet:   Increase protein intake, avoid fried, fatty foods and foods high in simple carbs.   Vitamins:  Take vitamin C 1000 mg, zinc 50mg, vitamin d 5000 units, and a daily multivitamin. Dawson is a good source of protein and nutrients to aid in wound healing.     Take antibiotics as prescribed              Future Appointments   Date Time Provider Department Center   2/22/2024 10:00 AM Felisha Love FNP [facility]   2/5/2025 11:15 AM [facility]            Signature:  TOMASA Maldonado  [facility]  [address] [facility] [address]  [phone]    Date of encounter: 2/2/24

## 2024-01-29 NOTE — PATIENT INSTRUCTIONS
Clean with baby shampoo and water or vashe     Apply silvadene cream to wound,apply gauze and wrap with meng if needed       Monitor closely for s/s of infection including fever, chills, increase in pain, odor from wound, and increased redness from foot. Go to ER if any complications develop.   Keep leg elevated and avoid pressure on wound  Diabetes:  Monitor glucose closely. Check fasting glucose and 2 hours after meals. HgA1C goal <7, fasting glucose , and 2 hours after meals <180  Hypertension:  Check blood pressure twice daily, goal <120/80  Diet:   Increase protein intake, avoid fried, fatty foods and foods high in simple carbs.   Vitamins:  Take vitamin C 1000 mg, zinc 50mg, vitamin d 5000 units, and a daily multivitamin. Dawson is a good source of protein and nutrients to aid in wound healing.     Take antibiotics as prescribed

## 2024-01-31 ENCOUNTER — HOSPITAL ENCOUNTER (OUTPATIENT)
Dept: RADIOLOGY | Facility: HOSPITAL | Age: 64
Discharge: HOME | End: 2024-01-31
Payer: MEDICAID

## 2024-01-31 VITALS — BODY MASS INDEX: 42.57 KG/M2 | WEIGHT: 280 LBS

## 2024-01-31 DIAGNOSIS — Z12.31 BREAST CANCER SCREENING BY MAMMOGRAM: ICD-10-CM

## 2024-01-31 PROCEDURE — 77067 SCR MAMMO BI INCL CAD: CPT | Mod: TC

## 2024-02-02 ENCOUNTER — OFFICE VISIT (OUTPATIENT)
Dept: WOUND CARE | Facility: CLINIC | Age: 64
End: 2024-02-02
Attending: FAMILY MEDICINE
Payer: MEDICAID

## 2024-02-02 DIAGNOSIS — E11.621 DIABETIC ULCER OF RIGHT MIDFOOT ASSOCIATED WITH TYPE 2 DIABETES MELLITUS, WITH MUSCLE INVOLVEMENT WITHOUT EVIDENCE OF NECROSIS: Primary | ICD-10-CM

## 2024-02-02 DIAGNOSIS — L97.415 DIABETIC ULCER OF RIGHT MIDFOOT ASSOCIATED WITH TYPE 2 DIABETES MELLITUS, WITH MUSCLE INVOLVEMENT WITHOUT EVIDENCE OF NECROSIS: Primary | ICD-10-CM

## 2024-02-02 PROCEDURE — 1159F MED LIST DOCD IN RCRD: CPT | Mod: CPTII,,, | Performed by: NURSE PRACTITIONER

## 2024-02-02 PROCEDURE — 4010F ACE/ARB THERAPY RXD/TAKEN: CPT | Mod: CPTII,,, | Performed by: NURSE PRACTITIONER

## 2024-02-02 PROCEDURE — 1160F RVW MEDS BY RX/DR IN RCRD: CPT | Mod: CPTII,,, | Performed by: NURSE PRACTITIONER

## 2024-02-02 PROCEDURE — 99213 OFFICE O/P EST LOW 20 MIN: CPT | Mod: S$PBB,,, | Performed by: NURSE PRACTITIONER

## 2024-02-02 PROCEDURE — 99213 OFFICE O/P EST LOW 20 MIN: CPT | Mod: PBBFAC | Performed by: NURSE PRACTITIONER

## 2024-02-02 RX ORDER — AMOXICILLIN AND CLAVULANATE POTASSIUM 875; 125 MG/1; MG/1
1 TABLET, FILM COATED ORAL EVERY 12 HOURS
Qty: 20 TABLET | Refills: 0 | Status: SHIPPED | OUTPATIENT
Start: 2024-02-02 | End: 2024-02-12

## 2024-02-02 RX ORDER — DOXYCYCLINE 100 MG/1
100 CAPSULE ORAL 2 TIMES DAILY
Qty: 60 CAPSULE | Refills: 0 | Status: SHIPPED | OUTPATIENT
Start: 2024-02-02 | End: 2024-03-03

## 2024-02-27 NOTE — PROGRESS NOTES
TOMASA Maldonado   [facility]  [address] [facility] [place] [address]  [phone]      PATIENT NAME: Deborah Sotelo  : 1960  DATE: 3/7/24  MRN: [id number]      Billing Provider: TOMASA Maldonado  Level of Service:   Patient PCP Information       Provider PCP Type    Ron Sanches MD General            Reason for Visit / Chief Complaint: Non-healing Wound Follow Up (R D[facility])       History of Present Illness / Problem Focused Workflow     Deborah Sotelo is a 63 y.o. female presents to clinic for follow up on chronic-non healing wound on right TMA. Medial aspect has healed. She has some drainage from lateral aspect of foot and callus. Reports area is tender to palpate. Continue to keep foot moisturized and apply silvadene to lateral aspect of foot. Take antibiotics as prescribed. Reports pain and swelling right lower leg. Venous doppler today to rule out DVT. X-ray pending.     Reinforced importance of local wound care, keeping pressure off foot, off-loading shoe, elevating legs, adherence to diabetic diet and strict glycemic control, and increasing protein intake.     Significant PMH diabetes, anemia, and CVA. Last HgA1C 7 in July, diabetes managed by PCP. Pertinent factors that delay wound healing include multiple co-morbidities, poor vascular supply, diabetes, edema, heavy drainage, decreased granulation tissue, tract(s), undermining and no protective sensation.         Review of Systems     Review of Systems   Constitutional:  Positive for activity change. Negative for appetite change, chills, diaphoresis and fever.   HENT: Negative.  Negative for congestion, ear pain, hearing loss and postnasal drip.    Eyes:  Negative for itching.   Respiratory:  Negative for chest tightness and shortness of breath.    Cardiovascular:  Positive for leg swelling. Negative for chest pain and palpitations.   Gastrointestinal:  Negative for abdominal pain.   Endocrine:  Negative for polydipsia.   Genitourinary:  Negative for frequency.   Musculoskeletal:  Positive for arthralgias, back pain and joint swelling.        Severe shoulder pain, 10 of 10 on pain scale   Skin:  Positive for color change and wound.        See wound assessment   Neurological:  Positive for weakness and numbness. Negative for headaches.   Psychiatric/Behavioral:  Negative for agitation, behavioral problems, confusion and self-injury.        Medical / Social / Family History     Past Medical History:   Diagnosis Date    Anemia 7/5/2022    Diabetes mellitus, type 2     Hyperlipidemia     Hypertension     Obesity     Primary osteoarthritis of left shoulder 5/24/2022    Stroke        Past Surgical History:   Procedure Laterality Date    AMPUTATION      APPENDECTOMY      DEBRIDEMENT OF FOOT Right 1/12/2023    Procedure: DEBRIDEMENT, FOOT;  Surgeon: Ravi Ramirez MD;  Location: [facility];  Service: General;  Laterality: Right;    EYE SURGERY         Social History  Ms. Deborah Sotelo  reports that she has never smoked. She has never used smokeless tobacco. She reports current alcohol use. She reports that she does not use drugs.    Family History  Ms.'srinath Sotelo family history includes Appendicitis in her father; Asthma in her sister; Cancer in her mother; Diabetes in her brother.    Medications and Allergies     Medications  Outpatient Medications Marked as Taking for the 3/7/24 encounter (Office Visit) with Felisha Love FNP   Medication Sig Dispense Refill    pantoprazole (PROTONIX) 40 MG tablet Take 40 mg by mouth once daily.      sodium hypochlorite 0.5 % (DAKIN'S SOLUTION) external solution Apply topically once daily. Pack wound with dakin's moistened nuguaze daily 473 mL 0    TRULICITY 0.75 mg/0.5 mL pen injector Inject 0.75 mg into the skin every 7 days.         Allergies  Review of patient's allergies indicates:   Allergen Reactions    Aspirin      Other reaction(s): UPSET STOMACH   Other  reaction(s): Unknown    Bactrim ds [sulfamethoxazole-trimethoprim] Itching       Physical Examination     Vitals:    03/07/24 1100   BP: (!) 100/57   Pulse: 108   Resp: 20   Temp: 97.5 °F (36.4 °C)     Physical Exam  Vitals and nursing note reviewed.   Constitutional:       Comments: Tearful during exam   HENT:      Head: Normocephalic.   Cardiovascular:      Rate and Rhythm: Normal rate and regular rhythm.      Pulses: Normal pulses.      Heart sounds: Normal heart sounds.   Pulmonary:      Effort: Pulmonary effort is normal. No respiratory distress.   Chest:      Chest wall: No tenderness.   Musculoskeletal:         General: Swelling, tenderness and deformity present.      Right lower leg: Edema present.      Comments: Left shoulder decreased ROM with weakness to left upper extremity   Skin:     General: Skin is warm.      Capillary Refill: Capillary refill takes 2 to 3 seconds.      Findings: Erythema and lesion present.      Comments: Wound, see LDA for measurements and picture   Neurological:      Mental Status: She is alert and oriented to person, place, and time.   Psychiatric:         Mood and Affect: Mood normal.         Behavior: Behavior normal.         Thought Content: Thought content normal.         Judgment: Judgment normal.       Assessment and Plan             Wound 01/18/21 1051 Diabetic Ulcer Right lateral Plantar #1 (Active)   01/18/21 1051    Pre-existing: Yes   Primary Wound Type: Diabetic ulc   Side: Right   Orientation: lateral   Location: Plantar   Wound Number: #1   Ankle-Brachial Index:    Pulses: normal   Removal Indication and Assessment:    Wound Outcome:    (Retired) Wound Type:    (Retired) Wound Length (cm):    (Retired) Wound Width (cm):    (Retired) Depth (cm):    Wound Description (Comments):    Removal Indications:    Wound Image    03/07/24 1100   Dressing Appearance Dry;Intact 03/07/24 1100   Drainage Amount None 03/07/24 1100   Appearance Intact 03/07/24 1100   Tissue loss  description Not applicable 03/07/24 1100   Black (%), Wound Tissue Color 0 % 03/07/24 1100   Red (%), Wound Tissue Color 0 % 03/07/24 1100   Yellow (%), Wound Tissue Color 0 % 03/07/24 1100   Periwound Area Intact 03/07/24 1100   Wound Edges Rolled/closed 03/07/24 1100   Wound Length (cm) 0 cm 03/07/24 1100   Wound Width (cm) 0 cm 03/07/24 1100   Wound Depth (cm) 0 cm 03/07/24 1100   Wound Volume (cm^3) 0 cm^3 03/07/24 1100   Wound Surface Area (cm^2) 0 cm^2 03/07/24 1100   Care Cleansed with:;Antimicrobial agent 03/07/24 1100   Dressing Applied 03/07/24 1100   Periwound Care Moisturizer applied 03/07/24 1100   Dressing Change Due 03/08/24 03/07/24 1100            Incision/Site 01/12/23 1251 Right Foot (Active)   01/12/23 1251   Present Prior to Hospital Arrival?:    Side: Right   Location: Foot   Orientation:    Incision Type:    Closure Method:    Additional Comments:    Removal Indication and Assessment:    Wound Outcome:    Removal Indications:    Wound Image    03/07/24 1102   Dressing Appearance Dried drainage 03/07/24 1102   Drainage Amount Moderate 03/07/24 1102   Drainage Characteristics/Odor Serous 03/07/24 1102   Appearance Maroon;Dry;Fibrin 03/07/24 1102   Black (%), Wound Tissue Color 0 % 03/07/24 1102   Red (%), Wound Tissue Color 100 % 03/07/24 1102   Yellow (%), Wound Tissue Color 0 % 03/07/24 1102   Periwound Area Intact 03/07/24 1102   Wound Edges Open 03/07/24 1102   Wound Length (cm) 0.6 cm 03/07/24 1102   Wound Width (cm) 0.7 cm 03/07/24 1102   Wound Depth (cm) 0.2 cm 03/07/24 1102   Wound Volume (cm^3) 0.084 cm^3 03/07/24 1102   Wound Surface Area (cm^2) 0.42 cm^2 03/07/24 1102   Care Cleansed with:;Antimicrobial agent 03/07/24 1102   Dressing Applied;Gauze;Rolled gauze 03/07/24 1102   Dressing Change Due 03/08/24 03/07/24 1102     Problem List Items Addressed This Visit          Endocrine    Diabetic foot ulcer - Primary    Overview                                                           Current Assessment & Plan       Clean with baby shampoo and water or vashe     Apply silvadene cream to wound,apply gauze and wrap with meng if needed       Monitor closely for s/s of infection including fever, chills, increase in pain, odor from wound, and increased redness from foot. Go to ER if any complications develop.   Keep leg elevated and avoid pressure on wound  Diabetes:  Monitor glucose closely. Check fasting glucose and 2 hours after meals. HgA1C goal <7, fasting glucose , and 2 hours after meals <180  Hypertension:  Check blood pressure twice daily, goal <120/80  Diet:   Increase protein intake, avoid fried, fatty foods and foods high in simple carbs.   Vitamins:  Take vitamin C 1000 mg, zinc 50mg, vitamin d 5000 units, and a daily multivitamin. Dawson is a good source of protein and nutrients to aid in wound healing.     Take antibiotics as prescribed           Relevant Orders    X-Ray Foot Complete 3 view Right     Other Visit Diagnoses       Leg pain, anterior, right        Relevant Orders    US Lower Extremity Veins Bilateral (Completed)            Future Appointments   Date Time Provider Department Center   3/7/2024  2:00 PM [facility] [place]   4/4/2024 10:00 AM Felisha Love FNP [place] [facility]   2/5/2025 11:15 AM [facility] [place]            Signature:  TOMASA Maldonado  [facility]  [address] [facility] [address]  [phone]    Date of encounter: 3/7/24

## 2024-03-04 ENCOUNTER — HOSPITAL ENCOUNTER (EMERGENCY)
Facility: HOSPITAL | Age: 64
Discharge: LEFT BEFORE DISCHARGE | End: 2024-03-04
Payer: MEDICAID

## 2024-03-04 VITALS
TEMPERATURE: 98 F | DIASTOLIC BLOOD PRESSURE: 84 MMHG | SYSTOLIC BLOOD PRESSURE: 157 MMHG | BODY MASS INDEX: 46.65 KG/M2 | RESPIRATION RATE: 16 BRPM | HEART RATE: 95 BPM | WEIGHT: 280 LBS | OXYGEN SATURATION: 97 % | HEIGHT: 65 IN

## 2024-03-04 DIAGNOSIS — M79.606 LEG PAIN: ICD-10-CM

## 2024-03-04 DIAGNOSIS — M79.673 FOOT PAIN: ICD-10-CM

## 2024-03-04 DIAGNOSIS — M79.669 LOWER LEG PAIN: ICD-10-CM

## 2024-03-04 PROCEDURE — 99282 EMERGENCY DEPT VISIT SF MDM: CPT | Mod: ,,, | Performed by: NURSE PRACTITIONER

## 2024-03-04 PROCEDURE — 99281 EMR DPT VST MAYX REQ PHY/QHP: CPT

## 2024-03-04 NOTE — ED PROVIDER NOTES
Encounter Date: 3/4/2024       History     Chief Complaint   Patient presents with    Leg Pain     63-year-old female presents to the emergency department to be evaluated for right foot pain and right lower leg pain.  She has had all of her toes and the distal part of her foot amputated in the past.  She usually goes to wound care.  She denies any injury, fever, chills, chest pain, shortness of breath.  She reports that she began having pain in her right foot and right lower leg while she was walking at her friend's house a couple of days ago.    The history is provided by the patient.     Review of patient's allergies indicates:   Allergen Reactions    Aspirin      Other reaction(s): UPSET STOMACH   Other reaction(s): Unknown    Bactrim ds [sulfamethoxazole-trimethoprim] Itching     Past Medical History:   Diagnosis Date    Anemia 7/5/2022    Diabetes mellitus, type 2     Hyperlipidemia     Hypertension     Obesity     Primary osteoarthritis of left shoulder 5/24/2022    Stroke      Past Surgical History:   Procedure Laterality Date    AMPUTATION      APPENDECTOMY      DEBRIDEMENT OF FOOT Right 1/12/2023    Procedure: DEBRIDEMENT, FOOT;  Surgeon: Ravi Ramirez MD;  Location: [facility];  Service: General;  Laterality: Right;    EYE SURGERY       Family History   Problem Relation Age of Onset    Cancer Mother     Appendicitis Father     Asthma Sister     Diabetes Brother      Social History     Tobacco Use    Smoking status: Never    Smokeless tobacco: Never   Substance Use Topics    Alcohol use: Yes    Drug use: Never     Review of Systems   Constitutional:  Negative for chills and fever.   Respiratory:  Negative for chest tightness and shortness of breath.    Cardiovascular:  Negative for chest pain.   All other systems reviewed and are negative.      Physical Exam     Initial Vitals [03/04/24 1400]   BP Pulse Resp Temp SpO2   (!) 157/84 95 16 97.7 °F (36.5 °C) 97 %      MAP       --         Physical  Exam    Vitals reviewed.  Constitutional: She appears well-developed and well-nourished.   Neck: Neck supple.   Cardiovascular:  Normal rate and regular rhythm.           Pulses:       Dorsalis pedis pulses are 3+ on the right side.   Pulmonary/Chest: Breath sounds normal.   Abdominal: Abdomen is soft. Bowel sounds are normal. She exhibits no distension and no mass. There is no abdominal tenderness. There is no rebound and no guarding.   Musculoskeletal:         General: Tenderness (Right calf tenderness) present. No edema. Normal range of motion.      Cervical back: Neck supple.     Neurological: She is alert and oriented to person, place, and time. She has normal strength. GCS score is 15. GCS eye subscore is 4. GCS verbal subscore is 5. GCS motor subscore is 6.   Skin: Skin is warm and dry. Capillary refill takes less than 2 seconds.   Ulcerated area on the right foot.  No redness, swelling or drainage.  See pictures.   Psychiatric: She has a normal mood and affect.               Medical Screening Exam   See Full Note    ED Course   Procedures  Labs Reviewed - No data to display       Imaging Results              US Lower Extremity Veins Right (In process)                      Medications - No data to display  Medical Decision Making  63-year-old female presents to the emergency department to be evaluated for right foot pain and right lower leg pain.  She has had all of her toes and the distal part of her foot amputated in the past.  She usually goes to wound care.  She denies any injury, fever, chills, chest pain, shortness of breath.  She reports that she began having pain in her right foot and right lower leg while she was walking at her friend's house a couple of days ago.    Amount and/or Complexity of Data Reviewed  Radiology: ordered.                                      Clinical Impression:   Final diagnoses:  [M79.673] Foot pain  [M79.669] Lower leg pain  [M79.606] Leg pain        ED Disposition  Condition    AMA Stable                Ellie Hernandez, TOMASA  03/04/24 1401       Ellie Hernandez, GLADYS  03/04/24 1444

## 2024-03-04 NOTE — ED NOTES
Zoe RT called stating patient refused xray/ultrasound. States she is leaving and has called her ride.   2

## 2024-03-05 ENCOUNTER — TELEPHONE (OUTPATIENT)
Dept: EMERGENCY MEDICINE | Facility: HOSPITAL | Age: 64
End: 2024-03-05
Payer: MEDICAID

## 2024-03-07 ENCOUNTER — HOSPITAL ENCOUNTER (OUTPATIENT)
Dept: RADIOLOGY | Facility: HOSPITAL | Age: 64
Discharge: HOME | End: 2024-03-07
Attending: NURSE PRACTITIONER
Payer: MEDICAID

## 2024-03-07 ENCOUNTER — OFFICE VISIT (OUTPATIENT)
Dept: WOUND CARE | Facility: CLINIC | Age: 64
End: 2024-03-07
Attending: FAMILY MEDICINE
Payer: MEDICAID

## 2024-03-07 VITALS
TEMPERATURE: 98 F | HEART RATE: 108 BPM | SYSTOLIC BLOOD PRESSURE: 100 MMHG | DIASTOLIC BLOOD PRESSURE: 57 MMHG | RESPIRATION RATE: 20 BRPM

## 2024-03-07 DIAGNOSIS — M79.604 LEG PAIN, ANTERIOR, RIGHT: ICD-10-CM

## 2024-03-07 DIAGNOSIS — L97.415 DIABETIC ULCER OF RIGHT MIDFOOT ASSOCIATED WITH TYPE 2 DIABETES MELLITUS, WITH MUSCLE INVOLVEMENT WITHOUT EVIDENCE OF NECROSIS: Primary | ICD-10-CM

## 2024-03-07 DIAGNOSIS — E11.621 DIABETIC ULCER OF RIGHT MIDFOOT ASSOCIATED WITH TYPE 2 DIABETES MELLITUS, WITH MUSCLE INVOLVEMENT WITHOUT EVIDENCE OF NECROSIS: Primary | ICD-10-CM

## 2024-03-07 DIAGNOSIS — L97.415 DIABETIC ULCER OF RIGHT MIDFOOT ASSOCIATED WITH TYPE 2 DIABETES MELLITUS, WITH MUSCLE INVOLVEMENT WITHOUT EVIDENCE OF NECROSIS: ICD-10-CM

## 2024-03-07 DIAGNOSIS — E11.621 DIABETIC ULCER OF RIGHT MIDFOOT ASSOCIATED WITH TYPE 2 DIABETES MELLITUS, WITH MUSCLE INVOLVEMENT WITHOUT EVIDENCE OF NECROSIS: ICD-10-CM

## 2024-03-07 PROCEDURE — 1159F MED LIST DOCD IN RCRD: CPT | Mod: CPTII,,, | Performed by: NURSE PRACTITIONER

## 2024-03-07 PROCEDURE — 99215 OFFICE O/P EST HI 40 MIN: CPT | Mod: PBBFAC,25 | Performed by: NURSE PRACTITIONER

## 2024-03-07 PROCEDURE — 93970 EXTREMITY STUDY: CPT | Mod: TC

## 2024-03-07 PROCEDURE — 3074F SYST BP LT 130 MM HG: CPT | Mod: CPTII,,, | Performed by: NURSE PRACTITIONER

## 2024-03-07 PROCEDURE — 3078F DIAST BP <80 MM HG: CPT | Mod: CPTII,,, | Performed by: NURSE PRACTITIONER

## 2024-03-07 PROCEDURE — 4010F ACE/ARB THERAPY RXD/TAKEN: CPT | Mod: CPTII,,, | Performed by: NURSE PRACTITIONER

## 2024-03-07 PROCEDURE — 93970 EXTREMITY STUDY: CPT | Mod: 26,,, | Performed by: RADIOLOGY

## 2024-03-07 PROCEDURE — 99213 OFFICE O/P EST LOW 20 MIN: CPT | Mod: S$PBB,,, | Performed by: NURSE PRACTITIONER

## 2024-03-07 PROCEDURE — 1160F RVW MEDS BY RX/DR IN RCRD: CPT | Mod: CPTII,,, | Performed by: NURSE PRACTITIONER

## 2024-03-07 RX ORDER — DOXYCYCLINE HYCLATE 100 MG
100 TABLET ORAL 2 TIMES DAILY
Qty: 60 TABLET | Refills: 0 | Status: SHIPPED | OUTPATIENT
Start: 2024-03-07 | End: 2024-04-06

## 2024-03-11 RX ORDER — AMOXICILLIN 500 MG/1
500 TABLET, FILM COATED ORAL EVERY 12 HOURS
Qty: 60 TABLET | Refills: 0 | Status: SHIPPED | OUTPATIENT
Start: 2024-03-11 | End: 2024-03-12 | Stop reason: SDUPTHER

## 2024-03-12 RX ORDER — AMOXICILLIN 500 MG/1
500 TABLET, FILM COATED ORAL EVERY 12 HOURS
Qty: 60 TABLET | Refills: 0 | Status: SHIPPED | OUTPATIENT
Start: 2024-03-12 | End: 2024-04-11

## 2024-04-10 NOTE — PATIENT INSTRUCTIONS
Clean with vashe     Apply aquacel ag to right foot  Cover with foam border    Keep foot moisturized with ammonium lactate or lotion        Monitor closely for s/s of infection including fever, chills, increase in pain, odor from wound, and increased redness from foot. Go to ER if any complications develop.   Keep leg elevated and avoid pressure on wound  Diabetes:  Monitor glucose closely. Check fasting glucose and 2 hours after meals. HgA1C goal <7, fasting glucose , and 2 hours after meals <180  Hypertension:  Check blood pressure twice daily, goal <120/80  Diet:   Increase protein intake, avoid fried, fatty foods and foods high in simple carbs.   Vitamins:  Take vitamin C 1000 mg, zinc 50mg, vitamin d 5000 units, and a daily multivitamin. Dawson is a good source of protein and nutrients to aid in wound healing.     Take antibiotics as prescribed

## 2024-04-10 NOTE — PROGRESS NOTES
TOMASA Maldonado   [facility]  [address] [facility] [address]  [phone]      PATIENT NAME: Deborah Sotelo  : 1960  DATE: 24  MRN: [id number]      Billing Provider: TOMASA Maldonado  Level of Service:   Patient PCP Information       Provider PCP Type    Ron Sanches MD General            Reason for Visit / Chief Complaint: Diabetic Foot Ulcer (Right foot)       History of Present Illness / Problem Focused Workflow     Deborah Sotelo is a 63 y.o. female presents to clinic for follow up on chronic-non healing wound on right TMA. She continues to have callus and drainage from medial foot. Maceration with crevice in callus, bedside debridement today. Ulcer with granulation tissue measuring 1.8 x 1.9 x .4. Reports area is tender to palpate. Aquacel ag to wound bed.   Calluses trimmed on right foot. No open  ulcerations. Apply ammonium lacate to soften callus.   Reinforced importance of local wound care, keeping pressure off foot, off-loading shoe, elevating legs, adherence to diabetic diet and strict glycemic control, and increasing protein intake.     Significant PMH diabetes, anemia, and CVA. Last HgA1C 7 in July, diabetes managed by PCP. Pertinent factors that delay wound healing include multiple co-morbidities, poor vascular supply, diabetes, edema, heavy drainage, decreased granulation tissue, tract(s), undermining and no protective sensation.       Review of Systems     Review of Systems   Constitutional:  Positive for activity change. Negative for appetite change, chills, diaphoresis and fever.   HENT: Negative.  Negative for congestion, ear pain, hearing loss and postnasal drip.    Eyes:  Negative for itching.   Respiratory:  Negative for chest tightness and shortness of breath.    Cardiovascular:  Positive for leg swelling. Negative for chest pain and palpitations.   Gastrointestinal:  Negative for abdominal pain.   Endocrine:  Negative for polydipsia.   Genitourinary:  Negative for frequency.   Musculoskeletal:  Positive for arthralgias, back pain and joint swelling.        Severe shoulder pain, 10 of 10 on pain scale   Skin:  Positive for color change and wound.        See wound assessment   Neurological:  Positive for weakness and numbness. Negative for headaches.   Psychiatric/Behavioral:  Negative for agitation, behavioral problems, confusion and self-injury.        Medical / Social / Family History     Past Medical History:   Diagnosis Date    Anemia 7/5/2022    Diabetes mellitus, type 2     Hyperlipidemia     Hypertension     Obesity     Primary osteoarthritis of left shoulder 5/24/2022    Stroke        Past Surgical History:   Procedure Laterality Date    AMPUTATION      APPENDECTOMY      DEBRIDEMENT OF FOOT Right 1/12/2023    Procedure: DEBRIDEMENT, FOOT;  Surgeon: Ravi Ramirez MD;  Location: [facility];  Service: General;  Laterality: Right;    EYE SURGERY         Social History  Ms. Deborah Sotelo  reports that she has never smoked. She has never used smokeless tobacco. She reports current alcohol use. She reports that she does not use drugs.    Family History  Ms.'s Deborah Sotelo family history includes Appendicitis in her father; Asthma in her sister; Cancer in her mother; Diabetes in her brother.    Medications and Allergies     Medications  Current Outpatient Medications   Medication Sig Dispense Refill    amLODIPine (NORVASC) 5 MG tablet Take 1 tablet (5 mg total) by mouth once daily. 30 tablet 5    diclofenac sodium (VOLTAREN) 1 % Gel Apply 2 g topically 4 (four) times daily. 450 g 2    FEROSUL 325 mg (65 mg iron) Tab tablet       insulin asp prt-insulin aspart, NovoLOG 70/30, (NOVOLOG 70/30) 100 unit/mL (70-30) Soln INJECT 50 UNITS SUB-Q EACH MORNING AND 35  UNITS EACH EVENING ...KEEP IN REFRIGERATOR ...USE WITHIN 28 DAYS AFTER OPENING EACH VIAL 30 mL 11    LIDOcaine (LIDODERM) 5 % Place 1 patch onto the  skin once daily. Remove & Discard patch within 12 hours or as directed by MD 30 patch 2    pantoprazole (PROTONIX) 40 MG tablet Take 40 mg by mouth once daily.      sodium hypochlorite 0.5 % (DAKIN'S SOLUTION) external solution Apply topically once daily. Pack wound with dakin's moistened nuguaze daily 473 mL 0    TRULICITY 0.75 mg/0.5 mL pen injector Inject 0.75 mg into the skin every 7 days.      ammonium lactate 12 % Crea Apply 1 g topically Daily. 385 g 2    atorvastatin (LIPITOR) 80 MG tablet TAKE 1 TABLET BY MOUTH EACH NIGHT AT BEDTIME FOR CHOLESTEROL ~~DO NOT EAT GRAPEFRUIT OR DRINK GRAPEFRUIT JUICE WHILE TAKING THIS MEDICATION~~ 30 tablet 5    LANTUS U-100 INSULIN 100 unit/mL injection Inject 20 Units into the skin once daily. Take at night 6 mL 11    losartan (COZAAR) 100 MG tablet Take 1 tablet (100 mg total) by mouth every evening. 30 tablet 5    omeprazole (PRILOSEC) 20 MG capsule Take 1 capsule (20 mg total) by mouth once daily. 30 capsule 5    ondansetron (ZOFRAN) 4 MG tablet Take 1 tablet (4 mg total) by mouth every 6 (six) hours. 12 tablet 0     No current facility-administered medications for this visit.       Allergies  Review of patient's allergies indicates:   Allergen Reactions    Aspirin      Other reaction(s): UPSET STOMACH   Other reaction(s): Unknown    Bactrim ds [sulfamethoxazole-trimethoprim] Itching       Physical Examination     Vitals:    04/17/24 1001   BP: (!) 150/84   Pulse: 98   Resp: 20   Temp: 97.7 °F (36.5 °C)       Physical Exam  Vitals and nursing note reviewed.   Constitutional:       Comments: Tearful during exam   HENT:      Head: Normocephalic.   Cardiovascular:      Rate and Rhythm: Normal rate and regular rhythm.      Pulses: Normal pulses.      Heart sounds: Normal heart sounds.   Pulmonary:      Effort: Pulmonary effort is normal. No respiratory distress.   Chest:      Chest wall: No tenderness.   Musculoskeletal:         General: Swelling, tenderness and  deformity present.      Right lower leg: Edema present.      Comments: Left shoulder decreased ROM with weakness to left upper extremity   Skin:     General: Skin is warm.      Capillary Refill: Capillary refill takes 2 to 3 seconds.      Findings: Erythema and lesion present.      Comments: Wound, see LDA for measurements and picture   Neurological:      Mental Status: She is alert and oriented to person, place, and time.   Psychiatric:         Mood and Affect: Mood normal.         Behavior: Behavior normal.         Thought Content: Thought content normal.         Judgment: Judgment normal.     Assessment and Plan             Wound 01/18/21 1051 Diabetic Ulcer Right lateral Plantar #1 (Active)   01/18/21 1051    Pre-existing: Yes   Primary Wound Type: Diabetic ulc   Side: Right   Orientation: lateral   Location: Plantar   Wound Number: #1   Ankle-Brachial Index:    Pulses: normal   Removal Indication and Assessment:    Wound Outcome:    (Retired) Wound Type:    (Retired) Wound Length (cm):    (Retired) Wound Width (cm):    (Retired) Depth (cm):    Wound Description (Comments):    Removal Indications:    Wound Image   04/17/24 1043            Incision/Site 01/12/23 1251 Right Foot (Active)   01/12/23 1251   Present Prior to Hospital Arrival?:    Side: Right   Location: Foot   Orientation:    Incision Type:    Closure Method:    Additional Comments:    Removal Indication and Assessment:    Wound Outcome:    Removal Indications:    Wound Image    04/17/24 1006   Dressing Appearance Dry;Intact;Clean 04/17/24 1006   Drainage Amount Moderate 04/17/24 1006   Drainage Characteristics/Odor Serosanguineous 04/17/24 1006   Appearance Pink;Red;Maroon;Black;Eschar;Yellow;Slough;Moist;Granulating 04/17/24 1006   Black (%), Wound Tissue Color 10 % 04/17/24 1006   Red (%), Wound Tissue Color 70 % 04/17/24 1006   Yellow (%), Wound Tissue Color 20 % 04/17/24 1006   Periwound Area Blistered 04/17/24 1006   Wound Edges Callused  04/17/24 1006   Wound Length (cm) 1.6 cm 04/17/24 1006   Wound Width (cm) 1.8 cm 04/17/24 1006   Wound Depth (cm) 0.2 cm 04/17/24 1006   Wound Volume (cm^3) 0.576 cm^3 04/17/24 1006   Wound Surface Area (cm^2) 2.88 cm^2 04/17/24 1006   Care Cleansed with:;Antimicrobial agent 04/17/24 1006   Dressing Applied;Silver;Gauze;Rolled gauze 04/17/24 1006       Problem List Items Addressed This Visit          Endocrine    Diabetic foot ulcer - Primary    Overview                                                            Current Assessment & Plan       Clean with vashe     Apply aquacel ag to right foot  Cover with foam border    Keep foot moisturized with ammonium lactate or lotion        Monitor closely for s/s of infection including fever, chills, increase in pain, odor from wound, and increased redness from foot. Go to ER if any complications develop.   Keep leg elevated and avoid pressure on wound  Diabetes:  Monitor glucose closely. Check fasting glucose and 2 hours after meals. HgA1C goal <7, fasting glucose , and 2 hours after meals <180  Hypertension:  Check blood pressure twice daily, goal <120/80  Diet:   Increase protein intake, avoid fried, fatty foods and foods high in simple carbs.   Vitamins:  Take vitamin C 1000 mg, zinc 50mg, vitamin d 5000 units, and a daily multivitamin. Dawson is a good source of protein and nutrients to aid in wound healing.     Take antibiotics as prescribed                Future Appointments   Date Time Provider Department Center   5/1/2024 10:00 AM Felisha Love FNP [place] [facility]   2/5/2025 11:15 AM [facility]            Signature:  TOMASA Maldonado  [facility]  [address]  [place] [address]  [phone]    Date of encounter: 4/17/24

## 2024-04-17 ENCOUNTER — OFFICE VISIT (OUTPATIENT)
Dept: WOUND CARE | Facility: CLINIC | Age: 64
End: 2024-04-17
Attending: FAMILY MEDICINE
Payer: MEDICAID

## 2024-04-17 VITALS
HEART RATE: 98 BPM | TEMPERATURE: 98 F | DIASTOLIC BLOOD PRESSURE: 84 MMHG | RESPIRATION RATE: 20 BRPM | SYSTOLIC BLOOD PRESSURE: 150 MMHG

## 2024-04-17 DIAGNOSIS — L97.415 DIABETIC ULCER OF RIGHT MIDFOOT ASSOCIATED WITH TYPE 2 DIABETES MELLITUS, WITH MUSCLE INVOLVEMENT WITHOUT EVIDENCE OF NECROSIS: Primary | ICD-10-CM

## 2024-04-17 DIAGNOSIS — E11.621 DIABETIC ULCER OF RIGHT MIDFOOT ASSOCIATED WITH TYPE 2 DIABETES MELLITUS, WITH MUSCLE INVOLVEMENT WITHOUT EVIDENCE OF NECROSIS: Primary | ICD-10-CM

## 2024-04-17 PROCEDURE — 11042 DBRDMT SUBQ TIS 1ST 20SQCM/<: CPT | Mod: PBBFAC | Performed by: NURSE PRACTITIONER

## 2024-04-17 PROCEDURE — 99215 OFFICE O/P EST HI 40 MIN: CPT | Mod: PBBFAC,25 | Performed by: NURSE PRACTITIONER

## 2024-04-17 PROCEDURE — 99499 UNLISTED E&M SERVICE: CPT | Mod: S$PBB,,, | Performed by: NURSE PRACTITIONER

## 2024-04-17 RX ORDER — AMMONIUM LACTATE 12 G/100G
1 CREAM TOPICAL DAILY
Qty: 385 G | Refills: 2 | Status: SHIPPED | OUTPATIENT
Start: 2024-04-17

## 2024-04-17 NOTE — PROGRESS NOTES
Debridement Performed for Assessment: Wound; right foot  Performed By: Provider: Felisha Sun NP  Assistant: MARCO Ngo RN    Debridement: Surgical    Photo taken post procedure: Yes    Time-Out Taken: Yes  Level: Skin/Subcutaneous Tissue  Post Debridement Measurements  Length: (cm) 1.8  Width: (cm) 1.9  Depth: (cm) 0.4      Area: (cm²) 3.4  Percent Debrided: 100%  Total Area Debrided: (sq cm) 3.4    Tissue and other material debrided:  Adipose, Dermis, Epidermis, Subcutaneous  Devitalized Tissue Debrided:Biofilm, Slough, Eschar  Instrument: Curette  Bleeding: Moderate  Hemostasis Achieved: Pressure  Procedural Pain: Insensate  Post Procedural Pain: Insensate  Response to Treatment: Procedure was tolerated well    Devitalized materials/tissue Removed  the following was removed during debridement  subcutaneous      Post Debridement Diagnosis  Post debridement diagnosis  Same as Pre-operative debridement diagnosis, No Complications noted.      Grafts or implants applied  Was a graft or implant applied?  No      Procedure assistant  Procedure assisted by:  Assistant is the same as nurse listed above      Complications related to procedure  Did any complication occure during procedure?  No complications noted during or after procedure.      Specimen  Specimen collect during procedure?  No specimen collected      Anaesthesia:  Anesthesia used  None      Blood Loss:  Blood loss during procedure  less than 5 cc

## 2024-04-17 NOTE — PROCEDURES
"Debridement    Date/Time: 4/17/2024 10:00 AM    Performed by: Felisha Love FNP  Authorized by: Felisha Love FNP    Time out: Immediately prior to procedure a "time out" was called to verify the correct patient, procedure, equipment, support staff and site/side marked as required.    Consent Done?:  Yes (Written)    Wound Details:    Location:  Right foot    Location:  Right Midfoot    Type of Debridement:  Excisional       Length (cm):  1.8       Area (sq cm):  3.42       Width (cm):  1.9       Percent Debrided (%):  100       Depth (cm):  0.4       Total Area Debrided (sq cm):  3.42    Depth of debridement:  Subcutaneous tissue    Tissue debrided:  Adipose, Dermis, Epidermis and Subcutaneous    Devitalized tissue debrided:  Biofilm, Callus, Clots and Exudate    Instruments:  Curette  Bleeding:  Moderate  Hemostasis Achieved: Yes  Method Used:  Pressure  Patient tolerance:  Patient tolerated the procedure well with no immediate complications  1st Wound Pain Assessment: 0     Assistant TIFFANI Duvall LPN    "

## 2024-04-18 NOTE — PROGRESS NOTES
TOMASA Maldonado   [facility]  [address] [facility] [place] [address]  [phone]      PATIENT NAME: Deborah Sotelo  : 1960  DATE: 24  MRN: [id number]      Billing Provider: TOMASA Maldnoado  Level of Service:   Patient PCP Information       Provider PCP Type    Ron Sanches MD General            Reason for Visit / Chief Complaint: Diabetic Foot Ulcer (R DFU)       History of Present Illness / Problem Focused Workflow     Deborah Sotelo is a 63 y.o. female presents to clinic for follow up on chronic-non healing wound on right TMA.Wound is healing well. Reports pain and tenderness to lateral ankle and cramps and spasms to lower leg. Labs ordered today. Continue with Aquacel ag to wound bed and ammonium lacate to soften callus.   Reinforced importance of local wound care, keeping pressure off foot, off-loading shoe, elevating legs, adherence to diabetic diet and strict glycemic control, and increasing protein intake.     Significant PMH diabetes, anemia, and CVA. Last HgA1C 7 in July, diabetes managed by PCP. Pertinent factors that delay wound healing include multiple co-morbidities, poor vascular supply, diabetes, edema, heavy drainage, decreased granulation tissue, tract(s), undermining and no protective sensation.       Review of Systems     Review of Systems   Constitutional:  Positive for activity change. Negative for appetite change, chills, diaphoresis and fever.   HENT: Negative.  Negative for congestion, ear pain, hearing loss and postnasal drip.    Eyes:  Negative for itching.   Respiratory:  Negative for chest tightness and shortness of breath.    Cardiovascular:  Positive for leg swelling. Negative for chest pain and palpitations.   Gastrointestinal:  Negative for abdominal pain.   Endocrine: Negative for polydipsia.   Genitourinary:  Negative for frequency.   Musculoskeletal:  Positive for arthralgias, back pain and joint swelling.         Severe shoulder pain, 10 of 10 on pain scale   Skin:  Positive for color change and wound.        See wound assessment   Neurological:  Positive for weakness and numbness. Negative for headaches.   Psychiatric/Behavioral:  Negative for agitation, behavioral problems, confusion and self-injury.        Medical / Social / Family History     Past Medical History:   Diagnosis Date    Anemia 7/5/2022    Diabetes mellitus, type 2     Hyperlipidemia     Hypertension     Obesity     Primary osteoarthritis of left shoulder 5/24/2022    Stroke        Past Surgical History:   Procedure Laterality Date    AMPUTATION      APPENDECTOMY      DEBRIDEMENT OF FOOT Right 1/12/2023    Procedure: DEBRIDEMENT, FOOT;  Surgeon: Ravi Ramirez MD;  Location: [facility];  Service: General;  Laterality: Right;    EYE SURGERY         Social History  Ms. Deborah Sotelo  reports that she has never smoked. She has never used smokeless tobacco. She reports current alcohol use. She reports that she does not use drugs.    Family History  Ms.'srinath Sotelo family history includes Appendicitis in her father; Asthma in her sister; Cancer in her mother; Diabetes in her brother.    Medications and Allergies     Medications  Current Outpatient Medications   Medication Sig Dispense Refill    amLODIPine (NORVASC) 5 MG tablet Take 1 tablet (5 mg total) by mouth once daily. 30 tablet 5    ammonium lactate 12 % Crea Apply 1 g topically Daily. 385 g 2    atorvastatin (LIPITOR) 80 MG tablet TAKE 1 TABLET BY MOUTH EACH NIGHT AT BEDTIME FOR CHOLESTEROL ~~DO NOT EAT GRAPEFRUIT OR DRINK GRAPEFRUIT JUICE WHILE TAKING THIS MEDICATION~~ 30 tablet 5    diclofenac sodium (VOLTAREN) 1 % Gel Apply 2 g topically 4 (four) times daily. 450 g 2    FEROSUL 325 mg (65 mg iron) Tab tablet       LIDOcaine (LIDODERM) 5 % Place 1 patch onto the skin once daily. Remove & Discard patch within 12 hours or as directed by MD 30 patch 2    ondansetron (ZOFRAN) 4 MG  tablet Take 1 tablet (4 mg total) by mouth every 6 (six) hours. 12 tablet 0    pantoprazole (PROTONIX) 40 MG tablet Take 40 mg by mouth once daily.      sodium hypochlorite 0.5 % (DAKIN'S SOLUTION) external solution Apply topically once daily. Pack wound with dakin's moistened nuguaze daily 473 mL 0    TRULICITY 0.75 mg/0.5 mL pen injector Inject 0.75 mg into the skin every 7 days.      insulin asp prt-insulin aspart, NovoLOG 70/30, (NOVOLOG 70/30) 100 unit/mL (70-30) Soln INJECT 50 UNITS SUB-Q EACH MORNING AND 35  UNITS EACH EVENING ...KEEP IN REFRIGERATOR ...USE WITHIN 28 DAYS AFTER OPENING EACH VIAL 30 mL 11    LANTUS U-100 INSULIN 100 unit/mL injection Inject 20 Units into the skin once daily. Take at night 6 mL 11    losartan (COZAAR) 100 MG tablet Take 1 tablet (100 mg total) by mouth every evening. 30 tablet 5    omeprazole (PRILOSEC) 20 MG capsule Take 1 capsule (20 mg total) by mouth once daily. 30 capsule 5     No current facility-administered medications for this visit.       Allergies  Review of patient's allergies indicates:   Allergen Reactions    Aspirin      Other reaction(s): UPSET STOMACH   Other reaction(s): Unknown    Bactrim ds [sulfamethoxazole-trimethoprim] Itching       Physical Examination     Vitals:    05/01/24 1010   BP: (!) 152/83   Pulse: 101   Resp: 20   Temp: 97.8 °F (36.6 °C)         Physical Exam  Vitals and nursing note reviewed.   Constitutional:       Comments: Tearful during exam   HENT:      Head: Normocephalic.   Cardiovascular:      Rate and Rhythm: Normal rate and regular rhythm.      Pulses: Normal pulses.      Heart sounds: Normal heart sounds.   Pulmonary:      Effort: Pulmonary effort is normal. No respiratory distress.   Chest:      Chest wall: No tenderness.   Musculoskeletal:         General: Swelling, tenderness and deformity present.      Right lower leg: Edema present.      Comments: Left shoulder decreased ROM with weakness to left upper extremity    Skin:     General: Skin is warm.      Capillary Refill: Capillary refill takes 2 to 3 seconds.      Findings: Erythema and lesion present.      Comments: Wound, see LDA for measurements and picture   Neurological:      Mental Status: She is alert and oriented to person, place, and time.   Psychiatric:         Mood and Affect: Mood normal.         Behavior: Behavior normal.         Thought Content: Thought content normal.         Judgment: Judgment normal.     Assessment and Plan             Wound 01/18/21 1051 Diabetic Ulcer Right lateral Plantar #1 (Active)   01/18/21 1051    Pre-existing: Yes   Primary Wound Type: Diabetic ulc   Side: Right   Orientation: lateral   Location: Plantar   Wound Number: #1   Ankle-Brachial Index:    Pulses: normal   Removal Indication and Assessment:    Wound Outcome:    (Retired) Wound Type:    (Retired) Wound Length (cm):    (Retired) Wound Width (cm):    (Retired) Depth (cm):    Wound Description (Comments):    Removal Indications:    Wound Image    05/01/24 1014   Dressing Appearance Dry;Intact;Clean 05/01/24 1014   Drainage Amount None 05/01/24 1014   Appearance Intact 05/01/24 1014   Tissue loss description Not applicable 05/01/24 1014   Black (%), Wound Tissue Color 0 % 05/01/24 1014   Red (%), Wound Tissue Color 0 % 05/01/24 1014   Yellow (%), Wound Tissue Color 0 % 05/01/24 1014   Periwound Area Intact 05/01/24 1014   Wound Edges Rolled/closed 05/01/24 1014   Wound Length (cm) 0 cm 05/01/24 1014   Wound Width (cm) 0 cm 05/01/24 1014   Wound Depth (cm) 0 cm 05/01/24 1014   Wound Volume (cm^3) 0 cm^3 05/01/24 1014   Wound Surface Area (cm^2) 0 cm^2 05/01/24 1014   Care Cleansed with:;Antimicrobial agent 05/01/24 1014   Dressing Applied 05/01/24 1014   Periwound Care Moisturizer applied 05/01/24 1014   Dressing Change Due 05/02/24 05/01/24 1014            Incision/Site 01/12/23 1251 Right Foot (Active)   01/12/23 1251   Present Prior to Hospital Arrival?:    Side: Right    Location: Foot   Orientation:    Incision Type:    Closure Method:    Additional Comments:    Removal Indication and Assessment:    Wound Outcome:    Removal Indications:    Wound Image     05/01/24 1016   Dressing Appearance Dried drainage 05/01/24 1016   Drainage Amount Moderate 05/01/24 1016   Drainage Characteristics/Odor Serosanguineous 05/01/24 1016   Appearance Pink;Moist;Granulating 05/01/24 1016   Black (%), Wound Tissue Color 0 % 05/01/24 1016   Red (%), Wound Tissue Color 100 % 05/01/24 1016   Yellow (%), Wound Tissue Color 0 % 05/01/24 1016   Periwound Area Intact;Dry 05/01/24 1016   Wound Edges Callused 05/01/24 1016   Wound Length (cm) 0.3 cm 05/01/24 1016   Wound Width (cm) 0.5 cm 05/01/24 1016   Wound Depth (cm) 0.3 cm 05/01/24 1016   Wound Volume (cm^3) 0.045 cm^3 05/01/24 1016   Wound Surface Area (cm^2) 0.15 cm^2 05/01/24 1016   Care Cleansed with:;Antimicrobial agent 05/01/24 1016   Dressing Applied;Gauze;Rolled gauze 05/01/24 1016   Periwound Care Moisturizer applied 05/01/24 1016   Dressing Change Due 05/02/24 05/01/24 1016         Problem List Items Addressed This Visit          Endocrine    Diabetic foot ulcer - Primary    Overview                                                        Current Assessment & Plan       Clean with vashe     Apply aquacel ag to right foot  Cover with foam border    Keep foot moisturized with ammonium lactate or lotion        Monitor closely for s/s of infection including fever, chills, increase in pain, odor from wound, and increased redness from foot. Go to ER if any complications develop.   Keep leg elevated and avoid pressure on wound  Diabetes:  Monitor glucose closely. Check fasting glucose and 2 hours after meals. HgA1C goal <7, fasting glucose , and 2 hours after meals <180  Hypertension:  Check blood pressure twice daily, goal <120/80  Diet:   Increase protein intake, avoid fried, fatty foods and foods high in simple carbs.   Vitamins:  Take  vitamin C 1000 mg, zinc 50mg, vitamin d 5000 units, and a daily multivitamin. Dawson is a good source of protein and nutrients to aid in wound healing.     Take antibiotics as prescribed             Relevant Orders    CBC Auto Differential    Comprehensive Metabolic Panel    C-Reactive Protein    Sedimentation Rate       Future Appointments   Date Time Provider Department Center   5/22/2024 10:00 AM Felisha Love FNP [place] [facility]   2/5/2025 11:15 AM [facility]            Signature:  TOMASA Maldonado  [facility]  [address]  [facility] [address]  [phone]    Date of encounter: 5/1/24

## 2024-05-01 ENCOUNTER — OFFICE VISIT (OUTPATIENT)
Dept: WOUND CARE | Facility: CLINIC | Age: 64
End: 2024-05-01
Attending: FAMILY MEDICINE
Payer: MEDICAID

## 2024-05-01 VITALS
HEART RATE: 101 BPM | RESPIRATION RATE: 20 BRPM | DIASTOLIC BLOOD PRESSURE: 83 MMHG | SYSTOLIC BLOOD PRESSURE: 152 MMHG | TEMPERATURE: 98 F

## 2024-05-01 DIAGNOSIS — E11.621 DIABETIC ULCER OF RIGHT MIDFOOT ASSOCIATED WITH TYPE 2 DIABETES MELLITUS, WITH MUSCLE INVOLVEMENT WITHOUT EVIDENCE OF NECROSIS: Primary | ICD-10-CM

## 2024-05-01 DIAGNOSIS — L97.415 DIABETIC ULCER OF RIGHT MIDFOOT ASSOCIATED WITH TYPE 2 DIABETES MELLITUS, WITH MUSCLE INVOLVEMENT WITHOUT EVIDENCE OF NECROSIS: Primary | ICD-10-CM

## 2024-05-01 PROCEDURE — 4010F ACE/ARB THERAPY RXD/TAKEN: CPT | Mod: CPTII,,, | Performed by: NURSE PRACTITIONER

## 2024-05-01 PROCEDURE — 3077F SYST BP >= 140 MM HG: CPT | Mod: CPTII,,, | Performed by: NURSE PRACTITIONER

## 2024-05-01 PROCEDURE — 99215 OFFICE O/P EST HI 40 MIN: CPT | Mod: PBBFAC | Performed by: NURSE PRACTITIONER

## 2024-05-01 PROCEDURE — 3079F DIAST BP 80-89 MM HG: CPT | Mod: CPTII,,, | Performed by: NURSE PRACTITIONER

## 2024-05-01 PROCEDURE — 1159F MED LIST DOCD IN RCRD: CPT | Mod: CPTII,,, | Performed by: NURSE PRACTITIONER

## 2024-05-01 PROCEDURE — 1160F RVW MEDS BY RX/DR IN RCRD: CPT | Mod: CPTII,,, | Performed by: NURSE PRACTITIONER

## 2024-05-01 PROCEDURE — 99213 OFFICE O/P EST LOW 20 MIN: CPT | Mod: S$PBB,,, | Performed by: NURSE PRACTITIONER

## 2024-05-09 NOTE — PATIENT INSTRUCTIONS
Clean with vashe     Apply puracol plus to wound  Cover with foam border    Keep foot moisturized with ammonium lactate or lotion        Monitor closely for s/s of infection including fever, chills, increase in pain, odor from wound, and increased redness from foot. Go to ER if any complications develop.   Keep leg elevated and avoid pressure on wound  Diabetes:  Monitor glucose closely. Check fasting glucose and 2 hours after meals. HgA1C goal <7, fasting glucose , and 2 hours after meals <180  Hypertension:  Check blood pressure twice daily, goal <120/80  Diet:   Increase protein intake, avoid fried, fatty foods and foods high in simple carbs.   Vitamins:  Take vitamin C 1000 mg, zinc 50mg, vitamin d 5000 units, and a daily multivitamin. Dawson is a good source of protein and nutrients to aid in wound healing.     Take antibiotics as prescribed

## 2024-05-09 NOTE — PROGRESS NOTES
TOMASA Maldonado   [facility]  [address] [facility] [place] [address]  [phone]      PATIENT NAME: Deborah Sotelo  : 1960  DATE: 24  MRN: [id number]      Billing Provider: TOMASA Maldonado  Level of Service:   Patient PCP Information       Provider PCP Type    Ron Sanches MD General            Reason for Visit / Chief Complaint: Diabetic Foot Ulcer (R DFU)       History of Present Illness / Problem Focused Workflow     Deborah Sotelo is a 63 y.o. female presents to clinic for follow up on chronic-non healing wound on right TMA.Wound is healing well. Puracol ag to wound. Continue with ammonium lactate.   Reinforced importance of local wound care, keeping pressure off foot, off-loading shoe, elevating legs, adherence to diabetic diet and strict glycemic control, and increasing protein intake.     Significant PMH diabetes, anemia, and CVA. Last HgA1C 7 in July, diabetes managed by PCP. Pertinent factors that delay wound healing include multiple co-morbidities, poor vascular supply, diabetes, edema, heavy drainage, decreased granulation tissue, tract(s), undermining and no protective sensation.       Review of Systems     Review of Systems   Constitutional:  Positive for activity change. Negative for appetite change, chills, diaphoresis and fever.   HENT: Negative.  Negative for congestion, ear pain, hearing loss and postnasal drip.    Eyes:  Negative for itching.   Respiratory:  Negative for chest tightness and shortness of breath.    Cardiovascular:  Positive for leg swelling. Negative for chest pain and palpitations.   Gastrointestinal:  Negative for abdominal pain.   Endocrine: Negative for polydipsia.   Genitourinary:  Negative for frequency.   Musculoskeletal:  Positive for arthralgias, back pain and joint swelling.        Severe shoulder pain, 10 of 10 on pain scale   Skin:  Positive for color change and wound.        See wound assessment    Neurological:  Positive for weakness and numbness. Negative for headaches.   Psychiatric/Behavioral:  Negative for agitation, behavioral problems, confusion and self-injury.        Medical / Social / Family History     Past Medical History:   Diagnosis Date    Anemia 7/5/2022    Diabetes mellitus, type 2     Hyperlipidemia     Hypertension     Obesity     Primary osteoarthritis of left shoulder 5/24/2022    Stroke        Past Surgical History:   Procedure Laterality Date    AMPUTATION      APPENDECTOMY      DEBRIDEMENT OF FOOT Right 1/12/2023    Procedure: DEBRIDEMENT, FOOT;  Surgeon: Ravi Ramirez MD;  Location: [facility];  Service: General;  Laterality: Right;    EYE SURGERY         Social History  Ms. Deborah Sotelo  reports that she has never smoked. She has never used smokeless tobacco. She reports current alcohol use. She reports that she does not use drugs.    Family History  Ms.'s Deborah Sotelo family history includes Appendicitis in her father; Asthma in her sister; Cancer in her mother; Diabetes in her brother.    Medications and Allergies     Medications  Outpatient Medications Marked as Taking for the 5/22/24 encounter (Office Visit) with Felisha Love FNP   Medication Sig Dispense Refill    insulin asp prt-insulin aspart, NovoLOG 70/30, (NOVOLOG 70/30) 100 unit/mL (70-30) Soln INJECT 50 UNITS SUB-Q EACH MORNING AND 35  UNITS EACH EVENING ...KEEP IN REFRIGERATOR ...USE WITHIN 28 DAYS AFTER OPENING EACH VIAL 30 mL 11    LIDOcaine (LIDODERM) 5 % Place 1 patch onto the skin once daily. Remove & Discard patch within 12 hours or as directed by MD 30 patch 2    nitrofurantoin, macrocrystal-monohydrate, (MACROBID) 100 MG capsule Take 1 capsule (100 mg total) by mouth 2 (two) times daily. for 5 days 10 capsule 0    ondansetron (ZOFRAN) 4 MG tablet Take 1 tablet (4 mg total) by mouth every 6 (six) hours. 12 tablet 0    pantoprazole (PROTONIX) 40 MG tablet Take 40 mg by mouth once daily.       sodium hypochlorite 0.5 % (DAKIN'S SOLUTION) external solution Apply topically once daily. Pack wound with dakin's moistened nuguaze daily 473 mL 0    TRULICITY 0.75 mg/0.5 mL pen injector Inject 0.75 mg into the skin every 7 days.         Allergies  Review of patient's allergies indicates:   Allergen Reactions    Aspirin      Other reaction(s): UPSET STOMACH   Other reaction(s): Unknown    Bactrim ds [sulfamethoxazole-trimethoprim] Itching       Physical Examination     Vitals:    05/22/24 1017   BP: (!) 152/82   Pulse: 83   Resp: 20   Temp: 97.4 °F (36.3 °C)           Physical Exam  Vitals and nursing note reviewed.   Constitutional:       Comments: Tearful during exam   HENT:      Head: Normocephalic.   Cardiovascular:      Rate and Rhythm: Normal rate and regular rhythm.      Pulses: Normal pulses.      Heart sounds: Normal heart sounds.   Pulmonary:      Effort: Pulmonary effort is normal. No respiratory distress.   Chest:      Chest wall: No tenderness.   Musculoskeletal:         General: Swelling, tenderness and deformity present.      Right lower leg: Edema present.      Comments: Left shoulder decreased ROM with weakness to left upper extremity   Skin:     General: Skin is warm.      Capillary Refill: Capillary refill takes 2 to 3 seconds.      Findings: Erythema and lesion present.      Comments: Wound, see LDA for measurements and picture   Neurological:      Mental Status: She is alert and oriented to person, place, and time.   Psychiatric:         Mood and Affect: Mood normal.         Behavior: Behavior normal.         Thought Content: Thought content normal.         Judgment: Judgment normal.     Assessment and Plan             Incision/Site 01/12/23 1251 Right Foot (Active)   01/12/23 1251   Present Prior to Hospital Arrival?:    Side: Right   Location: Foot   Orientation:    Incision Type:    Closure Method:    Additional Comments:    Removal Indication and Assessment:    Wound Outcome:     Removal Indications:    Wound Image     05/22/24 1019   Dressing Appearance Moist drainage 05/22/24 1019   Drainage Amount Moderate 05/22/24 1019   Drainage Characteristics/Odor Serosanguineous 05/22/24 1019   Appearance Pink;Moist;Granulating 05/22/24 1019   Black (%), Wound Tissue Color 0 % 05/22/24 1019   Red (%), Wound Tissue Color 100 % 05/22/24 1019   Yellow (%), Wound Tissue Color 0 % 05/22/24 1019   Periwound Area Intact;Dry 05/22/24 1019   Wound Edges Callused 05/22/24 1019   Wound Length (cm) 0.6 cm 05/22/24 1019   Wound Width (cm) 0.4 cm 05/22/24 1019   Wound Depth (cm) 0.3 cm 05/22/24 1019   Wound Volume (cm^3) 0.072 cm^3 05/22/24 1019   Wound Surface Area (cm^2) 0.24 cm^2 05/22/24 1019   Care Cleansed with:;Antimicrobial agent 05/22/24 1019   Dressing Applied;Gauze;Rolled gauze 05/22/24 1019   Periwound Care Moisture barrier applied 05/22/24 1019   Dressing Change Due 05/23/24 05/22/24 1019           Problem List Items Addressed This Visit          Endocrine    Diabetic foot ulcer - Primary    Overview                                                      Current Assessment & Plan       Clean with vashe     Apply puracol plus to wound  Cover with foam border    Keep foot moisturized with ammonium lactate or lotion        Monitor closely for s/s of infection including fever, chills, increase in pain, odor from wound, and increased redness from foot. Go to ER if any complications develop.   Keep leg elevated and avoid pressure on wound  Diabetes:  Monitor glucose closely. Check fasting glucose and 2 hours after meals. HgA1C goal <7, fasting glucose , and 2 hours after meals <180  Hypertension:  Check blood pressure twice daily, goal <120/80  Diet:   Increase protein intake, avoid fried, fatty foods and foods high in simple carbs.   Vitamins:  Take vitamin C 1000 mg, zinc 50mg, vitamin d 5000 units, and a daily multivitamin. Dawson is a good source of protein and nutrients to aid in wound healing.      Take antibiotics as prescribed                Future Appointments   Date Time Provider Department Center   6/12/2024 10:00 AM Felisha Love FNP [facility]   2/5/2025 11:15 AM [facility]            Signature:  TOMASA Maldonado  [facility]  [address] [facility] [address]  [phone]    Date of encounter: 5/22/24

## 2024-05-22 ENCOUNTER — OFFICE VISIT (OUTPATIENT)
Dept: WOUND CARE | Facility: CLINIC | Age: 64
End: 2024-05-22
Attending: FAMILY MEDICINE
Payer: MEDICAID

## 2024-05-22 VITALS
DIASTOLIC BLOOD PRESSURE: 82 MMHG | RESPIRATION RATE: 20 BRPM | HEART RATE: 83 BPM | SYSTOLIC BLOOD PRESSURE: 152 MMHG | TEMPERATURE: 97 F

## 2024-05-22 DIAGNOSIS — L97.415 DIABETIC ULCER OF RIGHT MIDFOOT ASSOCIATED WITH TYPE 2 DIABETES MELLITUS, WITH MUSCLE INVOLVEMENT WITHOUT EVIDENCE OF NECROSIS: Primary | ICD-10-CM

## 2024-05-22 DIAGNOSIS — E11.621 DIABETIC ULCER OF RIGHT MIDFOOT ASSOCIATED WITH TYPE 2 DIABETES MELLITUS, WITH MUSCLE INVOLVEMENT WITHOUT EVIDENCE OF NECROSIS: Primary | ICD-10-CM

## 2024-05-22 PROCEDURE — 4010F ACE/ARB THERAPY RXD/TAKEN: CPT | Mod: CPTII,,, | Performed by: NURSE PRACTITIONER

## 2024-05-22 PROCEDURE — 3077F SYST BP >= 140 MM HG: CPT | Mod: CPTII,,, | Performed by: NURSE PRACTITIONER

## 2024-05-22 PROCEDURE — 99215 OFFICE O/P EST HI 40 MIN: CPT | Mod: PBBFAC | Performed by: NURSE PRACTITIONER

## 2024-05-22 PROCEDURE — 1160F RVW MEDS BY RX/DR IN RCRD: CPT | Mod: CPTII,,, | Performed by: NURSE PRACTITIONER

## 2024-05-22 PROCEDURE — 1159F MED LIST DOCD IN RCRD: CPT | Mod: CPTII,,, | Performed by: NURSE PRACTITIONER

## 2024-05-22 PROCEDURE — 99213 OFFICE O/P EST LOW 20 MIN: CPT | Mod: S$PBB,,, | Performed by: NURSE PRACTITIONER

## 2024-05-22 PROCEDURE — 3079F DIAST BP 80-89 MM HG: CPT | Mod: CPTII,,, | Performed by: NURSE PRACTITIONER

## 2024-05-22 RX ORDER — NITROFURANTOIN 25; 75 MG/1; MG/1
100 CAPSULE ORAL 2 TIMES DAILY
Qty: 10 CAPSULE | Refills: 0 | Status: SHIPPED | OUTPATIENT
Start: 2024-05-22 | End: 2024-05-27

## 2024-06-17 NOTE — PROGRESS NOTES
TOMASA Maldonado   [facility]  [address]  [facility] [place] [address]  [phone]      PATIENT NAME: Deborah Sotelo  : 1960  DATE: 24  MRN: [id number]      Billing Provider: TOMASA Maldonado  Level of Service:   Patient PCP Information       Provider PCP Type    Ron Sanches MD General            Reason for Visit / Chief Complaint: Diabetic Foot Ulcer (R DFU)       History of Present Illness / Problem Focused Workflow     Deborah Sotelo is a 62 yo female presents for follow up on chronic non healing wound to right TMA. Wound is larger today with purulent drainage. Cultures and x-ray today. Initiate broad spectrum antibiotics. Pack with gentamicin and nuguaze.         63 y.o. female presents to clinic for follow up on chronic-non healing wound on right TMA.Wound is healing well. Reports pain and tenderness to lateral ankle and cramps and spasms to lower leg. Labs ordered today. Continue with Aquacel ag to wound bed and ammonium lacate to soften callus.   Reinforced importance of local wound care, keeping pressure off foot, off-loading shoe, elevating legs, adherence to diabetic diet and strict glycemic control, and increasing protein intake.     Significant PMH diabetes, anemia, and CVA. Last HgA1C 7 in July, diabetes managed by PCP. Pertinent factors that delay wound healing include multiple co-morbidities, poor vascular supply, diabetes, edema, heavy drainage, decreased granulation tissue, tract(s), undermining and no protective sensation.         Review of Systems     Review of Systems   Constitutional:  Positive for activity change. Negative for appetite change, chills, diaphoresis and fever.   HENT: Negative.  Negative for congestion, ear pain, hearing loss and postnasal drip.    Eyes:  Negative for itching.   Respiratory:  Negative for chest tightness and shortness of breath.    Cardiovascular:  Positive for leg swelling.  Negative for chest pain and palpitations.   Gastrointestinal:  Negative for abdominal pain.   Endocrine: Negative for polydipsia.   Genitourinary:  Negative for frequency.   Musculoskeletal:  Positive for arthralgias, back pain and joint swelling.        Severe shoulder pain, 10 of 10 on pain scale   Skin:  Positive for color change and wound.        See wound assessment   Neurological:  Positive for weakness and numbness. Negative for headaches.   Psychiatric/Behavioral:  Negative for agitation, behavioral problems, confusion and self-injury.        Medical / Social / Family History     Past Medical History:   Diagnosis Date    Anemia 7/5/2022    Diabetes mellitus, type 2     Hyperlipidemia     Hypertension     Obesity     Primary osteoarthritis of left shoulder 5/24/2022    Stroke        Past Surgical History:   Procedure Laterality Date    AMPUTATION      APPENDECTOMY      DEBRIDEMENT OF FOOT Right 1/12/2023    Procedure: DEBRIDEMENT, FOOT;  Surgeon: Ravi Ramirez MD;  Location: [facility];  Service: General;  Laterality: Right;    EYE SURGERY         Social History  Ms. Deborah Sotelo  reports that she has never smoked. She has never used smokeless tobacco. She reports current alcohol use. She reports that she does not use drugs.    Family History  Ms.'srinath Sotelo family history includes Appendicitis in her father; Asthma in her sister; Cancer in her mother; Diabetes in her brother.    Medications and Allergies     Medications  Outpatient Medications Marked as Taking for the 6/20/24 encounter (Office Visit) with Felisha Love FNP   Medication Sig Dispense Refill    amLODIPine (NORVASC) 5 MG tablet Take 1 tablet (5 mg total) by mouth once daily. 30 tablet 5    ammonium lactate 12 % Crea Apply 1 g topically Daily. 385 g 2    atorvastatin (LIPITOR) 80 MG tablet TAKE 1 TABLET BY MOUTH EACH NIGHT AT BEDTIME FOR CHOLESTEROL ~~DO NOT EAT GRAPEFRUIT OR DRINK GRAPEFRUIT JUICE WHILE TAKING THIS MEDICATION~~ 30  tablet 5    diclofenac sodium (VOLTAREN) 1 % Gel Apply 2 g topically 4 (four) times daily. 450 g 2    FEROSUL 325 mg (65 mg iron) Tab tablet       insulin asp prt-insulin aspart, NovoLOG 70/30, (NOVOLOG 70/30) 100 unit/mL (70-30) Soln INJECT 50 UNITS SUB-Q EACH MORNING AND 35  UNITS EACH EVENING ...KEEP IN REFRIGERATOR ...USE WITHIN 28 DAYS AFTER OPENING EACH VIAL 30 mL 11    ondansetron (ZOFRAN) 4 MG tablet Take 1 tablet (4 mg total) by mouth every 6 (six) hours. 12 tablet 0    pantoprazole (PROTONIX) 40 MG tablet Take 40 mg by mouth once daily.      sodium hypochlorite 0.5 % (DAKIN'S SOLUTION) external solution Apply topically once daily. Pack wound with dakin's moistened nuguaze daily 473 mL 0    TRULICITY 0.75 mg/0.5 mL pen injector Inject 0.75 mg into the skin every 7 days.         Allergies  Review of patient's allergies indicates:   Allergen Reactions    Aspirin      Other reaction(s): UPSET STOMACH   Other reaction(s): Unknown    Bactrim ds [sulfamethoxazole-trimethoprim] Itching       Physical Examination     Vitals:    06/20/24 1317   BP: (!) 158/87   Pulse: 98   Resp: 20   Temp: 98 °F (36.7 °C)           Physical Exam  Vitals and nursing note reviewed.   Constitutional:       Comments: Tearful during exam   HENT:      Head: Normocephalic.   Cardiovascular:      Rate and Rhythm: Normal rate and regular rhythm.      Pulses: Normal pulses.      Heart sounds: Normal heart sounds.   Pulmonary:      Effort: Pulmonary effort is normal. No respiratory distress.   Chest:      Chest wall: No tenderness.   Musculoskeletal:         General: Swelling, tenderness and deformity present.      Right lower leg: Edema present.      Comments: Left shoulder decreased ROM with weakness to left upper extremity   Skin:     General: Skin is warm.      Capillary Refill: Capillary refill takes 2 to 3 seconds.      Findings: Erythema and lesion present.      Comments: Wound, see LDA for measurements and picture   Neurological:       Mental Status: She is alert and oriented to person, place, and time.   Psychiatric:         Mood and Affect: Mood normal.         Behavior: Behavior normal.         Thought Content: Thought content normal.         Judgment: Judgment normal.     Assessment and Plan             Wound 01/18/21 1051 Diabetic Ulcer Right lateral Plantar #1 (Active)   01/18/21 1051    Pre-existing: Yes   Primary Wound Type: Diabetic ulc   Side: Right   Orientation: lateral   Location: Plantar   Wound Number: #1   Ankle-Brachial Index:    Pulses: normal   Removal Indication and Assessment:    Wound Outcome:    (Retired) Wound Type:    (Retired) Wound Length (cm):    (Retired) Wound Width (cm):    (Retired) Depth (cm):    Wound Description (Comments):    Removal Indications:    Wound Image    06/20/24 1323   Dressing Appearance Open to air 06/20/24 1323   Drainage Amount None 06/20/24 1323   Appearance Maroon 06/20/24 1323   Tissue loss description Not applicable 06/20/24 1323   Black (%), Wound Tissue Color 0 % 06/20/24 1323   Red (%), Wound Tissue Color 0 % 06/20/24 1323   Yellow (%), Wound Tissue Color 0 % 06/20/24 1323   Periwound Area Intact;Dry 06/20/24 1323   Wound Edges Rolled/closed 06/20/24 1323   Wound Length (cm) 0 cm 06/20/24 1323   Wound Width (cm) 0 cm 06/20/24 1323   Wound Depth (cm) 0 cm 06/20/24 1323   Wound Volume (cm^3) 0 cm^3 06/20/24 1323   Wound Surface Area (cm^2) 0 cm^2 06/20/24 1323   Care Cleansed with:;Antimicrobial agent 06/20/24 1323   Dressing Applied;Gauze;Rolled gauze 06/20/24 1323   Dressing Change Due 06/21/24 06/20/24 1323            Incision/Site 01/12/23 1251 Right Foot (Active)   01/12/23 1251   Present Prior to Hospital Arrival?:    Side: Right   Location: Foot   Orientation:    Incision Type:    Closure Method:    Additional Comments:    Removal Indication and Assessment:    Wound Outcome:    Removal Indications:    Wound Image     06/20/24 1324   Drainage Amount Moderate 06/20/24 1324   Drainage  Characteristics/Odor Serosanguineous 06/20/24 1324   Appearance Pink;Moist;Granulating;Tan;Slough 06/20/24 1324   Black (%), Wound Tissue Color 0 % 06/20/24 1324   Red (%), Wound Tissue Color 75 % 06/20/24 1324   Yellow (%), Wound Tissue Color 25 % 06/20/24 1324   Periwound Area Intact;Dry 06/20/24 1324   Wound Edges Callused 06/20/24 1324   Wound Length (cm) 0.7 cm 06/20/24 1324   Wound Width (cm) 0.7 cm 06/20/24 1324   Wound Depth (cm) 0.5 cm 06/20/24 1324   Wound Volume (cm^3) 0.245 cm^3 06/20/24 1324   Wound Surface Area (cm^2) 0.49 cm^2 06/20/24 1324   Care Cleansed with:;Antimicrobial agent 06/20/24 1324   Dressing Applied;Gauze;Rolled gauze 06/20/24 1324   Periwound Care Moisturizer applied 06/20/24 1324   Dressing Change Due 06/21/24 06/20/24 1324           Problem List Items Addressed This Visit          Endocrine    Diabetic foot ulcer - Primary    Overview                                                        Current Assessment & Plan       Clean with vashe     Pack wound with gentamicin moisten nu gauze,cover with dry gauze,wrap with meng,secure with paper tape. Change daily and as needed  Cover with foam border    Keep foot moisturized with ammonium lactate or lotion        Monitor closely for s/s of infection including fever, chills, increase in pain, odor from wound, and increased redness from foot. Go to ER if any complications develop.   Keep leg elevated and avoid pressure on wound  Diabetes:  Monitor glucose closely. Check fasting glucose and 2 hours after meals. HgA1C goal <7, fasting glucose , and 2 hours after meals <180  Hypertension:  Check blood pressure twice daily, goal <120/80  Diet:   Increase protein intake, avoid fried, fatty foods and foods high in simple carbs.   Vitamins:  Take vitamin C 1000 mg, zinc 50mg, vitamin d 5000 units, and a daily multivitamin. Dawson is a good source of protein and nutrients to aid in wound healing.     Take antibiotics as prescribed              Relevant Orders    Culture, Wound    Culture, Anaerobe    X-Ray Foot Complete 3 view Right       Future Appointments   Date Time Provider Department Center   7/11/2024  1:00 PM [facility]   2/5/2025 11:15 AM [facility]            Signature:  TOMASA Maldonado  [facility]  [address] [facility] [address]  [phone]    Date of encounter: 6/20/24

## 2024-06-17 NOTE — PATIENT INSTRUCTIONS
Clean with vashe     Pack wound with gentamicin moisten nu gauze,cover with dry gauze,wrap with meng,secure with paper tape. Change daily and as needed  Cover with foam border    Keep foot moisturized with ammonium lactate or lotion        Monitor closely for s/s of infection including fever, chills, increase in pain, odor from wound, and increased redness from foot. Go to ER if any complications develop.   Keep leg elevated and avoid pressure on wound  Diabetes:  Monitor glucose closely. Check fasting glucose and 2 hours after meals. HgA1C goal <7, fasting glucose , and 2 hours after meals <180  Hypertension:  Check blood pressure twice daily, goal <120/80  Diet:   Increase protein intake, avoid fried, fatty foods and foods high in simple carbs.   Vitamins:  Take vitamin C 1000 mg, zinc 50mg, vitamin d 5000 units, and a daily multivitamin. Dawson is a good source of protein and nutrients to aid in wound healing.     Take antibiotics as prescribed

## 2024-06-20 ENCOUNTER — HOSPITAL ENCOUNTER (OUTPATIENT)
Dept: RADIOLOGY | Facility: HOSPITAL | Age: 64
Discharge: HOME | End: 2024-06-20
Attending: NURSE PRACTITIONER
Payer: MEDICAID

## 2024-06-20 ENCOUNTER — OFFICE VISIT (OUTPATIENT)
Dept: WOUND CARE | Facility: CLINIC | Age: 64
End: 2024-06-20
Attending: FAMILY MEDICINE
Payer: MEDICAID

## 2024-06-20 VITALS
RESPIRATION RATE: 20 BRPM | HEART RATE: 98 BPM | DIASTOLIC BLOOD PRESSURE: 87 MMHG | SYSTOLIC BLOOD PRESSURE: 158 MMHG | TEMPERATURE: 98 F

## 2024-06-20 DIAGNOSIS — L97.415 DIABETIC ULCER OF RIGHT MIDFOOT ASSOCIATED WITH TYPE 2 DIABETES MELLITUS, WITH MUSCLE INVOLVEMENT WITHOUT EVIDENCE OF NECROSIS: Primary | ICD-10-CM

## 2024-06-20 DIAGNOSIS — E11.621 DIABETIC ULCER OF RIGHT MIDFOOT ASSOCIATED WITH TYPE 2 DIABETES MELLITUS, WITH MUSCLE INVOLVEMENT WITHOUT EVIDENCE OF NECROSIS: Primary | ICD-10-CM

## 2024-06-20 DIAGNOSIS — E11.621 DIABETIC ULCER OF RIGHT MIDFOOT ASSOCIATED WITH TYPE 2 DIABETES MELLITUS, WITH MUSCLE INVOLVEMENT WITHOUT EVIDENCE OF NECROSIS: ICD-10-CM

## 2024-06-20 DIAGNOSIS — L97.415 DIABETIC ULCER OF RIGHT MIDFOOT ASSOCIATED WITH TYPE 2 DIABETES MELLITUS, WITH MUSCLE INVOLVEMENT WITHOUT EVIDENCE OF NECROSIS: ICD-10-CM

## 2024-06-20 PROCEDURE — 99213 OFFICE O/P EST LOW 20 MIN: CPT | Mod: S$PBB,,, | Performed by: NURSE PRACTITIONER

## 2024-06-20 PROCEDURE — 3079F DIAST BP 80-89 MM HG: CPT | Mod: CPTII,,, | Performed by: NURSE PRACTITIONER

## 2024-06-20 PROCEDURE — 3077F SYST BP >= 140 MM HG: CPT | Mod: CPTII,,, | Performed by: NURSE PRACTITIONER

## 2024-06-20 PROCEDURE — 99999 PR PBB SHADOW E&M-EST. PATIENT-LVL V: CPT | Mod: PBBFAC,,, | Performed by: NURSE PRACTITIONER

## 2024-06-20 PROCEDURE — 87077 CULTURE AEROBIC IDENTIFY: CPT | Mod: ,,, | Performed by: CLINICAL MEDICAL LABORATORY

## 2024-06-20 PROCEDURE — 1160F RVW MEDS BY RX/DR IN RCRD: CPT | Mod: CPTII,,, | Performed by: NURSE PRACTITIONER

## 2024-06-20 PROCEDURE — 1159F MED LIST DOCD IN RCRD: CPT | Mod: CPTII,,, | Performed by: NURSE PRACTITIONER

## 2024-06-20 PROCEDURE — 73630 X-RAY EXAM OF FOOT: CPT | Mod: TC,RT

## 2024-06-20 PROCEDURE — 99215 OFFICE O/P EST HI 40 MIN: CPT | Mod: PBBFAC,25 | Performed by: NURSE PRACTITIONER

## 2024-06-20 PROCEDURE — 4010F ACE/ARB THERAPY RXD/TAKEN: CPT | Mod: CPTII,,, | Performed by: NURSE PRACTITIONER

## 2024-06-20 PROCEDURE — 87186 SC STD MICRODIL/AGAR DIL: CPT | Mod: ,,, | Performed by: CLINICAL MEDICAL LABORATORY

## 2024-06-20 PROCEDURE — 87070 CULTURE OTHR SPECIMN AEROBIC: CPT | Mod: ,,, | Performed by: CLINICAL MEDICAL LABORATORY

## 2024-06-20 RX ORDER — AMOXICILLIN AND CLAVULANATE POTASSIUM 875; 125 MG/1; MG/1
1 TABLET, FILM COATED ORAL EVERY 12 HOURS
Qty: 20 TABLET | Refills: 0 | Status: SHIPPED | OUTPATIENT
Start: 2024-06-20 | End: 2024-06-30

## 2024-06-20 RX ORDER — GENTAMICIN SULFATE 1 MG/G
CREAM TOPICAL DAILY
Qty: 90 G | Refills: 0 | Status: SHIPPED | OUTPATIENT
Start: 2024-06-20

## 2024-06-20 RX ORDER — DOXYCYCLINE HYCLATE 100 MG
100 TABLET ORAL 2 TIMES DAILY
Qty: 60 TABLET | Refills: 0 | Status: SHIPPED | OUTPATIENT
Start: 2024-06-20 | End: 2024-07-20

## 2024-06-22 LAB — MICROORGANISM SPEC CULT: ABNORMAL

## 2024-07-11 NOTE — PROGRESS NOTES
TOMASA Maldonado   [facility]  [address] [facility] [place] [address]  [phone]      PATIENT NAME: Deborah Sotelo  : 1960  DATE: 24  MRN: [id number]      Billing Provider: TOMASA Maldonado  Level of Service:   Patient PCP Information       Provider PCP Type    Ron Sanches MD General            Reason for Visit / Chief Complaint: Diabetic Foot Ulcer (Right plantar foot)       History of Present Illness / Problem Focused Workflow     Deborah Sotelo is a 64 yo female presents for follow up on chronic non healing wound to right TMA. Wound has improved since last visit. Biofilm and callus jonathon-wound, bedside debridement today. Continue with gentamicin and drawtex. Lateral aspect of foot is erythematous. Pad area with foam dressing. Continue with doxycycline.         63 y.o. female presents to clinic for follow up on chronic-non healing wound on right TMA.Wound is healing well. Reports pain and tenderness to lateral ankle and cramps and spasms to lower leg. Labs ordered today. Continue with Aquacel ag to wound bed and ammonium lacate to soften callus.   Reinforced importance of local wound care, keeping pressure off foot, off-loading shoe, elevating legs, adherence to diabetic diet and strict glycemic control, and increasing protein intake.     Significant PMH diabetes, anemia, and CVA. Last HgA1C 7 in July, diabetes managed by PCP. Pertinent factors that delay wound healing include multiple co-morbidities, poor vascular supply, diabetes, edema, heavy drainage, decreased granulation tissue, tract(s), undermining and no protective sensation.       Review of Systems     Review of Systems   Constitutional:  Positive for activity change. Negative for appetite change, chills, diaphoresis and fever.   HENT: Negative.  Negative for congestion, ear pain, hearing loss and postnasal drip.    Eyes:  Negative for itching.   Respiratory:  Negative  for chest tightness and shortness of breath.    Cardiovascular:  Positive for leg swelling. Negative for chest pain and palpitations.   Gastrointestinal:  Negative for abdominal pain.   Endocrine: Negative for polydipsia.   Genitourinary:  Negative for frequency.   Musculoskeletal:  Positive for arthralgias, back pain and joint swelling.        Severe shoulder pain, 10 of 10 on pain scale   Skin:  Positive for color change and wound.        See wound assessment   Neurological:  Positive for weakness and numbness. Negative for headaches.   Psychiatric/Behavioral:  Negative for agitation, behavioral problems, confusion and self-injury.        Medical / Social / Family History     Past Medical History:   Diagnosis Date    Anemia 7/5/2022    Diabetes mellitus, type 2     Hyperlipidemia     Hypertension     Obesity     Primary osteoarthritis of left shoulder 5/24/2022    Stroke        Past Surgical History:   Procedure Laterality Date    AMPUTATION      APPENDECTOMY      DEBRIDEMENT OF FOOT Right 1/12/2023    Procedure: DEBRIDEMENT, FOOT;  Surgeon: Ravi Ramirez MD;  Location: [facility];  Service: General;  Laterality: Right;    EYE SURGERY         Social History  Ms. Deborah Sotelo  reports that she has never smoked. She has never used smokeless tobacco. She reports current alcohol use. She reports that she does not use drugs.    Family History  Ms.'srinath Sotelo family history includes Appendicitis in her father; Asthma in her sister; Cancer in her mother; Diabetes in her brother.    Medications and Allergies     Medications  Outpatient Medications Marked as Taking for the 7/18/24 encounter (Office Visit) with Felisha Love FNP   Medication Sig Dispense Refill    amLODIPine (NORVASC) 5 MG tablet Take 1 tablet (5 mg total) by mouth once daily. 30 tablet 5    ammonium lactate 12 % Crea Apply 1 g topically Daily. 385 g 2    atorvastatin (LIPITOR) 80 MG tablet TAKE 1 TABLET BY MOUTH EACH NIGHT AT  BEDTIME FOR CHOLESTEROL ~~DO NOT EAT GRAPEFRUIT OR DRINK GRAPEFRUIT JUICE WHILE TAKING THIS MEDICATION~~ 30 tablet 5    doxycycline (VIBRA-TABS) 100 MG tablet Take 1 tablet (100 mg total) by mouth 2 (two) times daily. 60 tablet 0    FEROSUL 325 mg (65 mg iron) Tab tablet       insulin asp prt-insulin aspart, NovoLOG 70/30, (NOVOLOG 70/30) 100 unit/mL (70-30) Soln INJECT 50 UNITS SUB-Q EACH MORNING AND 35  UNITS EACH EVENING ...KEEP IN REFRIGERATOR ...USE WITHIN 28 DAYS AFTER OPENING EACH VIAL 30 mL 11    ondansetron (ZOFRAN) 4 MG tablet Take 1 tablet (4 mg total) by mouth every 6 (six) hours. 12 tablet 0    pantoprazole (PROTONIX) 40 MG tablet Take 40 mg by mouth once daily.      sodium hypochlorite 0.5 % (DAKIN'S SOLUTION) external solution Apply topically once daily. Pack wound with dakin's moistened nuguaze daily 473 mL 0    TRULICITY 0.75 mg/0.5 mL pen injector Inject 0.75 mg into the skin every 7 days.         Allergies  Review of patient's allergies indicates:   Allergen Reactions    Aspirin      Other reaction(s): UPSET STOMACH   Other reaction(s): Unknown    Bactrim ds [sulfamethoxazole-trimethoprim] Itching       Physical Examination     Vitals:    07/18/24 0907   BP: (!) 142/83   Pulse: 100   Resp: (!) 0   Temp: 98 °F (36.7 °C)             Physical Exam  Vitals and nursing note reviewed.   Constitutional:       Comments: Tearful during exam   HENT:      Head: Normocephalic.   Cardiovascular:      Rate and Rhythm: Normal rate and regular rhythm.      Pulses: Normal pulses.      Heart sounds: Normal heart sounds.   Pulmonary:      Effort: Pulmonary effort is normal. No respiratory distress.   Chest:      Chest wall: No tenderness.   Musculoskeletal:         General: Swelling, tenderness and deformity present.      Right lower leg: Edema present.      Comments: Left shoulder decreased ROM with weakness to left upper extremity   Skin:     General: Skin is warm.      Capillary Refill: Capillary refill  takes 2 to 3 seconds.      Findings: Erythema and lesion present.      Comments: Wound, see LDA for measurements and picture   Neurological:      Mental Status: She is alert and oriented to person, place, and time.   Psychiatric:         Mood and Affect: Mood normal.         Behavior: Behavior normal.         Thought Content: Thought content normal.         Judgment: Judgment normal.     Assessment and Plan             Wound 01/18/21 1051 Diabetic Ulcer Right lateral Plantar #1 (Active)   01/18/21 1051    Pre-existing: Yes   Primary Wound Type: Diabetic ulc   Side: Right   Orientation: lateral   Location: Plantar   Wound Number: #1   Ankle-Brachial Index:    Pulses: normal   Removal Indication and Assessment:    Wound Outcome:    (Retired) Wound Type:    (Retired) Wound Length (cm):    (Retired) Wound Width (cm):    (Retired) Depth (cm):    Wound Description (Comments):    Removal Indications:    Wound Image   07/18/24 0951   Dressing Appearance Dry;Intact 07/18/24 0909   Drainage Amount None 07/18/24 0909   Appearance Intact;Maroon 07/18/24 0909   Tissue loss description Not applicable 07/18/24 0909   Black (%), Wound Tissue Color 0 % 07/18/24 0909   Red (%), Wound Tissue Color 0 % 07/18/24 0909   Yellow (%), Wound Tissue Color 0 % 07/18/24 0909   Periwound Area Intact;Dry 07/18/24 0909   Wound Width (cm) 0 cm 07/18/24 0909   Wound Depth (cm) 0 cm 07/18/24 0909   Care Cleansed with:;Antimicrobial agent 07/18/24 0909            Incision/Site 01/12/23 1251 Right Foot (Active)   01/12/23 1251   Present Prior to Hospital Arrival?:    Side: Right   Location: Foot   Orientation:    Incision Type:    Closure Method:    Additional Comments:    Removal Indication and Assessment:    Wound Outcome:    Removal Indications:    Wound Image   07/18/24 0951   Dressing Appearance Intact;Moist drainage 07/18/24 0913   Drainage Amount Small 07/18/24 0913   Drainage Characteristics/Odor Serosanguineous 07/18/24 0913   Appearance  Red;Granulating 07/18/24 0913   Black (%), Wound Tissue Color 0 % 07/18/24 0913   Red (%), Wound Tissue Color 100 % 07/18/24 0913   Yellow (%), Wound Tissue Color 0 % 07/18/24 0913   Periwound Area Dry 07/18/24 0913   Wound Edges Callused 07/18/24 0913   Wound Length (cm) 0.9 cm 07/18/24 0913   Wound Width (cm) 1 cm 07/18/24 0913   Wound Depth (cm) 0.5 cm 07/18/24 0913   Wound Volume (cm^3) 0.45 cm^3 07/18/24 0913   Wound Surface Area (cm^2) 0.9 cm^2 07/18/24 0913   Care Cleansed with:;Antimicrobial agent 07/18/24 0913   Dressing Applied;Gauze;Foam 07/18/24 0913   Periwound Care Absorptive dressing applied 07/18/24 0913             Problem List Items Addressed This Visit          Endocrine    Diabetic foot ulcer - Primary    Overview                                                          Current Assessment & Plan       Clean with vashe     Pack wound with gentamicin moisten nu gauze,cover with dry gauze,wrap with meng,secure with paper tape. Change daily and as needed  Cover with foam border    Keep foot moisturized with ammonium lactate or lotion        Monitor closely for s/s of infection including fever, chills, increase in pain, odor from wound, and increased redness from foot. Go to ER if any complications develop.   Keep leg elevated and avoid pressure on wound  Diabetes:  Monitor glucose closely. Check fasting glucose and 2 hours after meals. HgA1C goal <7, fasting glucose , and 2 hours after meals <180  Hypertension:  Check blood pressure twice daily, goal <120/80  Diet:   Increase protein intake, avoid fried, fatty foods and foods high in simple carbs.   Vitamins:  Take vitamin C 1000 mg, zinc 50mg, vitamin d 5000 units, and a daily multivitamin. Dawson is a good source of protein and nutrients to aid in wound healing.     Take antibiotics as prescribed                Future Appointments   Date Time Provider Department Center   8/8/2024  9:00 AM Felisha Love FNP [facility]    2/5/2025 11:15 AM [facility]            Signature:  TOMASA Maldonado  [facility]  [address] [facility] [place] [address]  [phone]    Date of encounter: 7/18/24

## 2024-07-12 ENCOUNTER — HOSPITAL ENCOUNTER (OUTPATIENT)
Dept: RADIOLOGY | Facility: HOSPITAL | Age: 64
Discharge: HOME | End: 2024-07-12
Attending: NURSE PRACTITIONER
Payer: MEDICAID

## 2024-07-12 DIAGNOSIS — R05.9 COUGH, UNSPECIFIED: ICD-10-CM

## 2024-07-12 PROCEDURE — 71046 X-RAY EXAM CHEST 2 VIEWS: CPT | Mod: 26,,, | Performed by: RADIOLOGY

## 2024-07-12 PROCEDURE — 71046 X-RAY EXAM CHEST 2 VIEWS: CPT | Mod: TC

## 2024-07-18 ENCOUNTER — OFFICE VISIT (OUTPATIENT)
Dept: WOUND CARE | Facility: CLINIC | Age: 64
End: 2024-07-18
Attending: FAMILY MEDICINE
Payer: MEDICAID

## 2024-07-18 VITALS — TEMPERATURE: 98 F | HEART RATE: 100 BPM | DIASTOLIC BLOOD PRESSURE: 83 MMHG | SYSTOLIC BLOOD PRESSURE: 142 MMHG

## 2024-07-18 DIAGNOSIS — L97.415 DIABETIC ULCER OF RIGHT MIDFOOT ASSOCIATED WITH TYPE 2 DIABETES MELLITUS, WITH MUSCLE INVOLVEMENT WITHOUT EVIDENCE OF NECROSIS: Primary | ICD-10-CM

## 2024-07-18 DIAGNOSIS — E11.621 DIABETIC ULCER OF RIGHT MIDFOOT ASSOCIATED WITH TYPE 2 DIABETES MELLITUS, WITH MUSCLE INVOLVEMENT WITHOUT EVIDENCE OF NECROSIS: Primary | ICD-10-CM

## 2024-07-18 PROCEDURE — 99215 OFFICE O/P EST HI 40 MIN: CPT | Mod: PBBFAC | Performed by: NURSE PRACTITIONER

## 2024-07-18 PROCEDURE — 99999 PR PBB SHADOW E&M-EST. PATIENT-LVL V: CPT | Mod: PBBFAC,,, | Performed by: NURSE PRACTITIONER

## 2024-07-18 PROCEDURE — 11042 DBRDMT SUBQ TIS 1ST 20SQCM/<: CPT | Mod: PBBFAC | Performed by: NURSE PRACTITIONER

## 2024-07-18 PROCEDURE — 99499 UNLISTED E&M SERVICE: CPT | Mod: S$PBB,,, | Performed by: NURSE PRACTITIONER

## 2024-07-18 RX ORDER — KETOCONAZOLE 20 MG/ML
SHAMPOO, SUSPENSION TOPICAL
Qty: 120 ML | Refills: 2 | Status: SHIPPED | OUTPATIENT
Start: 2024-07-18

## 2024-07-18 RX ORDER — DOXYCYCLINE HYCLATE 100 MG
100 TABLET ORAL 2 TIMES DAILY
Qty: 60 TABLET | Refills: 0 | Status: SHIPPED | OUTPATIENT
Start: 2024-07-18 | End: 2024-08-17

## 2024-07-18 RX ORDER — KETOCONAZOLE 20 MG/G
CREAM TOPICAL DAILY
Qty: 60 G | Refills: 2 | Status: SHIPPED | OUTPATIENT
Start: 2024-07-18

## 2024-07-18 NOTE — PROCEDURES
"Debridement    Date/Time: 7/18/2024 10:00 AM    Performed by: Felisha Love FNP  Authorized by: Felisha Love FNP    Time out: Immediately prior to procedure a "time out" was called to verify the correct patient, procedure, equipment, support staff and site/side marked as required.    Consent Done?:  Yes (Written)    Wound Details:    Location:  Right foot    Location:  Right Plantar    Type of Debridement:  Excisional       Length (cm):  1       Area (sq cm):  1.2       Width (cm):  1.2       Percent Debrided (%):  100       Depth (cm):  0.2       Total Area Debrided (sq cm):  1.2    Depth of debridement:  Subcutaneous tissue    Tissue debrided:  Adipose, Dermis, Epidermis and Subcutaneous    Devitalized tissue debrided:  Biofilm, Callus, Clots, Exudate and Fibrin    Instruments:  Curette  Bleeding:  Moderate  Hemostasis Achieved: Yes  Method Used:  Pressure  Patient tolerance:  Patient tolerated the procedure well with no immediate complications  1st Wound Pain Assessment: 0     Assistant KOJO Mello RN    "

## 2024-07-18 NOTE — PROGRESS NOTES
Debridement Performed for Assessment: Wound# right plantar  Performed By: Provider: Felisha Sun NP  Assistant: TITO Vega, RN    Debridement: Surgical    Photo taken post procedure:    Time-Out Taken: Yes  Level: Skin/Subcutaneous Tissue  Post Debridement Measurements  Length: (cm) 1  Width: (cm) 1.2  Depth: (cm)       Area: (cm²) 1.2  Percent Debrided: 100%  Total Area Debrided: (sq cm) 1.2    Tissue and other material debrided:  Adipose, Dermis, Epidermis, Subcutaneous  Devitalized Tissue Debrided:Biofilm, Slough, Fibrin  Instrument: Curette  Bleeding: Moderate  Hemostasis Achieved: Pressure  Procedural Pain: Insensate  Post Procedural Pain: Insensate  Response to Treatment: Procedure was tolerated well    Devitalized materials/tissue Removed  the following was removed during debridement  subcutaneous      Post Debridement Diagnosis  Post debridement diagnosis  Same as Pre-operative debridement diagnosis, No Complications noted.      Grafts or implants applied  Was a graft or implant applied?  No      Procedure assistant  Procedure assisted by:  Assistant is the same as nurse listed above      Complications related to procedure  Did any complication occure during procedure?  No complications noted during or after procedure.      Specimen  Specimen collect during procedure?  No specimen collected      Anaesthesia:  Anesthesia used  None      Blood Loss:  Blood loss during procedure  less than 5 cc

## 2024-07-22 DIAGNOSIS — Z12.11 COLON CANCER SCREENING: Primary | ICD-10-CM

## 2024-07-22 DIAGNOSIS — Z12.11 SCREEN FOR COLON CANCER: Primary | ICD-10-CM

## 2024-07-25 RX ORDER — POLYETHYLENE GLYCOL 3350, SODIUM SULFATE ANHYDROUS, SODIUM BICARBONATE, SODIUM CHLORIDE, POTASSIUM CHLORIDE 236; 22.74; 6.74; 5.86; 2.97 G/4L; G/4L; G/4L; G/4L; G/4L
4 POWDER, FOR SOLUTION ORAL ONCE
Qty: 4000 ML | Refills: 0 | Status: SHIPPED | OUTPATIENT
Start: 2024-07-25 | End: 2024-07-25

## 2024-08-13 NOTE — PROGRESS NOTES
TOMASA Maldonado   [facility]  [address] [facility] [place] [address]  [phone]      PATIENT NAME: Deborah Sotelo  : 1960  DATE: 24  MRN: [id number]      Billing Provider: TOMASA Maldonado  Level of Service:   Patient PCP Information       Provider PCP Type    Ron Sanches MD General            Reason for Visit / Chief Complaint: Diabetic Foot Ulcer and Wound Check (Right foot)       History of Present Illness / Problem Focused Workflow     Deborah Sotelo is a 64 yo female presents for follow up on chronic non healing wound to right TMA. Wound  with increased drainage and maceration. Biofilm and callus jonathon-wound, bedside debridement today. Betadine to wound bed. Reports she was unable to  doxycycline. Prescription changed from [facility] to [facility].      63 y.o. female presents to clinic for follow up on chronic-non healing wound on right TMA.Wound is healing well. Reports pain and tenderness to lateral ankle and cramps and spasms to lower leg. Labs ordered today. Continue with Aquacel ag to wound bed and ammonium lacate to soften callus.   Reinforced importance of local wound care, keeping pressure off foot, off-loading shoe, elevating legs, adherence to diabetic diet and strict glycemic control, and increasing protein intake.     Significant PMH diabetes, anemia, and CVA. Last HgA1C 7 in July, diabetes managed by PCP. Pertinent factors that delay wound healing include multiple co-morbidities, poor vascular supply, diabetes, edema, heavy drainage, decreased granulation tissue, tract(s), undermining and no protective sensation.       Review of Systems     Review of Systems   Constitutional:  Positive for activity change. Negative for appetite change, chills, diaphoresis and fever.   HENT: Negative.  Negative for congestion, ear pain, hearing loss and postnasal drip.    Eyes:  Negative for itching.   Respiratory:  Negative  for chest tightness and shortness of breath.    Cardiovascular:  Positive for leg swelling. Negative for chest pain and palpitations.   Gastrointestinal:  Negative for abdominal pain.   Endocrine: Negative for polydipsia.   Genitourinary:  Negative for frequency.   Musculoskeletal:  Positive for arthralgias, back pain and joint swelling.        Severe shoulder pain, 10 of 10 on pain scale   Skin:  Positive for color change and wound.        See wound assessment   Neurological:  Positive for weakness and numbness. Negative for headaches.   Psychiatric/Behavioral:  Negative for agitation, behavioral problems, confusion and self-injury.        Medical / Social / Family History     Past Medical History:   Diagnosis Date    Anemia 7/5/2022    Diabetes mellitus, type 2     Hyperlipidemia     Hypertension     Obesity     Primary osteoarthritis of left shoulder 5/24/2022    Stroke        Past Surgical History:   Procedure Laterality Date    AMPUTATION      APPENDECTOMY      DEBRIDEMENT OF FOOT Right 1/12/2023    Procedure: DEBRIDEMENT, FOOT;  Surgeon: Ravi Ramirez MD;  [facility];  Service: General;  Laterality: Right;    EYE SURGERY         Social History  Ms. Deborah Sotelo  reports that she has never smoked. She has never used smokeless tobacco. She reports current alcohol use. She reports that she does not use drugs.    Family History  Ms.'srinath Sotelo family history includes Appendicitis in her father; Asthma in her sister; Cancer in her mother; Diabetes in her brother.    Medications and Allergies     Medications  Outpatient Medications Marked as Taking for the 8/16/24 encounter (Office Visit) with Felisha Love FNP   Medication Sig Dispense Refill    amLODIPine (NORVASC) 5 MG tablet Take 1 tablet (5 mg total) by mouth once daily. 30 tablet 5    ammonium lactate 12 % Crea Apply 1 g topically Daily. 385 g 2    atorvastatin (LIPITOR) 80 MG tablet TAKE 1 TABLET BY MOUTH EACH NIGHT AT  BEDTIME FOR CHOLESTEROL ~~DO NOT EAT GRAPEFRUIT OR DRINK GRAPEFRUIT JUICE WHILE TAKING THIS MEDICATION~~ 30 tablet 5    diclofenac sodium (VOLTAREN) 1 % Gel Apply 2 g topically 4 (four) times daily. 450 g 2    FEROSUL 325 mg (65 mg iron) Tab tablet       gentamicin (GARAMYCIN) 0.1 % cream Apply topically once daily. 90 g 0    insulin asp prt-insulin aspart, NovoLOG 70/30, (NOVOLOG 70/30) 100 unit/mL (70-30) Soln INJECT 50 UNITS SUB-Q EACH MORNING AND 35  UNITS EACH EVENING ...KEEP IN REFRIGERATOR ...USE WITHIN 28 DAYS AFTER OPENING EACH VIAL 30 mL 11    ketoconazole (NIZORAL) 2 % cream Apply topically once daily. 60 g 2    ketoconazole (NIZORAL) 2 % shampoo Apply topically twice a week. 120 mL 2    LIDOcaine (LIDODERM) 5 % Place 1 patch onto the skin once daily. Remove & Discard patch within 12 hours or as directed by MD 30 patch 2    ondansetron (ZOFRAN) 4 MG tablet Take 1 tablet (4 mg total) by mouth every 6 (six) hours. 12 tablet 0    pantoprazole (PROTONIX) 40 MG tablet Take 40 mg by mouth once daily.      sodium hypochlorite 0.5 % (DAKIN'S SOLUTION) external solution Apply topically once daily. Pack wound with dakin's moistened nuguaze daily 473 mL 0    TRULICITY 0.75 mg/0.5 mL pen injector Inject 0.75 mg into the skin every 7 days.         Allergies  Review of patient's allergies indicates:   Allergen Reactions    Aspirin      Other reaction(s): UPSET STOMACH   Other reaction(s): Unknown    Bactrim ds [sulfamethoxazole-trimethoprim] Itching       Physical Examination     Vitals:    08/16/24 0959   BP: 119/74   Pulse: 85   Resp: 19   Temp: 98 °F (36.7 °C)               Physical Exam  Vitals and nursing note reviewed.   Constitutional:       Comments: Tearful during exam   HENT:      Head: Normocephalic.   Cardiovascular:      Rate and Rhythm: Normal rate and regular rhythm.      Pulses: Normal pulses.      Heart sounds: Normal heart sounds.   Pulmonary:      Effort: Pulmonary effort is normal. No  respiratory distress.   Chest:      Chest wall: No tenderness.   Musculoskeletal:         General: Swelling, tenderness and deformity present.      Right lower leg: Edema present.      Comments: Left shoulder decreased ROM with weakness to left upper extremity   Skin:     General: Skin is warm.      Capillary Refill: Capillary refill takes 2 to 3 seconds.      Findings: Erythema and lesion present.      Comments: Wound, see LDA for measurements and picture   Neurological:      Mental Status: She is alert and oriented to person, place, and time.   Psychiatric:         Mood and Affect: Mood normal.         Behavior: Behavior normal.         Thought Content: Thought content normal.         Judgment: Judgment normal.     Assessment and Plan             Wound 01/18/21 1051 Diabetic Ulcer Right lateral Plantar #1 (Active)   01/18/21 1051    Pre-existing: Yes   Primary Wound Type: Diabetic ulc   Side: Right   Orientation: lateral   Location: Plantar   Wound Number: #1   Ankle-Brachial Index:    Pulses: normal   Removal Indication and Assessment:    Wound Outcome:    (Retired) Wound Type:    (Retired) Wound Length (cm):    (Retired) Wound Width (cm):    (Retired) Depth (cm):    Wound Description (Comments):    Removal Indications:    Wound Image   08/16/24 1025   Wound Length (cm) 1.1 cm 08/16/24 1005   Wound Width (cm) 1.2 cm 08/16/24 1005   Wound Depth (cm) 0.2 cm 08/16/24 1005   Wound Volume (cm^3) 0.264 cm^3 08/16/24 1005   Wound Surface Area (cm^2) 1.32 cm^2 08/16/24 1005            Incision/Site 01/12/23 1251 Right Foot (Active)   01/12/23 1251   Present Prior to Hospital Arrival?:    Side: Right   Location: Foot   Orientation:    Incision Type:    Closure Method:    Additional Comments:    Removal Indication and Assessment:    Wound Outcome:    Removal Indications:    Wound Image   08/16/24 1026   Wound Length (cm) 1.1 cm 08/16/24 1006   Wound Width (cm) 1.2 cm 08/16/24 1006   Wound Depth (cm) 0.2 cm 08/16/24 1006    Wound Volume (cm^3) 0.264 cm^3 08/16/24 1006   Wound Surface Area (cm^2) 1.32 cm^2 08/16/24 1006               Problem List Items Addressed This Visit          Endocrine    Diabetic foot ulcer - Primary    Overview                                                            Current Assessment & Plan       Clean with vashe     Pack wound with betadine moisten nu gauze,cover with dry gauze,wrap with meng,secure with paper tape. Change daily and as needed.    Wrap with edema wear.     Keep foot moisturized with ammonium lactate or lotion        Monitor closely for s/s of infection including fever, chills, increase in pain, odor from wound, and increased redness from foot. Go to ER if any complications develop.   Keep leg elevated and avoid pressure on wound  Diabetes:  Monitor glucose closely. Check fasting glucose and 2 hours after meals. HgA1C goal <7, fasting glucose , and 2 hours after meals <180  Hypertension:  Check blood pressure twice daily, goal <120/80  Diet:   Increase protein intake, avoid fried, fatty foods and foods high in simple carbs.   Vitamins:  Take vitamin C 1000 mg, zinc 50mg, vitamin d 5000 units, and a daily multivitamin. Dawson is a good source of protein and nutrients to aid in wound healing.     Take antibiotics as prescribed     9/3/24 Arterial Ultrasound at 10:00, come to wound appointment as soon as the Ultrasound is over             Other Visit Diagnoses       PVD (peripheral vascular disease)        Relevant Orders    US Lower Extrem Arteries Bilat with BRYSON (xpd)            Future Appointments   Date Time Provider Department Center   9/3/2024  9:00 AM Felisha Love FNP [facility]   9/3/2024 10:00 AM [facility]   2/5/2025 11:15 AM [facility]   2/13/2025 10:00 AM [facility] GI ROOM 01 [facility]            Signature:  TOMASA Maldonado  [facility]  [facility]  [address] [facility] [address]  [phone]    Date of encounter: 8/16/24

## 2024-08-13 NOTE — PATIENT INSTRUCTIONS
Clean with vashe     Pack wound with betadine moisten nu gauze,cover with dry gauze,wrap with meng,secure with paper tape. Change daily and as needed.    Wrap with edema wear.     Keep foot moisturized with ammonium lactate or lotion        Monitor closely for s/s of infection including fever, chills, increase in pain, odor from wound, and increased redness from foot. Go to ER if any complications develop.   Keep leg elevated and avoid pressure on wound  Diabetes:  Monitor glucose closely. Check fasting glucose and 2 hours after meals. HgA1C goal <7, fasting glucose , and 2 hours after meals <180  Hypertension:  Check blood pressure twice daily, goal <120/80  Diet:   Increase protein intake, avoid fried, fatty foods and foods high in simple carbs.   Vitamins:  Take vitamin C 1000 mg, zinc 50mg, vitamin d 5000 units, and a daily multivitamin. Dawson is a good source of protein and nutrients to aid in wound healing.     Take antibiotics as prescribed     9/3/24 Arterial Ultrasound at 10:00, come to wound appointment as soon as the Ultrasound is over

## 2024-08-16 ENCOUNTER — OFFICE VISIT (OUTPATIENT)
Dept: WOUND CARE | Facility: CLINIC | Age: 64
End: 2024-08-16
Attending: FAMILY MEDICINE
Payer: MEDICAID

## 2024-08-16 VITALS
TEMPERATURE: 98 F | HEART RATE: 85 BPM | DIASTOLIC BLOOD PRESSURE: 74 MMHG | SYSTOLIC BLOOD PRESSURE: 119 MMHG | RESPIRATION RATE: 19 BRPM

## 2024-08-16 DIAGNOSIS — E11.621 DIABETIC ULCER OF RIGHT MIDFOOT ASSOCIATED WITH TYPE 2 DIABETES MELLITUS, WITH MUSCLE INVOLVEMENT WITHOUT EVIDENCE OF NECROSIS: Primary | ICD-10-CM

## 2024-08-16 DIAGNOSIS — I73.9 PVD (PERIPHERAL VASCULAR DISEASE): ICD-10-CM

## 2024-08-16 DIAGNOSIS — L97.415 DIABETIC ULCER OF RIGHT MIDFOOT ASSOCIATED WITH TYPE 2 DIABETES MELLITUS, WITH MUSCLE INVOLVEMENT WITHOUT EVIDENCE OF NECROSIS: Primary | ICD-10-CM

## 2024-08-16 PROCEDURE — 99999 PR PBB SHADOW E&M-EST. PATIENT-LVL V: CPT | Mod: PBBFAC,,, | Performed by: NURSE PRACTITIONER

## 2024-08-16 PROCEDURE — 99499 UNLISTED E&M SERVICE: CPT | Mod: S$PBB,,, | Performed by: NURSE PRACTITIONER

## 2024-08-16 PROCEDURE — 99215 OFFICE O/P EST HI 40 MIN: CPT | Mod: PBBFAC | Performed by: NURSE PRACTITIONER

## 2024-08-16 PROCEDURE — 11042 DBRDMT SUBQ TIS 1ST 20SQCM/<: CPT | Mod: PBBFAC | Performed by: NURSE PRACTITIONER

## 2024-08-16 RX ORDER — DOXYCYCLINE HYCLATE 100 MG
100 TABLET ORAL 2 TIMES DAILY
Qty: 60 TABLET | Refills: 0 | Status: SHIPPED | OUTPATIENT
Start: 2024-08-16 | End: 2024-09-15

## 2024-08-16 NOTE — PROCEDURES
"Debridement    Date/Time: 8/16/2024 10:00 AM    Performed by: Felisha Love FNP  Authorized by: Felisha Love FNP    Time out: Immediately prior to procedure a "time out" was called to verify the correct patient, procedure, equipment, support staff and site/side marked as required.    Consent Done?:  Yes (Written)    Type of Debridement:  Excisional       Length (cm):  1.2       Area (sq cm):  1.56       Width (cm):  1.3       Percent Debrided (%):  100       Depth (cm):  0.4       Total Area Debrided (sq cm):  1.56    Depth of debridement:  Subcutaneous tissue    Tissue debrided:  Adipose, Epidermis, Dermis and Subcutaneous    Devitalized tissue debrided:  Biofilm, Callus, Clots, Exudate, Fibrin and Slough    Instruments:  Curette  Bleeding:  Moderate  Hemostasis Achieved: Yes  Method Used:  Pressure  Patient tolerance:  Patient tolerated the procedure well with no immediate complications  1st Wound Pain Assessment: 0     Assistant KOJO Mello RN    "

## 2024-08-16 NOTE — PROGRESS NOTES
Debridement Performed for Assessment: Wound# 1  Performed By: Provider: Felisha Sun NP  Assistant:TITO Vega, RN    Debridement: Surgical    Photo taken post procedure:    Time-Out Taken: Yes  Level: Skin/Subcutaneous Tissue  Post Debridement Measurements  Length: (cm) 1.2  Width: (cm) 1.3  Depth: (cm)       Area: (cm²) 1.56  Percent Debrided: 100%  Total Area Debrided: (sq cm) 1.56    Tissue and other material debrided:  Adipose, Dermis, Epidermis, Subcutaneous  Devitalized Tissue Debrided:Biofilm, Slough, Fibrin  Instrument: Curette  Bleeding: Moderate  Hemostasis Achieved: Pressure  Procedural Pain: Insensate  Post Procedural Pain: Insensate  Response to Treatment: Procedure was tolerated well    Devitalized materials/tissue Removed  the following was removed during debridement  subcutaneous      Post Debridement Diagnosis  Post debridement diagnosis  Same as Pre-operative debridement diagnosis, No Complications noted.      Grafts or implants applied  Was a graft or implant applied?  No      Procedure assistant  Procedure assisted by:  Assistant is the same as nurse listed above      Complications related to procedure  Did any complication occure during procedure?  No complications noted during or after procedure.      Specimen  Specimen collect during procedure?  No specimen collected      Anaesthesia:  Anesthesia used  None      Blood Loss:  Blood loss during procedure  less than 5 cc

## 2024-08-27 NOTE — PATIENT INSTRUCTIONS
Clean with vashe     Pack wound with betadine moisten nu gauze,cover with dry gauze,wrap with meng,secure with paper tape. Change daily and as needed.        Keep foot moisturized with ammonium lactate or lotion        Monitor closely for s/s of infection including fever, chills, increase in pain, odor from wound, and increased redness from foot. Go to ER if any complications develop.   Keep leg elevated and avoid pressure on wound  Diabetes:  Monitor glucose closely. Check fasting glucose and 2 hours after meals. HgA1C goal <7, fasting glucose , and 2 hours after meals <180  Hypertension:  Check blood pressure twice daily, goal <120/80  Diet:   Increase protein intake, avoid fried, fatty foods and foods high in simple carbs.   Vitamins:  Take vitamin C 1000 mg, zinc 50mg, vitamin d 5000 units, and a daily multivitamin. Dawson is a good source of protein and nutrients to aid in wound healing.     Take antibiotics as prescribed     9/3/24 Arterial Ultrasound at 10:00, come to wound appointment as soon as the Ultrasound is over

## 2024-08-27 NOTE — PROGRESS NOTES
Dictation #1  MRN:[id number]  CSN:[id number]    TOMASA Maldonado   [facility]  [address] [facility] [place] [address]  [phone]      PATIENT NAME: Deborah Sotelo  : 1960  DATE: 9/3/24  MRN: [id number]      Billing Provider: TOMASA Maldonado  Level of Service:   Patient PCP Information       Provider PCP Type    Ron Sanches MD General            Reason for Visit / Chief Complaint: Diabetic Foot Ulcer (R DFU)       History of Present Illness / Problem Focused Workflow     Deborah Sotelo is a 65 yo female presents for follow up on chronic non healing wound to right TMA. Wound  with increased drainage and maceration. Continue betadine to wound bed.       63 y.o. female presents to clinic for follow up on chronic-non healing wound on right TMA.Wound is healing well. Reports pain and tenderness to lateral ankle and cramps and spasms to lower leg. Labs ordered today. Continue with Aquacel ag to wound bed and ammonium lacate to soften callus.   Reinforced importance of local wound care, keeping pressure off foot, off-loading shoe, elevating legs, adherence to diabetic diet and strict glycemic control, and increasing protein intake.     Significant PMH diabetes, anemia, and CVA. Last HgA1C 7 in July, diabetes managed by PCP. Pertinent factors that delay wound healing include multiple co-morbidities, poor vascular supply, diabetes, edema, heavy drainage, decreased granulation tissue, tract(s), undermining and no protective sensation.       Review of Systems     Review of Systems   Constitutional:  Positive for activity change. Negative for appetite change, chills, diaphoresis and fever.   HENT: Negative.  Negative for congestion, ear pain, hearing loss and postnasal drip.    Eyes:  Negative for itching.   Respiratory:  Negative for chest tightness and shortness of breath.    Cardiovascular:  Positive for leg swelling. Negative for chest pain and  palpitations.   Gastrointestinal:  Negative for abdominal pain.   Endocrine: Negative for polydipsia.   Genitourinary:  Negative for frequency.   Musculoskeletal:  Positive for arthralgias, back pain and joint swelling.        Severe shoulder pain, 10 of 10 on pain scale   Skin:  Positive for color change and wound.        See wound assessment   Neurological:  Positive for weakness and numbness. Negative for headaches.   Psychiatric/Behavioral:  Negative for agitation, behavioral problems, confusion and self-injury.        Medical / Social / Family History     Past Medical History:   Diagnosis Date    Anemia 7/5/2022    Diabetes mellitus, type 2     Hyperlipidemia     Hypertension     Obesity     Primary osteoarthritis of left shoulder 5/24/2022    Stroke        Past Surgical History:   Procedure Laterality Date    AMPUTATION      APPENDECTOMY      DEBRIDEMENT OF FOOT Right 1/12/2023    Procedure: DEBRIDEMENT, FOOT;  Surgeon: Ravi Ramirez MD;  Location: [facility];  Service: General;  Laterality: Right;    EYE SURGERY         Social History  Ms. Deborah Sotelo  reports that she has never smoked. She has never used smokeless tobacco. She reports current alcohol use. She reports that she does not use drugs.    Family History  Ms.'srinath Sotelo family history includes Appendicitis in her father; Asthma in her sister; Cancer in her mother; Diabetes in her brother.    Medications and Allergies     Medications  Outpatient Medications Marked as Taking for the 9/3/24 encounter (Office Visit) with Felisha Love FNP   Medication Sig Dispense Refill    amLODIPine (NORVASC) 5 MG tablet Take 1 tablet (5 mg total) by mouth once daily. 30 tablet 5    ammonium lactate 12 % Crea Apply 1 g topically Daily. 385 g 2    atorvastatin (LIPITOR) 80 MG tablet TAKE 1 TABLET BY MOUTH EACH NIGHT AT BEDTIME FOR CHOLESTEROL ~~DO NOT EAT GRAPEFRUIT OR DRINK GRAPEFRUIT JUICE WHILE TAKING THIS MEDICATION~~ 30 tablet 5     diclofenac sodium (VOLTAREN) 1 % Gel Apply 2 g topically 4 (four) times daily. 450 g 2    doxycycline (VIBRA-TABS) 100 MG tablet Take 1 tablet (100 mg total) by mouth 2 (two) times daily. 60 tablet 0    FEROSUL 325 mg (65 mg iron) Tab tablet       gentamicin (GARAMYCIN) 0.1 % cream Apply topically once daily. 90 g 0    insulin asp prt-insulin aspart, NovoLOG 70/30, (NOVOLOG 70/30) 100 unit/mL (70-30) Soln INJECT 50 UNITS SUB-Q EACH MORNING AND 35  UNITS EACH EVENING ...KEEP IN REFRIGERATOR ...USE WITHIN 28 DAYS AFTER OPENING EACH VIAL 30 mL 11    ketoconazole (NIZORAL) 2 % cream Apply topically once daily. 60 g 2    ketoconazole (NIZORAL) 2 % shampoo Apply topically twice a week. 120 mL 2    LANTUS U-100 INSULIN 100 unit/mL injection Inject 20 Units into the skin once daily. Take at night 6 mL 11    LIDOcaine (LIDODERM) 5 % Place 1 patch onto the skin once daily. Remove & Discard patch within 12 hours or as directed by MD 30 patch 2    losartan (COZAAR) 100 MG tablet Take 1 tablet (100 mg total) by mouth every evening. 30 tablet 5    omeprazole (PRILOSEC) 20 MG capsule Take 1 capsule (20 mg total) by mouth once daily. 30 capsule 5    ondansetron (ZOFRAN) 4 MG tablet Take 1 tablet (4 mg total) by mouth every 6 (six) hours. 12 tablet 0    pantoprazole (PROTONIX) 40 MG tablet Take 40 mg by mouth once daily.      sodium hypochlorite 0.5 % (DAKIN'S SOLUTION) external solution Apply topically once daily. Pack wound with dakin's moistened nuguaze daily 473 mL 0    TRULICITY 0.75 mg/0.5 mL pen injector Inject 0.75 mg into the skin every 7 days.         Allergies  Review of patient's allergies indicates:   Allergen Reactions    Aspirin      Other reaction(s): UPSET STOMACH   Other reaction(s): Unknown    Bactrim ds [sulfamethoxazole-trimethoprim] Itching       Physical Examination     Vitals:    09/03/24 1041   BP: (!) 144/69   Pulse: 86   Resp: 20   Temp: 97.8 °F (36.6 °C)                 Physical  Exam  Vitals and nursing note reviewed.   Constitutional:       Comments: Tearful during exam   HENT:      Head: Normocephalic.   Cardiovascular:      Rate and Rhythm: Normal rate and regular rhythm.      Pulses: Normal pulses.      Heart sounds: Normal heart sounds.   Pulmonary:      Effort: Pulmonary effort is normal. No respiratory distress.   Chest:      Chest wall: No tenderness.   Musculoskeletal:         General: Swelling, tenderness and deformity present.      Right lower leg: Edema present.      Comments: Left shoulder decreased ROM with weakness to left upper extremity   Skin:     General: Skin is warm.      Capillary Refill: Capillary refill takes 2 to 3 seconds.      Findings: Erythema and lesion present.      Comments: Wound, see LDA for measurements and picture   Neurological:      Mental Status: She is alert and oriented to person, place, and time.   Psychiatric:         Mood and Affect: Mood normal.         Behavior: Behavior normal.         Thought Content: Thought content normal.         Judgment: Judgment normal.     Assessment and Plan             Incision/Site 01/12/23 1251 Right Foot (Active)   01/12/23 1251   Present Prior to Hospital Arrival?:    Side: Right   Location: Foot   Orientation:    Incision Type:    Closure Method:    Additional Comments:    Removal Indication and Assessment:    Wound Outcome:    Removal Indications:    Wound Image      09/03/24 1052   Dressing Appearance Moist drainage 09/03/24 1052   Drainage Amount Moderate 09/03/24 1052   Drainage Characteristics/Odor Serous 09/03/24 1052   Appearance Pink;Moist;Granulating 09/03/24 1052   Black (%), Wound Tissue Color 0 % 09/03/24 1052   Red (%), Wound Tissue Color 100 % 09/03/24 1052   Yellow (%), Wound Tissue Color 0 % 09/03/24 1052   Periwound Area Intact;Dry 09/03/24 1052   Wound Edges Callused 09/03/24 1052   Wound Length (cm) 1 cm 09/03/24 1052   Wound Width (cm) 1.1 cm 09/03/24 1052   Wound Depth (cm) 0.5 cm 09/03/24  1052   Wound Volume (cm^3) 0.55 cm^3 09/03/24 1052   Wound Surface Area (cm^2) 1.1 cm^2 09/03/24 1052   Care Cleansed with:;Antimicrobial agent;Soap and water 09/03/24 1052   Dressing Applied;Gauze;Rolled gauze 09/03/24 1052   Periwound Care Moisturizer applied 09/03/24 1052   Dressing Change Due 09/04/24 09/03/24 1052                 Problem List Items Addressed This Visit          Endocrine    Diabetic foot ulcer - Primary    Overview                                                              Current Assessment & Plan       Clean with vashe     Pack wound with betadine moisten nu gauze,cover with dry gauze,wrap with meng,secure with paper tape. Change daily and as needed.        Keep foot moisturized with ammonium lactate or lotion        Monitor closely for s/s of infection including fever, chills, increase in pain, odor from wound, and increased redness from foot. Go to ER if any complications develop.   Keep leg elevated and avoid pressure on wound  Diabetes:  Monitor glucose closely. Check fasting glucose and 2 hours after meals. HgA1C goal <7, fasting glucose , and 2 hours after meals <180  Hypertension:  Check blood pressure twice daily, goal <120/80  Diet:   Increase protein intake, avoid fried, fatty foods and foods high in simple carbs.   Vitamins:  Take vitamin C 1000 mg, zinc 50mg, vitamin d 5000 units, and a daily multivitamin. Dawson is a good source of protein and nutrients to aid in wound healing.     Take antibiotics as prescribed     9/3/24 Arterial Ultrasound at 10:00, come to wound appointment as soon as the Ultrasound is over               Future Appointments   Date Time Provider Department Center   9/25/2024 10:00 AM Felisha Love FNP [place] [facility]   2/5/2025 11:15 AM [facility]   2/13/2025 10:00 AM [facility] GI ROOM 01 [facility]            Signature:  TOMASA Maldonado  [facility]  [facility]  [address] [facility] [address]  [phone]    Date of encounter: 9/3/24

## 2024-09-03 ENCOUNTER — HOSPITAL ENCOUNTER (OUTPATIENT)
Dept: RADIOLOGY | Facility: HOSPITAL | Age: 64
Discharge: HOME | End: 2024-09-03
Attending: NURSE PRACTITIONER
Payer: MEDICAID

## 2024-09-03 ENCOUNTER — OFFICE VISIT (OUTPATIENT)
Dept: WOUND CARE | Facility: CLINIC | Age: 64
End: 2024-09-03
Attending: FAMILY MEDICINE
Payer: MEDICAID

## 2024-09-03 VITALS
RESPIRATION RATE: 20 BRPM | SYSTOLIC BLOOD PRESSURE: 144 MMHG | TEMPERATURE: 98 F | DIASTOLIC BLOOD PRESSURE: 69 MMHG | HEART RATE: 86 BPM

## 2024-09-03 DIAGNOSIS — E11.621 DIABETIC ULCER OF RIGHT MIDFOOT ASSOCIATED WITH TYPE 2 DIABETES MELLITUS, WITH MUSCLE INVOLVEMENT WITHOUT EVIDENCE OF NECROSIS: Primary | ICD-10-CM

## 2024-09-03 DIAGNOSIS — L97.415 DIABETIC ULCER OF RIGHT MIDFOOT ASSOCIATED WITH TYPE 2 DIABETES MELLITUS, WITH MUSCLE INVOLVEMENT WITHOUT EVIDENCE OF NECROSIS: Primary | ICD-10-CM

## 2024-09-03 DIAGNOSIS — I73.9 PVD (PERIPHERAL VASCULAR DISEASE): ICD-10-CM

## 2024-09-03 PROCEDURE — 99215 OFFICE O/P EST HI 40 MIN: CPT | Mod: PBBFAC,25 | Performed by: NURSE PRACTITIONER

## 2024-09-03 PROCEDURE — 3077F SYST BP >= 140 MM HG: CPT | Mod: CPTII,,, | Performed by: NURSE PRACTITIONER

## 2024-09-03 PROCEDURE — 1159F MED LIST DOCD IN RCRD: CPT | Mod: CPTII,,, | Performed by: NURSE PRACTITIONER

## 2024-09-03 PROCEDURE — 99999 PR PBB SHADOW E&M-EST. PATIENT-LVL V: CPT | Mod: PBBFAC,,, | Performed by: NURSE PRACTITIONER

## 2024-09-03 PROCEDURE — 93925 LOWER EXTREMITY STUDY: CPT | Mod: 26,,, | Performed by: RADIOLOGY

## 2024-09-03 PROCEDURE — 4010F ACE/ARB THERAPY RXD/TAKEN: CPT | Mod: CPTII,,, | Performed by: NURSE PRACTITIONER

## 2024-09-03 PROCEDURE — 93922 UPR/L XTREMITY ART 2 LEVELS: CPT | Mod: 26,,, | Performed by: RADIOLOGY

## 2024-09-03 PROCEDURE — 1160F RVW MEDS BY RX/DR IN RCRD: CPT | Mod: CPTII,,, | Performed by: NURSE PRACTITIONER

## 2024-09-03 PROCEDURE — 99213 OFFICE O/P EST LOW 20 MIN: CPT | Mod: S$PBB,,, | Performed by: NURSE PRACTITIONER

## 2024-09-03 PROCEDURE — 3078F DIAST BP <80 MM HG: CPT | Mod: CPTII,,, | Performed by: NURSE PRACTITIONER

## 2024-09-03 PROCEDURE — 93922 UPR/L XTREMITY ART 2 LEVELS: CPT | Mod: TC

## 2024-09-11 NOTE — PROGRESS NOTES
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 Hypergranulated tissue to wound bed, silver nitrate applied today. Betadine at home. Continue to keep pressure off wound.       63 y.o. female presents to clinic for follow up on chronic-non healing wound on right TMA.Wound is healing well. Reports pain and tenderness to lateral ankle and cramps and spasms to lower leg. Labs ordered today. Continue with Aquacel ag to wound bed and ammonium lacate to soften callus.   Reinforced importance of local wound care, keeping pressure off foot, off-loading shoe, elevating legs, adherence to diabetic diet and strict glycemic control, and increasing protein intake.     Significant PMH diabetes, anemia, and CVA. Last HgA1C 7 in July, diabetes managed by PCP. Pertinent factors that delay wound healing include multiple co-morbidities, poor vascular supply, diabetes, edema, heavy drainage, decreased granulation tissue, tract(s), undermining and no protective sensation.       Review of Systems     Review of Systems   Constitutional:  Positive for activity change. Negative for appetite change, chills, diaphoresis and fever.   HENT: Negative.  Negative for congestion, ear pain, hearing loss and postnasal drip.    Eyes:  Negative for itching.   Respiratory:  Negative for chest tightness and shortness of breath.    Cardiovascular:  Positive for leg swelling. Negative for chest pain and  palpitations.   Gastrointestinal:  Negative for abdominal pain.   Endocrine: Negative for polydipsia.   Genitourinary:  Negative for frequency.   Musculoskeletal:  Positive for arthralgias, back pain and joint swelling.        Severe shoulder pain, 10 of 10 on pain scale   Skin:  Positive for color change and wound.        See wound assessment   Neurological:  Positive for weakness and numbness. Negative for headaches.   Psychiatric/Behavioral:  Negative for agitation, behavioral problems, confusion and self-injury.        Medical / Social / Family History     Past Medical History:   Diagnosis Date    Anemia 7/5/2022    Diabetes mellitus, type 2     Hyperlipidemia     Hypertension     Obesity     Primary osteoarthritis of left shoulder 5/24/2022    Stroke        Past Surgical History:   Procedure Laterality Date    AMPUTATION      APPENDECTOMY      DEBRIDEMENT OF FOOT Right 1/12/2023    Procedure: DEBRIDEMENT, FOOT;  Surgeon: Ravi Ramirez MD;  Location: [facility];  Service: General;  Laterality: Right;    EYE SURGERY         Social History  Ms. Deborah Sotelo  reports that she has never smoked. She has never used smokeless tobacco. She reports current alcohol use. She reports that she does not use drugs.    Family History  Ms.'srinath Sotelo family history includes Appendicitis in her father; Asthma in her sister; Cancer in her mother; Diabetes in her brother.    Medications and Allergies     Medications  No outpatient medications have been marked as taking for the 9/25/24 encounter (Office Visit) with Felisha Love FNP.       Allergies  Review of patient's allergies indicates:   Allergen Reactions    Aspirin      Other reaction(s): UPSET STOMACH   Other reaction(s): Unknown    Bactrim ds [sulfamethoxazole-trimethoprim] Itching       Physical Examination     Vitals:    09/25/24 0957   BP: (!) 139/95   Pulse: 97   Resp: 18   Temp: 97.7 °F (36.5 °C)                   Physical Exam  Vitals and  nursing note reviewed.   Constitutional:       Comments: Tearful during exam   HENT:      Head: Normocephalic.   Cardiovascular:      Rate and Rhythm: Normal rate and regular rhythm.      Pulses: Normal pulses.      Heart sounds: Normal heart sounds.   Pulmonary:      Effort: Pulmonary effort is normal. No respiratory distress.   Chest:      Chest wall: No tenderness.   Musculoskeletal:         General: Swelling, tenderness and deformity present.      Right lower leg: Edema present.      Comments: Left shoulder decreased ROM with weakness to left upper extremity   Skin:     General: Skin is warm.      Capillary Refill: Capillary refill takes 2 to 3 seconds.      Findings: Erythema and lesion present.      Comments: Wound, see LDA for measurements and picture   Neurological:      Mental Status: She is alert and oriented to person, place, and time.   Psychiatric:         Mood and Affect: Mood normal.         Behavior: Behavior normal.         Thought Content: Thought content normal.         Judgment: Judgment normal.     Assessment and Plan             Incision/Site 01/12/23 1251 Right Foot (Active)   01/12/23 1251   Present Prior to Hospital Arrival?:    Side: Right   Location: Foot   Orientation:    Incision Type:    Closure Method:    Additional Comments:    Removal Indication and Assessment:    Wound Outcome:    Removal Indications:    Wound Image     09/25/24 1003   Dressing Appearance Moist drainage 09/25/24 1003   Drainage Amount Moderate 09/25/24 1003   Drainage Characteristics/Odor Serosanguineous 09/25/24 1003   Appearance Red;Moist;Hypergranulation 09/25/24 1003   Black (%), Wound Tissue Color 0 % 09/25/24 1003   Red (%), Wound Tissue Color 100 % 09/25/24 1003   Yellow (%), Wound Tissue Color 0 % 09/25/24 1003   Periwound Area Intact;Dry 09/25/24 1003   Wound Edges Callused 09/25/24 1003   Wound Length (cm) 1.5 cm 09/25/24 1003   Wound Width (cm) 1.5 cm 09/25/24 1003   Wound Depth (cm) 0.3 cm 09/25/24 1003    Wound Volume (cm^3) 0.675 cm^3 09/25/24 1003   Wound Surface Area (cm^2) 2.25 cm^2 09/25/24 1003   Care Cleansed with:;Antimicrobial agent 09/25/24 1003   Dressing Applied;Gauze;Rolled gauze 09/25/24 1003   Periwound Care Moisture barrier applied 09/25/24 1003   Dressing Change Due 09/26/24 09/25/24 1003                   Problem List Items Addressed This Visit          Endocrine    Diabetic foot ulcer - Primary    Overview                                                                Current Assessment & Plan       Clean with vashe     Pack wound with betadine moisten gauze,cover with dry gauze,wrap with meng,secure with paper tape. Change daily and as needed.        Keep foot moisturized with ammonium lactate or lotion        Monitor closely for s/s of infection including fever, chills, increase in pain, odor from wound, and increased redness from foot. Go to ER if any complications develop.   Keep leg elevated and avoid pressure on wound  Diabetes:  Monitor glucose closely. Check fasting glucose and 2 hours after meals. HgA1C goal <7, fasting glucose , and 2 hours after meals <180  Hypertension:  Check blood pressure twice daily, goal <120/80  Diet:   Increase protein intake, avoid fried, fatty foods and foods high in simple carbs.   Vitamins:  Take vitamin C 1000 mg, zinc 50mg, vitamin d 5000 units, and a daily multivitamin. Dawson is a good source of protein and nutrients to aid in wound healing.     Take antibiotics as prescribed     9/3/24 Arterial Ultrasound at 10:00, come to wound appointment as soon as the Ultrasound is over               Future Appointments   Date Time Provider Department Center   10/16/2024 10:00 AM Felisha Love FNP [facility]   2/5/2025 11:15 AM [facility]   2/13/2025 10:00 AM [facility] GI ROOM 01 [facility]            Signature:  TOMASA Maldonado  [facility]  [address]  [facility] [address]  [phone]    Date of encounter: 9/25/24

## 2024-09-11 NOTE — PATIENT INSTRUCTIONS
Clean with vashe     Pack wound with betadine moisten gauze,cover with dry gauze,wrap with meng,secure with paper tape. Change daily and as needed.        Keep foot moisturized with ammonium lactate or lotion        Monitor closely for s/s of infection including fever, chills, increase in pain, odor from wound, and increased redness from foot. Go to ER if any complications develop.   Keep leg elevated and avoid pressure on wound  Diabetes:  Monitor glucose closely. Check fasting glucose and 2 hours after meals. HgA1C goal <7, fasting glucose , and 2 hours after meals <180  Hypertension:  Check blood pressure twice daily, goal <120/80  Diet:   Increase protein intake, avoid fried, fatty foods and foods high in simple carbs.   Vitamins:  Take vitamin C 1000 mg, zinc 50mg, vitamin d 5000 units, and a daily multivitamin. Dawson is a good source of protein and nutrients to aid in wound healing.     Take antibiotics as prescribed     9/3/24 Arterial Ultrasound at 10:00, come to wound appointment as soon as the Ultrasound is over

## 2024-09-25 ENCOUNTER — OFFICE VISIT (OUTPATIENT)
Dept: WOUND CARE | Facility: CLINIC | Age: 64
End: 2024-09-25
Attending: FAMILY MEDICINE
Payer: MEDICAID

## 2024-09-25 VITALS
DIASTOLIC BLOOD PRESSURE: 95 MMHG | RESPIRATION RATE: 18 BRPM | HEART RATE: 97 BPM | TEMPERATURE: 98 F | SYSTOLIC BLOOD PRESSURE: 139 MMHG

## 2024-09-25 DIAGNOSIS — E11.621 DIABETIC ULCER OF RIGHT MIDFOOT ASSOCIATED WITH TYPE 2 DIABETES MELLITUS, WITH MUSCLE INVOLVEMENT WITHOUT EVIDENCE OF NECROSIS: Primary | ICD-10-CM

## 2024-09-25 DIAGNOSIS — L97.415 DIABETIC ULCER OF RIGHT MIDFOOT ASSOCIATED WITH TYPE 2 DIABETES MELLITUS, WITH MUSCLE INVOLVEMENT WITHOUT EVIDENCE OF NECROSIS: Primary | ICD-10-CM

## 2024-09-25 PROCEDURE — 99215 OFFICE O/P EST HI 40 MIN: CPT | Mod: PBBFAC | Performed by: NURSE PRACTITIONER

## 2024-09-25 PROCEDURE — 17250 CHEM CAUT OF GRANLTJ TISSUE: CPT | Mod: S$PBB,,, | Performed by: NURSE PRACTITIONER

## 2024-09-25 PROCEDURE — 99999 PR PBB SHADOW E&M-EST. PATIENT-LVL V: CPT | Mod: PBBFAC,,, | Performed by: NURSE PRACTITIONER

## 2024-09-25 PROCEDURE — 99499 UNLISTED E&M SERVICE: CPT | Mod: S$PBB,,, | Performed by: NURSE PRACTITIONER

## 2024-09-25 PROCEDURE — 17250 CHEM CAUT OF GRANLTJ TISSUE: CPT | Mod: PBBFAC | Performed by: NURSE PRACTITIONER

## 2024-09-25 NOTE — PROGRESS NOTES
Debridement Performed for Assessment: Wound # Right plantar foot  Performed By: Provider;Felisha Love NP  Assistant:  Cici Duvall LPN    Debridement: Chemical/enzymatic  Debridement Description: Non-Selective      Wound/Ulcer size:    Length: 1.5  Width:  1.5  Depth:              0.3  Cm2:                 2.25    Photo taken post procedure:  Procedural Pain: Insensate  Post Procedural Pain: Insensate  Response to Treatment: Procedure was tolerated well    Specimen  Was a specimen collected?  No Specimen collected      Graft or Implant Aplpied  Was a graft of implant applied?  No      Post deridement diagnosis chronic right foot diabetic ulcer  Did the post debridment diagnosis chagne?  The post debridment diagnosis is the same as the pre-op diagnosis.      Assistant of procedure  Who assisted with the procedure?  The assistant was the same as the nurse listed above.      Complication  Complications related to debridement?  No complications noted      Estimated Blood Loss  How much blood loss occured?  No blood loss      Anesthesia  Anesthesia used?  None    Estimated Blood Loss  How much blood loss occured?  No blood loss      Anesthesia  Anesthesia used?  None

## 2024-10-03 ENCOUNTER — OFFICE VISIT (OUTPATIENT)
Dept: WOUND CARE | Facility: CLINIC | Age: 64
End: 2024-10-03
Attending: FAMILY MEDICINE
Payer: MEDICAID

## 2024-10-03 ENCOUNTER — HOSPITAL ENCOUNTER (OUTPATIENT)
Dept: RADIOLOGY | Facility: HOSPITAL | Age: 64
Discharge: HOME | End: 2024-10-03
Attending: NURSE PRACTITIONER
Payer: MEDICAID

## 2024-10-03 VITALS
DIASTOLIC BLOOD PRESSURE: 94 MMHG | HEART RATE: 90 BPM | SYSTOLIC BLOOD PRESSURE: 152 MMHG | RESPIRATION RATE: 19 BRPM | TEMPERATURE: 98 F

## 2024-10-03 DIAGNOSIS — E11.621 DIABETIC ULCER OF RIGHT MIDFOOT ASSOCIATED WITH TYPE 2 DIABETES MELLITUS, WITH NECROSIS OF MUSCLE: Primary | ICD-10-CM

## 2024-10-03 DIAGNOSIS — E11.621 DIABETIC ULCER OF RIGHT MIDFOOT ASSOCIATED WITH TYPE 2 DIABETES MELLITUS, WITH NECROSIS OF MUSCLE: ICD-10-CM

## 2024-10-03 DIAGNOSIS — L97.413 DIABETIC ULCER OF RIGHT MIDFOOT ASSOCIATED WITH TYPE 2 DIABETES MELLITUS, WITH NECROSIS OF MUSCLE: Primary | ICD-10-CM

## 2024-10-03 DIAGNOSIS — L97.413 DIABETIC ULCER OF RIGHT MIDFOOT ASSOCIATED WITH TYPE 2 DIABETES MELLITUS, WITH NECROSIS OF MUSCLE: ICD-10-CM

## 2024-10-03 PROCEDURE — 87070 CULTURE OTHR SPECIMN AEROBIC: CPT | Mod: ,,, | Performed by: CLINICAL MEDICAL LABORATORY

## 2024-10-03 PROCEDURE — 87186 SC STD MICRODIL/AGAR DIL: CPT | Mod: ,,, | Performed by: CLINICAL MEDICAL LABORATORY

## 2024-10-03 PROCEDURE — 99999PBSHW PR PBB SHADOW TECHNICAL ONLY FILED TO HB: Mod: PBBFAC,,,

## 2024-10-03 PROCEDURE — 99999 PR PBB SHADOW E&M-EST. PATIENT-LVL V: CPT | Mod: PBBFAC,,, | Performed by: NURSE PRACTITIONER

## 2024-10-03 PROCEDURE — 96372 THER/PROPH/DIAG INJ SC/IM: CPT | Mod: PBBFAC | Performed by: NURSE PRACTITIONER

## 2024-10-03 PROCEDURE — 87077 CULTURE AEROBIC IDENTIFY: CPT | Mod: ,,, | Performed by: CLINICAL MEDICAL LABORATORY

## 2024-10-03 PROCEDURE — 73630 X-RAY EXAM OF FOOT: CPT | Mod: TC,RT

## 2024-10-03 PROCEDURE — 99215 OFFICE O/P EST HI 40 MIN: CPT | Mod: PBBFAC,25 | Performed by: NURSE PRACTITIONER

## 2024-10-03 PROCEDURE — 99499 UNLISTED E&M SERVICE: CPT | Mod: S$PBB,,, | Performed by: NURSE PRACTITIONER

## 2024-10-03 PROCEDURE — 11042 DBRDMT SUBQ TIS 1ST 20SQCM/<: CPT | Mod: PBBFAC | Performed by: NURSE PRACTITIONER

## 2024-10-03 RX ORDER — CIPROFLOXACIN 500 MG/1
500 TABLET ORAL EVERY 12 HOURS
Qty: 28 TABLET | Refills: 0 | Status: SHIPPED | OUTPATIENT
Start: 2024-10-03 | End: 2024-10-17

## 2024-10-03 RX ORDER — CEFTRIAXONE 1 G/1
1 INJECTION, POWDER, FOR SOLUTION INTRAMUSCULAR; INTRAVENOUS ONCE
Status: COMPLETED | OUTPATIENT
Start: 2024-10-03 | End: 2024-10-03

## 2024-10-03 RX ADMIN — CEFTRIAXONE SODIUM 1 G: 1 INJECTION, POWDER, FOR SOLUTION INTRAMUSCULAR; INTRAVENOUS at 09:10

## 2024-10-03 NOTE — ASSESSMENT & PLAN NOTE
Clean with Acetic acid (1 cup of white vinegar and 3 cups of water, store in a closed container)   Apply acetic acid moisten drawtex,cover with ABD pad,wrap with meng,secure with paper tape. Change daily and as needed.  Keep foot moisturized with ammonium lactate or lotion   Monitor closely for s/s of infection including fever, chills, increase in pain, odor from wound, and increased redness from foot. Go to ER if any complications develop.   Keep leg elevated and avoid pressure on wound  Diabetes:  Monitor glucose closely. Check fasting glucose and 2 hours after meals. HgA1C goal <7, fasting glucose , and 2 hours after meals <180  Hypertension:  Check blood pressure twice daily, goal <120/80  Diet:   Increase protein intake, avoid fried, fatty foods and foods high in simple carbs.   Vitamins:  Take vitamin C 1000 mg, zinc 50mg, vitamin d 5000 units, and a daily multivitamin. Dawson is a good source of protein and nutrients to aid in wound healing.     Take antibiotics as prescribed     Xray today

## 2024-10-03 NOTE — PROGRESS NOTES
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 Wound is worse today and continues to have hypergranulation tissue. Right foot with edema and erythema with blisters to lateral aspect  of foot. Patient reports increased pain and burning in her foot. Cultures today. Rocephin given IM in clinic and cipro to pharmacy. X-ray today. Acetic acid moistened drawtex to wound bed. Continue to keep pressure off wound. Prescription for wheelchair given to patient. Prescription for custom off-loading shoe faxed to [facility].        63 y.o. female presents to clinic for follow up on chronic-non healing wound on right TMA.Wound is healing well. Reports pain and tenderness to lateral ankle and cramps and spasms to lower leg. Labs ordered today. Continue with Aquacel ag to wound bed and ammonium lacate to soften callus.   Reinforced importance of local wound care, keeping pressure off foot, off-loading shoe, elevating legs, adherence to diabetic diet and strict glycemic control, and increasing protein intake.     Significant PMH diabetes, anemia, and CVA. Last HgA1C 7 in July, diabetes managed by PCP. Pertinent factors that delay wound healing include multiple co-morbidities, poor vascular supply, diabetes, edema, heavy drainage, decreased granulation tissue, tract(s), undermining and no protective sensation.         Review of Systems     Review of Systems    Constitutional:  Positive for activity change. Negative for appetite change, chills, diaphoresis and fever.   HENT: Negative.  Negative for congestion, ear pain, hearing loss and postnasal drip.    Eyes:  Negative for itching.   Respiratory:  Negative for chest tightness and shortness of breath.    Cardiovascular:  Positive for leg swelling. Negative for chest pain and palpitations.   Gastrointestinal:  Negative for abdominal pain.   Endocrine: Negative for polydipsia.   Genitourinary:  Negative for frequency.   Musculoskeletal:  Positive for arthralgias, back pain and joint swelling.        Severe shoulder pain, 10 of 10 on pain scale   Skin:  Positive for color change and wound.        See wound assessment   Neurological:  Positive for weakness and numbness. Negative for headaches.   Psychiatric/Behavioral:  Negative for agitation, behavioral problems, confusion and self-injury.        Medical / Social / Family History     Past Medical History:   Diagnosis Date    Anemia 7/5/2022    Diabetes mellitus, type 2     Hyperlipidemia     Hypertension     Obesity     Primary osteoarthritis of left shoulder 5/24/2022    Stroke        Past Surgical History:   Procedure Laterality Date    AMPUTATION      APPENDECTOMY      DEBRIDEMENT OF FOOT Right 1/12/2023    Procedure: DEBRIDEMENT, FOOT;  Surgeon: Ravi Ramirez MD;  Location: [facility];  Service: General;  Laterality: Right;    EYE SURGERY         Social History  Ms. Deborah Sotelo  reports that she has never smoked. She has never used smokeless tobacco. She reports current alcohol use. She reports that she does not use drugs.    Family History  Ms.'s Deborah Sotelo family history includes Appendicitis in her father; Asthma in her sister; Cancer in her mother; Diabetes in her brother.    Medications and Allergies     Medications  Outpatient Medications Marked as Taking for the 10/3/24 encounter (Office Visit) with Felisha Love FNP   Medication Sig Dispense  Refill    amLODIPine (NORVASC) 5 MG tablet Take 1 tablet (5 mg total) by mouth once daily. 30 tablet 5    ammonium lactate 12 % Crea Apply 1 g topically Daily. 385 g 2    atorvastatin (LIPITOR) 80 MG tablet TAKE 1 TABLET BY MOUTH EACH NIGHT AT BEDTIME FOR CHOLESTEROL ~~DO NOT EAT GRAPEFRUIT OR DRINK GRAPEFRUIT JUICE WHILE TAKING THIS MEDICATION~~ 30 tablet 5    diclofenac sodium (VOLTAREN) 1 % Gel Apply 2 g topically 4 (four) times daily. 450 g 2    FEROSUL 325 mg (65 mg iron) Tab tablet       gentamicin (GARAMYCIN) 0.1 % cream Apply topically once daily. 90 g 0    insulin asp prt-insulin aspart, NovoLOG 70/30, (NOVOLOG 70/30) 100 unit/mL (70-30) Soln INJECT 50 UNITS SUB-Q EACH MORNING AND 35  UNITS EACH EVENING ...KEEP IN REFRIGERATOR ...USE WITHIN 28 DAYS AFTER OPENING EACH VIAL 30 mL 11    ketoconazole (NIZORAL) 2 % cream Apply topically once daily. 60 g 2    ketoconazole (NIZORAL) 2 % shampoo Apply topically twice a week. 120 mL 2    LIDOcaine (LIDODERM) 5 % Place 1 patch onto the skin once daily. Remove & Discard patch within 12 hours or as directed by MD 30 patch 2    losartan (COZAAR) 100 MG tablet Take 1 tablet (100 mg total) by mouth every evening. 30 tablet 5    omeprazole (PRILOSEC) 20 MG capsule Take 1 capsule (20 mg total) by mouth once daily. 30 capsule 5    ondansetron (ZOFRAN) 4 MG tablet Take 1 tablet (4 mg total) by mouth every 6 (six) hours. 12 tablet 0    pantoprazole (PROTONIX) 40 MG tablet Take 40 mg by mouth once daily.      sodium hypochlorite 0.5 % (DAKIN'S SOLUTION) external solution Apply topically once daily. Pack wound with dakin's moistened nuguaze daily 473 mL 0    TRULICITY 0.75 mg/0.5 mL pen injector Inject 0.75 mg into the skin every 7 days.       Current Facility-Administered Medications for the 10/3/24 encounter (Office Visit) with Felisha Love FNP   Medication Dose Route Frequency Provider Last Rate Last Admin    [COMPLETED] cefTRIAXone injection 1 g  1 g Intramuscular  Once AdiaclaireFelisha ferro FNP   1 g at 10/03/24 0935       Allergies  Review of patient's allergies indicates:   Allergen Reactions    Aspirin      Other reaction(s): UPSET STOMACH   Other reaction(s): Unknown    Bactrim ds [sulfamethoxazole-trimethoprim] Itching       Physical Examination     Vitals:    10/03/24 0853   BP: (!) 152/94   Pulse: 90   Resp: 19   Temp: 98.2 °F (36.8 °C)     Physical Exam  Vitals and nursing note reviewed.   Constitutional:       Comments: Tearful during exam   HENT:      Head: Normocephalic.   Cardiovascular:      Rate and Rhythm: Normal rate and regular rhythm.      Pulses: Normal pulses.      Heart sounds: Normal heart sounds.   Pulmonary:      Effort: Pulmonary effort is normal. No respiratory distress.   Chest:      Chest wall: No tenderness.   Musculoskeletal:         General: Swelling, tenderness and deformity present.      Right lower leg: Edema present.      Comments: Left shoulder decreased ROM with weakness to left upper extremity   Skin:     General: Skin is warm.      Capillary Refill: Capillary refill takes 2 to 3 seconds.      Findings: Erythema and lesion present.      Comments: Wound, see LDA for measurements and picture   Neurological:      Mental Status: She is alert and oriented to person, place, and time.   Psychiatric:         Mood and Affect: Mood normal.         Behavior: Behavior normal.         Thought Content: Thought content normal.         Judgment: Judgment normal.       Assessment and Plan             Incision/Site 01/12/23 1251 Right Foot (Active)   01/12/23 1251   Present Prior to Hospital Arrival?:    Side: Right   Location: Foot   Orientation:    Incision Type:    Closure Method:    Additional Comments:    Removal Indication and Assessment:    Wound Outcome:    Removal Indications:    Wound Image    10/03/24 0920   Dressing Appearance Intact;Moist drainage 10/03/24 0856   Drainage Amount Moderate 10/03/24 0856   Drainage Characteristics/Odor  Serosanguineous 10/03/24 0856   Appearance Red;Moist;Granulating;Hypergranulation 10/03/24 0856   Black (%), Wound Tissue Color 0 % 10/03/24 0856   Red (%), Wound Tissue Color 100 % 10/03/24 0856   Yellow (%), Wound Tissue Color 0 % 10/03/24 0856   Periwound Area Moist 10/03/24 0856   Wound Edges Callused 10/03/24 0856   Wound Length (cm) 2.1 cm 10/03/24 0856   Wound Width (cm) 2.2 cm 10/03/24 0856   Wound Depth (cm) 0.1 cm 10/03/24 0856   Wound Volume (cm^3) 0.462 cm^3 10/03/24 0856   Wound Surface Area (cm^2) 4.62 cm^2 10/03/24 0856   Care Cleansed with:;Antimicrobial agent 10/03/24 0856   Dressing Applied;Hydrofiber;Gauze;Absorptive Pad;Rolled gauze;Island/border 10/03/24 0856     Problem List Items Addressed This Visit          Endocrine    Diabetic foot ulcer - Primary    Overview                                                                      Current Assessment & Plan      Clean with Acetic acid (1 cup of white vinegar and 3 cups of water, store in a closed container)   Apply acetic acid moisten drawtex,cover with ABD pad,wrap with meng,secure with paper tape. Change daily and as needed.  Keep foot moisturized with ammonium lactate or lotion   Monitor closely for s/s of infection including fever, chills, increase in pain, odor from wound, and increased redness from foot. Go to ER if any complications develop.   Keep leg elevated and avoid pressure on wound  Diabetes:  Monitor glucose closely. Check fasting glucose and 2 hours after meals. HgA1C goal <7, fasting glucose , and 2 hours after meals <180  Hypertension:  Check blood pressure twice daily, goal <120/80  Diet:   Increase protein intake, avoid fried, fatty foods and foods high in simple carbs.   Vitamins:  Take vitamin C 1000 mg, zinc 50mg, vitamin d 5000 units, and a daily multivitamin. Dawson is a good source of protein and nutrients to aid in wound healing.     Take antibiotics as prescribed     Xray today           Relevant Orders     X-Ray Foot Complete 3 view Right (Completed)    Culture, Wound    Culture, Anaerobe    Debridement       Future Appointments   Date Time Provider Department Center   10/16/2024 10:00 AM Felisha Love FNP [facility]    2/5/2025 11:15 AM [facility]   2/13/2025 10:00 AM [facility] GI ROOM 01 [facility]            Signature:  TOMASA Maldonado  [facility]  [address] [facility] [address]  [phone]    Date of encounter: 10/3/24

## 2024-10-03 NOTE — PROCEDURES
"Debridement    Date/Time: 10/3/2024 10:00 AM    Performed by: Felisha Love FNP  Authorized by: Felisha Love FNP    Time out: Immediately prior to procedure a "time out" was called to verify the correct patient, procedure, equipment, support staff and site/side marked as required.    Consent Done?:  Yes (Written)    Type of Debridement:  Excisional       Length (cm):  2.2       Area (sq cm):  5.06       Width (cm):  2.3       Percent Debrided (%):  100       Depth (cm):  0.7       Total Area Debrided (sq cm):  5.06    Depth of debridement:  Subcutaneous tissue    Tissue debrided:  Adipose, Dermis, Epidermis, Subcutaneous and Hypergranulation    Devitalized tissue debrided:  Biofilm, Callus, Clots, Exudate and Fibrin    Instruments:  Curette  Bleeding:  Moderate  Hemostasis Achieved: Yes  Method Used:  Pressure  Patient tolerance:  Patient tolerated the procedure well with no immediate complications  1st Wound Pain Assessment: 0     Assistant KOJO Mello RN    "

## 2024-10-03 NOTE — PROGRESS NOTES
Debridement Performed for Assessment: Wound#  right foot  Performed By: Provider: Felisha Sun NP  Assistant: TITO Vega, RN    Debridement: Surgical    Photo taken post procedure:    Time-Out Taken: Yes  Level: Skin/Subcutaneous Tissue  Post Debridement Measurements  Length: (cm) 2.2  Width: (cm) 2.3  Depth: (cm)       Area: (cm²) 5.06  Percent Debrided: 100%  Total Area Debrided: (sq cm) 5.06    Tissue and other material debrided:  Adipose, Dermis, Epidermis, Subcutaneous  Devitalized Tissue Debrided:Biofilm, Slough, Fibrin  Instrument: Curette  Bleeding: Minimal  Hemostasis Achieved: Pressure  Procedural Pain: Insensate  Post Procedural Pain: Insensate  Response to Treatment: Procedure was tolerated well    Devitalized materials/tissue Removed  the following was removed during debridement  subcutaneous      Post Debridement Diagnosis  Post debridement diagnosis  Same as Pre-operative debridement diagnosis, No Complications noted.      Grafts or implants applied  Was a graft or implant applied?  No      Procedure assistant  Procedure assisted by:  Assistant is the same as nurse listed above      Complications related to procedure  Did any complication occure during procedure?  No complications noted during or after procedure.      Specimen  Specimen collect during procedure?  No specimen collected      Anaesthesia:  Anesthesia used  None      Blood Loss:  Blood loss during procedure  less than 5 cc

## 2024-10-04 ENCOUNTER — TELEPHONE (OUTPATIENT)
Dept: WOUND CARE | Facility: CLINIC | Age: 64
End: 2024-10-04
Payer: MEDICAID

## 2024-10-05 LAB — MICROORGANISM SPEC CULT: ABNORMAL

## 2024-10-15 NOTE — PROGRESS NOTES
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 Wound has improved since last visit. Edema has decreased and she reports improvement in pain and tenderness. Callus trimmed jonathon-wound and silver nitrate applied to hypergranulated tissue.  Continue with current plan of care.     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 Wound is worse today and continues to have hypergranulation tissue. Right foot with edema and erythema with blisters to lateral aspect  of foot. Patient reports increased pain and burning in her foot. Cultures today. Rocephin given IM in clinic and cipro to pharmacy. X-ray today. Acetic acid moistened drawtex to wound bed. Continue to keep pressure off wound. Prescription for wheelchair given to patient. Prescription for custom off-loading shoe faxed to [facility].        63 y.o. female presents to clinic for follow up on chronic-non healing wound on right TMA.Wound is healing well. Reports pain and tenderness to lateral ankle and cramps and spasms to lower leg. Labs ordered today. Continue with Aquacel ag to wound bed and ammonium lacate to soften callus.   Reinforced importance of local wound care, keeping pressure off foot, off-loading shoe, elevating legs, adherence to diabetic diet and strict glycemic control, and increasing protein intake.     Significant PMH diabetes,  anemia, and CVA. Last HgA1C 7 in July, diabetes managed by PCP. Pertinent factors that delay wound healing include multiple co-morbidities, poor vascular supply, diabetes, edema, heavy drainage, decreased granulation tissue, tract(s), undermining and no protective sensation.         Review of Systems     Review of Systems   Constitutional:  Positive for activity change. Negative for appetite change, chills, diaphoresis and fever.   HENT: Negative.  Negative for congestion, ear pain, hearing loss and postnasal drip.    Eyes:  Negative for itching.   Respiratory:  Negative for chest tightness and shortness of breath.    Cardiovascular:  Positive for leg swelling. Negative for chest pain and palpitations.   Gastrointestinal:  Negative for abdominal pain.   Endocrine: Negative for polydipsia.   Genitourinary:  Negative for frequency.   Musculoskeletal:  Positive for arthralgias, back pain and joint swelling.        Severe shoulder pain, 10 of 10 on pain scale   Skin:  Positive for color change and wound.        See wound assessment   Neurological:  Positive for weakness and numbness. Negative for headaches.   Psychiatric/Behavioral:  Negative for agitation, behavioral problems, confusion and self-injury.        Medical / Social / Family History     Past Medical History:   Diagnosis Date    Anemia 7/5/2022    Diabetes mellitus, type 2     Hyperlipidemia     Hypertension     Obesity     Primary osteoarthritis of left shoulder 5/24/2022    Stroke        Past Surgical History:   Procedure Laterality Date    AMPUTATION      APPENDECTOMY      DEBRIDEMENT OF FOOT Right 1/12/2023    Procedure: DEBRIDEMENT, FOOT;  Surgeon: Ravi Ramirez MD;  Location: [facility];  Service: General;  Laterality: Right;    EYE SURGERY         Social History  Ms. Deborah Sotelo  reports that she has never smoked. She has never used smokeless tobacco. She reports current alcohol use. She reports that she does not use drugs.    Family History    Deborah Sotelo family history includes Appendicitis in her father; Asthma in her sister; Cancer in her mother; Diabetes in her brother.    Medications and Allergies     Medications  Outpatient Medications Marked as Taking for the 10/16/24 encounter (Office Visit) with Felisha Love FNP   Medication Sig Dispense Refill    amLODIPine (NORVASC) 5 MG tablet Take 1 tablet (5 mg total) by mouth once daily. 30 tablet 5    atorvastatin (LIPITOR) 80 MG tablet TAKE 1 TABLET BY MOUTH EACH NIGHT AT BEDTIME FOR CHOLESTEROL ~~DO NOT EAT GRAPEFRUIT OR DRINK GRAPEFRUIT JUICE WHILE TAKING THIS MEDICATION~~ 30 tablet 5    ciprofloxacin HCl (CIPRO) 500 MG tablet Take 1 tablet (500 mg total) by mouth every 12 (twelve) hours. for 14 days 28 tablet 0    diclofenac sodium (VOLTAREN) 1 % Gel Apply 2 g topically 4 (four) times daily. 450 g 2    FEROSUL 325 mg (65 mg iron) Tab tablet       ondansetron (ZOFRAN) 4 MG tablet Take 1 tablet (4 mg total) by mouth every 6 (six) hours. 12 tablet 0    pantoprazole (PROTONIX) 40 MG tablet Take 40 mg by mouth once daily.      TRULICITY 0.75 mg/0.5 mL pen injector Inject 0.75 mg into the skin every 7 days.         Allergies  Review of patient's allergies indicates:   Allergen Reactions    Aspirin      Other reaction(s): UPSET STOMACH   Other reaction(s): Unknown    Bactrim ds [sulfamethoxazole-trimethoprim] Itching       Physical Examination     Vitals:    10/16/24 0946   Resp: 20       Physical Exam  Vitals and nursing note reviewed.   Constitutional:       Comments: Tearful during exam   HENT:      Head: Normocephalic.   Cardiovascular:      Rate and Rhythm: Normal rate and regular rhythm.      Pulses: Normal pulses.      Heart sounds: Normal heart sounds.   Pulmonary:      Effort: Pulmonary effort is normal. No respiratory distress.   Chest:      Chest wall: No tenderness.   Musculoskeletal:         General: Swelling, tenderness and deformity present.      Right lower leg: Edema present.       Comments: Left shoulder decreased ROM with weakness to left upper extremity   Skin:     General: Skin is warm.      Capillary Refill: Capillary refill takes 2 to 3 seconds.      Findings: Erythema and lesion present.      Comments: Wound, see LDA for measurements and picture   Neurological:      Mental Status: She is alert and oriented to person, place, and time.   Psychiatric:         Mood and Affect: Mood normal.         Behavior: Behavior normal.         Thought Content: Thought content normal.         Judgment: Judgment normal.       Assessment and Plan             Altered Skin Integrity 07/25/23 0852 Left plantar Foot (Active)   07/25/23 0852   Altered Skin Integrity Present on Admission - Did Patient arrive to the hospital with altered skin?:    Side: Left   Orientation: plantar   Location: Foot   Wound Number:    Is this injury device related?:    Primary Wound Type:    Description of Altered Skin Integrity:    Ankle-Brachial Index:    Pulses:    Removal Indication and Assessment:    Wound Outcome:    (Retired) Wound Length (cm):    (Retired) Wound Width (cm):    (Retired) Depth (cm):    Wound Description (Comments):    Removal Indications:    Wound Image    10/16/24 0955   Description of Altered Skin Integrity Full thickness tissue loss with exposed bone, tendon, or muscle. Often includes undermining and tunneling. May extend into muscle and/or supporting structures. 10/16/24 0955   Dressing Appearance Dry;Intact;Clean 10/16/24 0955   Drainage Amount Moderate 10/16/24 0955   Drainage Characteristics/Odor Yellow 10/16/24 0955   Appearance Pink;Red;Moist;Granulating;Adipose;Hypergranulation 10/16/24 0955   Black (%), Wound Tissue Color 0 % 10/16/24 0955   Red (%), Wound Tissue Color 100 % 10/16/24 0955   Yellow (%), Wound Tissue Color 0 % 10/16/24 0955   Periwound Area Dry;Hyndman 10/16/24 0955   Wound Edges Callused 10/16/24 0955   Johnson Classification (diabetic foot ulcers only) Grade 1 10/16/24 0955   Wound  Length (cm) 1.1 cm 10/16/24 0955   Wound Width (cm) 1.4 cm 10/16/24 0955   Wound Depth (cm) 0.1 cm 10/16/24 0955   Wound Volume (cm^3) 0.154 cm^3 10/16/24 0955   Wound Surface Area (cm^2) 1.54 cm^2 10/16/24 0955   Care Cleansed with:;Antimicrobial agent 10/16/24 0955   Dressing Applied;Silver;Calcium alginate;Gauze;Rolled gauze 10/16/24 0955   Periwound Care Moisture barrier applied 10/16/24 0955       Problem List Items Addressed This Visit          Endocrine    Diabetic foot ulcer - Primary    Overview                                                                    Current Assessment & Plan     Clean with Acetic acid (1 cup of white vinegar and 3 cups of water, store in a closed container)     Apply betadine to wound bed,cover with foam bordered adhesive dressing. Change daily and as needed.      Keep foot moisturized with ammonium lactate or lotion        Monitor closely for s/s of infection including fever, chills, increase in pain, odor from wound, and increased redness from foot. Go to ER if any complications develop.   Keep leg elevated and avoid pressure on wound  Diabetes:  Monitor glucose closely. Check fasting glucose and 2 hours after meals. HgA1C goal <7, fasting glucose , and 2 hours after meals <180  Hypertension:  Check blood pressure twice daily, goal <120/80  Diet:   Increase protein intake, avoid fried, fatty foods and foods high in simple carbs.   Vitamins:  Take vitamin C 1000 mg, zinc 50mg, vitamin d 5000 units, and a daily multivitamin. Dawson is a good source of protein and nutrients to aid in wound healing.     Take antibiotics as prescribed             Future Appointments   Date Time Provider Department Center   11/6/2024  9:00 AM Felisha Love FNP [facility]   2/5/2025 11:15 AM [facility]   2/13/2025 10:00 AM [facility]            Signature:  TOMASA Maldonado  [facility]  [address] [facility] [address]  [phone]    Date of encounter: 10/16/24

## 2024-10-15 NOTE — PATIENT INSTRUCTIONS
Clean with Acetic acid (1 cup of white vinegar and 3 cups of water, store in a closed container)     Apply betadine to wound bed,cover with foam bordered adhesive dressing. Change daily and as needed.      Keep foot moisturized with ammonium lactate or lotion        Monitor closely for s/s of infection including fever, chills, increase in pain, odor from wound, and increased redness from foot. Go to ER if any complications develop.   Keep leg elevated and avoid pressure on wound  Diabetes:  Monitor glucose closely. Check fasting glucose and 2 hours after meals. HgA1C goal <7, fasting glucose , and 2 hours after meals <180  Hypertension:  Check blood pressure twice daily, goal <120/80  Diet:   Increase protein intake, avoid fried, fatty foods and foods high in simple carbs.   Vitamins:  Take vitamin C 1000 mg, zinc 50mg, vitamin d 5000 units, and a daily multivitamin. Dawson is a good source of protein and nutrients to aid in wound healing.     Take antibiotics as prescribed

## 2024-10-16 ENCOUNTER — OFFICE VISIT (OUTPATIENT)
Dept: WOUND CARE | Facility: CLINIC | Age: 64
End: 2024-10-16
Attending: FAMILY MEDICINE
Payer: MEDICAID

## 2024-10-16 VITALS — RESPIRATION RATE: 20 BRPM

## 2024-10-16 DIAGNOSIS — E11.621 DIABETIC ULCER OF RIGHT MIDFOOT ASSOCIATED WITH TYPE 2 DIABETES MELLITUS, WITH NECROSIS OF MUSCLE: Primary | ICD-10-CM

## 2024-10-16 DIAGNOSIS — L97.413 DIABETIC ULCER OF RIGHT MIDFOOT ASSOCIATED WITH TYPE 2 DIABETES MELLITUS, WITH NECROSIS OF MUSCLE: Primary | ICD-10-CM

## 2024-10-16 PROBLEM — L97.512 ULCER OF RIGHT FOOT WITH FAT LAYER EXPOSED: Status: RESOLVED | Noted: 2021-06-28 | Resolved: 2024-10-16

## 2024-10-16 PROCEDURE — 99214 OFFICE O/P EST MOD 30 MIN: CPT | Mod: PBBFAC | Performed by: NURSE PRACTITIONER

## 2024-10-16 PROCEDURE — 17250 CHEM CAUT OF GRANLTJ TISSUE: CPT | Mod: PBBFAC | Performed by: NURSE PRACTITIONER

## 2024-10-16 PROCEDURE — 17250 CHEM CAUT OF GRANLTJ TISSUE: CPT | Mod: S$PBB,,, | Performed by: NURSE PRACTITIONER

## 2024-10-16 PROCEDURE — 99999 PR PBB SHADOW E&M-EST. PATIENT-LVL IV: CPT | Mod: PBBFAC,,, | Performed by: NURSE PRACTITIONER

## 2024-10-16 PROCEDURE — 99499 UNLISTED E&M SERVICE: CPT | Mod: S$PBB,,, | Performed by: NURSE PRACTITIONER

## 2024-10-16 NOTE — PROGRESS NOTES
Debridement Performed for Assessment: Wound: right foot  Performed By: Provider;Felisha Love NP  Assistant: MARCO Ngo RN    Debridement: Chemical/enzymatic  Debridement Description: Non-Selective      Wound/Ulcer size:    Length:  1.2   Width: 1.6  Depth: 0.3  Cm2: 1.9    Photo taken post procedure:  Procedural Pain: Insensate  Post Procedural Pain: Insensate  Response to Treatment: Procedure was tolerated well    Specimen  Was a specimen collected?  No Specimen collected      Graft or Implant Aplpied  Was a graft of implant applied?  No      Post deridement diagnosis  Did the post debridment diagnosis chagne?  The post debridment diagnosis is the same as the pre-op diagnosis.      Assistant of procedure  Who assisted with the procedure?  The assistant was the same as the nurse listed above.      Complication  Complications related to debridement?  No complications noted      Estimated Blood Loss  How much blood loss occured?  No blood loss      Anesthesia  Anesthesia used?  None    Estimated Blood Loss  How much blood loss occured?  No blood loss      Anesthesia  Anesthesia used?  None

## 2024-10-18 NOTE — PROGRESS NOTES
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 Wound remains stable. Callus jonathon-wound and hypergranulation tissue to wound bed, bedside debridement today with application of silver nitrate. Continue current plan of care.     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 Wound is worse today and continues to have hypergranulation tissue. Right foot with edema and erythema with blisters to lateral aspect  of foot. Patient reports increased pain and burning in her foot. Cultures today. Rocephin given IM in clinic and cipro to pharmacy. X-ray today. Acetic acid moistened drawtex to wound bed. Continue to keep pressure off wound. Prescription for wheelchair given to patient. Prescription for custom off-loading shoe faxed to [facility].        63 y.o. female presents to clinic for follow up on chronic-non healing wound on right TMA.Wound is healing well. Reports pain and tenderness to lateral ankle and cramps and spasms to lower leg. Labs ordered today. Continue with Aquacel ag to wound bed and ammonium lacate to soften callus.   Reinforced importance of local wound care, keeping pressure off foot, off-loading shoe, elevating legs, adherence to diabetic diet and strict glycemic control, and increasing protein intake.     Significant PMH diabetes, anemia, and CVA. Last HgA1C 7 in July, diabetes  managed by PCP. Pertinent factors that delay wound healing include multiple co-morbidities, poor vascular supply, diabetes, edema, heavy drainage, decreased granulation tissue, tract(s), undermining and no protective sensation.       Review of Systems     Review of Systems   Constitutional:  Positive for activity change. Negative for appetite change, chills, diaphoresis and fever.   HENT: Negative.  Negative for congestion, ear pain, hearing loss and postnasal drip.    Eyes:  Negative for itching.   Respiratory:  Negative for chest tightness and shortness of breath.    Cardiovascular:  Positive for leg swelling. Negative for chest pain and palpitations.   Gastrointestinal:  Negative for abdominal pain.   Endocrine: Negative for polydipsia.   Genitourinary:  Negative for frequency.   Musculoskeletal:  Positive for arthralgias, back pain and joint swelling.        Severe shoulder pain, 10 of 10 on pain scale   Skin:  Positive for color change and wound.        See wound assessment   Neurological:  Positive for weakness and numbness. Negative for headaches.   Psychiatric/Behavioral:  Negative for agitation, behavioral problems, confusion and self-injury.        Medical / Social / Family History     Past Medical History:   Diagnosis Date    Anemia 7/5/2022    Diabetes mellitus, type 2     Hyperlipidemia     Hypertension     Obesity     Primary osteoarthritis of left shoulder 5/24/2022    Stroke        Past Surgical History:   Procedure Laterality Date    AMPUTATION      APPENDECTOMY      DEBRIDEMENT OF FOOT Right 1/12/2023    Procedure: DEBRIDEMENT, FOOT;  Surgeon: Ravi Ramirez MD;  Location: [facility];  Service: General;  Laterality: Right;    EYE SURGERY         Social History  Ms. Deborah Sotelo  reports that she has never smoked. She has never used smokeless tobacco. She reports current alcohol use. She reports that she does not use drugs.    Family History  Ms.'s Deborah Sotelo family history includes  Appendicitis in her father; Asthma in her sister; Cancer in her mother; Diabetes in her brother.    Medications and Allergies     Medications  Outpatient Medications Marked as Taking for the 11/6/24 encounter (Office Visit) with Felisha Love FNP   Medication Sig Dispense Refill    amLODIPine (NORVASC) 5 MG tablet Take 1 tablet (5 mg total) by mouth once daily. 30 tablet 5    ammonium lactate 12 % Crea Apply 1 g topically Daily. 385 g 2    atorvastatin (LIPITOR) 80 MG tablet TAKE 1 TABLET BY MOUTH EACH NIGHT AT BEDTIME FOR CHOLESTEROL ~~DO NOT EAT GRAPEFRUIT OR DRINK GRAPEFRUIT JUICE WHILE TAKING THIS MEDICATION~~ 30 tablet 5    diclofenac sodium (VOLTAREN) 1 % Gel Apply 2 g topically 4 (four) times daily. 450 g 2    FEROSUL 325 mg (65 mg iron) Tab tablet       gentamicin (GARAMYCIN) 0.1 % cream Apply topically once daily. 90 g 0    insulin asp prt-insulin aspart, NovoLOG 70/30, (NOVOLOG 70/30) 100 unit/mL (70-30) Soln INJECT 50 UNITS SUB-Q EACH MORNING AND 35  UNITS EACH EVENING ...KEEP IN REFRIGERATOR ...USE WITHIN 28 DAYS AFTER OPENING EACH VIAL 30 mL 11    ketoconazole (NIZORAL) 2 % cream Apply topically once daily. 60 g 2    ketoconazole (NIZORAL) 2 % shampoo Apply topically twice a week. 120 mL 2    LIDOcaine (LIDODERM) 5 % Place 1 patch onto the skin once daily. Remove & Discard patch within 12 hours or as directed by MD 30 patch 2    losartan (COZAAR) 100 MG tablet Take 1 tablet (100 mg total) by mouth every evening. 30 tablet 5    omeprazole (PRILOSEC) 20 MG capsule Take 1 capsule (20 mg total) by mouth once daily. 30 capsule 5    ondansetron (ZOFRAN) 4 MG tablet Take 1 tablet (4 mg total) by mouth every 6 (six) hours. 12 tablet 0    pantoprazole (PROTONIX) 40 MG tablet Take 40 mg by mouth once daily.      sodium hypochlorite 0.5 % (DAKIN'S SOLUTION) external solution Apply topically once daily. Pack wound with dakin's moistened nuguaze daily 473 mL 0    TRULICITY 0.75 mg/0.5 mL pen  injector Inject 0.75 mg into the skin every 7 days.         Allergies  Review of patient's allergies indicates:   Allergen Reactions    Aspirin      Other reaction(s): UPSET STOMACH   Other reaction(s): Unknown    Bactrim ds [sulfamethoxazole-trimethoprim] Itching       Physical Examination     Vitals:    11/06/24 0849   BP: (!) 166/88   Pulse: 83   Resp: 17   Temp: 98 °F (36.7 °C)         Physical Exam  Vitals and nursing note reviewed.   Constitutional:       Comments: Tearful during exam   HENT:      Head: Normocephalic.   Cardiovascular:      Rate and Rhythm: Normal rate and regular rhythm.      Pulses: Normal pulses.      Heart sounds: Normal heart sounds.   Pulmonary:      Effort: Pulmonary effort is normal. No respiratory distress.   Chest:      Chest wall: No tenderness.   Musculoskeletal:         General: Swelling, tenderness and deformity present.      Right lower leg: Edema present.      Comments: Left shoulder decreased ROM with weakness to left upper extremity   Skin:     General: Skin is warm.      Capillary Refill: Capillary refill takes 2 to 3 seconds.      Findings: Erythema and lesion present.      Comments: Wound, see LDA for measurements and picture   Neurological:      Mental Status: She is alert and oriented to person, place, and time.   Psychiatric:         Mood and Affect: Mood normal.         Behavior: Behavior normal.         Thought Content: Thought content normal.         Judgment: Judgment normal.     Assessment and Plan             Altered Skin Integrity 07/25/23 0852 Left plantar Foot (Active)   07/25/23 0852   Altered Skin Integrity Present on Admission - Did Patient arrive to the hospital with altered skin?:    Side: Left   Orientation: plantar   Location: Foot   Wound Number:    Is this injury device related?:    Primary Wound Type:    Description of Altered Skin Integrity:    Ankle-Brachial Index:    Pulses:    Removal Indication and Assessment:    Wound Outcome:    (Retired)  Wound Length (cm):    (Retired) Wound Width (cm):    (Retired) Depth (cm):    Wound Description (Comments):    Removal Indications:    Wound Image   11/06/24 0851   Description of Altered Skin Integrity Full thickness tissue loss. Subcutaneous fat may be visible but bone, tendon or muscle are not exposed 11/06/24 0851   Dressing Appearance Moist drainage 11/06/24 0851   Drainage Amount Moderate 11/06/24 0851   Drainage Characteristics/Odor Serosanguineous 11/06/24 0851   Appearance Red;Hypergranulation 11/06/24 0851   Tissue loss description Full thickness 11/06/24 0851   Black (%), Wound Tissue Color 0 % 11/06/24 0851   Red (%), Wound Tissue Color 100 % 11/06/24 0851   Yellow (%), Wound Tissue Color 0 % 11/06/24 0851   Periwound Area Intact;Moist 11/06/24 0851   Wound Edges Callused 11/06/24 0851   Wound Length (cm) 1.8 cm 11/06/24 0851   Wound Width (cm) 1.8 cm 11/06/24 0851   Wound Depth (cm) 0.2 cm 11/06/24 0851   Wound Volume (cm^3) 0.648 cm^3 11/06/24 0851   Wound Surface Area (cm^2) 3.24 cm^2 11/06/24 0851   Care Cleansed with:;Antimicrobial agent 11/06/24 0851   Dressing Applied;Foam;Island/border 11/06/24 0851   Periwound Care Moisturizer applied 11/06/24 0851         Problem List Items Addressed This Visit          Endocrine    Diabetic foot ulcer - Primary    Overview                                                                      Current Assessment & Plan     Clean with Acetic acid (1 cup of white vinegar and 3 cups of water, store in a closed container)     Apply betadine to wound bed,cover with foam bordered adhesive dressing. Change daily and as needed.    Keep foot moisturized with ammonium lactate or lotion     Monitor closely for s/s of infection including fever, chills, increase in pain, odor from wound, and increased redness from foot. Go to ER if any complications develop.   Keep leg elevated and avoid pressure on wound  Diabetes:  Monitor glucose closely. Check fasting glucose and 2 hours  after meals. HgA1C goal <7, fasting glucose , and 2 hours after meals <180  Hypertension:  Check blood pressure twice daily, goal <120/80  Diet:   Increase protein intake, avoid fried, fatty foods and foods high in simple carbs.   Vitamins:  Take vitamin C 1000 mg, zinc 50mg, vitamin d 5000 units, and a daily multivitamin. Dawson is a good source of protein and nutrients to aid in wound healing.     Take antibiotics as prescribed             Future Appointments   Date Time Provider Department Center   11/21/2024 10:00 AM Felisha Love FNP [facility]   2/5/2025 11:20 AM [facility]   2/13/2025 10:00 AM [facility] GI ROOM 01 [facility]            Signature:  TOMASA Maldonado  [facility]  [address]  [place] [address]  [phone]    Date of encounter: 11/6/24

## 2024-11-06 ENCOUNTER — OFFICE VISIT (OUTPATIENT)
Dept: WOUND CARE | Facility: CLINIC | Age: 64
End: 2024-11-06
Attending: FAMILY MEDICINE
Payer: MEDICAID

## 2024-11-06 VITALS
DIASTOLIC BLOOD PRESSURE: 88 MMHG | SYSTOLIC BLOOD PRESSURE: 166 MMHG | TEMPERATURE: 98 F | RESPIRATION RATE: 17 BRPM | HEART RATE: 83 BPM

## 2024-11-06 DIAGNOSIS — L97.413 DIABETIC ULCER OF RIGHT MIDFOOT ASSOCIATED WITH TYPE 2 DIABETES MELLITUS, WITH NECROSIS OF MUSCLE: Primary | ICD-10-CM

## 2024-11-06 DIAGNOSIS — E11.621 DIABETIC ULCER OF RIGHT MIDFOOT ASSOCIATED WITH TYPE 2 DIABETES MELLITUS, WITH NECROSIS OF MUSCLE: Primary | ICD-10-CM

## 2024-11-06 PROCEDURE — 99999 PR PBB SHADOW E&M-EST. PATIENT-LVL V: CPT | Mod: PBBFAC,,, | Performed by: NURSE PRACTITIONER

## 2024-11-06 PROCEDURE — 99499 UNLISTED E&M SERVICE: CPT | Mod: S$PBB,,, | Performed by: NURSE PRACTITIONER

## 2024-11-06 PROCEDURE — 99215 OFFICE O/P EST HI 40 MIN: CPT | Mod: PBBFAC | Performed by: NURSE PRACTITIONER

## 2024-11-06 PROCEDURE — 25000003 PHARM REV CODE 250

## 2024-11-06 PROCEDURE — 11042 DBRDMT SUBQ TIS 1ST 20SQCM/<: CPT | Mod: PBBFAC | Performed by: NURSE PRACTITIONER

## 2024-11-06 NOTE — PROGRESS NOTES
Debridement Performed for Assessment: Wound# left plantar  Performed By: Provider: Felisha Sun NP  Assistant: TITO Vega, RN    Debridement: Surgical    Photo taken post procedure:    Time-Out Taken: Yes  Level: Skin/Subcutaneous Tissue  Post Debridement Measurements  Length: (cm) 1.9  Width: (cm) 1.9  Depth: (cm)       Area: (cm²) 3.62  Percent Debrided: 100%  Total Area Debrided: (sq cm) 3.62    Tissue and other material debrided:  Adipose, Dermis, Epidermis, Subcutaneous  Devitalized Tissue Debrided:Biofilm, Slough, Fibrin  Instrument: Curette  Bleeding: Moderate  Hemostasis Achieved: Silver nitrate  Procedural Pain: Insensate  Post Procedural Pain: Insensate  Response to Treatment: Procedure was tolerated well    Devitalized materials/tissue Removed  the following was removed during debridement  subcutaneous      Post Debridement Diagnosis  Post debridement diagnosis  Same as Pre-operative debridement diagnosis, No Complications noted.      Grafts or implants applied  Was a graft or implant applied?  No      Procedure assistant  Procedure assisted by:  Assistant is the same as nurse listed above      Complications related to procedure  Did any complication occure during procedure?  No complications noted during or after procedure.      Specimen  Specimen collect during procedure?  No specimen collected      Anaesthesia:  Anesthesia used  None      Blood Loss:  Blood loss during procedure  less than 5 cc

## 2024-11-06 NOTE — PROCEDURES
"Debridement    Date/Time: 11/6/2024 9:00 AM    Performed by: Felisha Love FNP  Authorized by: Felisha Love FNP    Time out: Immediately prior to procedure a "time out" was called to verify the correct patient, procedure, equipment, support staff and site/side marked as required.    Consent Done?:  Yes (Written)    Wound Details:    Location:  Left foot    Location:  Left Plantar    Type of Debridement:  Excisional       Length (cm):  1.9       Area (sq cm):  3.61       Width (cm):  1.9       Percent Debrided (%):  100       Depth (cm):  0.5       Total Area Debrided (sq cm):  3.61    Depth of debridement:  Subcutaneous tissue    Tissue debrided:  Adipose, Dermis, Epidermis, Subcutaneous and Hypergranulation    Devitalized tissue debrided:  Biofilm, Callus, Clots and Exudate    Instruments:  Curette  Bleeding:  Moderate  Hemostasis Achieved: Yes  Method Used:  Silver Nitrate  Patient tolerance:  Patient tolerated the procedure well with no immediate complications  1st Wound Pain Assessment: 0     Assistant MARCO Ngo RN    "

## 2024-11-13 NOTE — PROGRESS NOTES
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 Wound is larger today with maceration and hypergranulation tissue. Referral to General Surgery to evaluate for debridement. Cipro to pharmacy.     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 Wound is worse today and continues to have hypergranulation tissue. Right foot with edema and erythema with blisters to lateral aspect  of foot. Patient reports increased pain and burning in her foot. Cultures today. Rocephin given IM in clinic and cipro to pharmacy. X-ray today. Acetic acid moistened drawtex to wound bed. Continue to keep pressure off wound. Prescription for wheelchair given to patient. Prescription for custom off-loading shoe faxed to [facility].        63 y.o. female presents to clinic for follow up on chronic-non healing wound on right TMA.Wound is healing well. Reports pain and tenderness to lateral ankle and cramps and spasms to lower leg. Labs ordered today. Continue with Aquacel ag to wound bed and ammonium lacate to soften callus.   Reinforced importance of local wound care, keeping pressure off foot, off-loading shoe, elevating legs, adherence to diabetic diet and strict glycemic control, and increasing protein intake.     Significant PMH diabetes, anemia, and CVA. Last HgA1C 7 in July, diabetes managed by PCP. Pertinent factors  that delay wound healing include multiple co-morbidities, poor vascular supply, diabetes, edema, heavy drainage, decreased granulation tissue, tract(s), undermining and no protective sensation.       Review of Systems     Review of Systems   Constitutional:  Positive for activity change. Negative for appetite change, chills, diaphoresis and fever.   HENT: Negative.  Negative for congestion, ear pain, hearing loss and postnasal drip.    Eyes:  Negative for itching.   Respiratory:  Negative for chest tightness and shortness of breath.    Cardiovascular:  Positive for leg swelling. Negative for chest pain and palpitations.   Gastrointestinal:  Negative for abdominal pain.   Endocrine: Negative for polydipsia.   Genitourinary:  Negative for frequency.   Musculoskeletal:  Positive for arthralgias, back pain and joint swelling.        Severe shoulder pain, 10 of 10 on pain scale   Skin:  Positive for color change and wound.        See wound assessment   Neurological:  Positive for weakness and numbness. Negative for headaches.   Psychiatric/Behavioral:  Negative for agitation, behavioral problems, confusion and self-injury.        Medical / Social / Family History     Past Medical History:   Diagnosis Date    Anemia 7/5/2022    Diabetes mellitus, type 2     Hyperlipidemia     Hypertension     Obesity     Primary osteoarthritis of left shoulder 5/24/2022    Stroke        Past Surgical History:   Procedure Laterality Date    AMPUTATION      APPENDECTOMY      DEBRIDEMENT OF FOOT Right 1/12/2023    Procedure: DEBRIDEMENT, FOOT;  Surgeon: Ravi Ramirez MD;  Location: [facility];  Service: General;  Laterality: Right;    EYE SURGERY         Social History  Ms. Deborah Sotelo  reports that she has never smoked. She has never used smokeless tobacco. She reports current alcohol use. She reports that she does not use drugs.    Family History  Ms.'s Deborah Sotelo family history includes Appendicitis in her father; Asthma  in her sister; Cancer in her mother; Diabetes in her brother.    Medications and Allergies     Medications  Outpatient Medications Marked as Taking for the 11/21/24 encounter (Office Visit) with Felisha Love FNP   Medication Sig Dispense Refill    amLODIPine (NORVASC) 5 MG tablet Take 1 tablet (5 mg total) by mouth once daily. 30 tablet 5    ammonium lactate 12 % Crea Apply 1 g topically Daily. 385 g 2    atorvastatin (LIPITOR) 80 MG tablet TAKE 1 TABLET BY MOUTH EACH NIGHT AT BEDTIME FOR CHOLESTEROL ~~DO NOT EAT GRAPEFRUIT OR DRINK GRAPEFRUIT JUICE WHILE TAKING THIS MEDICATION~~ 30 tablet 5    diclofenac sodium (VOLTAREN) 1 % Gel Apply 2 g topically 4 (four) times daily. 450 g 2    FEROSUL 325 mg (65 mg iron) Tab tablet       gentamicin (GARAMYCIN) 0.1 % cream Apply topically once daily. 90 g 0    insulin asp prt-insulin aspart, NovoLOG 70/30, (NOVOLOG 70/30) 100 unit/mL (70-30) Soln INJECT 50 UNITS SUB-Q EACH MORNING AND 35  UNITS EACH EVENING ...KEEP IN REFRIGERATOR ...USE WITHIN 28 DAYS AFTER OPENING EACH VIAL 30 mL 11    ketoconazole (NIZORAL) 2 % cream Apply topically once daily. 60 g 2    ketoconazole (NIZORAL) 2 % shampoo Apply topically twice a week. 120 mL 2    LIDOcaine (LIDODERM) 5 % Place 1 patch onto the skin once daily. Remove & Discard patch within 12 hours or as directed by MD 30 patch 2    losartan (COZAAR) 100 MG tablet Take 1 tablet (100 mg total) by mouth every evening. 30 tablet 5    omeprazole (PRILOSEC) 20 MG capsule Take 1 capsule (20 mg total) by mouth once daily. 30 capsule 5    ondansetron (ZOFRAN) 4 MG tablet Take 1 tablet (4 mg total) by mouth every 6 (six) hours. 12 tablet 0    pantoprazole (PROTONIX) 40 MG tablet Take 40 mg by mouth once daily.      sodium hypochlorite 0.5 % (DAKIN'S SOLUTION) external solution Apply topically once daily. Pack wound with dakin's moistened nuguaze daily 473 mL 0    TRULICITY 0.75 mg/0.5 mL pen injector Inject 0.75 mg into the  skin every 7 days.         Allergies  Review of patient's allergies indicates:   Allergen Reactions    Aspirin      Other reaction(s): UPSET STOMACH   Other reaction(s): Unknown    Bactrim ds [sulfamethoxazole-trimethoprim] Itching       Physical Examination     Vitals:    11/21/24 1048   BP: (!) 138/97   Pulse:    Resp:    Temp:            Physical Exam  Vitals and nursing note reviewed.   Constitutional:       Comments: Tearful during exam   HENT:      Head: Normocephalic.   Cardiovascular:      Rate and Rhythm: Normal rate and regular rhythm.      Pulses: Normal pulses.      Heart sounds: Normal heart sounds.   Pulmonary:      Effort: Pulmonary effort is normal. No respiratory distress.   Chest:      Chest wall: No tenderness.   Musculoskeletal:         General: Swelling, tenderness and deformity present.      Right lower leg: Edema present.      Comments: Left shoulder decreased ROM with weakness to left upper extremity   Skin:     General: Skin is warm.      Capillary Refill: Capillary refill takes 2 to 3 seconds.      Findings: Erythema and lesion present.      Comments: Wound, see LDA for measurements and picture   Neurological:      Mental Status: She is alert and oriented to person, place, and time.   Psychiatric:         Mood and Affect: Mood normal.         Behavior: Behavior normal.         Thought Content: Thought content normal.         Judgment: Judgment normal.     Assessment and Plan             Wound 01/18/21 1051 Diabetic Ulcer Right lateral Plantar #1 (Active)   01/18/21 1051    Pre-existing: Yes   Primary Wound Type: Diabetic ulc   Side: Right   Orientation: lateral   Location: Plantar   Wound Number: #1   Ankle-Brachial Index:    Pulses: normal   Removal Indication and Assessment:    Wound Outcome:    (Retired) Wound Type:    (Retired) Wound Length (cm):    (Retired) Wound Width (cm):    (Retired) Depth (cm):    Wound Description (Comments):    Removal Indications:    Wound Image    11/21/24  1011   Dressing Appearance Intact;Moist drainage 11/21/24 1011   Drainage Amount Moderate 11/21/24 1011   Drainage Characteristics/Odor Serosanguineous 11/21/24 1011   Appearance Red;Hypergranulation 11/21/24 1011   Tissue loss description Full thickness 11/21/24 1011   Black (%), Wound Tissue Color 0 % 11/21/24 1011   Red (%), Wound Tissue Color 100 % 11/21/24 1011   Yellow (%), Wound Tissue Color 0 % 11/21/24 1011   Periwound Area Intact;Dry 11/21/24 1011   Wound Edges Callused 11/21/24 1011   Johnson Classification (diabetic foot ulcers only) Grade 1 11/21/24 1011   Wound Length (cm) 3.2 cm 11/21/24 1011   Wound Width (cm) 2 cm 11/21/24 1011   Wound Depth (cm) 0.3 cm 11/21/24 1011   Wound Volume (cm^3) 1.92 cm^3 11/21/24 1011   Wound Surface Area (cm^2) 6.4 cm^2 11/21/24 1011   Care Cleansed with:;Antimicrobial agent 11/21/24 1011   Dressing Applied;Silver;Foam;Island/border 11/21/24 1011   Packing gauze, iodoform 11/21/24 1011           Problem List Items Addressed This Visit          Endocrine    Diabetic foot ulcer - Primary    Overview                                                                        Current Assessment & Plan     Clean with Acetic acid (1 cup of white vinegar and 3 cups of water, store in a closed container)  Pack wound with Iodoform and cover with silver foam bordered adhesive dressing. Change daily and as needed.    Keep foot moisturized with ammonium lactate or lotion        Monitor closely for s/s of infection including fever, chills, increase in pain, odor from wound, and increased redness from foot. Go to ER if any complications develop.   Keep leg elevated and avoid pressure on wound  Diabetes:  Monitor glucose closely. Check fasting glucose and 2 hours after meals. HgA1C goal <7, fasting glucose , and 2 hours after meals <180  Hypertension:  Check blood pressure twice daily, goal <120/80  Diet:   Increase protein intake, avoid fried, fatty foods and foods high in simple  carbs.   Vitamins:  Take vitamin C 1000 mg, zinc 50mg, vitamin d 5000 units, and a daily multivitamin. Dawson is a good source of protein and nutrients to aid in wound healing.     Take Cipro as prescribed     Appointment with Dr. Ramirez on 12/9/2024 at 2:15pm         Relevant Orders    Ambulatory referral/consult to General Surgery       Future Appointments   Date Time Provider Department Center   12/5/2024 10:00 AM Felisha Love FNP [facility]   12/9/2024  2:15 PM Ravi Ramirez MD [facility]   2/5/2025 11:20 AM [facility] [place]   2/13/2025 10:00 AM [facility] GI ROOM 01 [facility]            Signature:  TOMASA Maldonado  [facility]  [address] [facility] [address]  [phone]    Date of encounter: 11/21/24         Cardiac Monitor/Defib/ACLS/Rescue Kit/O2/BVM

## 2024-11-13 NOTE — PATIENT INSTRUCTIONS
Clean with Acetic acid (1 cup of white vinegar and 3 cups of water, store in a closed container)  Pack wound with Iodoform and cover with silver foam bordered adhesive dressing. Change daily and as needed.    Keep foot moisturized with ammonium lactate or lotion        Monitor closely for s/s of infection including fever, chills, increase in pain, odor from wound, and increased redness from foot. Go to ER if any complications develop.   Keep leg elevated and avoid pressure on wound  Diabetes:  Monitor glucose closely. Check fasting glucose and 2 hours after meals. HgA1C goal <7, fasting glucose , and 2 hours after meals <180  Hypertension:  Check blood pressure twice daily, goal <120/80  Diet:   Increase protein intake, avoid fried, fatty foods and foods high in simple carbs.   Vitamins:  Take vitamin C 1000 mg, zinc 50mg, vitamin d 5000 units, and a daily multivitamin. Dawson is a good source of protein and nutrients to aid in wound healing.     Take Cipro as prescribed     Appointment with Dr. Ramirez on 12/9/2024 at 2:15pm

## 2024-11-21 ENCOUNTER — OFFICE VISIT (OUTPATIENT)
Dept: WOUND CARE | Facility: CLINIC | Age: 64
End: 2024-11-21
Attending: FAMILY MEDICINE
Payer: MEDICAID

## 2024-11-21 VITALS
HEART RATE: 88 BPM | SYSTOLIC BLOOD PRESSURE: 138 MMHG | RESPIRATION RATE: 17 BRPM | DIASTOLIC BLOOD PRESSURE: 97 MMHG | TEMPERATURE: 98 F

## 2024-11-21 DIAGNOSIS — E11.621 DIABETIC ULCER OF RIGHT MIDFOOT ASSOCIATED WITH TYPE 2 DIABETES MELLITUS, WITH NECROSIS OF MUSCLE: Primary | ICD-10-CM

## 2024-11-21 DIAGNOSIS — L97.413 DIABETIC ULCER OF RIGHT MIDFOOT ASSOCIATED WITH TYPE 2 DIABETES MELLITUS, WITH NECROSIS OF MUSCLE: Primary | ICD-10-CM

## 2024-11-21 PROCEDURE — 99999 PR PBB SHADOW E&M-EST. PATIENT-LVL V: CPT | Mod: PBBFAC,,, | Performed by: NURSE PRACTITIONER

## 2024-11-21 PROCEDURE — 3080F DIAST BP >= 90 MM HG: CPT | Mod: CPTII,,, | Performed by: NURSE PRACTITIONER

## 2024-11-21 PROCEDURE — 3075F SYST BP GE 130 - 139MM HG: CPT | Mod: CPTII,,, | Performed by: NURSE PRACTITIONER

## 2024-11-21 PROCEDURE — 1159F MED LIST DOCD IN RCRD: CPT | Mod: CPTII,,, | Performed by: NURSE PRACTITIONER

## 2024-11-21 PROCEDURE — 99215 OFFICE O/P EST HI 40 MIN: CPT | Mod: PBBFAC | Performed by: NURSE PRACTITIONER

## 2024-11-21 PROCEDURE — 1160F RVW MEDS BY RX/DR IN RCRD: CPT | Mod: CPTII,,, | Performed by: NURSE PRACTITIONER

## 2024-11-21 PROCEDURE — 4010F ACE/ARB THERAPY RXD/TAKEN: CPT | Mod: CPTII,,, | Performed by: NURSE PRACTITIONER

## 2024-11-21 PROCEDURE — 99213 OFFICE O/P EST LOW 20 MIN: CPT | Mod: S$PBB,,, | Performed by: NURSE PRACTITIONER

## 2024-11-21 RX ORDER — CIPROFLOXACIN 250 MG/1
250 TABLET, FILM COATED ORAL EVERY 12 HOURS
Qty: 28 TABLET | Refills: 0 | Status: SHIPPED | OUTPATIENT
Start: 2024-11-21 | End: 2024-12-05

## 2024-12-09 ENCOUNTER — OFFICE VISIT (OUTPATIENT)
Dept: WOUND CARE | Facility: CLINIC | Age: 64
End: 2024-12-09
Attending: FAMILY MEDICINE
Payer: MEDICAID

## 2024-12-09 ENCOUNTER — HOSPITAL ENCOUNTER (OUTPATIENT)
Facility: HOSPITAL | Age: 64
Discharge: HOME | End: 2024-12-11
Admitting: INTERNAL MEDICINE
Payer: MEDICAID

## 2024-12-09 VITALS
RESPIRATION RATE: 19 BRPM | SYSTOLIC BLOOD PRESSURE: 122 MMHG | HEART RATE: 96 BPM | DIASTOLIC BLOOD PRESSURE: 92 MMHG | TEMPERATURE: 98 F

## 2024-12-09 DIAGNOSIS — L97.413 DIABETIC ULCER OF RIGHT MIDFOOT ASSOCIATED WITH TYPE 2 DIABETES MELLITUS, WITH NECROSIS OF MUSCLE: Primary | ICD-10-CM

## 2024-12-09 DIAGNOSIS — R07.9 CHEST PAIN: ICD-10-CM

## 2024-12-09 DIAGNOSIS — L03.115 CELLULITIS OF RIGHT FOOT: ICD-10-CM

## 2024-12-09 DIAGNOSIS — E11.621 DIABETIC ULCER OF RIGHT MIDFOOT ASSOCIATED WITH TYPE 2 DIABETES MELLITUS, WITH NECROSIS OF MUSCLE: Primary | ICD-10-CM

## 2024-12-09 DIAGNOSIS — L03.031 CELLULITIS OF TOE OF RIGHT FOOT: Primary | ICD-10-CM

## 2024-12-09 DIAGNOSIS — S91.309A WOUND OF FOOT: ICD-10-CM

## 2024-12-09 PROBLEM — B37.31 VAGINAL CANDIDIASIS: Status: ACTIVE | Noted: 2024-12-09

## 2024-12-09 LAB
ANION GAP SERPL CALCULATED.3IONS-SCNC: 9 MMOL/L (ref 7–16)
BASOPHILS # BLD AUTO: 0.04 K/UL (ref 0–0.2)
BASOPHILS NFR BLD AUTO: 0.6 % (ref 0–1)
BUN SERPL-MCNC: 24 MG/DL (ref 10–20)
BUN/CREAT SERPL: 20 (ref 6–20)
CALCIUM SERPL-MCNC: 8.8 MG/DL (ref 8.4–10.2)
CHLORIDE SERPL-SCNC: 109 MMOL/L (ref 98–107)
CO2 SERPL-SCNC: 24 MMOL/L (ref 23–31)
CREAT SERPL-MCNC: 1.18 MG/DL (ref 0.55–1.02)
DIFFERENTIAL METHOD BLD: ABNORMAL
EGFR (NO RACE VARIABLE) (RUSH/TITUS): 52 ML/MIN/1.73M2
EOSINOPHIL # BLD AUTO: 0.4 K/UL (ref 0–0.5)
EOSINOPHIL NFR BLD AUTO: 6.2 % (ref 1–4)
ERYTHROCYTE [DISTWIDTH] IN BLOOD BY AUTOMATED COUNT: 14 % (ref 11.5–14.5)
EST. AVERAGE GLUCOSE BLD GHB EST-MCNC: 146 MG/DL
GLUCOSE SERPL-MCNC: 126 MG/DL (ref 82–115)
GLUCOSE SERPL-MCNC: 175 MG/DL (ref 70–105)
HBA1C MFR BLD HPLC: 6.7 %
HCT VFR BLD AUTO: 33.3 % (ref 38–47)
HGB BLD-MCNC: 11 G/DL (ref 12–16)
IMM GRANULOCYTES # BLD AUTO: 0.03 K/UL (ref 0–0.04)
IMM GRANULOCYTES NFR BLD: 0.5 % (ref 0–0.4)
LYMPHOCYTES # BLD AUTO: 2.24 K/UL (ref 1–4.8)
LYMPHOCYTES NFR BLD AUTO: 34.5 % (ref 27–41)
MCH RBC QN AUTO: 25.5 PG (ref 27–31)
MCHC RBC AUTO-ENTMCNC: 33 G/DL (ref 32–36)
MCV RBC AUTO: 77.3 FL (ref 80–96)
MONOCYTES # BLD AUTO: 0.46 K/UL (ref 0–0.8)
MONOCYTES NFR BLD AUTO: 7.1 % (ref 2–6)
MPC BLD CALC-MCNC: 11.4 FL (ref 9.4–12.4)
NEUTROPHILS # BLD AUTO: 3.32 K/UL (ref 1.8–7.7)
NEUTROPHILS NFR BLD AUTO: 51.1 % (ref 53–65)
NRBC # BLD AUTO: 0 X10E3/UL
NRBC, AUTO (.00): 0 %
PLATELET # BLD AUTO: 202 K/UL (ref 150–400)
POTASSIUM SERPL-SCNC: 4.9 MMOL/L (ref 3.5–5.1)
RBC # BLD AUTO: 4.31 M/UL (ref 4.2–5.4)
SARS-COV-2 RDRP RESP QL NAA+PROBE: NEGATIVE
SODIUM SERPL-SCNC: 137 MMOL/L (ref 136–145)
WBC # BLD AUTO: 6.49 K/UL (ref 4.5–11)

## 2024-12-09 PROCEDURE — 36415 COLL VENOUS BLD VENIPUNCTURE: CPT | Performed by: NURSE PRACTITIONER

## 2024-12-09 PROCEDURE — 99223 1ST HOSP IP/OBS HIGH 75: CPT | Mod: 25,,, | Performed by: INTERNAL MEDICINE

## 2024-12-09 PROCEDURE — 25000003 PHARM REV CODE 250

## 2024-12-09 PROCEDURE — 63600175 PHARM REV CODE 636 W HCPCS: Performed by: INTERNAL MEDICINE

## 2024-12-09 PROCEDURE — 1159F MED LIST DOCD IN RCRD: CPT | Mod: CPTII,,, | Performed by: NURSE PRACTITIONER

## 2024-12-09 PROCEDURE — 80048 BASIC METABOLIC PNL TOTAL CA: CPT | Performed by: NURSE PRACTITIONER

## 2024-12-09 PROCEDURE — 96372 THER/PROPH/DIAG INJ SC/IM: CPT | Mod: PBBFAC | Performed by: NURSE PRACTITIONER

## 2024-12-09 PROCEDURE — 82962 GLUCOSE BLOOD TEST: CPT

## 2024-12-09 PROCEDURE — 96361 HYDRATE IV INFUSION ADD-ON: CPT

## 2024-12-09 PROCEDURE — 3080F DIAST BP >= 90 MM HG: CPT | Mod: CPTII,,, | Performed by: NURSE PRACTITIONER

## 2024-12-09 PROCEDURE — 96367 TX/PROPH/DG ADDL SEQ IV INF: CPT

## 2024-12-09 PROCEDURE — 63600175 PHARM REV CODE 636 W HCPCS

## 2024-12-09 PROCEDURE — 99213 OFFICE O/P EST LOW 20 MIN: CPT | Mod: S$PBB,25,, | Performed by: NURSE PRACTITIONER

## 2024-12-09 PROCEDURE — 3074F SYST BP LT 130 MM HG: CPT | Mod: CPTII,,, | Performed by: NURSE PRACTITIONER

## 2024-12-09 PROCEDURE — 25000003 PHARM REV CODE 250: Performed by: INTERNAL MEDICINE

## 2024-12-09 PROCEDURE — 99999 PR PBB SHADOW E&M-EST. PATIENT-LVL V: CPT | Mod: PBBFAC,,, | Performed by: NURSE PRACTITIONER

## 2024-12-09 PROCEDURE — 85025 COMPLETE CBC W/AUTO DIFF WBC: CPT | Performed by: NURSE PRACTITIONER

## 2024-12-09 PROCEDURE — 4010F ACE/ARB THERAPY RXD/TAKEN: CPT | Mod: CPTII,,, | Performed by: NURSE PRACTITIONER

## 2024-12-09 PROCEDURE — 83036 HEMOGLOBIN GLYCOSYLATED A1C: CPT | Performed by: INTERNAL MEDICINE

## 2024-12-09 PROCEDURE — 99999PBSHW PR PBB SHADOW TECHNICAL ONLY FILED TO HB: Mod: PBBFAC,,,

## 2024-12-09 PROCEDURE — 11000001 HC ACUTE MED/SURG PRIVATE ROOM

## 2024-12-09 PROCEDURE — 99285 EMERGENCY DEPT VISIT HI MDM: CPT | Mod: 25

## 2024-12-09 PROCEDURE — G0378 HOSPITAL OBSERVATION PER HR: HCPCS

## 2024-12-09 PROCEDURE — 87635 SARS-COV-2 COVID-19 AMP PRB: CPT

## 2024-12-09 PROCEDURE — 99215 OFFICE O/P EST HI 40 MIN: CPT | Mod: PBBFAC | Performed by: NURSE PRACTITIONER

## 2024-12-09 PROCEDURE — 96375 TX/PRO/DX INJ NEW DRUG ADDON: CPT

## 2024-12-09 PROCEDURE — 96365 THER/PROPH/DIAG IV INF INIT: CPT

## 2024-12-09 RX ORDER — ONDANSETRON HYDROCHLORIDE 2 MG/ML
4 INJECTION, SOLUTION INTRAVENOUS EVERY 8 HOURS PRN
Status: DISCONTINUED | OUTPATIENT
Start: 2024-12-09 | End: 2024-12-11 | Stop reason: HOSPADM

## 2024-12-09 RX ORDER — SODIUM CHLORIDE 0.9 % (FLUSH) 0.9 %
10 SYRINGE (ML) INJECTION EVERY 12 HOURS PRN
Status: DISCONTINUED | OUTPATIENT
Start: 2024-12-09 | End: 2024-12-11 | Stop reason: HOSPADM

## 2024-12-09 RX ORDER — INSULIN GLARGINE 100 [IU]/ML
30 INJECTION, SOLUTION SUBCUTANEOUS NIGHTLY
Status: DISCONTINUED | OUTPATIENT
Start: 2024-12-09 | End: 2024-12-11 | Stop reason: HOSPADM

## 2024-12-09 RX ORDER — ATORVASTATIN CALCIUM 80 MG/1
80 TABLET, FILM COATED ORAL NIGHTLY
Status: DISCONTINUED | OUTPATIENT
Start: 2024-12-09 | End: 2024-12-11 | Stop reason: HOSPADM

## 2024-12-09 RX ORDER — GLUCAGON 1 MG
1 KIT INJECTION
Status: DISCONTINUED | OUTPATIENT
Start: 2024-12-09 | End: 2024-12-11 | Stop reason: HOSPADM

## 2024-12-09 RX ORDER — SODIUM CHLORIDE 450 MG/100ML
INJECTION, SOLUTION INTRAVENOUS CONTINUOUS
Status: DISCONTINUED | OUTPATIENT
Start: 2024-12-09 | End: 2024-12-10

## 2024-12-09 RX ORDER — HYDROCODONE BITARTRATE AND ACETAMINOPHEN 5; 325 MG/1; MG/1
1 TABLET ORAL EVERY 6 HOURS PRN
Status: DISCONTINUED | OUTPATIENT
Start: 2024-12-09 | End: 2024-12-11 | Stop reason: HOSPADM

## 2024-12-09 RX ORDER — ENOXAPARIN SODIUM 100 MG/ML
40 INJECTION SUBCUTANEOUS EVERY 24 HOURS
Status: DISCONTINUED | OUTPATIENT
Start: 2024-12-10 | End: 2024-12-11 | Stop reason: HOSPADM

## 2024-12-09 RX ORDER — GLUCAGON 1 MG
1 KIT INJECTION
Status: DISCONTINUED | OUTPATIENT
Start: 2024-12-09 | End: 2024-12-09

## 2024-12-09 RX ORDER — PANTOPRAZOLE SODIUM 40 MG/1
40 TABLET, DELAYED RELEASE ORAL DAILY
Status: DISCONTINUED | OUTPATIENT
Start: 2024-12-10 | End: 2024-12-11 | Stop reason: HOSPADM

## 2024-12-09 RX ORDER — ACETAMINOPHEN 325 MG/1
650 TABLET ORAL EVERY 4 HOURS PRN
Status: DISCONTINUED | OUTPATIENT
Start: 2024-12-09 | End: 2024-12-11 | Stop reason: HOSPADM

## 2024-12-09 RX ORDER — LOSARTAN POTASSIUM 100 MG/1
100 TABLET ORAL NIGHTLY
Status: DISCONTINUED | OUTPATIENT
Start: 2024-12-09 | End: 2024-12-11 | Stop reason: HOSPADM

## 2024-12-09 RX ORDER — IBUPROFEN 200 MG
16 TABLET ORAL
Status: DISCONTINUED | OUTPATIENT
Start: 2024-12-09 | End: 2024-12-11 | Stop reason: HOSPADM

## 2024-12-09 RX ORDER — NALOXONE HCL 0.4 MG/ML
0.02 VIAL (ML) INJECTION
Status: DISCONTINUED | OUTPATIENT
Start: 2024-12-09 | End: 2024-12-11 | Stop reason: HOSPADM

## 2024-12-09 RX ORDER — IBUPROFEN 200 MG
24 TABLET ORAL
Status: DISCONTINUED | OUTPATIENT
Start: 2024-12-09 | End: 2024-12-11 | Stop reason: HOSPADM

## 2024-12-09 RX ORDER — INSULIN ASPART 100 [IU]/ML
0-10 INJECTION, SOLUTION INTRAVENOUS; SUBCUTANEOUS EVERY 6 HOURS PRN
Status: DISCONTINUED | OUTPATIENT
Start: 2024-12-09 | End: 2024-12-11 | Stop reason: HOSPADM

## 2024-12-09 RX ORDER — AMLODIPINE BESYLATE 5 MG/1
5 TABLET ORAL DAILY
Status: DISCONTINUED | OUTPATIENT
Start: 2024-12-10 | End: 2024-12-11 | Stop reason: HOSPADM

## 2024-12-09 RX ORDER — CEFTRIAXONE 1 G/1
1 INJECTION, POWDER, FOR SOLUTION INTRAMUSCULAR; INTRAVENOUS ONCE
Status: COMPLETED | OUTPATIENT
Start: 2024-12-09 | End: 2024-12-09

## 2024-12-09 RX ORDER — FLUCONAZOLE 150 MG/1
150 TABLET ORAL DAILY
Qty: 5 TABLET | Refills: 0 | Status: SHIPPED | OUTPATIENT
Start: 2024-12-09 | End: 2024-12-14

## 2024-12-09 RX ORDER — MORPHINE SULFATE 4 MG/ML
4 INJECTION, SOLUTION INTRAMUSCULAR; INTRAVENOUS EVERY 4 HOURS PRN
Status: DISCONTINUED | OUTPATIENT
Start: 2024-12-09 | End: 2024-12-11 | Stop reason: HOSPADM

## 2024-12-09 RX ADMIN — ATORVASTATIN CALCIUM 80 MG: 80 TABLET, FILM COATED ORAL at 08:12

## 2024-12-09 RX ADMIN — VANCOMYCIN HYDROCHLORIDE 2500 MG: 500 INJECTION, POWDER, LYOPHILIZED, FOR SOLUTION INTRAVENOUS at 09:12

## 2024-12-09 RX ADMIN — MORPHINE SULFATE 4 MG: 4 INJECTION INTRAVENOUS at 09:12

## 2024-12-09 RX ADMIN — INSULIN ASPART 1 UNITS: 100 INJECTION, SOLUTION INTRAVENOUS; SUBCUTANEOUS at 09:12

## 2024-12-09 RX ADMIN — CEFTRIAXONE SODIUM 1 G: 1 INJECTION, POWDER, FOR SOLUTION INTRAMUSCULAR; INTRAVENOUS at 01:12

## 2024-12-09 RX ADMIN — PIPERACILLIN AND TAZOBACTAM 4.5 G: 4; .5 INJECTION, POWDER, LYOPHILIZED, FOR SOLUTION INTRAVENOUS; PARENTERAL at 09:12

## 2024-12-09 RX ADMIN — SODIUM CHLORIDE: 4.5 INJECTION, SOLUTION INTRAVENOUS at 09:12

## 2024-12-09 RX ADMIN — LOSARTAN POTASSIUM 100 MG: 100 TABLET, FILM COATED ORAL at 08:12

## 2024-12-09 NOTE — ASSESSMENT & PLAN NOTE
Clean with vashe  Allow vashe moistened 4x4 to soak x 10 minutes then remove and apply urgoclean ag to wound bed  Cover and secure with 4x4s, meng, and paper tape  Recommend 2 layer compression to reduce edema  Continue antibiotics as prescribed  Rocephin given IM In clinic  Diflucan to pharmacy       Monitor closely for s/s of infection including fever, chills, increase in pain, odor from wound, and increased redness from foot. Go to ER if any complications develop.   Keep leg elevated and avoid pressure on wound  Diabetes:  Monitor glucose closely. Check fasting glucose and 2 hours after meals. HgA1C goal <7, fasting glucose , and 2 hours after meals <180  Hypertension:  Check blood pressure twice daily, goal <120/80  Diet:   Increase protein intake, avoid fried, fatty foods and foods high in simple carbs.   Vitamins:  Take vitamin C 1000 mg, zinc 50mg, vitamin d 5000 units, and a daily multivitamin. Dawson is a good source of protein and nutrients to aid in wound healing.     Take Cipro as prescribed     Appointment with Dr. Ramirez on 12/9/2024 at 2:15pm

## 2024-12-09 NOTE — SUBJECTIVE & OBJECTIVE
Current Facility-Administered Medications on File Prior to Encounter   Medication    [COMPLETED] cefTRIAXone injection 1 g     Current Outpatient Medications on File Prior to Encounter   Medication Sig    amLODIPine (NORVASC) 5 MG tablet Take 1 tablet (5 mg total) by mouth once daily.    ammonium lactate 12 % Crea Apply 1 g topically Daily.    atorvastatin (LIPITOR) 80 MG tablet TAKE 1 TABLET BY MOUTH EACH NIGHT AT BEDTIME FOR CHOLESTEROL ~~DO NOT EAT GRAPEFRUIT OR DRINK GRAPEFRUIT JUICE WHILE TAKING THIS MEDICATION~~    diclofenac sodium (VOLTAREN) 1 % Gel Apply 2 g topically 4 (four) times daily.    FEROSUL 325 mg (65 mg iron) Tab tablet     fluconazole (DIFLUCAN) 150 MG Tab Take 1 tablet (150 mg total) by mouth once daily. for 5 days    gentamicin (GARAMYCIN) 0.1 % cream Apply topically once daily.    insulin asp prt-insulin aspart, NovoLOG 70/30, (NOVOLOG 70/30) 100 unit/mL (70-30) Soln INJECT 50 UNITS SUB-Q EACH MORNING AND 35  UNITS EACH EVENING ...KEEP IN REFRIGERATOR ...USE WITHIN 28 DAYS AFTER OPENING EACH VIAL    ketoconazole (NIZORAL) 2 % cream Apply topically once daily.    ketoconazole (NIZORAL) 2 % shampoo Apply topically twice a week.    LANTUS U-100 INSULIN 100 unit/mL injection Inject 20 Units into the skin once daily. Take at night (Patient not taking: Reported on 10/3/2024)    LIDOcaine (LIDODERM) 5 % Place 1 patch onto the skin once daily. Remove & Discard patch within 12 hours or as directed by MD    losartan (COZAAR) 100 MG tablet Take 1 tablet (100 mg total) by mouth every evening.    omeprazole (PRILOSEC) 20 MG capsule Take 1 capsule (20 mg total) by mouth once daily.    ondansetron (ZOFRAN) 4 MG tablet Take 1 tablet (4 mg total) by mouth every 6 (six) hours.    pantoprazole (PROTONIX) 40 MG tablet Take 40 mg by mouth once daily.    sodium hypochlorite 0.5 % (DAKIN'S SOLUTION) external solution Apply topically once daily. Pack wound with dakin's moistened nuguaze daily (Patient not taking:  Reported on 12/9/2024)    TRULICITY 0.75 mg/0.5 mL pen injector Inject 0.75 mg into the skin every 7 days.       Review of patient's allergies indicates:   Allergen Reactions    Aspirin      Other reaction(s): UPSET STOMACH   Other reaction(s): Unknown    Bactrim ds [sulfamethoxazole-trimethoprim] Itching       Past Medical History:   Diagnosis Date    Anemia 7/5/2022    Diabetes mellitus, type 2     Hyperlipidemia     Hypertension     Obesity     Primary osteoarthritis of left shoulder 5/24/2022    Stroke      Past Surgical History:   Procedure Laterality Date    AMPUTATION      APPENDECTOMY      DEBRIDEMENT OF FOOT Right 1/12/2023    Procedure: DEBRIDEMENT, FOOT;  Surgeon: Ravi Ramirez MD;  Location: [facility];  Service: General;  Laterality: Right;    EYE SURGERY       Family History       Problem Relation (Age of Onset)    Appendicitis Father    Asthma Sister    Cancer Mother    Diabetes Brother          Tobacco Use    Smoking status: Never    Smokeless tobacco: Never   Substance and Sexual Activity    Alcohol use: Yes    Drug use: Never    Sexual activity: Never     Review of Systems   Constitutional:  Negative for fever.   Respiratory: Negative.     Cardiovascular: Negative.    Skin:  Positive for wound.   Neurological: Negative.      Objective:     Vital Signs (Most Recent):  Temp: 98.2 °F (36.8 °C) (12/09/24 1434)  Pulse: 95 (12/09/24 1434)  Resp: 18 (12/09/24 1434)  BP: (!) 151/87 (12/09/24 1434)  SpO2: 99 % (12/09/24 1434) Vital Signs (24h Range):  Temp:  [98 °F (36.7 °C)-98.2 °F (36.8 °C)] 98.2 °F (36.8 °C)  Pulse:  [95-96] 95  Resp:  [18-19] 18  SpO2:  [99 %] 99 %  BP: (122-151)/(87-92) 151/87     Weight: 128 kg (282 lb 3 oz)  Body mass index is 46.96 kg/m².     Physical Exam  Vitals reviewed.   Constitutional:       General: She is not in acute distress.     Appearance: She is obese.   Cardiovascular:      Rate and Rhythm: Normal rate.   Pulmonary:      Effort: Pulmonary effort is normal. No  "respiratory distress.   Musculoskeletal:      Comments: Right foot: See media   Skin:     General: Skin is warm and dry.   Neurological:      Mental Status: She is alert. Mental status is at baseline.            I have reviewed all pertinent lab results within the past 24 hours.  CBC: No results for input(s): "WBC", "RBC", "HGB", "HCT", "PLT", "MCV", "MCH", "MCHC" in the last 168 hours.  CMP: No results for input(s): "GLU", "CALCIUM", "ALBUMIN", "PROT", "NA", "K", "CO2", "CL", "BUN", "CREATININE", "ALKPHOS", "ALT", "AST", "BILITOT" in the last 168 hours.    Significant Diagnostics:  I have reviewed all pertinent imaging results/findings within the past 24 hours.    "

## 2024-12-09 NOTE — PROGRESS NOTES
1:57pm Called ER and gave report to BERNARDO Lambert. Instructed Petra that patient right foot wound has an odor and increased drainage. Will need to be admitted for IV antibiotics.   Patient transported to ER via wheelchair with son present.

## 2024-12-09 NOTE — ED PROVIDER NOTES
Encounter Date: 12/9/2024       History     Chief Complaint   Patient presents with    Wound Infection     64-year-old female presents to the emergency department to be evaluated for a wound on her right foot.  She went to wound care clinic today for a scheduled follow-up appointment and was sent to the ER due to the drainage and foul odor from the wound on her foot.    The history is provided by the patient.     Review of patient's allergies indicates:   Allergen Reactions    Aspirin      Other reaction(s): UPSET STOMACH   Other reaction(s): Unknown    Bactrim ds [sulfamethoxazole-trimethoprim] Itching     Past Medical History:   Diagnosis Date    Anemia 7/5/2022    Diabetes mellitus, type 2     Hyperlipidemia     Hypertension     Obesity     Primary osteoarthritis of left shoulder 5/24/2022    Stroke      Past Surgical History:   Procedure Laterality Date    AMPUTATION      APPENDECTOMY      DEBRIDEMENT OF FOOT Right 1/12/2023    Procedure: DEBRIDEMENT, FOOT;  Surgeon: Ravi Ramirez MD;  Location: [facility];  Service: General;  Laterality: Right;    EYE SURGERY       Family History   Problem Relation Name Age of Onset    Cancer Mother      Appendicitis Father      Asthma Sister      Diabetes Brother       Social History     Tobacco Use    Smoking status: Never    Smokeless tobacco: Never   Substance Use Topics    Alcohol use: Yes    Drug use: Never     Review of Systems   Constitutional:  Negative for chills and fever.   Skin:  Positive for wound.   All other systems reviewed and are negative.      Physical Exam     Initial Vitals [12/09/24 1434]   BP Pulse Resp Temp SpO2   (!) 151/87 95 18 98.2 °F (36.8 °C) 99 %      MAP       --         Physical Exam    Vitals reviewed.  Constitutional: She appears well-developed and well-nourished.   Morbidly obese   Pulmonary/Chest: Breath sounds normal.     Neurological: She is alert and oriented to person, place, and time. She has normal strength. GCS score is 15. GCS eye  subscore is 4. GCS verbal subscore is 5. GCS motor subscore is 6.   Skin: Skin is warm and dry.   The toes on the right foot have been amputated.  There is a wound on the right foot.  See picture.   Psychiatric: She has a normal mood and affect.         Medical Screening Exam   See Full Note    ED Course   Procedures  Labs Reviewed   BASIC METABOLIC PANEL - Abnormal       Result Value    Sodium 137      Potassium 4.9      Chloride 109 (*)     CO2 24      Anion Gap 9      Glucose 126 (*)     BUN 24 (*)     Creatinine 1.18 (*)     BUN/Creatinine Ratio 20      Calcium 8.8      eGFR 52 (*)    CBC WITH DIFFERENTIAL - Abnormal    WBC 6.49      RBC 4.31      Hemoglobin 11.0 (*)     Hematocrit 33.3 (*)     MCV 77.3 (*)     MCH 25.5 (*)     MCHC 33.0      RDW 14.0      Platelet Count 202      MPV 11.4      Neutrophils % 51.1 (*)     Lymphocytes % 34.5      Monocytes % 7.1 (*)     Eosinophils % 6.2 (*)     Basophils % 0.6      Immature Granulocytes % 0.5 (*)     nRBC, Auto 0.0      Neutrophils, Abs 3.32      Lymphocytes, Absolute 2.24      Monocytes, Absolute 0.46      Eosinophils, Absolute 0.40      Basophils, Absolute 0.04      Immature Granulocytes, Absolute 0.03      nRBC, Absolute 0.00      Diff Type Auto     BASIC METABOLIC PANEL - Abnormal    Sodium 136      Potassium 4.5      Chloride 109 (*)     CO2 22 (*)     Anion Gap 10      Glucose 149 (*)     BUN 21 (*)     Creatinine 1.13 (*)     BUN/Creatinine Ratio 19      Calcium 8.2 (*)     eGFR 55 (*)    CBC WITH DIFFERENTIAL - Abnormal    WBC 5.59      RBC 3.84 (*)     Hemoglobin 9.6 (*)     Hematocrit 29.7 (*)     MCV 77.3 (*)     MCH 25.0 (*)     MCHC 32.3      RDW 14.2      Platelet Count 159      MPV 10.1      Neutrophils % 49.6 (*)     Lymphocytes % 34.3      Monocytes % 8.8 (*)     Eosinophils % 6.3 (*)     Basophils % 0.5      Immature Granulocytes % 0.5 (*)     nRBC, Auto 0.0      Neutrophils, Abs 2.77      Lymphocytes, Absolute 1.92      Monocytes, Absolute 0.49       Eosinophils, Absolute 0.35      Basophils, Absolute 0.03      Immature Granulocytes, Absolute 0.03      nRBC, Absolute 0.00      Diff Type Auto     POCT GLUCOSE MONITORING CONTINUOUS - Abnormal    POC Glucose 175 (*)    POCT GLUCOSE MONITORING CONTINUOUS - Abnormal    POC Glucose 154 (*)    SARS-COV-2 RNA AMPLIFICATION, QUAL - Normal    SARS COV-2 Molecular Negative      Narrative:     Negative SARS-CoV results should not be used as the sole basis for treatment or patient management decisions; negative results should be considered in the context of a patient's recent exposures, history and the presene of clinical signs and symptoms consistent with COVID-19.  Negative results should be treated as presumptive and confirmed by molecular assay, if necessary for patient management.   CBC W/ AUTO DIFFERENTIAL    Narrative:     The following orders were created for panel order CBC auto differential.  Procedure                               Abnormality         Status                     ---------                               -----------         ------                     CBC with Differential[[id number]]       Abnormal            Final result                 Please view results for these tests on the individual orders.   HEMOGLOBIN A1C    Hemoglobin A1C 6.7      Estimated Average Glucose 146     CBC W/ AUTO DIFFERENTIAL    Narrative:     The following orders were created for panel order CBC with Automated Differential.  Procedure                               Abnormality         Status                     ---------                               -----------         ------                     CBC with Differential[[id number]]       Abnormal            Final result                 Please view results for these tests on the individual orders.   POCT GLUCOSE, HAND-HELD DEVICE   POCT GLUCOSE, HAND-HELD DEVICE   POCT GLUCOSE, HAND-HELD DEVICE   POCT GLUCOSE MONITORING CONTINUOUS          Imaging Results              X-Ray  Foot Complete Right (Final result)  Result time 12/09/24 15:32:48      Final result by Beto Bernardo MD (12/09/24 15:32:48)                   Impression:      1. Surgical changes of the foot, no convincing new interval erosive or destructive process.      Electronically signed by: Beto Bernardo MD  Date:    12/09/2024  Time:    15:32               Narrative:    EXAMINATION:  XR FOOT COMPLETE 3 VIEW RIGHT    CLINICAL HISTORY:  . Unspecified open wound, unspecified foot, initial encounter    TECHNIQUE:  AP, lateral, and oblique views of the right foot were performed.    COMPARISON:  10/03/2024    FINDINGS:  Three views right foot.    There is surgical change of transmetatarsal amputation of the 1st through 5th metatarsals.  No convincing interval osseous erosive or destructive process involving the surgical margins.  There are degenerative changes of the midfoot.  There is edema about the foot.  There are degenerative changes of the calcaneus.                                       Medications   amLODIPine tablet 5 mg (has no administration in time range)   atorvastatin tablet 80 mg (80 mg Oral Given 12/9/24 2056)   fluconazole tablet 150 mg (has no administration in time range)   insulin glargine U-100 (Lantus) injection 30 Units (30 Units Subcutaneous Not Given 12/9/24 2100)   losartan tablet 100 mg (100 mg Oral Given 12/9/24 2056)   pantoprazole EC tablet 40 mg (has no administration in time range)   sodium chloride 0.9% flush 10 mL (has no administration in time range)   naloxone 0.4 mg/mL injection 0.02 mg (has no administration in time range)   glucose chewable tablet 16 g (has no administration in time range)   glucose chewable tablet 24 g (has no administration in time range)   0.45% NaCl infusion ( Intravenous New Bag 12/10/24 6601)   enoxaparin injection 40 mg (has no administration in time range)   acetaminophen tablet 650 mg (has no administration in time range)   HYDROcodone-acetaminophen 5-325 mg  per tablet 1 tablet (has no administration in time range)   morphine injection 4 mg (4 mg Intravenous Given 12/10/24 0351)   ondansetron injection 4 mg (has no administration in time range)   insulin aspart U-100 injection 0-10 Units (2 Units Subcutaneous Given 12/10/24 0649)   piperacillin-tazobactam (ZOSYN) 4.5 g in D5W 100 mL IVPB (MB+) (0 g Intravenous Stopped 12/10/24 0531)   glucagon (human recombinant) injection 1 mg (has no administration in time range)   dextrose 10% bolus 125 mL 125 mL (has no administration in time range)   dextrose 10% bolus 250 mL 250 mL (has no administration in time range)   vancomycin (VANCOCIN) 2,500 mg in 0.9% NaCl 500 mL IVPB (0 mg Intravenous Stopped 12/10/24 0015)   vancomycin - pharmacy to dose (has no administration in time range)   dextrose 10% bolus 125 mL 125 mL (has no administration in time range)   dextrose 10% bolus 250 mL 250 mL (has no administration in time range)   piperacillin-tazobactam (ZOSYN) 4.5 g in D5W 100 mL IVPB (MB+) (0 g Intravenous Stopped 12/9/24 2142)     Medical Decision Making  64-year-old female presents to the emergency department to be evaluated for a wound on her right foot.  She went to wound care clinic today for a scheduled follow-up appointment and was sent to the ER due to the drainage and foul odor from the wound on her foot.  Case discussed with Jeffery Parkinson, general surgery NP.  He evaluated the patient in the emergency department.  Ordered and reviewed viral swabs with negative results  Ordered and reviewed labs  Spoke with Dr. Rubio regarding patient who agreed to accept patient for wound debridement in AM  Diagnosis: Left foot wound      Amount and/or Complexity of Data Reviewed  Labs: ordered.  Radiology: ordered.    Risk  Decision regarding hospitalization.                                      Clinical Impression:   Final diagnoses:  [S91.309A] Wound of foot  [L03.115] Cellulitis of right foot        ED Disposition Condition    Admit  Stable                Jarred Evangelista, RANDAL  12/09/24 1930       Jarred Evangelista, RANDAL  12/09/24 1933       Ellie Hernandez, [facility]  12/10/24 0807

## 2024-12-09 NOTE — H&P (VIEW-ONLY)
[facility] - Emergency Department  General Surgery  Consult Note    Patient Name: Deborah Sotelo  MRN: [id number]  Code Status: Prior  Admission Date: 12/9/2024  Hospital Length of Stay: 0 days  Attending Physician: No att. providers found  Primary Care Provider: Pineda Hunter FNP    Patient information was obtained from patient and ER records.     Inpatient consult to General surgery  Consult performed by: Rahat Parkinson FNP  Consult ordered by: Ellie Hernandez FNP        Subjective:     Principal Problem: <principal problem not specified>    History of Present Illness: History of CVA, dm 2, HTN, and obesity.  Previous right TMA by Dr. Tinajero.  Was evaluated in wound care clinic with reports of malodorous purulent drainage from ulceration on the plantar surface of the right foot.  On exam in ER, no appreciable odor or drainage.  However, the wound was just cleaned in wound care.  Plan for OR tomorrow for probing and possible debridement by Dr. Pizano.    Current Facility-Administered Medications on File Prior to Encounter   Medication    [COMPLETED] cefTRIAXone injection 1 g     Current Outpatient Medications on File Prior to Encounter   Medication Sig    amLODIPine (NORVASC) 5 MG tablet Take 1 tablet (5 mg total) by mouth once daily.    ammonium lactate 12 % Crea Apply 1 g topically Daily.    atorvastatin (LIPITOR) 80 MG tablet TAKE 1 TABLET BY MOUTH EACH NIGHT AT BEDTIME FOR CHOLESTEROL ~~DO NOT EAT GRAPEFRUIT OR DRINK GRAPEFRUIT JUICE WHILE TAKING THIS MEDICATION~~    diclofenac sodium (VOLTAREN) 1 % Gel Apply 2 g topically 4 (four) times daily.    FEROSUL 325 mg (65 mg iron) Tab tablet     fluconazole (DIFLUCAN) 150 MG Tab Take 1 tablet (150 mg total) by mouth once daily. for 5 days    gentamicin (GARAMYCIN) 0.1 % cream Apply topically once daily.    insulin asp prt-insulin aspart, NovoLOG 70/30, (NOVOLOG 70/30) 100 unit/mL (70-30) Soln INJECT 50 UNITS SUB-Q EACH MORNING AND 35  UNITS EACH  EVENING ...KEEP IN REFRIGERATOR ...USE WITHIN 28 DAYS AFTER OPENING EACH VIAL    ketoconazole (NIZORAL) 2 % cream Apply topically once daily.    ketoconazole (NIZORAL) 2 % shampoo Apply topically twice a week.    LANTUS U-100 INSULIN 100 unit/mL injection Inject 20 Units into the skin once daily. Take at night (Patient not taking: Reported on 10/3/2024)    LIDOcaine (LIDODERM) 5 % Place 1 patch onto the skin once daily. Remove & Discard patch within 12 hours or as directed by MD    losartan (COZAAR) 100 MG tablet Take 1 tablet (100 mg total) by mouth every evening.    omeprazole (PRILOSEC) 20 MG capsule Take 1 capsule (20 mg total) by mouth once daily.    ondansetron (ZOFRAN) 4 MG tablet Take 1 tablet (4 mg total) by mouth every 6 (six) hours.    pantoprazole (PROTONIX) 40 MG tablet Take 40 mg by mouth once daily.    sodium hypochlorite 0.5 % (DAKIN'S SOLUTION) external solution Apply topically once daily. Pack wound with dakin's moistened nuguaze daily (Patient not taking: Reported on 12/9/2024)    TRULICITY 0.75 mg/0.5 mL pen injector Inject 0.75 mg into the skin every 7 days.       Review of patient's allergies indicates:   Allergen Reactions    Aspirin      Other reaction(s): UPSET STOMACH   Other reaction(s): Unknown    Bactrim ds [sulfamethoxazole-trimethoprim] Itching       Past Medical History:   Diagnosis Date    Anemia 7/5/2022    Diabetes mellitus, type 2     Hyperlipidemia     Hypertension     Obesity     Primary osteoarthritis of left shoulder 5/24/2022    Stroke      Past Surgical History:   Procedure Laterality Date    AMPUTATION      APPENDECTOMY      DEBRIDEMENT OF FOOT Right 1/12/2023    Procedure: DEBRIDEMENT, FOOT;  Surgeon: Ravi Ramirez MD;  Location: [facility];  Service: General;  Laterality: Right;    EYE SURGERY       Family History       Problem Relation (Age of Onset)    Appendicitis Father    Asthma Sister    Cancer Mother    Diabetes Brother          Tobacco Use    Smoking status:  "Never    Smokeless tobacco: Never   Substance and Sexual Activity    Alcohol use: Yes    Drug use: Never    Sexual activity: Never     Review of Systems   Constitutional:  Negative for fever.   Respiratory: Negative.     Cardiovascular: Negative.    Skin:  Positive for wound.   Neurological: Negative.      Objective:     Vital Signs (Most Recent):  Temp: 98.2 °F (36.8 °C) (12/09/24 1434)  Pulse: 95 (12/09/24 1434)  Resp: 18 (12/09/24 1434)  BP: (!) 151/87 (12/09/24 1434)  SpO2: 99 % (12/09/24 1434) Vital Signs (24h Range):  Temp:  [98 °F (36.7 °C)-98.2 °F (36.8 °C)] 98.2 °F (36.8 °C)  Pulse:  [95-96] 95  Resp:  [18-19] 18  SpO2:  [99 %] 99 %  BP: (122-151)/(87-92) 151/87     Weight: 128 kg (282 lb 3 oz)  Body mass index is 46.96 kg/m².     Physical Exam  Vitals reviewed.   Constitutional:       General: She is not in acute distress.     Appearance: She is obese.   Cardiovascular:      Rate and Rhythm: Normal rate.   Pulmonary:      Effort: Pulmonary effort is normal. No respiratory distress.   Musculoskeletal:      Comments: Right foot: See media   Skin:     General: Skin is warm and dry.   Neurological:      Mental Status: She is alert. Mental status is at baseline.            I have reviewed all pertinent lab results within the past 24 hours.  CBC: No results for input(s): "WBC", "RBC", "HGB", "HCT", "PLT", "MCV", "MCH", "MCHC" in the last 168 hours.  CMP: No results for input(s): "GLU", "CALCIUM", "ALBUMIN", "PROT", "NA", "K", "CO2", "CL", "BUN", "CREATININE", "ALKPHOS", "ALT", "AST", "BILITOT" in the last 168 hours.    Significant Diagnostics:  I have reviewed all pertinent imaging results/findings within the past 24 hours.    Assessment/Plan:     No notes have been filed under this hospital service.  Service: General Surgery    VTE Risk Mitigation (From admission, onward)      None            Thank you for your consult. I will follow-up with patient. Please contact us if you have any additional " questions.    Rahat Parkinson, TOMASA  General Surgery  [facility] - Emergency Department

## 2024-12-09 NOTE — PATIENT INSTRUCTIONS
Clean with Acetic acid (1 cup of white vinegar and 3 cups of water, store in a closed container)  Apply UrgoClean AG to wound then cover with 4x4 gauze and kerlix. Secure with paper tape. Change daily and as needed.    Keep foot moisturized with ammonium lactate or lotion        Monitor closely for s/s of infection including fever, chills, increase in pain, odor from wound, and increased redness from foot. Go to ER if any complications develop.   Keep leg elevated and avoid pressure on wound  Diabetes:  Monitor glucose closely. Check fasting glucose and 2 hours after meals. HgA1C goal <7, fasting glucose , and 2 hours after meals <180  Hypertension:  Check blood pressure twice daily, goal <120/80  Diet:   Increase protein intake, avoid fried, fatty foods and foods high in simple carbs.   Vitamins:  Take vitamin C 1000 mg, zinc 50mg, vitamin d 5000 units, and a daily multivitamin. Dawson is a good source of protein and nutrients to aid in wound healing.     Take Cipro as prescribed     Appointment with Dr. Ramirez on 12/9/2024 at 2:15pm

## 2024-12-09 NOTE — CONSULTS
[facility] - Emergency Department  General Surgery  Consult Note    Patient Name: Deborah Soteol  MRN: [id number]  Code Status: Prior  Admission Date: 12/9/2024  Hospital Length of Stay: 0 days  Attending Physician: No att. providers found  Primary Care Provider: Pineda Hunter FNP    Patient information was obtained from patient and ER records.     Inpatient consult to General surgery  Consult performed by: Rahat Parkinson FNP  Consult ordered by: Ellie Hernandez FNP        Subjective:     Principal Problem: <principal problem not specified>    History of Present Illness: History of CVA, dm 2, HTN, and obesity.  Previous right TMA by Dr. Tinajero.  Was evaluated in wound care clinic with reports of malodorous purulent drainage from ulceration on the plantar surface of the right foot.  On exam in ER, no appreciable odor or drainage.  However, the wound was just cleaned in wound care.  Plan for OR tomorrow for probing and possible debridement by Dr. Pizano.    Current Facility-Administered Medications on File Prior to Encounter   Medication    [COMPLETED] cefTRIAXone injection 1 g     Current Outpatient Medications on File Prior to Encounter   Medication Sig    amLODIPine (NORVASC) 5 MG tablet Take 1 tablet (5 mg total) by mouth once daily.    ammonium lactate 12 % Crea Apply 1 g topically Daily.    atorvastatin (LIPITOR) 80 MG tablet TAKE 1 TABLET BY MOUTH EACH NIGHT AT BEDTIME FOR CHOLESTEROL ~~DO NOT EAT GRAPEFRUIT OR DRINK GRAPEFRUIT JUICE WHILE TAKING THIS MEDICATION~~    diclofenac sodium (VOLTAREN) 1 % Gel Apply 2 g topically 4 (four) times daily.    FEROSUL 325 mg (65 mg iron) Tab tablet     fluconazole (DIFLUCAN) 150 MG Tab Take 1 tablet (150 mg total) by mouth once daily. for 5 days    gentamicin (GARAMYCIN) 0.1 % cream Apply topically once daily.    insulin asp prt-insulin aspart, NovoLOG 70/30, (NOVOLOG 70/30) 100 unit/mL (70-30) Soln INJECT 50 UNITS SUB-Q EACH MORNING AND 35  UNITS EACH  EVENING ...KEEP IN REFRIGERATOR ...USE WITHIN 28 DAYS AFTER OPENING EACH VIAL    ketoconazole (NIZORAL) 2 % cream Apply topically once daily.    ketoconazole (NIZORAL) 2 % shampoo Apply topically twice a week.    LANTUS U-100 INSULIN 100 unit/mL injection Inject 20 Units into the skin once daily. Take at night (Patient not taking: Reported on 10/3/2024)    LIDOcaine (LIDODERM) 5 % Place 1 patch onto the skin once daily. Remove & Discard patch within 12 hours or as directed by MD    losartan (COZAAR) 100 MG tablet Take 1 tablet (100 mg total) by mouth every evening.    omeprazole (PRILOSEC) 20 MG capsule Take 1 capsule (20 mg total) by mouth once daily.    ondansetron (ZOFRAN) 4 MG tablet Take 1 tablet (4 mg total) by mouth every 6 (six) hours.    pantoprazole (PROTONIX) 40 MG tablet Take 40 mg by mouth once daily.    sodium hypochlorite 0.5 % (DAKIN'S SOLUTION) external solution Apply topically once daily. Pack wound with dakin's moistened nuguaze daily (Patient not taking: Reported on 12/9/2024)    TRULICITY 0.75 mg/0.5 mL pen injector Inject 0.75 mg into the skin every 7 days.       Review of patient's allergies indicates:   Allergen Reactions    Aspirin      Other reaction(s): UPSET STOMACH   Other reaction(s): Unknown    Bactrim ds [sulfamethoxazole-trimethoprim] Itching       Past Medical History:   Diagnosis Date    Anemia 7/5/2022    Diabetes mellitus, type 2     Hyperlipidemia     Hypertension     Obesity     Primary osteoarthritis of left shoulder 5/24/2022    Stroke      Past Surgical History:   Procedure Laterality Date    AMPUTATION      APPENDECTOMY      DEBRIDEMENT OF FOOT Right 1/12/2023    Procedure: DEBRIDEMENT, FOOT;  Surgeon: Ravi Ramirez MD;  Location: [facility];  Service: General;  Laterality: Right;    EYE SURGERY       Family History       Problem Relation (Age of Onset)    Appendicitis Father    Asthma Sister    Cancer Mother    Diabetes Brother          Tobacco Use    Smoking status:  "Never    Smokeless tobacco: Never   Substance and Sexual Activity    Alcohol use: Yes    Drug use: Never    Sexual activity: Never     Review of Systems   Constitutional:  Negative for fever.   Respiratory: Negative.     Cardiovascular: Negative.    Skin:  Positive for wound.   Neurological: Negative.      Objective:     Vital Signs (Most Recent):  Temp: 98.2 °F (36.8 °C) (12/09/24 1434)  Pulse: 95 (12/09/24 1434)  Resp: 18 (12/09/24 1434)  BP: (!) 151/87 (12/09/24 1434)  SpO2: 99 % (12/09/24 1434) Vital Signs (24h Range):  Temp:  [98 °F (36.7 °C)-98.2 °F (36.8 °C)] 98.2 °F (36.8 °C)  Pulse:  [95-96] 95  Resp:  [18-19] 18  SpO2:  [99 %] 99 %  BP: (122-151)/(87-92) 151/87     Weight: 128 kg (282 lb 3 oz)  Body mass index is 46.96 kg/m².     Physical Exam  Vitals reviewed.   Constitutional:       General: She is not in acute distress.     Appearance: She is obese.   Cardiovascular:      Rate and Rhythm: Normal rate.   Pulmonary:      Effort: Pulmonary effort is normal. No respiratory distress.   Musculoskeletal:      Comments: Right foot: See media   Skin:     General: Skin is warm and dry.   Neurological:      Mental Status: She is alert. Mental status is at baseline.            I have reviewed all pertinent lab results within the past 24 hours.  CBC: No results for input(s): "WBC", "RBC", "HGB", "HCT", "PLT", "MCV", "MCH", "MCHC" in the last 168 hours.  CMP: No results for input(s): "GLU", "CALCIUM", "ALBUMIN", "PROT", "NA", "K", "CO2", "CL", "BUN", "CREATININE", "ALKPHOS", "ALT", "AST", "BILITOT" in the last 168 hours.    Significant Diagnostics:  I have reviewed all pertinent imaging results/findings within the past 24 hours.    Assessment/Plan:     No notes have been filed under this hospital service.  Service: General Surgery    VTE Risk Mitigation (From admission, onward)      None            Thank you for your consult. I will follow-up with patient. Please contact us if you have any additional " questions.    Rahat Parkinson, TOMASA  General Surgery  [facility] - Emergency Department

## 2024-12-09 NOTE — PROGRESS NOTES
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 Last visit she was referred to General surgery to evaluate for debridement. Appointment is scheduled for . Patient reports increase in pain and bleeding from wound. Wound is malodorous. She has been taking cipro as prescribed. Reports vaginal itching today. Diflucan to pharmacy. Urgoclean ag applied to wound. Recommend adding two layer compression to reduce edema. Labs were ordered at last visit but have not been drawn yet. Due to odor and increased pain, recommend patient go to ED to evaluate for admission.    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 Wound is worse today and continues to have hypergranulation tissue. Right foot with edema and erythema with blisters to lateral aspect  of foot. Patient reports increased pain and burning in her foot. Cultures today. Rocephin given IM in clinic and cipro to pharmacy. X-ray today. Acetic acid moistened drawtex to wound bed. Continue to keep pressure off wound. Prescription for wheelchair given to patient. Prescription for custom off-loading shoe faxed to [facility].        63 y.o. female presents to clinic for follow up on chronic-non healing wound on right TMA.Wound is healing well. Reports pain and tenderness to lateral ankle and cramps and spasms to lower leg. Labs ordered today. Continue  with Aquacel ag to wound bed and ammonium lacate to soften callus.   Reinforced importance of local wound care, keeping pressure off foot, off-loading shoe, elevating legs, adherence to diabetic diet and strict glycemic control, and increasing protein intake.     Significant PMH diabetes, anemia, and CVA. Last HgA1C 7 in July, diabetes managed by PCP. Pertinent factors that delay wound healing include multiple co-morbidities, poor vascular supply, diabetes, edema, heavy drainage, decreased granulation tissue, tract(s), undermining and no protective sensation.       Review of Systems     Review of Systems   Constitutional:  Positive for activity change. Negative for appetite change, chills, diaphoresis and fever.   HENT: Negative.  Negative for congestion, ear pain, hearing loss and postnasal drip.    Eyes:  Negative for itching.   Respiratory:  Negative for chest tightness and shortness of breath.    Cardiovascular:  Positive for leg swelling. Negative for chest pain and palpitations.   Gastrointestinal:  Negative for abdominal pain.   Endocrine: Negative for polydipsia.   Genitourinary:  Negative for frequency.   Musculoskeletal:  Positive for arthralgias, back pain and joint swelling.        Severe shoulder pain, 10 of 10 on pain scale   Skin:  Positive for color change and wound.        See wound assessment   Neurological:  Positive for weakness and numbness. Negative for headaches.   Psychiatric/Behavioral:  Negative for agitation, behavioral problems, confusion and self-injury.        Medical / Social / Family History     Past Medical History:   Diagnosis Date    Anemia 7/5/2022    Diabetes mellitus, type 2     Hyperlipidemia     Hypertension     Obesity     Primary osteoarthritis of left shoulder 5/24/2022    Stroke        Past Surgical History:   Procedure Laterality Date    AMPUTATION      APPENDECTOMY      DEBRIDEMENT OF FOOT Right 1/12/2023    Procedure: DEBRIDEMENT, FOOT;  Surgeon: Ravi Ramirez MD;   Location: [facility] OR;  Service: General;  Laterality: Right;    EYE SURGERY         Social History  Ms. Deborah Sotelo  reports that she has never smoked. She has never used smokeless tobacco. She reports current alcohol use. She reports that she does not use drugs.    Family History  Ms.'s Deborah Sotelo family history includes Appendicitis in her father; Asthma in her sister; Cancer in her mother; Diabetes in her brother.    Medications and Allergies     Medications  Outpatient Medications Marked as Taking for the 12/9/24 encounter (Office Visit) with Felisha Love FNP   Medication Sig Dispense Refill    amLODIPine (NORVASC) 5 MG tablet Take 1 tablet (5 mg total) by mouth once daily. 30 tablet 5    ammonium lactate 12 % Crea Apply 1 g topically Daily. 385 g 2    atorvastatin (LIPITOR) 80 MG tablet TAKE 1 TABLET BY MOUTH EACH NIGHT AT BEDTIME FOR CHOLESTEROL ~~DO NOT EAT GRAPEFRUIT OR DRINK GRAPEFRUIT JUICE WHILE TAKING THIS MEDICATION~~ 30 tablet 5    diclofenac sodium (VOLTAREN) 1 % Gel Apply 2 g topically 4 (four) times daily. 450 g 2    FEROSUL 325 mg (65 mg iron) Tab tablet       gentamicin (GARAMYCIN) 0.1 % cream Apply topically once daily. 90 g 0    insulin asp prt-insulin aspart, NovoLOG 70/30, (NOVOLOG 70/30) 100 unit/mL (70-30) Soln INJECT 50 UNITS SUB-Q EACH MORNING AND 35  UNITS EACH EVENING ...KEEP IN REFRIGERATOR ...USE WITHIN 28 DAYS AFTER OPENING EACH VIAL 30 mL 11    ketoconazole (NIZORAL) 2 % cream Apply topically once daily. 60 g 2    ketoconazole (NIZORAL) 2 % shampoo Apply topically twice a week. 120 mL 2    LIDOcaine (LIDODERM) 5 % Place 1 patch onto the skin once daily. Remove & Discard patch within 12 hours or as directed by MD 30 patch 2    losartan (COZAAR) 100 MG tablet Take 1 tablet (100 mg total) by mouth every evening. 30 tablet 5    omeprazole (PRILOSEC) 20 MG capsule Take 1 capsule (20 mg total) by mouth once daily. 30 capsule 5    ondansetron (ZOFRAN) 4 MG tablet Take 1  tablet (4 mg total) by mouth every 6 (six) hours. 12 tablet 0    pantoprazole (PROTONIX) 40 MG tablet Take 40 mg by mouth once daily.      TRULICITY 0.75 mg/0.5 mL pen injector Inject 0.75 mg into the skin every 7 days.       Current Facility-Administered Medications for the 12/9/24 encounter (Office Visit) with Felisha Love FNP   Medication Dose Route Frequency Provider Last Rate Last Admin    cefTRIAXone injection 1 g  1 g Intramuscular Once Felisha Love FNP           Allergies  Review of patient's allergies indicates:   Allergen Reactions    Aspirin      Other reaction(s): UPSET STOMACH   Other reaction(s): Unknown    Bactrim ds [sulfamethoxazole-trimethoprim] Itching       Physical Examination     Vitals:    12/09/24 1304   BP: (!) 122/92   Pulse: 96   Resp: 19   Temp: 98 °F (36.7 °C)             Physical Exam  Vitals and nursing note reviewed.   Constitutional:       Comments: Tearful during exam   HENT:      Head: Normocephalic.   Cardiovascular:      Rate and Rhythm: Normal rate and regular rhythm.      Pulses: Normal pulses.      Heart sounds: Normal heart sounds.   Pulmonary:      Effort: Pulmonary effort is normal. No respiratory distress.   Chest:      Chest wall: No tenderness.   Musculoskeletal:         General: Swelling, tenderness and deformity present.      Right lower leg: Edema present.      Comments: Left shoulder decreased ROM with weakness to left upper extremity   Skin:     General: Skin is warm.      Capillary Refill: Capillary refill takes 2 to 3 seconds.      Findings: Erythema and lesion present.      Comments: Wound, see LDA for measurements and picture   Neurological:      Mental Status: She is alert and oriented to person, place, and time.   Psychiatric:         Mood and Affect: Mood normal.         Behavior: Behavior normal.         Thought Content: Thought content normal.         Judgment: Judgment normal.     Assessment and Plan             Wound 01/18/21 1051 Diabetic  Ulcer Right lateral Plantar #1 (Active)   01/18/21 1051    Pre-existing: Yes   Primary Wound Type: Diabetic ulc   Side: Right   Orientation: lateral   Location: Plantar   Wound Number: #1   Ankle-Brachial Index:    Pulses: normal   Removal Indication and Assessment:    Wound Outcome:    (Retired) Wound Type:    (Retired) Wound Length (cm):    (Retired) Wound Width (cm):    (Retired) Depth (cm):    Wound Description (Comments):    Removal Indications:    Wound Image    12/09/24 1307   Dressing Appearance Intact;Moist drainage 12/09/24 1307   Drainage Amount Moderate 12/09/24 1307   Drainage Characteristics/Odor Serosanguineous 12/09/24 1307   Appearance Red;Maroon;Moist;Granulating 12/09/24 1307   Tissue loss description Full thickness 12/09/24 1307   Black (%), Wound Tissue Color 0 % 12/09/24 1307   Red (%), Wound Tissue Color 100 % 12/09/24 1307   Yellow (%), Wound Tissue Color 0 % 12/09/24 1307   Periwound Area Intact 12/09/24 1307   Wound Edges Callused 12/09/24 1307   Johnson Classification (diabetic foot ulcers only) Grade 1 12/09/24 1307   Wound Length (cm) 2.5 cm 12/09/24 1307   Wound Width (cm) 2.4 cm 12/09/24 1307   Wound Depth (cm) 0.3 cm 12/09/24 1307   Wound Volume (cm^3) 1.8 cm^3 12/09/24 1307   Wound Surface Area (cm^2) 6 cm^2 12/09/24 1307   Care Cleansed with:;Soap and water 12/09/24 1307             Problem List Items Addressed This Visit          Endocrine    Diabetic foot ulcer - Primary    Overview                                                                          Current Assessment & Plan     Clean with vashe  Allow vashe moistened 4x4 to soak x 10 minutes then remove and apply urgoclean ag to wound bed  Cover and secure with 4x4s, meng, and paper tape  Recommend 2 layer compression to reduce edema  Continue antibiotics as prescribed  Rocephin given IM In clinic  Diflucan to pharmacy       Monitor closely for s/s of infection including fever, chills, increase in pain, odor from wound, and  increased redness from foot. Go to ER if any complications develop.   Keep leg elevated and avoid pressure on wound  Diabetes:  Monitor glucose closely. Check fasting glucose and 2 hours after meals. HgA1C goal <7, fasting glucose , and 2 hours after meals <180  Hypertension:  Check blood pressure twice daily, goal <120/80  Diet:   Increase protein intake, avoid fried, fatty foods and foods high in simple carbs.   Vitamins:  Take vitamin C 1000 mg, zinc 50mg, vitamin d 5000 units, and a daily multivitamin. Dawson is a good source of protein and nutrients to aid in wound healing.     Take Cipro as prescribed     Appointment with Dr. Ramirez on 12/9/2024 at 2:15pm            Future Appointments   Date Time Provider Department Center   12/11/2024  1:45 PM Ravi Ramirez MD [facility]   12/23/2024 10:00 AM [facility]   2/5/2025 11:20 AM [facility] [place]   2/13/2025 10:00 AM [facility] GI ROOM 01 [facility]            Signature:  TOMASA Maldonado  [facility]  [address] [facility] [place] [address]  [phone]    Date of encounter: 12/9/24

## 2024-12-09 NOTE — HPI
History of CVA, dm 2, HTN, and obesity.  Previous right TMA by Dr. Tinajero.  Was evaluated in wound care clinic with reports of malodorous purulent drainage from ulceration on the plantar surface of the right foot.  On exam in ER, no appreciable odor or drainage.  However, the wound was just cleaned in wound care.  Plan for OR tomorrow for probing and possible debridement by Dr. Pizano.

## 2024-12-10 ENCOUNTER — ANESTHESIA EVENT (OUTPATIENT)
Dept: SURGERY | Facility: HOSPITAL | Age: 64
End: 2024-12-10
Payer: MEDICAID

## 2024-12-10 ENCOUNTER — ANESTHESIA (OUTPATIENT)
Dept: SURGERY | Facility: HOSPITAL | Age: 64
End: 2024-12-10
Payer: MEDICAID

## 2024-12-10 LAB
ANION GAP SERPL CALCULATED.3IONS-SCNC: 10 MMOL/L (ref 7–16)
BASOPHILS # BLD AUTO: 0.03 K/UL (ref 0–0.2)
BASOPHILS NFR BLD AUTO: 0.5 % (ref 0–1)
BUN SERPL-MCNC: 21 MG/DL (ref 10–20)
BUN/CREAT SERPL: 19 (ref 6–20)
CALCIUM SERPL-MCNC: 8.2 MG/DL (ref 8.4–10.2)
CHLORIDE SERPL-SCNC: 109 MMOL/L (ref 98–107)
CO2 SERPL-SCNC: 22 MMOL/L (ref 23–31)
CREAT SERPL-MCNC: 1.13 MG/DL (ref 0.55–1.02)
DIFFERENTIAL METHOD BLD: ABNORMAL
EGFR (NO RACE VARIABLE) (RUSH/TITUS): 55 ML/MIN/1.73M2
EOSINOPHIL # BLD AUTO: 0.35 K/UL (ref 0–0.5)
EOSINOPHIL NFR BLD AUTO: 6.3 % (ref 1–4)
ERYTHROCYTE [DISTWIDTH] IN BLOOD BY AUTOMATED COUNT: 14.2 % (ref 11.5–14.5)
GLUCOSE SERPL-MCNC: 149 MG/DL (ref 82–115)
GLUCOSE SERPL-MCNC: 154 MG/DL (ref 70–105)
GLUCOSE SERPL-MCNC: 189 MG/DL (ref 70–105)
GLUCOSE SERPL-MCNC: 259 MG/DL (ref 70–105)
HCT VFR BLD AUTO: 29.7 % (ref 38–47)
HGB BLD-MCNC: 9.6 G/DL (ref 12–16)
IMM GRANULOCYTES # BLD AUTO: 0.03 K/UL (ref 0–0.04)
IMM GRANULOCYTES NFR BLD: 0.5 % (ref 0–0.4)
LYMPHOCYTES # BLD AUTO: 1.92 K/UL (ref 1–4.8)
LYMPHOCYTES NFR BLD AUTO: 34.3 % (ref 27–41)
MCH RBC QN AUTO: 25 PG (ref 27–31)
MCHC RBC AUTO-ENTMCNC: 32.3 G/DL (ref 32–36)
MCV RBC AUTO: 77.3 FL (ref 80–96)
MONOCYTES # BLD AUTO: 0.49 K/UL (ref 0–0.8)
MONOCYTES NFR BLD AUTO: 8.8 % (ref 2–6)
MPC BLD CALC-MCNC: 10.1 FL (ref 9.4–12.4)
NEUTROPHILS # BLD AUTO: 2.77 K/UL (ref 1.8–7.7)
NEUTROPHILS NFR BLD AUTO: 49.6 % (ref 53–65)
NRBC # BLD AUTO: 0 X10E3/UL
NRBC, AUTO (.00): 0 %
PLATELET # BLD AUTO: 159 K/UL (ref 150–400)
POTASSIUM SERPL-SCNC: 4.5 MMOL/L (ref 3.5–5.1)
RBC # BLD AUTO: 3.84 M/UL (ref 4.2–5.4)
SODIUM SERPL-SCNC: 136 MMOL/L (ref 136–145)
WBC # BLD AUTO: 5.59 K/UL (ref 4.5–11)

## 2024-12-10 PROCEDURE — 63600175 PHARM REV CODE 636 W HCPCS: Performed by: INTERNAL MEDICINE

## 2024-12-10 PROCEDURE — 96361 HYDRATE IV INFUSION ADD-ON: CPT

## 2024-12-10 PROCEDURE — 63600175 PHARM REV CODE 636 W HCPCS: Performed by: ANESTHESIOLOGY

## 2024-12-10 PROCEDURE — 27000177 HC AIRWAY, LARYNGEAL MASK: Performed by: ANESTHESIOLOGY

## 2024-12-10 PROCEDURE — 25000003 PHARM REV CODE 250: Performed by: INTERNAL MEDICINE

## 2024-12-10 PROCEDURE — 96376 TX/PRO/DX INJ SAME DRUG ADON: CPT

## 2024-12-10 PROCEDURE — 96366 THER/PROPH/DIAG IV INF ADDON: CPT

## 2024-12-10 PROCEDURE — 11042 DBRDMT SUBQ TIS 1ST 20SQCM/<: CPT | Mod: ,,, | Performed by: SURGERY

## 2024-12-10 PROCEDURE — 63600175 PHARM REV CODE 636 W HCPCS

## 2024-12-10 PROCEDURE — 82962 GLUCOSE BLOOD TEST: CPT

## 2024-12-10 PROCEDURE — 36000707: Performed by: SURGERY

## 2024-12-10 PROCEDURE — 96372 THER/PROPH/DIAG INJ SC/IM: CPT | Performed by: ANESTHESIOLOGY

## 2024-12-10 PROCEDURE — 27000716 HC OXISENSOR PROBE, ANY SIZE: Performed by: ANESTHESIOLOGY

## 2024-12-10 PROCEDURE — 71000039 HC RECOVERY, EACH ADD'L HOUR: Performed by: SURGERY

## 2024-12-10 PROCEDURE — 88304 TISSUE EXAM BY PATHOLOGIST: CPT | Mod: 26,,, | Performed by: PATHOLOGY

## 2024-12-10 PROCEDURE — 99232 SBSQ HOSP IP/OBS MODERATE 35: CPT | Mod: ,,,

## 2024-12-10 PROCEDURE — 71000016 HC POSTOP RECOV ADDL HR: Performed by: SURGERY

## 2024-12-10 PROCEDURE — 27201423 OPTIME MED/SURG SUP & DEVICES STERILE SUPPLY: Performed by: SURGERY

## 2024-12-10 PROCEDURE — 96372 THER/PROPH/DIAG INJ SC/IM: CPT | Performed by: INTERNAL MEDICINE

## 2024-12-10 PROCEDURE — 37000008 HC ANESTHESIA 1ST 15 MINUTES: Performed by: SURGERY

## 2024-12-10 PROCEDURE — 85025 COMPLETE CBC W/AUTO DIFF WBC: CPT | Performed by: INTERNAL MEDICINE

## 2024-12-10 PROCEDURE — G0378 HOSPITAL OBSERVATION PER HR: HCPCS

## 2024-12-10 PROCEDURE — 37000009 HC ANESTHESIA EA ADD 15 MINS: Performed by: SURGERY

## 2024-12-10 PROCEDURE — 36000706: Performed by: SURGERY

## 2024-12-10 PROCEDURE — 71000033 HC RECOVERY, INTIAL HOUR: Performed by: SURGERY

## 2024-12-10 PROCEDURE — 88304 TISSUE EXAM BY PATHOLOGIST: CPT | Mod: TC,SUR | Performed by: SURGERY

## 2024-12-10 PROCEDURE — 11000001 HC ACUTE MED/SURG PRIVATE ROOM

## 2024-12-10 PROCEDURE — 80048 BASIC METABOLIC PNL TOTAL CA: CPT | Performed by: INTERNAL MEDICINE

## 2024-12-10 PROCEDURE — 27000510 HC BLANKET BAIR HUGGER ANY SIZE: Performed by: ANESTHESIOLOGY

## 2024-12-10 PROCEDURE — 36415 COLL VENOUS BLD VENIPUNCTURE: CPT | Performed by: INTERNAL MEDICINE

## 2024-12-10 PROCEDURE — 63600175 PHARM REV CODE 636 W HCPCS: Performed by: NURSE ANESTHETIST, CERTIFIED REGISTERED

## 2024-12-10 PROCEDURE — 71000015 HC POSTOP RECOV 1ST HR: Performed by: SURGERY

## 2024-12-10 PROCEDURE — 25000003 PHARM REV CODE 250

## 2024-12-10 PROCEDURE — 25000003 PHARM REV CODE 250: Performed by: NURSE ANESTHETIST, CERTIFIED REGISTERED

## 2024-12-10 RX ORDER — IPRATROPIUM BROMIDE AND ALBUTEROL SULFATE 2.5; .5 MG/3ML; MG/3ML
3 SOLUTION RESPIRATORY (INHALATION)
Status: DISCONTINUED | OUTPATIENT
Start: 2024-12-10 | End: 2024-12-10 | Stop reason: HOSPADM

## 2024-12-10 RX ORDER — EPHEDRINE SULFATE 50 MG/ML
INJECTION, SOLUTION INTRAVENOUS
Status: DISCONTINUED | OUTPATIENT
Start: 2024-12-10 | End: 2024-12-10

## 2024-12-10 RX ORDER — ONDANSETRON HYDROCHLORIDE 2 MG/ML
4 INJECTION, SOLUTION INTRAVENOUS DAILY PRN
Status: DISCONTINUED | OUTPATIENT
Start: 2024-12-10 | End: 2024-12-10 | Stop reason: HOSPADM

## 2024-12-10 RX ORDER — MORPHINE SULFATE 10 MG/ML
4 INJECTION INTRAMUSCULAR; INTRAVENOUS; SUBCUTANEOUS EVERY 5 MIN PRN
Status: DISCONTINUED | OUTPATIENT
Start: 2024-12-10 | End: 2024-12-10 | Stop reason: HOSPADM

## 2024-12-10 RX ORDER — FENTANYL CITRATE 50 UG/ML
INJECTION, SOLUTION INTRAMUSCULAR; INTRAVENOUS
Status: DISCONTINUED | OUTPATIENT
Start: 2024-12-10 | End: 2024-12-10

## 2024-12-10 RX ORDER — HYDROMORPHONE HYDROCHLORIDE 2 MG/ML
0.5 INJECTION, SOLUTION INTRAMUSCULAR; INTRAVENOUS; SUBCUTANEOUS EVERY 5 MIN PRN
Status: DISCONTINUED | OUTPATIENT
Start: 2024-12-10 | End: 2024-12-10 | Stop reason: HOSPADM

## 2024-12-10 RX ORDER — PHENYLEPHRINE HYDROCHLORIDE 10 MG/ML
INJECTION INTRAVENOUS
Status: DISCONTINUED | OUTPATIENT
Start: 2024-12-10 | End: 2024-12-10

## 2024-12-10 RX ORDER — GLUCAGON 1 MG
1 KIT INJECTION
Status: DISCONTINUED | OUTPATIENT
Start: 2024-12-10 | End: 2024-12-10 | Stop reason: HOSPADM

## 2024-12-10 RX ORDER — DULAGLUTIDE 1.5 MG/.5ML
INJECTION, SOLUTION SUBCUTANEOUS
COMMUNITY
Start: 2024-11-22

## 2024-12-10 RX ORDER — DIPHENHYDRAMINE HYDROCHLORIDE 50 MG/ML
25 INJECTION INTRAMUSCULAR; INTRAVENOUS EVERY 6 HOURS PRN
Status: DISCONTINUED | OUTPATIENT
Start: 2024-12-10 | End: 2024-12-10 | Stop reason: HOSPADM

## 2024-12-10 RX ORDER — MIDAZOLAM HYDROCHLORIDE 1 MG/ML
INJECTION INTRAMUSCULAR; INTRAVENOUS
Status: DISCONTINUED | OUTPATIENT
Start: 2024-12-10 | End: 2024-12-10

## 2024-12-10 RX ORDER — CEFAZOLIN SODIUM 1 G/3ML
INJECTION, POWDER, FOR SOLUTION INTRAMUSCULAR; INTRAVENOUS
Status: DISCONTINUED | OUTPATIENT
Start: 2024-12-10 | End: 2024-12-10

## 2024-12-10 RX ORDER — LIDOCAINE HYDROCHLORIDE 20 MG/ML
INJECTION, SOLUTION EPIDURAL; INFILTRATION; INTRACAUDAL; PERINEURAL
Status: DISCONTINUED | OUTPATIENT
Start: 2024-12-10 | End: 2024-12-10

## 2024-12-10 RX ORDER — SODIUM CHLORIDE, SODIUM LACTATE, POTASSIUM CHLORIDE, CALCIUM CHLORIDE 600; 310; 30; 20 MG/100ML; MG/100ML; MG/100ML; MG/100ML
125 INJECTION, SOLUTION INTRAVENOUS CONTINUOUS
Status: DISCONTINUED | OUTPATIENT
Start: 2024-12-10 | End: 2024-12-10

## 2024-12-10 RX ORDER — PROPOFOL 10 MG/ML
VIAL (ML) INTRAVENOUS
Status: DISCONTINUED | OUTPATIENT
Start: 2024-12-10 | End: 2024-12-10

## 2024-12-10 RX ORDER — PROMETHAZINE HYDROCHLORIDE 25 MG/ML
12.5 INJECTION, SOLUTION INTRAMUSCULAR; INTRAVENOUS ONCE
Status: COMPLETED | OUTPATIENT
Start: 2024-12-10 | End: 2024-12-10

## 2024-12-10 RX ORDER — OXYCODONE HYDROCHLORIDE 5 MG/1
5 TABLET ORAL
Status: DISCONTINUED | OUTPATIENT
Start: 2024-12-10 | End: 2024-12-10 | Stop reason: HOSPADM

## 2024-12-10 RX ORDER — MEPERIDINE HYDROCHLORIDE 25 MG/ML
25 INJECTION INTRAMUSCULAR; INTRAVENOUS; SUBCUTANEOUS EVERY 10 MIN PRN
Status: DISCONTINUED | OUTPATIENT
Start: 2024-12-10 | End: 2024-12-10 | Stop reason: HOSPADM

## 2024-12-10 RX ADMIN — EPHEDRINE SULFATE 25 MG: 50 INJECTION INTRAVENOUS at 10:12

## 2024-12-10 RX ADMIN — PHENYLEPHRINE HYDROCHLORIDE 200 MCG: 10 INJECTION INTRAVENOUS at 11:12

## 2024-12-10 RX ADMIN — FENTANYL CITRATE 100 MCG: 50 INJECTION, SOLUTION INTRAMUSCULAR; INTRAVENOUS at 10:12

## 2024-12-10 RX ADMIN — VANCOMYCIN HYDROCHLORIDE 2500 MG: 500 INJECTION, POWDER, LYOPHILIZED, FOR SOLUTION INTRAVENOUS at 09:12

## 2024-12-10 RX ADMIN — PHENYLEPHRINE HYDROCHLORIDE 200 MCG: 10 INJECTION INTRAVENOUS at 10:12

## 2024-12-10 RX ADMIN — CEFAZOLIN 2 G: 1 INJECTION, POWDER, FOR SOLUTION INTRAMUSCULAR; INTRAVENOUS; PARENTERAL at 10:12

## 2024-12-10 RX ADMIN — SODIUM CHLORIDE: 4.5 INJECTION, SOLUTION INTRAVENOUS at 04:12

## 2024-12-10 RX ADMIN — INSULIN ASPART 2 UNITS: 100 INJECTION, SOLUTION INTRAVENOUS; SUBCUTANEOUS at 06:12

## 2024-12-10 RX ADMIN — HYDROCODONE BITARTRATE AND ACETAMINOPHEN 1 TABLET: 5; 325 TABLET ORAL at 05:12

## 2024-12-10 RX ADMIN — LIDOCAINE HYDROCHLORIDE 40 MG: 20 INJECTION, SOLUTION EPIDURAL; INFILTRATION; INTRACAUDAL; PERINEURAL at 10:12

## 2024-12-10 RX ADMIN — PROMETHAZINE HYDROCHLORIDE 12.5 MG: 25 INJECTION INTRAMUSCULAR; INTRAVENOUS at 11:12

## 2024-12-10 RX ADMIN — PROPOFOL 150 MG: 10 INJECTION, EMULSION INTRAVENOUS at 10:12

## 2024-12-10 RX ADMIN — MIDAZOLAM HYDROCHLORIDE 2 MG: 1 INJECTION, SOLUTION INTRAMUSCULAR; INTRAVENOUS at 10:12

## 2024-12-10 RX ADMIN — PIPERACILLIN SODIUM,TAZOBACTAM SODIUM 4.5 G: 4; .5 INJECTION, POWDER, FOR SOLUTION INTRAVENOUS at 12:12

## 2024-12-10 RX ADMIN — HYDROMORPHONE HYDROCHLORIDE 0.5 MG: 2 INJECTION INTRAMUSCULAR; INTRAVENOUS; SUBCUTANEOUS at 11:12

## 2024-12-10 RX ADMIN — ENOXAPARIN SODIUM 40 MG: 40 INJECTION SUBCUTANEOUS at 03:12

## 2024-12-10 RX ADMIN — ONDANSETRON 4 MG: 2 INJECTION INTRAMUSCULAR; INTRAVENOUS at 11:12

## 2024-12-10 RX ADMIN — ATORVASTATIN CALCIUM 80 MG: 80 TABLET, FILM COATED ORAL at 08:12

## 2024-12-10 RX ADMIN — MORPHINE SULFATE 4 MG: 4 INJECTION INTRAVENOUS at 03:12

## 2024-12-10 RX ADMIN — LOSARTAN POTASSIUM 100 MG: 100 TABLET, FILM COATED ORAL at 08:12

## 2024-12-10 RX ADMIN — PIPERACILLIN SODIUM,TAZOBACTAM SODIUM 4.5 G: 4; .5 INJECTION, POWDER, FOR SOLUTION INTRAVENOUS at 08:12

## 2024-12-10 RX ADMIN — INSULIN GLARGINE 30 UNITS: 100 INJECTION, SOLUTION SUBCUTANEOUS at 09:12

## 2024-12-10 RX ADMIN — PIPERACILLIN SODIUM,TAZOBACTAM SODIUM 4.5 G: 4; .5 INJECTION, POWDER, FOR SOLUTION INTRAVENOUS at 01:12

## 2024-12-10 NOTE — ANESTHESIA POSTPROCEDURE EVALUATION
Anesthesia Post Evaluation    Patient: Deborah Sotelo    Procedure(s) Performed: Procedure(s) (LRB):  DEBRIDEMENT, FOOT (Right)    Final Anesthesia Type: general      Patient location during evaluation: PACU  Patient participation: Yes- Able to Participate  Level of consciousness: awake and sedated  Post-procedure vital signs: reviewed and stable  Pain management: adequate  Airway patency: patent    PONV status at discharge: No PONV  Anesthetic complications: no      Cardiovascular status: blood pressure returned to baseline  Respiratory status: unassisted  Hydration status: euvolemic  Follow-up not needed.              Vitals Value Taken Time   /65 12/10/24 1401   Temp 36.4 °C (97.6 °F) 12/10/24 1118   Pulse 92 12/10/24 1443   Resp 14 12/10/24 1400   SpO2 97 % 12/10/24 1443   Vitals shown include unfiled device data.      Event Time   Out of Recovery 12/10/2024 12:24:00         Pain/Xander Score: Pain Rating Prior to Med Admin: 10 (12/10/2024 11:56 AM)  Pain Rating Post Med Admin: 2 (12/10/2024  4:21 AM)  Xander Score: 10 (12/10/2024 12:40 PM)

## 2024-12-10 NOTE — ANESTHESIA PREPROCEDURE EVALUATION
12/10/2024  Deborah Sotelo is a 64 y.o., female.      Pre-op Assessment    I have reviewed the Patient Summary Reports.     I have reviewed the Nursing Notes. I have reviewed the NPO Status.   I have reviewed the Medications.     Review of Systems  Anesthesia Hx:  No problems with previous Anesthesia                Social:  Non-Smoker, No Alcohol Use       Hematology/Oncology:    Oncology Normal    -- Anemia:                                  EENT/Dental:  EENT/Dental Normal           Cardiovascular:     Hypertension           hyperlipidemia                               Pulmonary:  Pulmonary Normal                       Renal/:  Renal/ Normal                 Hepatic/GI:     GERD                Musculoskeletal:  Arthritis   Diabetic foot ulcer            Neurological:   CVA        Embolic CVA                            Endocrine:  Diabetes, poorly controlled, type 2           Dermatological:  Skin Normal    Psych:  Psychiatric Normal                    Physical Exam  General: Well nourished    Airway:  Mallampati: III / III  Mouth Opening: Normal  TM Distance: > 6 cm  Tongue: Normal  Neck ROM: Normal ROM    Chest/Lungs:  Clear to auscultation, Normal Respiratory Rate    Heart:  Rate: Normal  Rhythm: Regular Rhythm        Anesthesia Plan  Type of Anesthesia, risks & benefits discussed:    Anesthesia Type: Gen Supraglottic Airway  Intra-op Monitoring Plan: Standard ASA Monitors  Post Op Pain Control Plan: IV/PO Opioids PRN  Induction:  IV  Informed Consent: Informed consent signed with the Patient and all parties understand the risks and agree with anesthesia plan.  All questions answered. Patient consented to blood products? Yes  ASA Score: 3  Day of Surgery Review of History & Physical: H&P Update referred to the surgeon/provider.I have interviewed and examined the patient. I have reviewed the patient's H&P  dated:     Ready For Surgery From Anesthesia Perspective.       Chemistry        Component Value Date/Time     12/10/2024 0452    K 4.5 12/10/2024 0452     (H) 12/10/2024 0452    CO2 22 (L) 12/10/2024 0452    BUN 21 (H) 12/10/2024 0452    CREATININE 1.13 (H) 12/10/2024 0452     (H) 12/10/2024 0452        Component Value Date/Time    CALCIUM 8.2 (L) 12/10/2024 0452    ALKPHOS 152 (H) 06/11/2023 1519    AST 26 06/11/2023 1519    ALT 39 06/11/2023 1519    BILITOT 0.2 06/11/2023 1519    ESTGFRAFRICA 60 11/09/2021 1447    EGFRNONAA 38 (L) 07/22/2022 1635        Lab Results   Component Value Date    WBC 5.59 12/10/2024    HGB 9.6 (L) 12/10/2024    HCT 29.7 (L) 12/10/2024     12/10/2024     Results for orders placed or performed during the hospital encounter of 07/08/22   EKG 12-lead    Collection Time: 07/08/22  2:57 AM    Narrative    Test Reason : R53.1,    Vent. Rate : 116 BPM     Atrial Rate : 000 BPM     P-R Int : 126 ms          QRS Dur : 080 ms      QT Int : 318 ms       P-R-T Axes : 050 -04 052 degrees     QTc Int : 416 ms    Sinus tachycardia with sinus arrhythmia  Normal ECG except for rate    Confirmed by Dunia BRYANT, Meir YUNG ([id number]) on 7/8/2022 4:24:23 PM    Referred By: [email or web address]   SELF           Confirmed By:Meir Duque MD       .  NPO greater than 8 hours  NAC  Allergic to aspirin and bactrim    Hct 31  7/8/22 EKG: Sinus tachycardia with sinus arrhythmia; 116 bpm    Medical History   Diabetes mellitus, type 2 Hypertension   Hyperlipidemia Obesity   Stroke Primary osteoarthritis of left shoulder   Anemia    GERD    Airway exam deferred (COVID precautions)

## 2024-12-10 NOTE — OP NOTE
[facility]  Surgery Department  Operative Note    SUMMARY     Date of Procedure: 12/10/2024     Procedure: Procedure(s) (LRB):  DEBRIDEMENT, FOOT (Right)     Surgeons and Role:     * Dhaval Pizano MD - Primary    Assisting Surgeon: None    Pre-Operative Diagnosis: Wound of foot [S91.309A]    Post-Operative Diagnosis: Post-Op Diagnosis Codes:     * Wound of foot [S91.309A]    Anesthesia: General/MAC    Procedures Performed:  Debridement of right foot wound    Significant Findings of the Procedure:  Right foot ulcer at the 1st MTP head went all the way down to the bone with obvious bone involvement.    Procedure in Detail:  After informed consent patient brought to the OR prepped and draped in the usual sterile fashion.  We addressed the ulcer of the base of the 1st metatarsal head.  We surgically excise and cut out this full-thickness area of about 3 x 3 cm that went about 3 cm deep all the way down to the 1st metatarsal head.  Once we cut this area to good healthy tissues we packed the area up placed a piece of Surgicel in the wound and placed a dressing on the wound.  Patient tolerated the procedure well.    Complications: No    Estimated Blood Loss (EBL): 5 cc           Implants: * No implants in log *    Specimens:   Specimen (24h ago, onward)       Start     Ordered    12/10/24 1058  Surgical Pathology  RELEASE UPON ORDERING         12/10/24 1058                            Condition: Good    Disposition: PACU - hemodynamically stable.    Attestation: I was present and scrubbed for the entire procedure.

## 2024-12-10 NOTE — H&P
[facility] - Emergency Department  Hospital Medicine  History & Physical    Patient Name: Deborah Sotelo  MRN: [id number]  Patient Class: Emergency  Admission Date: 12/9/2024  Attending Physician:  SHAYY Trammell MD  Primary Care Provider: Pineda Hunter FNP         Patient information was obtained from patient and ER records.     Subjective:     Principal Problem:Cellulitis of toe of right foot    Chief Complaint:   Chief Complaint   Patient presents with    Wound Infection        HPI: Patient is a 64-year-old female with a history of essential hypertension, hyperlipidemia, type 2 diabetes on insulin with diabetic complications including CKD stage 3 and diabetic foot ulcers status post transmetatarsal amputation who presented to the emergency room [facility].  Patient stated that right foot wound has been hurting a lot for the last few days and yesterday started to notice purulent drainage and foul odor emanating from the wound prompting a [facility] to [facility].  Patient otherwise denied any associated symptoms such as fever chills cough wheezing or shortness of breath.    On presentation vital signs were stable and patient was afebrile workup was notable for presence of chronic renal failure with serum creatinine of 1.18 which is at her baseline but the rest of the workup including x-ray of the foot was unremarkable.  Patient will be admitted for further evaluation and intervention        Past Medical History:   Diagnosis Date    Anemia 7/5/2022    Diabetes mellitus, type 2     Hyperlipidemia     Hypertension     Obesity     Primary osteoarthritis of left shoulder 5/24/2022    Stroke        Past Surgical History:   Procedure Laterality Date    AMPUTATION      APPENDECTOMY      DEBRIDEMENT OF FOOT Right 1/12/2023    Procedure: DEBRIDEMENT, FOOT;  Surgeon: Ravi Ramirez MD;  Location: [facility];  Service: General;  Laterality: Right;    EYE  SURGERY         Review of patient's allergies indicates:   Allergen Reactions    Aspirin      Other reaction(s): UPSET STOMACH   Other reaction(s): Unknown    Bactrim ds [sulfamethoxazole-trimethoprim] Itching       Current Facility-Administered Medications on File Prior to Encounter   Medication    [COMPLETED] cefTRIAXone injection 1 g     Current Outpatient Medications on File Prior to Encounter   Medication Sig    amLODIPine (NORVASC) 5 MG tablet Take 1 tablet (5 mg total) by mouth once daily.    ammonium lactate 12 % Crea Apply 1 g topically Daily.    atorvastatin (LIPITOR) 80 MG tablet TAKE 1 TABLET BY MOUTH EACH NIGHT AT BEDTIME FOR CHOLESTEROL ~~DO NOT EAT GRAPEFRUIT OR DRINK GRAPEFRUIT JUICE WHILE TAKING THIS MEDICATION~~    diclofenac sodium (VOLTAREN) 1 % Gel Apply 2 g topically 4 (four) times daily.    FEROSUL 325 mg (65 mg iron) Tab tablet     fluconazole (DIFLUCAN) 150 MG Tab Take 1 tablet (150 mg total) by mouth once daily. for 5 days    gentamicin (GARAMYCIN) 0.1 % cream Apply topically once daily.    insulin asp prt-insulin aspart, NovoLOG 70/30, (NOVOLOG 70/30) 100 unit/mL (70-30) Soln INJECT 50 UNITS SUB-Q EACH MORNING AND 35  UNITS EACH EVENING ...KEEP IN REFRIGERATOR ...USE WITHIN 28 DAYS AFTER OPENING EACH VIAL    ketoconazole (NIZORAL) 2 % cream Apply topically once daily.    ketoconazole (NIZORAL) 2 % shampoo Apply topically twice a week.    LANTUS U-100 INSULIN 100 unit/mL injection Inject 20 Units into the skin once daily. Take at night (Patient not taking: Reported on 10/3/2024)    LIDOcaine (LIDODERM) 5 % Place 1 patch onto the skin once daily. Remove & Discard patch within 12 hours or as directed by MD    losartan (COZAAR) 100 MG tablet Take 1 tablet (100 mg total) by mouth every evening.    omeprazole (PRILOSEC) 20 MG capsule Take 1 capsule (20 mg total) by mouth once daily.    ondansetron (ZOFRAN) 4 MG tablet Take 1 tablet (4 mg total) by mouth every 6 (six) hours.    pantoprazole  (PROTONIX) 40 MG tablet Take 40 mg by mouth once daily.    sodium hypochlorite 0.5 % (DAKIN'S SOLUTION) external solution Apply topically once daily. Pack wound with dakin's moistened nuguaze daily (Patient not taking: Reported on 12/9/2024)    TRULICITY 0.75 mg/0.5 mL pen injector Inject 0.75 mg into the skin every 7 days.     Family History       Problem Relation (Age of Onset)    Appendicitis Father    Asthma Sister    Cancer Mother    Diabetes Brother          Tobacco Use    Smoking status: Never    Smokeless tobacco: Never   Substance and Sexual Activity    Alcohol use: Yes    Drug use: Never    Sexual activity: Never     Review of Systems   Constitutional:  Negative for chills and fever.   HENT:  Negative for postnasal drip and rhinorrhea.    Eyes:  Negative for itching.   Respiratory:  Negative for shortness of breath.    Cardiovascular:  Negative for chest pain, palpitations and leg swelling.   Gastrointestinal:  Negative for abdominal distention, abdominal pain, diarrhea, nausea and vomiting.   Endocrine: Negative for cold intolerance, heat intolerance, polydipsia, polyphagia and polyuria.   Genitourinary:  Negative for dysuria and hematuria.   Musculoskeletal:  Negative for arthralgias, joint swelling, myalgias, neck pain and neck stiffness.        Right foot pain was endorsed   Skin:  Negative for pallor and rash.   Allergic/Immunologic: Negative for environmental allergies, food allergies and immunocompromised state.   Neurological:  Negative for dizziness, seizures, facial asymmetry, light-headedness and numbness.     Objective:     Vital Signs (Most Recent):  Temp: 98.3 °F (36.8 °C) (12/09/24 1815)  Pulse: 85 (12/09/24 1815)  Resp: 18 (12/09/24 1434)  BP: (!) 150/82 (12/09/24 1815)  SpO2: 97 % (12/09/24 1815) Vital Signs (24h Range):  Temp:  [98 °F (36.7 °C)-98.3 °F (36.8 °C)] 98.3 °F (36.8 °C)  Pulse:  [85-96] 85  Resp:  [18-19] 18  SpO2:  [97 %-99 %] 97 %  BP: (122-151)/(82-92) 150/82     Weight: 128  kg (282 lb 3 oz)  Body mass index is 46.96 kg/m².     Physical Exam  Constitutional:       Appearance: Normal appearance. She is obese.   HENT:      Head: Normocephalic and atraumatic.      Nose: Nose normal.      Mouth/Throat:      Mouth: Mucous membranes are moist.   Eyes:      Extraocular Movements: Extraocular movements intact.   Cardiovascular:      Rate and Rhythm: Normal rate and regular rhythm.      Pulses: Normal pulses.      Heart sounds: Normal heart sounds.   Pulmonary:      Effort: Pulmonary effort is normal.      Breath sounds: Normal breath sounds.   Abdominal:      General: Bowel sounds are normal.      Palpations: Abdomen is soft.   Musculoskeletal:      Cervical back: Normal range of motion and neck supple.      Comments: Status post transmetatarsal amputation of the right.  There is an unstageable ulcer with serosanguineous drainage and surrounding erythema involving the head of 1st metatarsal   Neurological:      General: No focal deficit present.      Mental Status: She is alert and oriented to person, place, and time.   Psychiatric:         Mood and Affect: Mood normal.         Behavior: Behavior normal.                Significant Labs: All pertinent labs within the past 24 hours have been reviewed.    Significant Imaging: I have reviewed all pertinent imaging results/findings within the past 24 hours.  Assessment/Plan:     * Cellulitis of toe of right foot    Patient has a history of diabetic foot ulcer resulting in right transmetatarsal amputation.  Patient now has an unstageable wound with foul-smelling and serosanguineous drainage emanating from the wound involving the plantar aspect of the head of 1st metatarsal.  Will empirically start patient on vancomycin and Zosyn.  General surgery will be consulted as well      Vaginal candidiasis    Associated with antibiotic.  Patient was [facility].  Will continue Diflucan      Anemia    Anemia is  likely due to chronic disease due to type 2 diabetes . Most recent hemoglobin and hematocrit are listed below.  Recent Labs     12/09/24  1537   HGB 11.0*   HCT 33.3*     Plan  - Monitor serial CBC: Daily  - Transfuse PRBC if patient becomes hemodynamically unstable, symptomatic or H/H drops below 7/21.      Gastroesophageal reflux disease    Continue present management with PPI      Hyperlipidemia    Continue present management with a statin      Hypertension    Patient's blood pressure range in the last 24 hours was: BP  Min: 122/92  Max: 151/87.The patient's inpatient anti-hypertensive regimen is listed below:    Current Antihypertensives  amLODIPine tablet 5 mg, Daily, Oral  losartan tablet 100 mg, Nightly, Oral    Plan  - BP is controlled, no changes needed to their regimen      Type 2 diabetes mellitus with other specified complication    Patient is diabetic complication still include diabetic foot ulcer and CKD.  Will check hemoglobin A1c.  In the meantime, will start patient on sliding scale insulin to maintain CBGs in the range of 130s to 180s        VTE Risk Mitigation (From admission, onward)      Lovenox 40 mg subQ Q 24 hours                            Jack Rubio MD  Department of Hospital Medicine  [facility] - Emergency Department

## 2024-12-10 NOTE — ANESTHESIA PROCEDURE NOTES
Intubation    Date/Time: 12/10/2024 10:37 AM    Performed by: Bebeto Crenshaw CRNA  Authorized by: Dhiraj Adorno MD    Intubation:     Induction:  Intravenous    Intubated:  Postinduction    Mask Ventilation:  Not attempted    Attempts:  1    Attempted By:  CRNA    Difficult Airway Encountered?: No      Complications:  None    Airway Device:  Supraglottic airway/LMA    Airway Device Size:  4.0    Style/Cuff Inflation:  Cuffed (inflated to minimal occlusive pressure)    Placement Verified By:  Capnometry    Complicating Factors:  None    Findings Post-Intubation:  BS equal bilateral and atraumatic/condition of teeth unchanged

## 2024-12-10 NOTE — OR NURSING
1115- Pt arrived in pacu. Pt drowsy. Vss, respirations even et unlabored. Pt has dressing on right foot with scant amount of drainage. No signs of pain or distress.    1125- Pt more awake. Vomitting. Vss, respirations even et unlabored. Dose of nausea medication administered. See flow sheet.    1135- Dressing on right foot shows small amount of drainage at tend of food and at heel. Physciian contacted. Dressing reinforced and coban placed on food per Dr. Pizano. Pt aaox4.     1155- Pt continues to occasionally vomit. Physician contacted. Order received. Dose of nausea medication administered. See flow sheet.    1156- Pt complaining of pain. Dose of pain medication administered. See flow sheet.     1215- Pt resting more comfortably. Dressing on right foot clean, dry and intact. No further bleeding or drainage. Pt denies nausea. Vss, respirations even et unlabored.    1224- Pt ready for discharge per pacu criteria. Vss, respirations even et unlabroed. Dressing on right foot C/D/I. Pt transported to [facility] to await room assignment.    1230- Report given to TIFFANI Dial RN. VS: 121/64, hr 85, rr 16, O2 97% on room air. Family at bedside.

## 2024-12-10 NOTE — HPI
Patient is a 64-year-old female with a history of essential hypertension, hyperlipidemia, type 2 diabetes on insulin with diabetic complications including CKD stage 3 and diabetic foot ulcers status post transmetatarsal amputation who presented to the emergency room after [facility].  Patient stated that right foot wound has been hurting a lot for the last few days and yesterday started to notice purulent drainage and foul odor emanating from the wound prompting a visit to the [facility].  Patient otherwise denied any associated symptoms such as fever chills cough wheezing or shortness of breath.    On presentation vital signs were stable and patient was afebrile workup was notable for presence of chronic renal failure with serum creatinine of 1.18 which is at her baseline but the rest of the workup including x-ray of the foot was unremarkable.  Patient will be admitted for further evaluation and intervention

## 2024-12-10 NOTE — SUBJECTIVE & OBJECTIVE
Interval History:    Review of Systems   Constitutional:  Negative for chills and fever.   HENT:  Negative for postnasal drip and rhinorrhea.    Eyes:  Negative for itching.   Respiratory:  Negative for shortness of breath.    Cardiovascular:  Negative for chest pain, palpitations and leg swelling.   Gastrointestinal:  Negative for abdominal distention, abdominal pain, diarrhea, nausea and vomiting.   Endocrine: Negative for cold intolerance, heat intolerance, polydipsia, polyphagia and polyuria.   Genitourinary:  Negative for dysuria and hematuria.   Musculoskeletal:  Negative for arthralgias, joint swelling, myalgias, neck pain and neck stiffness.        Right foot pain was endorsed   Skin:  Negative for pallor and rash.   Allergic/Immunologic: Negative for environmental allergies, food allergies and immunocompromised state.   Neurological:  Negative for dizziness, seizures, facial asymmetry, light-headedness and numbness.     Objective:     Vital Signs (Most Recent):  Temp: 97.6 °F (36.4 °C) (12/10/24 1118)  Pulse: 90 (12/10/24 1400)  Resp: 14 (12/10/24 1400)  BP: 111/62 (12/10/24 1345)  SpO2: 96 % (12/10/24 1400) Vital Signs (24h Range):  Temp:  [97.6 °F (36.4 °C)-98.4 °F (36.9 °C)] 97.6 °F (36.4 °C)  Pulse:  [] 90  Resp:  [12-19] 14  SpO2:  [93 %-100 %] 96 %  BP: (101-187)/(57-96) 111/62     Weight: 128 kg (282 lb 3 oz)  Body mass index is 46.96 kg/m².     Physical Exam  Constitutional:       Appearance: Normal appearance. She is obese.   HENT:      Head: Normocephalic and atraumatic.      Nose: Nose normal.      Mouth/Throat:      Mouth: Mucous membranes are moist.   Eyes:      Extraocular Movements: Extraocular movements intact.   Cardiovascular:      Rate and Rhythm: Normal rate and regular rhythm.      Pulses: Normal pulses.      Heart sounds: Normal heart sounds.   Pulmonary:      Effort: Pulmonary effort is normal.      Breath sounds: Normal breath sounds.   Abdominal:      General: Bowel sounds are  normal.      Palpations: Abdomen is soft.   Musculoskeletal:      Cervical back: Normal range of motion and neck supple.      Comments: Status post transmetatarsal amputation of the right.  There is an unstageable ulcer with serosanguineous drainage and surrounding erythema involving the head of 1st metatarsal   Neurological:      General: No focal deficit present.      Mental Status: She is alert and oriented to person, place, and time.   Psychiatric:         Mood and Affect: Mood normal.         Behavior: Behavior normal.                Significant Labs: All pertinent labs within the past 24 hours have been reviewed.    Significant Imaging: I have reviewed all pertinent imaging results/findings within the past 24 hours.

## 2024-12-10 NOTE — SUBJECTIVE & OBJECTIVE
Past Medical History:   Diagnosis Date    Anemia 7/5/2022    Diabetes mellitus, type 2     Hyperlipidemia     Hypertension     Obesity     Primary osteoarthritis of left shoulder 5/24/2022    Stroke        Past Surgical History:   Procedure Laterality Date    AMPUTATION      APPENDECTOMY      DEBRIDEMENT OF FOOT Right 1/12/2023    Procedure: DEBRIDEMENT, FOOT;  Surgeon: Ravi Ramirez MD;  Location: [facility];  Service: General;  Laterality: Right;    EYE SURGERY         Review of patient's allergies indicates:   Allergen Reactions    Aspirin      Other reaction(s): UPSET STOMACH   Other reaction(s): Unknown    Bactrim ds [sulfamethoxazole-trimethoprim] Itching       Current Facility-Administered Medications on File Prior to Encounter   Medication    [COMPLETED] cefTRIAXone injection 1 g     Current Outpatient Medications on File Prior to Encounter   Medication Sig    amLODIPine (NORVASC) 5 MG tablet Take 1 tablet (5 mg total) by mouth once daily.    ammonium lactate 12 % Crea Apply 1 g topically Daily.    atorvastatin (LIPITOR) 80 MG tablet TAKE 1 TABLET BY MOUTH EACH NIGHT AT BEDTIME FOR CHOLESTEROL ~~DO NOT EAT GRAPEFRUIT OR DRINK GRAPEFRUIT JUICE WHILE TAKING THIS MEDICATION~~    diclofenac sodium (VOLTAREN) 1 % Gel Apply 2 g topically 4 (four) times daily.    FEROSUL 325 mg (65 mg iron) Tab tablet     fluconazole (DIFLUCAN) 150 MG Tab Take 1 tablet (150 mg total) by mouth once daily. for 5 days    gentamicin (GARAMYCIN) 0.1 % cream Apply topically once daily.    insulin asp prt-insulin aspart, NovoLOG 70/30, (NOVOLOG 70/30) 100 unit/mL (70-30) Soln INJECT 50 UNITS SUB-Q EACH MORNING AND 35  UNITS EACH EVENING ...KEEP IN REFRIGERATOR ...USE WITHIN 28 DAYS AFTER OPENING EACH VIAL    ketoconazole (NIZORAL) 2 % cream Apply topically once daily.    ketoconazole (NIZORAL) 2 % shampoo Apply topically twice a week.    LANTUS U-100 INSULIN 100 unit/mL injection Inject 20 Units into the skin once daily. Take at night  (Patient not taking: Reported on 10/3/2024)    LIDOcaine (LIDODERM) 5 % Place 1 patch onto the skin once daily. Remove & Discard patch within 12 hours or as directed by MD    losartan (COZAAR) 100 MG tablet Take 1 tablet (100 mg total) by mouth every evening.    omeprazole (PRILOSEC) 20 MG capsule Take 1 capsule (20 mg total) by mouth once daily.    ondansetron (ZOFRAN) 4 MG tablet Take 1 tablet (4 mg total) by mouth every 6 (six) hours.    pantoprazole (PROTONIX) 40 MG tablet Take 40 mg by mouth once daily.    sodium hypochlorite 0.5 % (DAKIN'S SOLUTION) external solution Apply topically once daily. Pack wound with dakin's moistened nuguaze daily (Patient not taking: Reported on 12/9/2024)    TRULICITY 0.75 mg/0.5 mL pen injector Inject 0.75 mg into the skin every 7 days.     Family History       Problem Relation (Age of Onset)    Appendicitis Father    Asthma Sister    Cancer Mother    Diabetes Brother          Tobacco Use    Smoking status: Never    Smokeless tobacco: Never   Substance and Sexual Activity    Alcohol use: Yes    Drug use: Never    Sexual activity: Never     Review of Systems   Constitutional:  Negative for chills and fever.   HENT:  Negative for postnasal drip and rhinorrhea.    Eyes:  Negative for itching.   Respiratory:  Negative for shortness of breath.    Cardiovascular:  Negative for chest pain, palpitations and leg swelling.   Gastrointestinal:  Negative for abdominal distention, abdominal pain, diarrhea, nausea and vomiting.   Endocrine: Negative for cold intolerance, heat intolerance, polydipsia, polyphagia and polyuria.   Genitourinary:  Negative for dysuria and hematuria.   Musculoskeletal:  Negative for arthralgias, joint swelling, myalgias, neck pain and neck stiffness.        Right foot pain was endorsed   Skin:  Negative for pallor and rash.   Allergic/Immunologic: Negative for environmental allergies, food allergies and immunocompromised state.   Neurological:  Negative for  dizziness, seizures, facial asymmetry, light-headedness and numbness.     Objective:     Vital Signs (Most Recent):  Temp: 98.3 °F (36.8 °C) (12/09/24 1815)  Pulse: 85 (12/09/24 1815)  Resp: 18 (12/09/24 1434)  BP: (!) 150/82 (12/09/24 1815)  SpO2: 97 % (12/09/24 1815) Vital Signs (24h Range):  Temp:  [98 °F (36.7 °C)-98.3 °F (36.8 °C)] 98.3 °F (36.8 °C)  Pulse:  [85-96] 85  Resp:  [18-19] 18  SpO2:  [97 %-99 %] 97 %  BP: (122-151)/(82-92) 150/82     Weight: 128 kg (282 lb 3 oz)  Body mass index is 46.96 kg/m².     Physical Exam  Constitutional:       Appearance: Normal appearance. She is obese.   HENT:      Head: Normocephalic and atraumatic.      Nose: Nose normal.      Mouth/Throat:      Mouth: Mucous membranes are moist.   Eyes:      Extraocular Movements: Extraocular movements intact.   Cardiovascular:      Rate and Rhythm: Normal rate and regular rhythm.      Pulses: Normal pulses.      Heart sounds: Normal heart sounds.   Pulmonary:      Effort: Pulmonary effort is normal.      Breath sounds: Normal breath sounds.   Abdominal:      General: Bowel sounds are normal.      Palpations: Abdomen is soft.   Musculoskeletal:      Cervical back: Normal range of motion and neck supple.      Comments: Status post transmetatarsal amputation of the right.  There is an unstageable ulcer with serosanguineous drainage and surrounding erythema involving the head of 1st metatarsal   Neurological:      General: No focal deficit present.      Mental Status: She is alert and oriented to person, place, and time.   Psychiatric:         Mood and Affect: Mood normal.         Behavior: Behavior normal.                Significant Labs: All pertinent labs within the past 24 hours have been reviewed.    Significant Imaging: I have reviewed all pertinent imaging results/findings within the past 24 hours.

## 2024-12-10 NOTE — ASSESSMENT & PLAN NOTE
Patient has a history of diabetic foot ulcer resulting in right transmetatarsal amputation.  Patient now has an unstageable wound with foul-smelling and serosanguineous drainage emanating from the wound involving the plantar aspect of the head of 1st metatarsal.  Will empirically start patient on vancomycin and Zosyn.  General surgery will be consulted as well

## 2024-12-10 NOTE — ASSESSMENT & PLAN NOTE
Patient is diabetic complication still include diabetic foot ulcer and CKD.  Will check hemoglobin A1c.  In the meantime, will start patient on sliding scale insulin to maintain CBGs in the range of 130s to 180s

## 2024-12-10 NOTE — ASSESSMENT & PLAN NOTE
Anemia is likely due to chronic disease due to type 2 diabetes . Most recent hemoglobin and hematocrit are listed below.  Recent Labs     12/09/24  1537   HGB 11.0*   HCT 33.3*     Plan  - Monitor serial CBC: Daily  - Transfuse PRBC if patient becomes hemodynamically unstable, symptomatic or H/H drops below 7/21.

## 2024-12-10 NOTE — ASSESSMENT & PLAN NOTE
Patient's blood pressure range in the last 24 hours was: BP  Min: 122/92  Max: 151/87.The patient's inpatient anti-hypertensive regimen is listed below:    Current Antihypertensives  amLODIPine tablet 5 mg, Daily, Oral  losartan tablet 100 mg, Nightly, Oral    Plan  - BP is controlled, no changes needed to their regimen

## 2024-12-10 NOTE — ASSESSMENT & PLAN NOTE
Associated with antibiotic.  Patient was prescribed D[facility]lucan at [facility] earlier today.  Will continue Diflucan

## 2024-12-10 NOTE — INTERVAL H&P NOTE
The patient has been examined and the H&P has been reviewed:    I concur with the findings and no changes have occurred since H&P was written.    Surgery risks, benefits and alternative options discussed and understood by patient/family.    OR right foot debridement        Active Hospital Problems    Diagnosis  POA    *Cellulitis of toe of right foot [L03.031]  Yes    Vaginal candidiasis [B37.31]  Yes    Anemia [D64.9]  Yes    Gastroesophageal reflux disease [K21.9]  Yes    Hyperlipidemia [E78.5]  Yes    Hypertension [I10]  Yes    Type 2 diabetes mellitus with other specified complication [E11.69]  Yes                     Resolved Hospital Problems   No resolved problems to display.

## 2024-12-10 NOTE — PROGRESS NOTES
"Pharmacokinetic Initial Assessment: IV Vancomycin    Assessment/Plan:    Initiate intravenous vancomycin 2.5g q24h  Desired empiric serum trough concentration is 10 to 15 mcg/mL  Draw vancomycin trough level 30 min prior to fourth dose on 12/12 at approximately 2130  Pharmacy will continue to follow and monitor vancomycin.         Patient brief summary:  Deborah Sotelo is a 64 y.o. female initiated on antimicrobial therapy with IV Vancomycin for treatment of suspected skin & soft tissue infection    Drug Allergies:   Review of patient's allergies indicates:   Allergen Reactions    Aspirin      Other reaction(s): UPSET STOMACH   Other reaction(s): Unknown    Bactrim ds [sulfamethoxazole-trimethoprim] Itching       Actual Body Weight:   128    Renal Function:   Estimated Creatinine Clearance: 65.8 mL/min (A) (based on SCr of 1.18 mg/dL (H)).,     Dialysis Method (if applicable):  N/A    CBC (last 72 hours):  Recent Labs   Lab Result Units 12/09/24  1537   WBC K/uL 6.49   Hemoglobin g/dL 11.0*   Hematocrit % 33.3*   Platelet Count K/uL 202   Lymphocytes % % 34.5   Monocytes % % 7.1*   Eosinophils % % 6.2*   Basophils % % 0.6   Diff Type  Auto       Metabolic Panel (last 72 hours):  Recent Labs   Lab Result Units 12/09/24  1537   Sodium mmol/L 137   Potassium mmol/L 4.9   Chloride mmol/L 109*   CO2 mmol/L 24   Glucose mg/dL 126*   BUN mg/dL 24*   Creatinine mg/dL 1.18*       Drug levels (last 3 results):  No results for input(s): "VANCOMYCINRA", "VANCORANDOM", "VANCOMYCINPE", "VANCOPEAK", "VANCOMYCINTR", "VANCOTROUGH" in the last 72 hours.    Microbiologic Results:  Microbiology Results (last 7 days)       ** No results found for the last 168 hours. **            Please contact pharmacy at extension [phone] with any questions regarding this assessment.     Thank you for the consult,   Felisha Ferraro, Yessenia  "

## 2024-12-10 NOTE — ASSESSMENT & PLAN NOTE
Anemia is likely due to chronic disease due to type 2 diabetes . Most recent hemoglobin and hematocrit are listed below.  Recent Labs     12/09/24  1537 12/10/24  0452   HGB 11.0* 9.6*   HCT 33.3* 29.7*       Plan  - Monitor serial CBC: Daily  - Transfuse PRBC if patient becomes hemodynamically unstable, symptomatic or H/H drops below 7/21.

## 2024-12-10 NOTE — ASSESSMENT & PLAN NOTE
Patient's blood pressure range in the last 24 hours was: BP  Min: 101/57  Max: 187/96.The patient's inpatient anti-hypertensive regimen is listed below:    Current Antihypertensives  amLODIPine tablet 5 mg, Daily, Oral  losartan tablet 100 mg, Nightly, Oral    Plan  - BP is controlled, no changes needed to their regimen

## 2024-12-10 NOTE — TRANSFER OF CARE
"Anesthesia Transfer of Care Note    Patient: Deborah Sotelo    Procedure(s) Performed: Procedure(s) (LRB):  DEBRIDEMENT, FOOT (Right)    Patient location: PACU    Anesthesia Type: general    Transport from OR: Transported from OR on 100% O2 by closed face mask with adequate spontaneous ventilation    Post pain: adequate analgesia    Post assessment: no apparent anesthetic complications    Post vital signs: stable    Level of consciousness: responds to stimulation    Nausea/Vomiting: no nausea/vomiting    Complications: none    Transfer of care protocol was followed      Last vitals: Visit Vitals  BP (!) 168/88   Pulse 92   Temp 36.4 °C (97.6 °F) (Oral)   Resp 16   Ht 5' 5" (1.651 m)   Wt 128 kg (282 lb 3 oz)   SpO2 100%   BMI 46.96 kg/m²     "

## 2024-12-10 NOTE — HOSPITAL COURSE
"Over course of stay patient evaluated and taken to the OR by Surgery. They report "wound tract does extend to the 1st metatarsal head, there is no erosion of bone or sign of infection.  This is a chronic condition.  Therefore, no need for treatment of acute osteomyelitis.  Can discharge home with wet-to-dry dressing and follow-up in clinic next week." Patient ok for discharge home. No antibiotics indicated.     "

## 2024-12-10 NOTE — PROGRESS NOTES
[facility]  [facility] Medicine  Progress Note    Patient Name: Deborah Sotelo  MRN: [id number]  Patient Class: IP- Inpatient   Admission Date: 12/9/2024  Length of Stay: 1 days  Attending Physician: Awilda Trammell MD  Primary Care Provider: Pineda Hunter FNP        Subjective     Principal Problem:Cellulitis of toe of right foot        HPI:  Patient is a 64-year-old female with a history of essential hypertension, hyperlipidemia, type 2 diabetes on insulin with diabetic complications including CKD stage 3 and diabetic foot ulcers status post transmetatarsal amputation who presented to the emergency room after [facility].  Patient stated that right foot wound has been hurting a lot for the last few days and yesterday started to notice purulent drainage and foul odor emanating from the wound prompting a visit to the [facility].  Patient otherwise denied any associated symptoms such as fever chills cough wheezing or shortness of breath.    On presentation vital signs were stable and patient was afebrile workup was notable for presence of chronic renal failure with serum creatinine of 1.18 which is at her baseline but the rest of the workup including x-ray of the foot was unremarkable.  Patient will be admitted for further evaluation and intervention        Overview/Hospital Course:  12/10: OR today, recs pending    Interval History:    Review of Systems   Constitutional:  Negative for chills and fever.   HENT:  Negative for postnasal drip and rhinorrhea.    Eyes:  Negative for itching.   Respiratory:  Negative for shortness of breath.    Cardiovascular:  Negative for chest pain, palpitations and leg swelling.   Gastrointestinal:  Negative for abdominal distention, abdominal pain, diarrhea, nausea and vomiting.   Endocrine: Negative for cold intolerance, heat intolerance, polydipsia, polyphagia and polyuria.   Genitourinary:  Negative for  dysuria and hematuria.   Musculoskeletal:  Negative for arthralgias, joint swelling, myalgias, neck pain and neck stiffness.        Right foot pain was endorsed   Skin:  Negative for pallor and rash.   Allergic/Immunologic: Negative for environmental allergies, food allergies and immunocompromised state.   Neurological:  Negative for dizziness, seizures, facial asymmetry, light-headedness and numbness.     Objective:     Vital Signs (Most Recent):  Temp: 97.6 °F (36.4 °C) (12/10/24 1118)  Pulse: 90 (12/10/24 1400)  Resp: 14 (12/10/24 1400)  BP: 111/62 (12/10/24 1345)  SpO2: 96 % (12/10/24 1400) Vital Signs (24h Range):  Temp:  [97.6 °F (36.4 °C)-98.4 °F (36.9 °C)] 97.6 °F (36.4 °C)  Pulse:  [] 90  Resp:  [12-19] 14  SpO2:  [93 %-100 %] 96 %  BP: (101-187)/(57-96) 111/62     Weight: 128 kg (282 lb 3 oz)  Body mass index is 46.96 kg/m².     Physical Exam  Constitutional:       Appearance: Normal appearance. She is obese.   HENT:      Head: Normocephalic and atraumatic.      Nose: Nose normal.      Mouth/Throat:      Mouth: Mucous membranes are moist.   Eyes:      Extraocular Movements: Extraocular movements intact.   Cardiovascular:      Rate and Rhythm: Normal rate and regular rhythm.      Pulses: Normal pulses.      Heart sounds: Normal heart sounds.   Pulmonary:      Effort: Pulmonary effort is normal.      Breath sounds: Normal breath sounds.   Abdominal:      General: Bowel sounds are normal.      Palpations: Abdomen is soft.   Musculoskeletal:      Cervical back: Normal range of motion and neck supple.      Comments: Status post transmetatarsal amputation of the right.  There is an unstageable ulcer with serosanguineous drainage and surrounding erythema involving the head of 1st metatarsal   Neurological:      General: No focal deficit present.      Mental Status: She is alert and oriented to person, place, and time.   Psychiatric:         Mood and Affect: Mood normal.         Behavior: Behavior normal.                 Significant Labs: All pertinent labs within the past 24 hours have been reviewed.    Significant Imaging: I have reviewed all pertinent imaging results/findings within the past 24 hours.    Assessment and Plan     * Cellulitis of toe of right foot    Patient has a history of diabetic foot ulcer resulting in right transmetatarsal amputation.  Patient now has an unstageable wound with foul-smelling and serosanguineous drainage emanating from the wound involving the plantar aspect of the head of 1st metatarsal.  Will empirically start patient on vancomycin and Zosyn.  General surgery will be consulted as well      Vaginal candidiasis    Associated with antibiotic.  Patient was prescribed Diflucan [facility] earlier today.  Will continue Diflucan      Anemia    Anemia is likely due to chronic disease due to type 2 diabetes . Most recent hemoglobin and hematocrit are listed below.  Recent Labs     12/09/24  1537 12/10/24  0452   HGB 11.0* 9.6*   HCT 33.3* 29.7*       Plan  - Monitor serial CBC: Daily  - Transfuse PRBC if patient becomes hemodynamically unstable, symptomatic or H/H drops below 7/21.      Gastroesophageal reflux disease    Continue present management with PPI      Hyperlipidemia    Continue present management with a statin      Hypertension    Patient's blood pressure range in the last 24 hours was: BP  Min: 101/57  Max: 187/96.The patient's inpatient anti-hypertensive regimen is listed below:    Current Antihypertensives  amLODIPine tablet 5 mg, Daily, Oral  losartan tablet 100 mg, Nightly, Oral    Plan  - BP is controlled, no changes needed to their regimen      Type 2 diabetes mellitus with other specified complication  Patient is diabetic complication still include diabetic foot ulcer and CKD.  Will check hemoglobin A1c.  In the meantime, will start patient on sliding scale insulin to maintain CBGs in the range of 130s to 180s        VTE Risk Mitigation (From admission,  onward)           Ordered     enoxaparin injection 40 mg  Daily         12/09/24 2022     IP VTE HIGH RISK PATIENT  Once         12/09/24 2022     Place sequential compression device  Until discontinued         12/09/24 2022                    Discharge Planning   REYES:      Code Status: Full Code   Medical Readiness for Discharge Date:                            Awilda Trammell MD  Department of Hospital Medicine   [facility]

## 2024-12-10 NOTE — PLAN OF CARE
Problem: Adult Inpatient Plan of Care  Goal: Plan of Care Review  12/10/2024 0531 by Jenniffer Burciaga RN  Outcome: Progressing  12/10/2024 0357 by Jenniffer Burciaga RN  Outcome: Progressing  Goal: Patient-Specific Goal (Individualized)  12/10/2024 0531 by Jenniffer Burciaga RN  Outcome: Progressing  12/10/2024 0357 by Jenniffer Burciaga RN  Outcome: Progressing  Goal: Absence of Hospital-Acquired Illness or Injury  12/10/2024 0531 by Jenniffer Burciaga RN  Outcome: Progressing  12/10/2024 0357 by Jenniffer Burciaga RN  Outcome: Progressing  Goal: Optimal Comfort and Wellbeing  12/10/2024 0531 by Jenniffer Burciaga RN  Outcome: Progressing  12/10/2024 0357 by Jenniffer Burciaga RN  Outcome: Progressing  Goal: Readiness for Transition of Care  12/10/2024 0531 by Jenniffer Burciaga RN  Outcome: Progressing  12/10/2024 0357 by Jenniffer Burciaga RN  Outcome: Progressing     Problem: Bariatric Environmental Safety  Goal: Safety Maintained with Care  12/10/2024 0531 by Jenniffer Burciaga RN  Outcome: Progressing  12/10/2024 0357 by Jenniffer Burciaga RN  Outcome: Progressing

## 2024-12-10 NOTE — PLAN OF CARE
Problem: Adult Inpatient Plan of Care  Goal: Plan of Care Review  Outcome: Progressing  Goal: Patient-Specific Goal (Individualized)  Outcome: Progressing  Goal: Absence of Hospital-Acquired Illness or Injury  Outcome: Progressing  Goal: Optimal Comfort and Wellbeing  Outcome: Progressing  Goal: Readiness for Transition of Care  Outcome: Progressing     Problem: Bariatric Environmental Safety  Goal: Safety Maintained with Care  Outcome: Progressing     Problem: Skin Injury Risk Increased  Goal: Skin Health and Integrity  Outcome: Progressing     Problem: Wound  Goal: Optimal Coping  Outcome: Progressing  Goal: Optimal Functional Ability  Outcome: Progressing  Goal: Absence of Infection Signs and Symptoms  Outcome: Progressing  Goal: Improved Oral Intake  Outcome: Progressing  Goal: Optimal Pain Control and Function  Outcome: Progressing  Goal: Skin Health and Integrity  Outcome: Progressing  Goal: Optimal Wound Healing  Outcome: Progressing     Problem: Diabetes Comorbidity  Goal: Blood Glucose Level Within Targeted Range  Outcome: Progressing

## 2024-12-11 VITALS
RESPIRATION RATE: 18 BRPM | WEIGHT: 282.19 LBS | HEART RATE: 87 BPM | OXYGEN SATURATION: 97 % | SYSTOLIC BLOOD PRESSURE: 118 MMHG | HEIGHT: 65 IN | TEMPERATURE: 98 F | BODY MASS INDEX: 47.02 KG/M2 | DIASTOLIC BLOOD PRESSURE: 74 MMHG

## 2024-12-11 LAB
ANION GAP SERPL CALCULATED.3IONS-SCNC: 10 MMOL/L (ref 7–16)
BASOPHILS # BLD AUTO: 0.02 K/UL (ref 0–0.2)
BASOPHILS NFR BLD AUTO: 0.3 % (ref 0–1)
BUN SERPL-MCNC: 25 MG/DL (ref 10–20)
BUN/CREAT SERPL: 19 (ref 6–20)
CALCIUM SERPL-MCNC: 8 MG/DL (ref 8.4–10.2)
CHLORIDE SERPL-SCNC: 110 MMOL/L (ref 98–107)
CO2 SERPL-SCNC: 19 MMOL/L (ref 23–31)
CREAT SERPL-MCNC: 1.33 MG/DL (ref 0.55–1.02)
DIFFERENTIAL METHOD BLD: ABNORMAL
EGFR (NO RACE VARIABLE) (RUSH/TITUS): 45 ML/MIN/1.73M2
EOSINOPHIL # BLD AUTO: 0.37 K/UL (ref 0–0.5)
EOSINOPHIL NFR BLD AUTO: 6.5 % (ref 1–4)
ERYTHROCYTE [DISTWIDTH] IN BLOOD BY AUTOMATED COUNT: 14.1 % (ref 11.5–14.5)
ESTROGEN SERPL-MCNC: NORMAL PG/ML
GLUCOSE SERPL-MCNC: 119 MG/DL (ref 82–115)
GLUCOSE SERPL-MCNC: 159 MG/DL (ref 70–105)
GLUCOSE SERPL-MCNC: 184 MG/DL (ref 70–105)
GLUCOSE SERPL-MCNC: 279 MG/DL (ref 70–105)
HCT VFR BLD AUTO: 27.5 % (ref 38–47)
HGB BLD-MCNC: 9.2 G/DL (ref 12–16)
IMM GRANULOCYTES # BLD AUTO: 0.03 K/UL (ref 0–0.04)
IMM GRANULOCYTES NFR BLD: 0.5 % (ref 0–0.4)
INSULIN SERPL-ACNC: NORMAL U[IU]/ML
LAB AP GROSS DESCRIPTION: NORMAL
LAB AP LABORATORY NOTES: NORMAL
LYMPHOCYTES # BLD AUTO: 1.41 K/UL (ref 1–4.8)
LYMPHOCYTES NFR BLD AUTO: 24.6 % (ref 27–41)
MCH RBC QN AUTO: 25.6 PG (ref 27–31)
MCHC RBC AUTO-ENTMCNC: 33.5 G/DL (ref 32–36)
MCV RBC AUTO: 76.6 FL (ref 80–96)
MONOCYTES # BLD AUTO: 0.56 K/UL (ref 0–0.8)
MONOCYTES NFR BLD AUTO: 9.8 % (ref 2–6)
MPC BLD CALC-MCNC: 11.3 FL (ref 9.4–12.4)
NEUTROPHILS # BLD AUTO: 3.34 K/UL (ref 1.8–7.7)
NEUTROPHILS NFR BLD AUTO: 58.3 % (ref 53–65)
NRBC # BLD AUTO: 0 X10E3/UL
NRBC, AUTO (.00): 0 %
PLATELET # BLD AUTO: 158 K/UL (ref 150–400)
POTASSIUM SERPL-SCNC: 4.6 MMOL/L (ref 3.5–5.1)
RBC # BLD AUTO: 3.59 M/UL (ref 4.2–5.4)
SODIUM SERPL-SCNC: 134 MMOL/L (ref 136–145)
T3RU NFR SERPL: NORMAL %
WBC # BLD AUTO: 5.73 K/UL (ref 4.5–11)

## 2024-12-11 PROCEDURE — 25000003 PHARM REV CODE 250: Performed by: INTERNAL MEDICINE

## 2024-12-11 PROCEDURE — 63600175 PHARM REV CODE 636 W HCPCS: Performed by: INTERNAL MEDICINE

## 2024-12-11 PROCEDURE — 36415 COLL VENOUS BLD VENIPUNCTURE: CPT | Performed by: INTERNAL MEDICINE

## 2024-12-11 PROCEDURE — 96372 THER/PROPH/DIAG INJ SC/IM: CPT | Performed by: INTERNAL MEDICINE

## 2024-12-11 PROCEDURE — 99239 HOSP IP/OBS DSCHRG MGMT >30: CPT | Mod: ,,,

## 2024-12-11 PROCEDURE — 82962 GLUCOSE BLOOD TEST: CPT

## 2024-12-11 PROCEDURE — G0378 HOSPITAL OBSERVATION PER HR: HCPCS

## 2024-12-11 PROCEDURE — 85025 COMPLETE CBC W/AUTO DIFF WBC: CPT | Performed by: INTERNAL MEDICINE

## 2024-12-11 PROCEDURE — 80048 BASIC METABOLIC PNL TOTAL CA: CPT | Performed by: INTERNAL MEDICINE

## 2024-12-11 PROCEDURE — 63700000 PHARM REV CODE 250 ALT 637 W/O HCPCS: Performed by: INTERNAL MEDICINE

## 2024-12-11 RX ORDER — HYDROCODONE BITARTRATE AND ACETAMINOPHEN 7.5; 325 MG/1; MG/1
1 TABLET ORAL EVERY 6 HOURS PRN
Qty: 15 TABLET | Refills: 0 | Status: SHIPPED | OUTPATIENT
Start: 2024-12-11

## 2024-12-11 RX ADMIN — PIPERACILLIN SODIUM,TAZOBACTAM SODIUM 4.5 G: 4; .5 INJECTION, POWDER, FOR SOLUTION INTRAVENOUS at 04:12

## 2024-12-11 RX ADMIN — INSULIN ASPART 2 UNITS: 100 INJECTION, SOLUTION INTRAVENOUS; SUBCUTANEOUS at 06:12

## 2024-12-11 RX ADMIN — PIPERACILLIN SODIUM,TAZOBACTAM SODIUM 4.5 G: 4; .5 INJECTION, POWDER, FOR SOLUTION INTRAVENOUS at 12:12

## 2024-12-11 RX ADMIN — PANTOPRAZOLE SODIUM 40 MG: 40 TABLET, DELAYED RELEASE ORAL at 09:12

## 2024-12-11 RX ADMIN — AMLODIPINE BESYLATE 5 MG: 5 TABLET ORAL at 09:12

## 2024-12-11 RX ADMIN — INSULIN ASPART 3 UNITS: 100 INJECTION, SOLUTION INTRAVENOUS; SUBCUTANEOUS at 12:12

## 2024-12-11 RX ADMIN — HYDROCODONE BITARTRATE AND ACETAMINOPHEN 1 TABLET: 5; 325 TABLET ORAL at 10:12

## 2024-12-11 RX ADMIN — HYDROCODONE BITARTRATE AND ACETAMINOPHEN 1 TABLET: 5; 325 TABLET ORAL at 12:12

## 2024-12-11 RX ADMIN — FLUCONAZOLE 150 MG: 50 TABLET ORAL at 09:12

## 2024-12-11 RX ADMIN — INSULIN ASPART 2 UNITS: 100 INJECTION, SOLUTION INTRAVENOUS; SUBCUTANEOUS at 12:12

## 2024-12-11 NOTE — PATIENT INSTRUCTIONS
Clean with vashe or baby shampoo and water    Pack wound with nu gauze,cover with Mepilex UP and dry dressing,wrap with meng,secure with paper tape. Change daily and as needed       Monitor closely for s/s of infection including fever, chills, increase in pain, odor from wound, and increased redness from foot. Go to ER if any complications develop.   Keep leg elevated and avoid pressure on wound  Diabetes:  Monitor glucose closely. Check fasting glucose and 2 hours after meals. HgA1C goal <7, fasting glucose , and 2 hours after meals <180  Hypertension:  Check blood pressure twice daily, goal <120/80  Diet:   Increase protein intake, avoid fried, fatty foods and foods high in simple carbs.   Vitamins:  Take vitamin C 1000 mg, zinc 50mg, vitamin d 5000 units, and a daily multivitamin. Dawson is a good source of protein and nutrients to aid in wound healing.

## 2024-12-11 NOTE — SUBJECTIVE & OBJECTIVE
Interval History:   Stable, no acute changes overnight.  Dressing changed.  Minimal sanguinous oozing, easily controlled with pressure and dressing.  No purulence or malodor.  Of the wound tract does extend to the 1st metatarsal head, there is no erosion of bone or sign of infection.  This is a chronic condition.  Therefore, no need for treatment of acute osteomyelitis.  Can discharge home with wet-to-dry dressing and follow-up in clinic next week.    Medications:  Continuous Infusions:  Scheduled Meds:   amLODIPine  5 mg Oral Daily    atorvastatin  80 mg Oral QHS    enoxparin  40 mg Subcutaneous Daily    fluconazole  150 mg Oral Daily    insulin glargine U-100  30 Units Subcutaneous QHS    losartan  100 mg Oral QHS    pantoprazole  40 mg Oral Daily    piperacillin-tazobactam (Zosyn) IV (PEDS and ADULTS) (extended infusion is not appropriate)  4.5 g Intravenous Q8H    vancomycin (VANCOCIN) IV (PEDS and ADULTS)  2,500 mg Intravenous Q24H     PRN Meds:  Current Facility-Administered Medications:     acetaminophen, 650 mg, Oral, Q4H PRN    dextrose 50%, 12.5 g, Intravenous, PRN    dextrose 50%, 25 g, Intravenous, PRN    glucagon (human recombinant), 1 mg, Intramuscular, PRN    glucose, 16 g, Oral, PRN    glucose, 24 g, Oral, PRN    HYDROcodone-acetaminophen, 1 tablet, Oral, Q6H PRN    insulin aspart U-100, 0-10 Units, Subcutaneous, Q6H PRN    morphine, 4 mg, Intravenous, Q4H PRN    naloxone, 0.02 mg, Intravenous, PRN    ondansetron, 4 mg, Intravenous, Q8H PRN    sodium chloride 0.9%, 10 mL, Intravenous, Q12H PRN    vancomycin - pharmacy to dose, , Intravenous, pharmacy to manage frequency     Review of patient's allergies indicates:   Allergen Reactions    Aspirin      Other reaction(s): UPSET STOMACH   Other reaction(s): Unknown    Bactrim ds [sulfamethoxazole-trimethoprim] Itching     Objective:     Vital Signs (Most Recent):  Temp: 98.2 °F (36.8 °C) (12/11/24 1202)  Pulse: 87 (12/11/24 1202)  Resp: 18 (12/11/24  1202)  BP: 118/74 (12/11/24 1202)  SpO2: 97 % (12/11/24 1202) Vital Signs (24h Range):  Temp:  [97.4 °F (36.3 °C)-98.2 °F (36.8 °C)] 98.2 °F (36.8 °C)  Pulse:  [84-99] 87  Resp:  [14-18] 18  SpO2:  [96 %-98 %] 97 %  BP: ()/(53-74) 118/74     Weight: 128 kg (282 lb 3 oz)  Body mass index is 46.96 kg/m².    Intake/Output - Last 3 Shifts         12/09 0700  12/10 0659 12/10 0700  12/11 0659 12/11 0700  12/12 0659    I.V. (mL/kg)  0.5 (0)     Total Intake(mL/kg)  0.5 (0)     Net  +0.5            Urine Occurrence  2 x              Physical Exam  Vitals reviewed.   Constitutional:       General: She is not in acute distress.  Cardiovascular:      Rate and Rhythm: Normal rate.   Pulmonary:      Effort: Pulmonary effort is normal. No respiratory distress.   Musculoskeletal:      Comments: Right foot:  Small surgical incision just distal to the 1st metatarsal, plantar surface.  No significant bleeding.  No purulence or malodor.   Skin:     General: Skin is warm and dry.   Neurological:      Mental Status: She is alert. Mental status is at baseline.          Significant Labs:  I have reviewed all pertinent lab results within the past 24 hours.  CBC:   Recent Labs   Lab 12/11/24  0542   WBC 5.73   RBC 3.59*   HGB 9.2*   HCT 27.5*      MCV 76.6*   MCH 25.6*   MCHC 33.5     CMP:   Recent Labs   Lab 12/11/24  0542   *   CALCIUM 8.0*   *   K 4.6   CO2 19*   *   BUN 25*   CREATININE 1.33*       Significant Diagnostics:  I have reviewed all pertinent imaging results/findings within the past 24 hours.

## 2024-12-11 NOTE — DISCHARGE SUMMARY
"[facility] Hospital Medicine  Discharge Summary      Patient Name: Deborah Sotelo  MRN: [id number]  DEION: [id number]  Patient Class: IP- Inpatient  Admission Date: 12/9/2024  Hospital Length of Stay: 2 days  Discharge Date and Time:  12/11/2024 3:31 PM  Attending Physician: Awilda Trammell MD   Discharging Provider: Awilda Trammell MD  Primary Care Provider: Pineda Hunter FNP    Primary Care Team: Networked reference to record PCT     HPI:   Patient is a 64-year-old female with a history of essential hypertension, hyperlipidemia, type 2 diabetes on insulin with diabetic complications including CKD stage 3 and diabetic foot ulcers status post transmetatarsal amputation who presented to the emergency room after having [facility].  Patient stated that right foot wound has been hurting a lot for the last few days and yesterday started to notice purulent drainage and foul odor emanating from the wound prompting a visit to the [facility].  Patient otherwise denied any associated symptoms such as fever chills cough wheezing or shortness of breath.    On presentation vital signs were stable and patient was afebrile workup was notable for presence of chronic renal failure with serum creatinine of 1.18 which is at her baseline but the rest of the workup including x-ray of the foot was unremarkable.  Patient will be admitted for further evaluation and intervention        Procedure(s) (LRB):  DEBRIDEMENT, FOOT (Right)      Hospital Course:   Over course of stay patient evaluated and taken to the OR by Surgery. They report "wound tract does extend to the 1st metatarsal head, there is no erosion of bone or sign of infection.  This is a chronic condition.  Therefore, no need for treatment of acute osteomyelitis.  Can discharge home with wet-to-dry dressing and follow-up in clinic next week." Patient ok for discharge home. No antibiotics indicated.    "     Goals of Care Treatment Preferences:  Code Status: Full Code      SDOH Screening:  The patient was screened for utility difficulties, food insecurity, transport difficulties, housing insecurity, and interpersonal safety and there were no concerns identified this admission.     Consults:   Consults (From admission, onward)          Status Ordering Provider     Pharmacy to dose Vancomycin consult  Once        Provider:  (Not yet assigned)    Acknowledged NIKKIE SOTO     Inpatient consult to General surgery  Once        Provider:  Rahat Parkinson FNP    Completed KVNG LAY            * Cellulitis of toe of right foot  Patient has a history of diabetic foot ulcer resulting in right transmetatarsal amputation.  Patient now has an unstageable wound with foul-smelling and serosanguineous drainage emanating from the wound involving the plantar aspect of the head of 1st metatarsal.  Will empirically start patient on vancomycin and Zosyn.  General surgery will be consulted as well    12/11: chronic in nature, no need for antibiotics, follow in one week as outlined above    Vaginal candidiasis    Associated with antibiotic.  Patient was prescribed Diflucan at [facility] earlier today.  Will continue Diflucan      Anemia    Anemia is likely due to chronic disease due to type 2 diabetes . Most recent hemoglobin and hematocrit are listed below.  Recent Labs     12/09/24  1537 12/10/24  0452   HGB 11.0* 9.6*   HCT 33.3* 29.7*       Plan  - Monitor serial CBC: Daily  - Transfuse PRBC if patient becomes hemodynamically unstable, symptomatic or H/H drops below 7/21.      Gastroesophageal reflux disease    Continue present management with PPI      Hyperlipidemia    Continue present management with a statin      Hypertension    Patient's blood pressure range in the last 24 hours was: BP  Min: 101/57  Max: 187/96.The patient's inpatient anti-hypertensive regimen is listed below:    Current  Antihypertensives  amLODIPine tablet 5 mg, Daily, Oral  losartan tablet 100 mg, Nightly, Oral    Plan  - BP is controlled, no changes needed to their regimen      Type 2 diabetes mellitus with other specified complication  Patient is diabetic complication still include diabetic foot ulcer and CKD.  Will check hemoglobin A1c.  In the meantime, will start patient on sliding scale insulin to maintain CBGs in the range of 130s to 180s        Final Active Diagnoses:    Diagnosis Date Noted POA    PRINCIPAL PROBLEM:  Cellulitis of toe of right foot [L03.031] 12/09/2024 Yes    Vaginal candidiasis [B37.31] 12/09/2024 Yes    Anemia [D64.9] 07/05/2022 Yes    Gastroesophageal reflux disease [K21.9] 11/09/2021 Yes    Hyperlipidemia [E78.5] 03/19/2021 Yes    Hypertension [I10] 03/19/2021 Yes    Type 2 diabetes mellitus with other specified complication [E11.69] 03/19/2021 Yes      Problems Resolved During this Admission:       Discharged Condition: stable    Disposition: Home or Self Care    Follow Up:   Follow-up Information       Dhaval Pizano MD. Schedule an appointment as soon as possible for a visit in 1 week(s).    Specialties: General Surgery, Surgery  Contact information:  [address] [place] [id number]  [phone]                           Patient Instructions:      Diet diabetic     Diet Cardiac     Notify your health care provider if you experience any of the following:  temperature >100.4     Notify your health care provider if you experience any of the following:  persistent nausea and vomiting or diarrhea     Notify your health care provider if you experience any of the following:  severe uncontrolled pain     Notify your health care provider if you experience any of the following:  persistent dizziness, light-headedness, or visual disturbances     Notify your health care provider if you experience any of the following:  increased confusion or weakness     Activity as tolerated       Significant  Diagnostic Studies: N/A    Pending Diagnostic Studies:       None           Medications:  Reconciled Home Medications:      Medication List        START taking these medications      HYDROcodone-acetaminophen 7.5-325 mg per tablet  Commonly known as: NORCO  Take 1 tablet by mouth every 6 (six) hours as needed for Pain.            CONTINUE taking these medications      amLODIPine 5 MG tablet  Commonly known as: NORVASC  Take 1 tablet (5 mg total) by mouth once daily.     ammonium lactate 12 % Crea  Apply 1 g topically Daily.     atorvastatin 80 MG tablet  Commonly known as: LIPITOR  TAKE 1 TABLET BY MOUTH EACH NIGHT AT BEDTIME FOR CHOLESTEROL ~~DO NOT EAT GRAPEFRUIT OR DRINK GRAPEFRUIT JUICE WHILE TAKING THIS MEDICATION~~     diclofenac sodium 1 % Gel  Commonly known as: VOLTAREN  Apply 2 g topically 4 (four) times daily.     FeroSuL 325 mg (65 mg iron) Tab tablet  Generic drug: ferrous sulfate     fluconazole 150 MG Tab  Commonly known as: DIFLUCAN  Take 1 tablet (150 mg total) by mouth once daily. for 5 days     insulin asp prt-insulin aspart (NovoLOG 70/30) 100 unit/mL (70-30) Soln  Commonly known as: NovoLOG 70/30  INJECT 50 UNITS SUB-Q EACH MORNING AND 35  UNITS EACH EVENING ...KEEP IN REFRIGERATOR ...USE WITHIN 28 DAYS AFTER OPENING EACH VIAL     * ketoconazole 2 % shampoo  Commonly known as: NIZORAL  Apply topically twice a week.     * ketoconazole 2 % cream  Commonly known as: NIZORAL  Apply topically once daily.     pantoprazole 40 MG tablet  Commonly known as: PROTONIX  Take 40 mg by mouth once daily.     TRULICITY 1.5 mg/0.5 mL pen injector  Generic drug: dulaglutide  inject 0.5ml (1.5mg) UNDER THE SKIN EACH WEEK ...KEEP REFRIGERATED           * This list has 2 medication(s) that are the same as other medications prescribed for you. Read the directions carefully, and ask your doctor or other care provider to review them with you.                  Indwelling Lines/Drains at time of discharge:    Lines/Drains/Airways       None                   Time spent on the discharge of patient: 40 minutes         Awilda Trammell MD  Department of Hospital Medicine  [facility]

## 2024-12-11 NOTE — DISCHARGE INSTRUCTIONS
Right foot: Change dressing daily and as needed per soiling.  Remove old dressing.  Cleanse with baby soap and water or Vashe.  Apply Vashe wet-to-dry, gauze, Kerlix, and Ace dressing.    Hospitalist Discharge orders  *Notify your healthcare provider if you experience any of the following: temperature >100.4  *Notify your healthcare provider if you experience any of the following: redness, tenderness, or any signs or symptoms of infection (pain, swelling, redness, odor or green/yellow drainage around incision site).  *Notify your healthcare provider if you experience any of the following: difficulty breathing or increased cough.  *Notify your physician if you experience any persistent nausea, vomiting, diarrhea or headache.  *Notify your physician if you experience any of the following: severe uncontrolled pain, worsening rash, increased confusion, weakness, dizziness, lightheadedness, or visual disturbances.

## 2024-12-11 NOTE — ASSESSMENT & PLAN NOTE
Patient has a history of diabetic foot ulcer resulting in right transmetatarsal amputation.  Patient now has an unstageable wound with foul-smelling and serosanguineous drainage emanating from the wound involving the plantar aspect of the head of 1st metatarsal.  Will empirically start patient on vancomycin and Zosyn.  General surgery will be consulted as well    12/11: chronic in nature, no need for antibiotics, follow in one week as outlined above

## 2024-12-11 NOTE — PROGRESS NOTES
[facility]  General Surgery  Progress Note    Subjective:     History of Present Illness:  History of CVA, dm 2, HTN, and obesity.  Previous right TMA by Dr. Tinajero.  Was evaluated in wound care clinic with reports of malodorous purulent drainage from ulceration on the plantar surface of the right foot.  On exam in ER, no appreciable odor or drainage.  However, the wound was just cleaned in wound care.  Plan for OR tomorrow for probing and possible debridement by Dr. Pizano.    Post-Op Info:  Procedure(s) (LRB):  DEBRIDEMENT, FOOT (Right)   1 Day Post-Op     Interval History:   Stable, no acute changes overnight.  Dressing changed.  Minimal sanguinous oozing, easily controlled with pressure and dressing.  No purulence or malodor.  Of the wound tract does extend to the 1st metatarsal head, there is no erosion of bone or sign of infection.  This is a chronic condition.  Therefore, no need for treatment of acute osteomyelitis.  Can discharge home with wet-to-dry dressing and follow-up in clinic next week.    Medications:  Continuous Infusions:  Scheduled Meds:   amLODIPine  5 mg Oral Daily    atorvastatin  80 mg Oral QHS    enoxparin  40 mg Subcutaneous Daily    fluconazole  150 mg Oral Daily    insulin glargine U-100  30 Units Subcutaneous QHS    losartan  100 mg Oral QHS    pantoprazole  40 mg Oral Daily    piperacillin-tazobactam (Zosyn) IV (PEDS and ADULTS) (extended infusion is not appropriate)  4.5 g Intravenous Q8H    vancomycin (VANCOCIN) IV (PEDS and ADULTS)  2,500 mg Intravenous Q24H     PRN Meds:  Current Facility-Administered Medications:     acetaminophen, 650 mg, Oral, Q4H PRN    dextrose 50%, 12.5 g, Intravenous, PRN    dextrose 50%, 25 g, Intravenous, PRN    glucagon (human recombinant), 1 mg, Intramuscular, PRN    glucose, 16 g, Oral, PRN    glucose, 24 g, Oral, PRN    HYDROcodone-acetaminophen, 1 tablet, Oral, Q6H PRN    insulin aspart U-100, 0-10 Units, Subcutaneous, Q6H PRN     morphine, 4 mg, Intravenous, Q4H PRN    naloxone, 0.02 mg, Intravenous, PRN    ondansetron, 4 mg, Intravenous, Q8H PRN    sodium chloride 0.9%, 10 mL, Intravenous, Q12H PRN    vancomycin - pharmacy to dose, , Intravenous, pharmacy to manage frequency     Review of patient's allergies indicates:   Allergen Reactions    Aspirin      Other reaction(s): UPSET STOMACH   Other reaction(s): Unknown    Bactrim ds [sulfamethoxazole-trimethoprim] Itching     Objective:     Vital Signs (Most Recent):  Temp: 98.2 °F (36.8 °C) (12/11/24 1202)  Pulse: 87 (12/11/24 1202)  Resp: 18 (12/11/24 1202)  BP: 118/74 (12/11/24 1202)  SpO2: 97 % (12/11/24 1202) Vital Signs (24h Range):  Temp:  [97.4 °F (36.3 °C)-98.2 °F (36.8 °C)] 98.2 °F (36.8 °C)  Pulse:  [84-99] 87  Resp:  [14-18] 18  SpO2:  [96 %-98 %] 97 %  BP: ()/(53-74) 118/74     Weight: 128 kg (282 lb 3 oz)  Body mass index is 46.96 kg/m².    Intake/Output - Last 3 Shifts         12/09 0700  12/10 0659 12/10 0700  12/11 0659 12/11 0700  12/12 0659    I.V. (mL/kg)  0.5 (0)     Total Intake(mL/kg)  0.5 (0)     Net  +0.5            Urine Occurrence  2 x              Physical Exam  Vitals reviewed.   Constitutional:       General: She is not in acute distress.  Cardiovascular:      Rate and Rhythm: Normal rate.   Pulmonary:      Effort: Pulmonary effort is normal. No respiratory distress.   Musculoskeletal:      Comments: Right foot:  Small surgical incision just distal to the 1st metatarsal, plantar surface.  No significant bleeding.  No purulence or malodor.   Skin:     General: Skin is warm and dry.   Neurological:      Mental Status: She is alert. Mental status is at baseline.          Significant Labs:  I have reviewed all pertinent lab results within the past 24 hours.  CBC:   Recent Labs   Lab 12/11/24  0542   WBC 5.73   RBC 3.59*   HGB 9.2*   HCT 27.5*      MCV 76.6*   MCH 25.6*   MCHC 33.5     CMP:   Recent Labs   Lab 12/11/24  0542   *   CALCIUM 8.0*   NA  134*   K 4.6   CO2 19*   *   BUN 25*   CREATININE 1.33*       Significant Diagnostics:  I have reviewed all pertinent imaging results/findings within the past 24 hours.  Assessment/Plan:     No notes have been filed under this hospital service.  Service: General Surgery      Rahat Parkinson, MARIOP  General Surgery  [facility]

## 2024-12-11 NOTE — PROGRESS NOTES
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 Since last visit, she was admitted and underwent debridement per Dr. Pizano. Wound has improved and patient reports significant improvement in pain. Wound packed with vashe moistened nuguaze and mepilex up applied. Set up with [facility].     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 Wound is worse today and continues to have hypergranulation tissue. Right foot with edema and erythema with blisters to lateral aspect  of foot. Patient reports increased pain and burning in her foot. Cultures today. Rocephin given IM in clinic and cipro to pharmacy. X-ray today. Acetic acid moistened drawtex to wound bed. Continue to keep pressure off wound. Prescription for wheelchair given to patient. Prescription for custom off-loading shoe faxed to [facility].        63 y.o. female presents to clinic for follow up on chronic-non healing wound on right TMA.Wound is healing well. Reports pain and tenderness to lateral ankle and cramps and spasms to lower leg. Labs ordered today. Continue with Aquacel ag to wound bed and ammonium lacate to soften callus.   Reinforced importance of local wound care, keeping pressure off foot, off-loading shoe, elevating legs, adherence to diabetic diet and strict glycemic control, and increasing protein intake.     Significant PMH  diabetes, anemia, and CVA. Last HgA1C 7 in July, diabetes managed by PCP. Pertinent factors that delay wound healing include multiple co-morbidities, poor vascular supply, diabetes, edema, heavy drainage, decreased granulation tissue, tract(s), undermining and no protective sensation.       Review of Systems     Review of Systems   Constitutional:  Positive for activity change. Negative for appetite change, chills, diaphoresis and fever.   HENT: Negative.  Negative for congestion, ear pain, hearing loss and postnasal drip.    Eyes:  Negative for itching.   Respiratory:  Negative for chest tightness and shortness of breath.    Cardiovascular:  Positive for leg swelling. Negative for chest pain and palpitations.   Gastrointestinal:  Negative for abdominal pain.   Endocrine: Negative for polydipsia.   Genitourinary:  Negative for frequency.   Musculoskeletal:  Positive for arthralgias, back pain and joint swelling.        Severe shoulder pain, 10 of 10 on pain scale   Skin:  Positive for color change and wound.        See wound assessment   Neurological:  Positive for weakness and numbness. Negative for headaches.   Psychiatric/Behavioral:  Negative for agitation, behavioral problems, confusion and self-injury.        Medical / Social / Family History     Past Medical History:   Diagnosis Date    Anemia 7/5/2022    Diabetes mellitus, type 2     Hyperlipidemia     Hypertension     Obesity     Primary osteoarthritis of left shoulder 5/24/2022    Stroke        Past Surgical History:   Procedure Laterality Date    AMPUTATION      APPENDECTOMY      DEBRIDEMENT OF FOOT Right 1/12/2023    Procedure: DEBRIDEMENT, FOOT;  Surgeon: Ravi Ramirez MD;  Location: [facility] OR;  Service: General;  Laterality: Right;    DEBRIDEMENT OF FOOT Right 12/10/2024    Procedure: DEBRIDEMENT, FOOT;  Surgeon: Dhaval Pizano MD;  Location: [facility] OR;  Service: General;  Laterality: Right;    EYE SURGERY         Social  History  Ms. Deborah Sotelo  reports that she has never smoked. She has never used smokeless tobacco. She reports current alcohol use. She reports that she does not use drugs.    Family History  Ms.'s Deborah Sotelo family history includes Appendicitis in her father; Asthma in her sister; Cancer in her mother; Diabetes in her brother.    Medications and Allergies     Medications  Outpatient Medications Marked as Taking for the 12/23/24 encounter (Office Visit) with Felisha Love FNP   Medication Sig Dispense Refill    amLODIPine (NORVASC) 5 MG tablet Take 1 tablet (5 mg total) by mouth once daily. 30 tablet 5    ammonium lactate 12 % Crea Apply 1 g topically Daily. 385 g 2    atorvastatin (LIPITOR) 80 MG tablet TAKE 1 TABLET BY MOUTH EACH NIGHT AT BEDTIME FOR CHOLESTEROL ~~DO NOT EAT GRAPEFRUIT OR DRINK GRAPEFRUIT JUICE WHILE TAKING THIS MEDICATION~~ 30 tablet 5    diclofenac sodium (VOLTAREN) 1 % Gel Apply 2 g topically 4 (four) times daily. 450 g 2    FEROSUL 325 mg (65 mg iron) Tab tablet       HYDROcodone-acetaminophen (NORCO) 7.5-325 mg per tablet Take 1 tablet by mouth every 6 (six) hours as needed for Pain. 15 tablet 0    insulin asp prt-insulin aspart, NovoLOG 70/30, (NOVOLOG 70/30) 100 unit/mL (70-30) Soln INJECT 50 UNITS SUB-Q EACH MORNING AND 35  UNITS EACH EVENING ...KEEP IN REFRIGERATOR ...USE WITHIN 28 DAYS AFTER OPENING EACH VIAL 30 mL 11    pantoprazole (PROTONIX) 40 MG tablet Take 40 mg by mouth once daily.      TRULICITY 1.5 mg/0.5 mL pen injector inject 0.5ml (1.5mg) UNDER THE SKIN EACH WEEK ...KEEP REFRIGERATED         Allergies  Review of patient's allergies indicates:   Allergen Reactions    Aspirin      Other reaction(s): UPSET STOMACH   Other reaction(s): Unknown    Bactrim ds [sulfamethoxazole-trimethoprim] Itching       Physical Examination     Vitals:    12/23/24 1005   BP: (!) 175/93   Pulse: 94   Resp: 20   Temp: 98 °F (36.7 °C)               Physical Exam  Vitals and  nursing note reviewed.   Constitutional:       Comments: Tearful during exam   HENT:      Head: Normocephalic.   Cardiovascular:      Rate and Rhythm: Normal rate and regular rhythm.      Pulses: Normal pulses.      Heart sounds: Normal heart sounds.   Pulmonary:      Effort: Pulmonary effort is normal. No respiratory distress.   Chest:      Chest wall: No tenderness.   Musculoskeletal:         General: Swelling, tenderness and deformity present.      Right lower leg: Edema present.      Comments: Left shoulder decreased ROM with weakness to left upper extremity   Skin:     General: Skin is warm.      Capillary Refill: Capillary refill takes 2 to 3 seconds.      Findings: Erythema and lesion present.      Comments: Wound, see LDA for measurements and picture   Neurological:      Mental Status: She is alert and oriented to person, place, and time.   Psychiatric:         Mood and Affect: Mood normal.         Behavior: Behavior normal.         Thought Content: Thought content normal.         Judgment: Judgment normal.     Assessment and Plan             Altered Skin Integrity 07/25/23 0852 Left plantar Foot (Active)   07/25/23 0852   Altered Skin Integrity Present on Admission - Did Patient arrive to the hospital with altered skin?:    Side: Left   Orientation: plantar   Location: Foot   Wound Number:    Is this injury device related?:    Primary Wound Type:    Description of Altered Skin Integrity:    Ankle-Brachial Index:    Pulses:    Removal Indication and Assessment:    Wound Outcome:    (Retired) Wound Length (cm):    (Retired) Wound Width (cm):    (Retired) Depth (cm):    Wound Description (Comments):    Removal Indications:    Wound Image    12/23/24 1016   Description of Altered Skin Integrity Partial thickness tissue loss. Shallow open ulcer with a red or pink wound bed, without slough. Intact or Open/Ruptured Serum-filled blister. 12/23/24 1016   Dressing Appearance Open to air 12/23/24 1016   Drainage  Amount None 12/23/24 1016   Tissue loss description Not applicable 12/23/24 1016   Black (%), Wound Tissue Color 0 % 12/23/24 1016   Red (%), Wound Tissue Color 0 % 12/23/24 1016   Yellow (%), Wound Tissue Color 0 % 12/23/24 1016   Periwound Area Intact;Dry 12/23/24 1016   Wound Edges Callused 12/23/24 1016   Wound Length (cm) 0 cm 12/23/24 1016   Wound Width (cm) 0 cm 12/23/24 1016   Wound Depth (cm) 0 cm 12/23/24 1016   Wound Volume (cm^3) 0 cm^3 12/23/24 1016   Wound Surface Area (cm^2) 0 cm^2 12/23/24 1016   Care Cleansed with:;Antimicrobial agent 12/23/24 1016   Dressing Applied;Gauze 12/23/24 1016   Periwound Care Moisture barrier applied 12/23/24 1016   Dressing Change Due 12/24/24 12/23/24 1016            Incision/Site 01/12/23 1251 Right Foot (Active)   01/12/23 1251   Present Prior to Hospital Arrival?:    Side: Right   Location: Foot   Orientation:    Incision Type:    Closure Method:    Additional Comments:    Removal Indication and Assessment:    Wound Outcome:    Removal Indications:    Wound Image    12/23/24 1014   Dressing Appearance Moist drainage 12/23/24 1014   Drainage Amount Moderate 12/23/24 1014   Drainage Characteristics/Odor Serosanguineous 12/23/24 1014   Appearance Pink;Moist;Granulating 12/23/24 1014   Black (%), Wound Tissue Color 0 % 12/23/24 1014   Red (%), Wound Tissue Color 100 % 12/23/24 1014   Yellow (%), Wound Tissue Color 0 % 12/23/24 1014   Periwound Area Intact;Dry 12/23/24 1014   Wound Edges Callused 12/23/24 1014   Wound Length (cm) 0.9 cm 12/23/24 1014   Wound Width (cm) 0.9 cm 12/23/24 1014   Wound Depth (cm) 1.1 cm 12/23/24 1014   Wound Volume (cm^3) 0.891 cm^3 12/23/24 1014   Wound Surface Area (cm^2) 0.81 cm^2 12/23/24 1014   Care Cleansed with:;Antimicrobial agent 12/23/24 1014   Dressing Applied;Gauze;Rolled gauze 12/23/24 1014   Periwound Care Moisture barrier applied 12/23/24 1014   Dressing Change Due 12/24/24 12/23/24 1014               Problem List Items  Addressed This Visit          Endocrine    Diabetic foot ulcer - Primary    Overview                                                                            Current Assessment & Plan     Clean with vashe or baby shampoo and water    Pack wound with nu gauze,cover with Mepilex UP and dry dressing,wrap with meng,secure with paper tape. Change daily and as needed       Monitor closely for s/s of infection including fever, chills, increase in pain, odor from wound, and increased redness from foot. Go to ER if any complications develop.   Keep leg elevated and avoid pressure on wound  Diabetes:  Monitor glucose closely. Check fasting glucose and 2 hours after meals. HgA1C goal <7, fasting glucose , and 2 hours after meals <180  Hypertension:  Check blood pressure twice daily, goal <120/80  Diet:   Increase protein intake, avoid fried, fatty foods and foods high in simple carbs.   Vitamins:  Take vitamin C 1000 mg, zinc 50mg, vitamin d 5000 units, and a daily multivitamin. Dawson is a good source of protein and nutrients to aid in wound healing.     Take Cipro as prescribed     Appointment with Dr. Ramirez on 12/9/2024 at 2:15pm            Future Appointments   Date Time Provider Department Center   1/13/2025 10:00 AM Felisha Love FNP [facility]    2/5/2025 11:20 AM [facility] [place]   2/13/2025 10:00 AM [facility] GI ROOM 01 [facility]            Signature:  TOMASA Maldonado  [facility]  [address] [facility] [place] [address]  [phone]    Date of encounter: 12/23/24

## 2024-12-11 NOTE — PROGRESS NOTES
[facility]    Patient Name:  Deborah Sotelo   MRN:  [id number]  Date: 12/11/2024  Diagnosis: Cellulitis of toe of right foot    History:     Past Medical History:   Diagnosis Date    Anemia 7/5/2022    Diabetes mellitus, type 2     Hyperlipidemia     Hypertension     Obesity     Primary osteoarthritis of left shoulder 5/24/2022    Stroke        Social History     Socioeconomic History    Marital status:    Occupational History    Occupation: disabled   Tobacco Use    Smoking status: Never    Smokeless tobacco: Never   Substance and Sexual Activity    Alcohol use: Yes    Drug use: Never    Sexual activity: Never     Social Drivers of Health     Financial Resource Strain: Low Risk  (12/10/2024)    Overall Financial Resource Strain (CARDIA)     Difficulty of Paying Living Expenses: Not hard at all   Food Insecurity: No Food Insecurity (12/10/2024)    Hunger Vital Sign     Worried About Running Out of Food in the Last Year: Never true     Ran Out of Food in the Last Year: Never true   Transportation Needs: No Transportation Needs (12/10/2024)    TRANSPORTATION NEEDS     Transportation : No   Physical Activity: Inactive (1/13/2023)    Exercise Vital Sign     Days of Exercise per Week: 0 days     Minutes of Exercise per Session: 0 min   Stress: No Stress Concern Present (12/10/2024)    [employer] - Occupational Stress Questionnaire     Feeling of Stress : Not at all   Housing Stability: Low Risk  (12/10/2024)    Housing Stability Vital Sign     Unable to Pay for Housing in the Last Year: No     Homeless in the Last Year: No       Precautions:     Allergies as of 12/09/2024 - Reviewed 12/09/2024   Allergen Reaction Noted    Aspirin  04/29/2020    Bactrim ds [sulfamethoxazole-trimethoprim] Itching 07/23/2021       WOC Assessment Details/Treatment   Narrative: Seen patient for initial preventative skin care measures.  Patient ambulates with cane.  Dressing  to right foot. Foam border applied to left heel per patient request. No redness or open wound noted to left heel and sacral.  Consult wound care of any findings.      12/11/2024

## 2024-12-17 ENCOUNTER — OFFICE VISIT (OUTPATIENT)
Dept: SURGERY | Facility: CLINIC | Age: 64
End: 2024-12-17
Attending: SURGERY
Payer: MEDICAID

## 2024-12-17 DIAGNOSIS — Z09 FOLLOW-UP EXAMINATION, FOLLOWING OTHER SURGERY: Primary | ICD-10-CM

## 2024-12-17 PROCEDURE — 3044F HG A1C LEVEL LT 7.0%: CPT | Mod: CPTII,,, | Performed by: SURGERY

## 2024-12-17 PROCEDURE — 99024 POSTOP FOLLOW-UP VISIT: CPT | Mod: ,,, | Performed by: SURGERY

## 2024-12-17 PROCEDURE — 1159F MED LIST DOCD IN RCRD: CPT | Mod: CPTII,,, | Performed by: SURGERY

## 2024-12-17 PROCEDURE — 99999 PR PBB SHADOW E&M-EST. PATIENT-LVL III: CPT | Mod: PBBFAC,,, | Performed by: SURGERY

## 2024-12-17 PROCEDURE — 99213 OFFICE O/P EST LOW 20 MIN: CPT | Mod: PBBFAC | Performed by: SURGERY

## 2024-12-17 PROCEDURE — 4010F ACE/ARB THERAPY RXD/TAKEN: CPT | Mod: CPTII,,, | Performed by: SURGERY

## 2024-12-17 NOTE — PROGRESS NOTES
Post-operative Note    HPI:  The patient is status post debridement of foot.  Overall doing well.  Feels much better than before surgery.    PHYSICAL EXAM:    Incision healing well clean dry with no drainage.  Still a 2 x 2 x 2 cm hole    Recent Results (from the past 3 weeks)   Basic metabolic panel    Collection Time: 12/09/24  3:37 PM   Result Value Ref Range    Sodium 137 136 - 145 mmol/L    Potassium 4.9 3.5 - 5.1 mmol/L    Chloride 109 (H) 98 - 107 mmol/L    CO2 24 23 - 31 mmol/L    Anion Gap 9 7 - 16 mmol/L    Glucose 126 (H) 82 - 115 mg/dL    BUN 24 (H) 10 - 20 mg/dL    Creatinine 1.18 (H) 0.55 - 1.02 mg/dL    BUN/Creatinine Ratio 20 6 - 20    Calcium 8.8 8.4 - 10.2 mg/dL    eGFR 52 (L) >=60 mL/min/1.73m2   CBC with Differential    Collection Time: 12/09/24  3:37 PM   Result Value Ref Range    WBC 6.49 4.50 - 11.00 K/uL    RBC 4.31 4.20 - 5.40 M/uL    Hemoglobin 11.0 (L) 12.0 - 16.0 g/dL    Hematocrit 33.3 (L) 38.0 - 47.0 %    MCV 77.3 (L) 80.0 - 96.0 fL    MCH 25.5 (L) 27.0 - 31.0 pg    MCHC 33.0 32.0 - 36.0 g/dL    RDW 14.0 11.5 - 14.5 %    Platelet Count 202 150 - 400 K/uL    MPV 11.4 9.4 - 12.4 fL    Neutrophils % 51.1 (L) 53.0 - 65.0 %    Lymphocytes % 34.5 27.0 - 41.0 %    Monocytes % 7.1 (H) 2.0 - 6.0 %    Eosinophils % 6.2 (H) 1.0 - 4.0 %    Basophils % 0.6 0.0 - 1.0 %    Immature Granulocytes % 0.5 (H) 0.0 - 0.4 %    nRBC, Auto 0.0 <=0.0 %    Neutrophils, Abs 3.32 1.80 - 7.70 K/uL    Lymphocytes, Absolute 2.24 1.00 - 4.80 K/uL    Monocytes, Absolute 0.46 0.00 - 0.80 K/uL    Eosinophils, Absolute 0.40 0.00 - 0.50 K/uL    Basophils, Absolute 0.04 0.00 - 0.20 K/uL    Immature Granulocytes, Absolute 0.03 0.00 - 0.04 K/uL    nRBC, Absolute 0.00 <=0.00 x10e3/uL    Diff Type Auto    Hemoglobin A1c if not done in past 3 months    Collection Time: 12/09/24  3:37 PM   Result Value Ref Range    Hemoglobin A1C 6.7 <=7.0 %     Estimated Average Glucose 146 mg/dL   COVID-19 Rapid Screening    Collection Time: 12/09/24  4:33 PM   Result Value Ref Range    SARS COV-2 Molecular Negative Negative, Invalid   POCT glucose    Collection Time: 12/09/24  8:09 PM   Result Value Ref Range    POC Glucose 175 (H) 70 - 105 mg/dL   Basic Metabolic Panel (BMP)    Collection Time: 12/10/24  4:52 AM   Result Value Ref Range    Sodium 136 136 - 145 mmol/L    Potassium 4.5 3.5 - 5.1 mmol/L    Chloride 109 (H) 98 - 107 mmol/L    CO2 22 (L) 23 - 31 mmol/L    Anion Gap 10 7 - 16 mmol/L    Glucose 149 (H) 82 - 115 mg/dL    BUN 21 (H) 10 - 20 mg/dL    Creatinine 1.13 (H) 0.55 - 1.02 mg/dL    BUN/Creatinine Ratio 19 6 - 20    Calcium 8.2 (L) 8.4 - 10.2 mg/dL    eGFR 55 (L) >=60 mL/min/1.73m2   CBC with Differential    Collection Time: 12/10/24  4:52 AM   Result Value Ref Range    WBC 5.59 4.50 - 11.00 K/uL    RBC 3.84 (L) 4.20 - 5.40 M/uL    Hemoglobin 9.6 (L) 12.0 - 16.0 g/dL    Hematocrit 29.7 (L) 38.0 - 47.0 %    MCV 77.3 (L) 80.0 - 96.0 fL    MCH 25.0 (L) 27.0 - 31.0 pg    MCHC 32.3 32.0 - 36.0 g/dL    RDW 14.2 11.5 - 14.5 %    Platelet Count 159 150 - 400 K/uL    MPV 10.1 9.4 - 12.4 fL    Neutrophils % 49.6 (L) 53.0 - 65.0 %    Lymphocytes % 34.3 27.0 - 41.0 %    Monocytes % 8.8 (H) 2.0 - 6.0 %    Eosinophils % 6.3 (H) 1.0 - 4.0 %    Basophils % 0.5 0.0 - 1.0 %    Immature Granulocytes % 0.5 (H) 0.0 - 0.4 %    nRBC, Auto 0.0 <=0.0 %    Neutrophils, Abs 2.77 1.80 - 7.70 K/uL    Lymphocytes, Absolute 1.92 1.00 - 4.80 K/uL    Monocytes, Absolute 0.49 0.00 - 0.80 K/uL    Eosinophils, Absolute 0.35 0.00 - 0.50 K/uL    Basophils, Absolute 0.03 0.00 - 0.20 K/uL    Immature Granulocytes, Absolute 0.03 0.00 - 0.04 K/uL    nRBC, Absolute 0.00 <=0.00 x10e3/uL    Diff Type Auto    POCT glucose    Collection Time: 12/10/24  5:29 AM   Result Value Ref Range    POC Glucose 154 (H) 70 - 105 mg/dL   Surgical Pathology    Collection Time: 12/10/24 10:57 AM   Result Value Ref  Range    Case Report       Surgical Pathology                                Case: [id number]                                   Authorizing Provider:  Dhaval Pizano MD       Collected:           12/10/2024 10:57 AM          Ordering Location:     [facility] -     Received:            12/10/2024 12:57 PM                                 Periop Services                                                              Pathologist:           Kentrell Wilkins MD                                                           Specimen:    Foot, Right, right foot wound                                                              Final Diagnosis       A. Right foot wound, excisional debridement:  Ulcerated skin with associated granulation tissue formation and necrosis      Gross Description       A. Foot, Right: right foot wound  The specimen is received in formalin designated right foot wound and consists of multiple yellow to pink-tan tissue fragments that measure 5.0 x 4.0 x 1.2 cm in aggregate.  Scant brown skin is present.  Sectioning demonstrates pink-tan underlying soft tissue.  Representative sections are submitted in cassette A1.    Grossing was completed by Don Bolton.      Microscopic Description       A microscopic examination was performed and the diagnosis reflects the findings.          Laboratory Notes       If this report includes immunohistochemical (IHC) test results, please note the following: IHC studies were interpreted in conjunction with appropriate positive and negative controls which demonstrate the expected positive and negative reactivity. This laboratory is regulated under CLIA as qualified to perform high-complexity testing. IHC tests are used for clinical purposes. They should not be regarded as investigational or research.       POCT glucose    Collection Time: 12/10/24 11:27 AM   Result Value Ref Range    POC Glucose 189 (H) 70 - 105 mg/dL   POCT glucose    Collection Time: 12/10/24   5:12 PM   Result Value Ref Range    POC Glucose 259 (H) 70 - 105 mg/dL   POCT glucose    Collection Time: 12/11/24 12:10 AM   Result Value Ref Range    POC Glucose 279 (H) 70 - 105 mg/dL   Basic Metabolic Panel (BMP)    Collection Time: 12/11/24  5:42 AM   Result Value Ref Range    Sodium 134 (L) 136 - 145 mmol/L    Potassium 4.6 3.5 - 5.1 mmol/L    Chloride 110 (H) 98 - 107 mmol/L    CO2 19 (L) 23 - 31 mmol/L    Anion Gap 10 7 - 16 mmol/L    Glucose 119 (H) 82 - 115 mg/dL    BUN 25 (H) 10 - 20 mg/dL    Creatinine 1.33 (H) 0.55 - 1.02 mg/dL    BUN/Creatinine Ratio 19 6 - 20    Calcium 8.0 (L) 8.4 - 10.2 mg/dL    eGFR 45 (L) >=60 mL/min/1.73m2   CBC with Differential    Collection Time: 12/11/24  5:42 AM   Result Value Ref Range    WBC 5.73 4.50 - 11.00 K/uL    RBC 3.59 (L) 4.20 - 5.40 M/uL    Hemoglobin 9.2 (L) 12.0 - 16.0 g/dL    Hematocrit 27.5 (L) 38.0 - 47.0 %    MCV 76.6 (L) 80.0 - 96.0 fL    MCH 25.6 (L) 27.0 - 31.0 pg    MCHC 33.5 32.0 - 36.0 g/dL    RDW 14.1 11.5 - 14.5 %    Platelet Count 158 150 - 400 K/uL    MPV 11.3 9.4 - 12.4 fL    Neutrophils % 58.3 53.0 - 65.0 %    Lymphocytes % 24.6 (L) 27.0 - 41.0 %    Monocytes % 9.8 (H) 2.0 - 6.0 %    Eosinophils % 6.5 (H) 1.0 - 4.0 %    Basophils % 0.3 0.0 - 1.0 %    Immature Granulocytes % 0.5 (H) 0.0 - 0.4 %    nRBC, Auto 0.0 <=0.0 %    Neutrophils, Abs 3.34 1.80 - 7.70 K/uL    Lymphocytes, Absolute 1.41 1.00 - 4.80 K/uL    Monocytes, Absolute 0.56 0.00 - 0.80 K/uL    Eosinophils, Absolute 0.37 0.00 - 0.50 K/uL    Basophils, Absolute 0.02 0.00 - 0.20 K/uL    Immature Granulocytes, Absolute 0.03 0.00 - 0.04 K/uL    nRBC, Absolute 0.00 <=0.00 x10e3/uL    Diff Type Auto    POCT glucose    Collection Time: 12/11/24  6:53 AM   Result Value Ref Range    POC Glucose 159 (H) 70 - 105 mg/dL   POCT glucose    Collection Time: 12/11/24 12:03 PM   Result Value Ref Range    POC Glucose 184 (H) 70 - 105 mg/dL        ASSESSMENT:      The patient is doing well after surgery.        PLAN:      Follow up PRN.    She has a wound care follow up in about a week and a half.  Will continue wet-to-dry to then.  Come back and see me p.r.n..  All questions answered.

## 2024-12-23 ENCOUNTER — OFFICE VISIT (OUTPATIENT)
Dept: WOUND CARE | Facility: CLINIC | Age: 64
End: 2024-12-23
Attending: FAMILY MEDICINE
Payer: MEDICAID

## 2024-12-23 VITALS
RESPIRATION RATE: 20 BRPM | DIASTOLIC BLOOD PRESSURE: 93 MMHG | HEART RATE: 94 BPM | SYSTOLIC BLOOD PRESSURE: 175 MMHG | TEMPERATURE: 98 F

## 2024-12-23 DIAGNOSIS — L97.413 DIABETIC ULCER OF RIGHT MIDFOOT ASSOCIATED WITH TYPE 2 DIABETES MELLITUS, WITH NECROSIS OF MUSCLE: Primary | ICD-10-CM

## 2024-12-23 DIAGNOSIS — E11.621 DIABETIC ULCER OF RIGHT MIDFOOT ASSOCIATED WITH TYPE 2 DIABETES MELLITUS, WITH NECROSIS OF MUSCLE: Primary | ICD-10-CM

## 2024-12-23 PROCEDURE — 1159F MED LIST DOCD IN RCRD: CPT | Mod: CPTII,,, | Performed by: NURSE PRACTITIONER

## 2024-12-23 PROCEDURE — 3080F DIAST BP >= 90 MM HG: CPT | Mod: CPTII,,, | Performed by: NURSE PRACTITIONER

## 2024-12-23 PROCEDURE — 4010F ACE/ARB THERAPY RXD/TAKEN: CPT | Mod: CPTII,,, | Performed by: NURSE PRACTITIONER

## 2024-12-23 PROCEDURE — 3044F HG A1C LEVEL LT 7.0%: CPT | Mod: CPTII,,, | Performed by: NURSE PRACTITIONER

## 2024-12-23 PROCEDURE — 99999 PR PBB SHADOW E&M-EST. PATIENT-LVL IV: CPT | Mod: PBBFAC,,, | Performed by: NURSE PRACTITIONER

## 2024-12-23 PROCEDURE — 99213 OFFICE O/P EST LOW 20 MIN: CPT | Mod: S$PBB,,, | Performed by: NURSE PRACTITIONER

## 2024-12-23 PROCEDURE — 99214 OFFICE O/P EST MOD 30 MIN: CPT | Mod: PBBFAC | Performed by: NURSE PRACTITIONER

## 2024-12-23 PROCEDURE — 3077F SYST BP >= 140 MM HG: CPT | Mod: CPTII,,, | Performed by: NURSE PRACTITIONER

## 2024-12-23 PROCEDURE — 1160F RVW MEDS BY RX/DR IN RCRD: CPT | Mod: CPTII,,, | Performed by: NURSE PRACTITIONER

## 2024-12-30 NOTE — PROGRESS NOTES
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 Wound continues to improve. Continue with current plan of care. Purple discoloration to lateral aspect of foot. Discussed applying foam border for protect and avoiding pressure to foot when in bed.     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 Wound is worse today and continues to have hypergranulation tissue. Right foot with edema and erythema with blisters to lateral aspect  of foot. Patient reports increased pain and burning in her foot. Cultures today. Rocephin given IM in clinic and cipro to pharmacy. X-ray today. Acetic acid moistened drawtex to wound bed. Continue to keep pressure off wound. Prescription for wheelchair given to patient. Prescription for custom off-loading shoe faxed to [facility].        63 y.o. female presents to clinic for follow up on chronic-non healing wound on right TMA.Wound is healing well. Reports pain and tenderness to lateral ankle and cramps and spasms to lower leg. Labs ordered today. Continue with Aquacel ag to wound bed and ammonium lacate to soften callus.   Reinforced importance of local wound care, keeping pressure off foot, off-loading shoe, elevating legs, adherence to diabetic diet and strict glycemic control, and increasing protein intake.     Significant PMH diabetes, anemia, and CVA. Last HgA1C 7 in July, diabetes  managed by PCP. Pertinent factors that delay wound healing include multiple co-morbidities, poor vascular supply, diabetes, edema, heavy drainage, decreased granulation tissue, tract(s), undermining and no protective sensation.       Review of Systems     Review of Systems   Constitutional:  Positive for activity change. Negative for appetite change, chills, diaphoresis and fever.   HENT: Negative.  Negative for congestion, ear pain, hearing loss and postnasal drip.    Eyes:  Negative for itching.   Respiratory:  Negative for chest tightness and shortness of breath.    Cardiovascular:  Positive for leg swelling. Negative for chest pain and palpitations.   Gastrointestinal:  Negative for abdominal pain.   Endocrine: Negative for polydipsia.   Genitourinary:  Negative for frequency.   Musculoskeletal:  Positive for arthralgias, back pain and joint swelling.        Severe shoulder pain, 10 of 10 on pain scale   Skin:  Positive for color change and wound.        See wound assessment   Neurological:  Positive for weakness and numbness. Negative for headaches.   Psychiatric/Behavioral:  Negative for agitation, behavioral problems, confusion and self-injury.        Medical / Social / Family History     Past Medical History:   Diagnosis Date    Anemia 7/5/2022    Diabetes mellitus, type 2     Hyperlipidemia     Hypertension     Obesity     Primary osteoarthritis of left shoulder 5/24/2022    Stroke        Past Surgical History:   Procedure Laterality Date    AMPUTATION      APPENDECTOMY      DEBRIDEMENT OF FOOT Right 1/12/2023    Procedure: DEBRIDEMENT, FOOT;  Surgeon: Ravi Ramirez MD;  Location: [facility] OR;  Service: General;  Laterality: Right;    DEBRIDEMENT OF FOOT Right 12/10/2024    Procedure: DEBRIDEMENT, FOOT;  Surgeon: Dhaval Pizano MD;  Location: [facility] OR;  Service: General;  Laterality: Right;    EYE SURGERY         Social History  Ms. Deborah Sotelo  reports that she has never smoked. She  has never used smokeless tobacco. She reports current alcohol use. She reports that she does not use drugs.    Family History  Ms.'s Deborah Sotelo family history includes Appendicitis in her father; Asthma in her sister; Cancer in her mother; Diabetes in her brother.    Medications and Allergies     Medications  Outpatient Medications Marked as Taking for the 1/13/25 encounter (Office Visit) with Felisha Love FNP   Medication Sig Dispense Refill    amLODIPine (NORVASC) 5 MG tablet Take 1 tablet (5 mg total) by mouth once daily. 30 tablet 5    ammonium lactate 12 % Crea Apply 1 g topically Daily. 385 g 2    atorvastatin (LIPITOR) 80 MG tablet TAKE 1 TABLET BY MOUTH EACH NIGHT AT BEDTIME FOR CHOLESTEROL ~~DO NOT EAT GRAPEFRUIT OR DRINK GRAPEFRUIT JUICE WHILE TAKING THIS MEDICATION~~ 30 tablet 5    diclofenac sodium (VOLTAREN) 1 % Gel Apply 2 g topically 4 (four) times daily. 450 g 2    FEROSUL 325 mg (65 mg iron) Tab tablet       HYDROcodone-acetaminophen (NORCO) 7.5-325 mg per tablet Take 1 tablet by mouth every 6 (six) hours as needed for Pain. 15 tablet 0    insulin asp prt-insulin aspart, NovoLOG 70/30, (NOVOLOG 70/30) 100 unit/mL (70-30) Soln INJECT 50 UNITS SUB-Q EACH MORNING AND 35  UNITS EACH EVENING ...KEEP IN REFRIGERATOR ...USE WITHIN 28 DAYS AFTER OPENING EACH VIAL 30 mL 11    ketoconazole (NIZORAL) 2 % cream Apply topically once daily. 60 g 2    ketoconazole (NIZORAL) 2 % shampoo Apply topically twice a week. 120 mL 2    TRULICITY 1.5 mg/0.5 mL pen injector inject 0.5ml (1.5mg) UNDER THE SKIN EACH WEEK ...KEEP REFRIGERATED         Allergies  Review of patient's allergies indicates:   Allergen Reactions    Aspirin      Other reaction(s): UPSET STOMACH   Other reaction(s): Unknown    Bactrim ds [sulfamethoxazole-trimethoprim] Itching       Physical Examination     Vitals:    01/13/25 1013   BP: (!) 116/98   Pulse: 95   Resp: 20   Temp: 97.7 °F (36.5 °C)                 Physical  Exam  Vitals and nursing note reviewed.   Constitutional:       Comments: Tearful during exam   HENT:      Head: Normocephalic.   Cardiovascular:      Rate and Rhythm: Normal rate and regular rhythm.      Pulses: Normal pulses.      Heart sounds: Normal heart sounds.   Pulmonary:      Effort: Pulmonary effort is normal. No respiratory distress.   Chest:      Chest wall: No tenderness.   Musculoskeletal:         General: Swelling, tenderness and deformity present.      Right lower leg: Edema present.      Comments: Left shoulder decreased ROM with weakness to left upper extremity   Skin:     General: Skin is warm.      Capillary Refill: Capillary refill takes 2 to 3 seconds.      Findings: Erythema and lesion present.      Comments: Wound, see LDA for measurements and picture   Neurological:      Mental Status: She is alert and oriented to person, place, and time.   Psychiatric:         Mood and Affect: Mood normal.         Behavior: Behavior normal.         Thought Content: Thought content normal.         Judgment: Judgment normal.     Assessment and Plan             Wound 01/13/25 1015 Skin Tear Right lateral Foot #3 (Active)   01/13/25 1015 Foot   Present on Original Admission: Y   Primary Wound Type: Skin tear   Side: Right   Orientation: lateral   Wound Approximate Age at First Assessment (Weeks):    Wound Number: #3   Is this injury device related?: No   Incision Type:    Closure Method:    Wound Description (Comments):    Type:    Additional Comments:    Ankle-Brachial Index:    Pulses:    Removal Indication and Assessment:    Wound Outcome:    Wound Image    01/13/25 1020   Dressing Appearance Moist drainage 01/13/25 1020   Drainage Amount Small 01/13/25 1020   Drainage Characteristics/Odor Serous 01/13/25 1020   Appearance Pink;Moist;Epithelialization 01/13/25 1020   Tissue loss description Full thickness 01/13/25 1020   Black (%), Wound Tissue Color 0 % 01/13/25 1020   Red (%), Wound Tissue Color 100 %  01/13/25 1020   Yellow (%), Wound Tissue Color 0 % 01/13/25 1020   Periwound Area Intact;Dry 01/13/25 1020   Wound Edges Defined 01/13/25 1020   Wound Length (cm) 0.6 cm 01/13/25 1020   Wound Width (cm) 0.7 cm 01/13/25 1020   Wound Depth (cm) 0.2 cm 01/13/25 1020   Wound Volume (cm^3) 0.084 cm^3 01/13/25 1020   Wound Surface Area (cm^2) 0.42 cm^2 01/13/25 1020   Care Cleansed with:;Antimicrobial agent;Soap and water 01/13/25 1020   Dressing Applied;Foam 01/13/25 1020   Periwound Care Moisture barrier applied 01/13/25 1020   Dressing Change Due 01/14/25 01/13/25 1020            Incision/Site 01/12/23 1251 Right Foot (Active)   01/12/23 1251   Present Prior to Hospital Arrival?:    Side: Right   Location: Foot   Orientation:    Incision Type:    Closure Method:    Additional Comments:    Removal Indication and Assessment:    Wound Outcome:    Removal Indications:    Wound Image     01/13/25 1018   Dressing Appearance Moist drainage 01/13/25 1018   Drainage Amount Moderate 01/13/25 1018   Drainage Characteristics/Odor Serosanguineous 01/13/25 1018   Appearance Pink;Moist;Granulating 01/13/25 1018   Black (%), Wound Tissue Color 0 % 01/13/25 1018   Red (%), Wound Tissue Color 100 % 01/13/25 1018   Yellow (%), Wound Tissue Color 0 % 01/13/25 1018   Periwound Area Intact 01/13/25 1018   Wound Edges Callused 01/13/25 1018   Wound Length (cm) 1.9 cm 01/13/25 1018   Wound Width (cm) 2.1 cm 01/13/25 1018   Wound Depth (cm) 0.8 cm 01/13/25 1018   Wound Volume (cm^3) 3.192 cm^3 01/13/25 1018   Wound Surface Area (cm^2) 3.99 cm^2 01/13/25 1018   Care Cleansed with:;Antimicrobial agent 01/13/25 1018   Dressing Applied;Gauze;Rolled gauze 01/13/25 1018   Periwound Care Moisture barrier applied 01/13/25 1018   Dressing Change Due 01/14/25 01/13/25 1018                 Problem List Items Addressed This Visit          Endocrine    Diabetic foot ulcer - Primary    Overview                                                                                   Current Assessment & Plan     Clean with vashe or baby shampoo and water    Pack wound with nu gauze,cover with Mepilex UP and dry dressing,wrap with meng,secure with paper tape. Change daily and as needed       Monitor closely for s/s of infection including fever, chills, increase in pain, odor from wound, and increased redness from foot. Go to ER if any complications develop.   Keep leg elevated and avoid pressure on wound  Diabetes:  Monitor glucose closely. Check fasting glucose and 2 hours after meals. HgA1C goal <7, fasting glucose , and 2 hours after meals <180  Hypertension:  Check blood pressure twice daily, goal <120/80  Diet:   Increase protein intake, avoid fried, fatty foods and foods high in simple carbs.   Vitamins:  Take vitamin C 1000 mg, zinc 50mg, vitamin d 5000 units, and a daily multivitamin. Dawson is a good source of protein and nutrients to aid in wound healing.             Future Appointments   Date Time Provider Department Center   1/27/2025 10:00 AM Felisha Love FNP [facility]    2/5/2025 11:20 AM [facility]   2/13/2025 10:00 AM [facility] GI ROOM 01 [facility]            Signature:  TOMASA Maldonado  [facility]  [address] [facility] [place] [address]  [phone]    Date of encounter: 1/13/25

## 2025-01-13 ENCOUNTER — OFFICE VISIT (OUTPATIENT)
Dept: WOUND CARE | Facility: CLINIC | Age: 65
End: 2025-01-13
Attending: FAMILY MEDICINE
Payer: MEDICAID

## 2025-01-13 VITALS
TEMPERATURE: 98 F | DIASTOLIC BLOOD PRESSURE: 98 MMHG | HEART RATE: 95 BPM | RESPIRATION RATE: 20 BRPM | SYSTOLIC BLOOD PRESSURE: 116 MMHG

## 2025-01-13 DIAGNOSIS — L97.413 DIABETIC ULCER OF RIGHT MIDFOOT ASSOCIATED WITH TYPE 2 DIABETES MELLITUS, WITH NECROSIS OF MUSCLE: Primary | ICD-10-CM

## 2025-01-13 DIAGNOSIS — E11.621 DIABETIC ULCER OF RIGHT MIDFOOT ASSOCIATED WITH TYPE 2 DIABETES MELLITUS, WITH NECROSIS OF MUSCLE: Primary | ICD-10-CM

## 2025-01-13 PROCEDURE — 1159F MED LIST DOCD IN RCRD: CPT | Mod: CPTII,,, | Performed by: NURSE PRACTITIONER

## 2025-01-13 PROCEDURE — 99214 OFFICE O/P EST MOD 30 MIN: CPT | Mod: PBBFAC | Performed by: NURSE PRACTITIONER

## 2025-01-13 PROCEDURE — 99999 PR PBB SHADOW E&M-EST. PATIENT-LVL IV: CPT | Mod: PBBFAC,,, | Performed by: NURSE PRACTITIONER

## 2025-01-13 PROCEDURE — 99213 OFFICE O/P EST LOW 20 MIN: CPT | Mod: S$PBB,,, | Performed by: NURSE PRACTITIONER

## 2025-01-13 PROCEDURE — 1160F RVW MEDS BY RX/DR IN RCRD: CPT | Mod: CPTII,,, | Performed by: NURSE PRACTITIONER

## 2025-01-13 PROCEDURE — 3080F DIAST BP >= 90 MM HG: CPT | Mod: CPTII,,, | Performed by: NURSE PRACTITIONER

## 2025-01-13 PROCEDURE — 3074F SYST BP LT 130 MM HG: CPT | Mod: CPTII,,, | Performed by: NURSE PRACTITIONER

## 2025-01-14 NOTE — PROGRESS NOTES
TOMASA Maldonado   [facility]  [address] 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 Wound remains stable. Lateral aspect of foot has improved. Continue to protect with foam border. Aquacel ag to ulcer. Continue to elevate leg when sitting and off-load as much as possible.       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 Wound continues to improve. Continue with current plan of care. Purple discoloration to lateral aspect of foot. Discussed applying foam border for protect and avoiding pressure to foot when in bed.     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 Since last visit, she was admitted and underwent debridement per Dr. Pizano. Wound has improved and patient reports significant improvement in pain. Wound packed with vashe moistened nuguaze and mepilex up applied. Set up with [facility].     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 Wound is worse today and continues to have hypergranulation tissue. Right foot with edema and erythema with blisters to lateral aspect  of foot. Patient reports increased pain and burning in her foot. Cultures today. Rocephin given IM in clinic and cipro to pharmacy. X-ray today. Acetic acid moistened drawtex to wound bed. Continue to keep pressure off wound. Prescription for wheelchair given to patient. Prescription for custom  off-loading shoe faxed to [facility].      5/ 22/2024  63 y.o. female presents to clinic for follow up on chronic-non healing wound on right TMA.Wound is healing well. Reports pain and tenderness to lateral ankle and cramps and spasms to lower leg. Labs ordered today. Continue with Aquacel ag to wound bed and ammonium lacate to soften callus.   Reinforced importance of local wound care, keeping pressure off foot, off-loading shoe, elevating legs, adherence to diabetic diet and strict glycemic control, and increasing protein intake.     Significant PMH diabetes, anemia, and CVA. Last HgA1C 7 in July, diabetes managed by PCP. Pertinent factors that delay wound healing include multiple co-morbidities, poor vascular supply, diabetes, edema, heavy drainage, decreased granulation tissue, tract(s), undermining and no protective sensation.       Review of Systems     Review of Systems   Constitutional:  Positive for activity change. Negative for appetite change, chills, diaphoresis and fever.   HENT: Negative.  Negative for congestion, ear pain, hearing loss and postnasal drip.    Eyes:  Negative for itching.   Respiratory:  Negative for chest tightness and shortness of breath.    Cardiovascular:  Positive for leg swelling. Negative for chest pain and palpitations.   Gastrointestinal:  Negative for abdominal pain.   Endocrine: Negative for polydipsia.   Genitourinary:  Negative for frequency.   Musculoskeletal:  Positive for arthralgias, back pain and joint swelling.        Severe shoulder pain, 10 of 10 on pain scale   Skin:  Positive for color change and wound.        See wound assessment   Neurological:  Positive for weakness and numbness. Negative for headaches.   Psychiatric/Behavioral:  Negative for agitation, behavioral problems, confusion and self-injury.        Medical / Social / Family History     Past Medical History:   Diagnosis Date    Anemia 7/5/2022    Diabetes mellitus, type 2     Hyperlipidemia      Hypertension     Obesity     Primary osteoarthritis of left shoulder 5/24/2022    Stroke        Past Surgical History:   Procedure Laterality Date    AMPUTATION      APPENDECTOMY      DEBRIDEMENT OF FOOT Right 1/12/2023    Procedure: DEBRIDEMENT, FOOT;  Surgeon: Ravi Ramirez MD;  Location: [facility] OR;  Service: General;  Laterality: Right;    DEBRIDEMENT OF FOOT Right 12/10/2024    Procedure: DEBRIDEMENT, FOOT;  Surgeon: Dhaval Pizano MD;  Location: [facility] OR;  Service: General;  Laterality: Right;    EYE SURGERY         Social History  Ms. Deborah Sotelo  reports that she has never smoked. She has never used smokeless tobacco. She reports current alcohol use. She reports that she does not use drugs.    Family History  Ms.'s Deborah Sotelo family history includes Appendicitis in her father; Asthma in her sister; Cancer in her mother; Diabetes in her brother.    Medications and Allergies     Medications  Outpatient Medications Marked as Taking for the 1/27/25 encounter (Office Visit) with Felisha Love FNP   Medication Sig Dispense Refill    amLODIPine (NORVASC) 5 MG tablet Take 1 tablet (5 mg total) by mouth once daily. 30 tablet 5    ammonium lactate 12 % Crea Apply 1 g topically Daily. 385 g 2    atorvastatin (LIPITOR) 80 MG tablet TAKE 1 TABLET BY MOUTH EACH NIGHT AT BEDTIME FOR CHOLESTEROL ~~DO NOT EAT GRAPEFRUIT OR DRINK GRAPEFRUIT JUICE WHILE TAKING THIS MEDICATION~~ 30 tablet 5    diclofenac sodium (VOLTAREN) 1 % Gel Apply 2 g topically 4 (four) times daily. 450 g 2    FEROSUL 325 mg (65 mg iron) Tab tablet       HYDROcodone-acetaminophen (NORCO) 7.5-325 mg per tablet Take 1 tablet by mouth every 6 (six) hours as needed for Pain. 15 tablet 0    pantoprazole (PROTONIX) 40 MG tablet Take 40 mg by mouth once daily.      TRULICITY 1.5 mg/0.5 mL pen injector inject 0.5ml (1.5mg) UNDER THE SKIN EACH WEEK ...KEEP REFRIGERATED         Allergies  Review of patient's allergies indicates:    Allergen Reactions    Aspirin      Other reaction(s): UPSET STOMACH   Other reaction(s): Unknown    Bactrim ds [sulfamethoxazole-trimethoprim] Itching       Physical Examination     Vitals:    01/27/25 0916   BP: 122/76   Pulse: 105   Resp: 18   Temp: 97.7 °F (36.5 °C)                 Physical Exam  Vitals and nursing note reviewed.   Constitutional:       Comments: Tearful during exam   HENT:      Head: Normocephalic.   Cardiovascular:      Rate and Rhythm: Normal rate and regular rhythm.      Pulses: Normal pulses.      Heart sounds: Normal heart sounds.   Pulmonary:      Effort: Pulmonary effort is normal. No respiratory distress.   Chest:      Chest wall: No tenderness.   Musculoskeletal:         General: Swelling, tenderness and deformity present.      Right lower leg: Edema present.      Comments: Left shoulder decreased ROM with weakness to left upper extremity   Skin:     General: Skin is warm.      Capillary Refill: Capillary refill takes 2 to 3 seconds.      Findings: Erythema and lesion present.      Comments: Wound, see LDA for measurements and picture   Neurological:      Mental Status: She is alert and oriented to person, place, and time.   Psychiatric:         Mood and Affect: Mood normal.         Behavior: Behavior normal.         Thought Content: Thought content normal.         Judgment: Judgment normal.     Assessment and Plan             Incision/Site 01/12/23 1251 Right Foot (Active)   01/12/23 1251   Present Prior to Hospital Arrival?:    Side: Right   Location: Foot   Orientation:    Incision Type:    Closure Method:    Additional Comments:    Removal Indication and Assessment:    Wound Outcome:    Removal Indications:    Wound Image    01/27/25 0925   Dressing Appearance Moist drainage 01/27/25 0925   Drainage Amount Moderate 01/27/25 0925   Drainage Characteristics/Odor Serosanguineous 01/27/25 0925   Appearance Pink;Moist;Granulating 01/27/25 0925   Black (%), Wound Tissue Color 0 %  01/27/25 0925   Red (%), Wound Tissue Color 100 % 01/27/25 0925   Yellow (%), Wound Tissue Color 0 % 01/27/25 0925   Periwound Area Intact;Dry 01/27/25 0925   Wound Edges Callused 01/27/25 0925   Wound Length (cm) 1 cm 01/27/25 0925   Wound Width (cm) 1.9 cm 01/27/25 0925   Wound Depth (cm) 0.5 cm 01/27/25 0925   Wound Volume (cm^3) 0.95 cm^3 01/27/25 0925   Wound Surface Area (cm^2) 1.9 cm^2 01/27/25 0925   Care Cleansed with:;Antimicrobial agent;Soap and water 01/27/25 0925   Dressing Applied;Gauze;Rolled gauze 01/27/25 0925   Periwound Care Moisturizer applied 01/27/25 0925   Dressing Change Due 01/28/25 01/27/25 0925                 Problem List Items Addressed This Visit          Endocrine    Diabetic foot ulcer - Primary    Overview                                                                          Current Assessment & Plan     Clean with vashe or baby shampoo and water    Apply aquacel ag  and dry dressing,wrap with meng,secure with paper tape. Change daily and as needed       Monitor closely for s/s of infection including fever, chills, increase in pain, odor from wound, and increased redness from foot. Go to ER if any complications develop.   Keep leg elevated and avoid pressure on wound  Diabetes:  Monitor glucose closely. Check fasting glucose and 2 hours after meals. HgA1C goal <7, fasting glucose , and 2 hours after meals <180  Hypertension:  Check blood pressure twice daily, goal <120/80  Diet:   Increase protein intake, avoid fried, fatty foods and foods high in simple carbs.   Vitamins:  Take vitamin C 1000 mg, zinc 50mg, vitamin d 5000 units, and a daily multivitamin. Dawson is a good source of protein and nutrients to aid in wound healing.             Future Appointments   Date Time Provider Department Center   2/5/2025 11:20 AM [facility]   [facility]11[facility]2025 10[facility]   2/13/2025 10:00 AM [facility] GI ROOM 01 [facility]  [facility]            Signature:  TOMASA Maldonado  [facility]  [address] [facility] [address]  [phone]    Date of encounter: 1/27/25

## 2025-01-14 NOTE — PATIENT INSTRUCTIONS
Clean with vashe or baby shampoo and water    Apply aquacel ag  and dry dressing,wrap with meng,secure with paper tape. Change daily and as needed       Monitor closely for s/s of infection including fever, chills, increase in pain, odor from wound, and increased redness from foot. Go to ER if any complications develop.   Keep leg elevated and avoid pressure on wound  Diabetes:  Monitor glucose closely. Check fasting glucose and 2 hours after meals. HgA1C goal <7, fasting glucose , and 2 hours after meals <180  Hypertension:  Check blood pressure twice daily, goal <120/80  Diet:   Increase protein intake, avoid fried, fatty foods and foods high in simple carbs.   Vitamins:  Take vitamin C 1000 mg, zinc 50mg, vitamin d 5000 units, and a daily multivitamin. Dawson is a good source of protein and nutrients to aid in wound healing.

## 2025-01-27 ENCOUNTER — OFFICE VISIT (OUTPATIENT)
Dept: WOUND CARE | Facility: CLINIC | Age: 65
End: 2025-01-27
Attending: SURGERY
Payer: MEDICAID

## 2025-01-27 VITALS
TEMPERATURE: 98 F | SYSTOLIC BLOOD PRESSURE: 122 MMHG | RESPIRATION RATE: 18 BRPM | DIASTOLIC BLOOD PRESSURE: 76 MMHG | HEART RATE: 105 BPM

## 2025-01-27 DIAGNOSIS — E11.621 DIABETIC ULCER OF RIGHT MIDFOOT ASSOCIATED WITH TYPE 2 DIABETES MELLITUS, WITH NECROSIS OF MUSCLE: Primary | ICD-10-CM

## 2025-01-27 DIAGNOSIS — L97.413 DIABETIC ULCER OF RIGHT MIDFOOT ASSOCIATED WITH TYPE 2 DIABETES MELLITUS, WITH NECROSIS OF MUSCLE: Primary | ICD-10-CM

## 2025-01-27 PROCEDURE — 3078F DIAST BP <80 MM HG: CPT | Mod: CPTII,,, | Performed by: NURSE PRACTITIONER

## 2025-01-27 PROCEDURE — 1159F MED LIST DOCD IN RCRD: CPT | Mod: CPTII,,, | Performed by: NURSE PRACTITIONER

## 2025-01-27 PROCEDURE — 99999 PR PBB SHADOW E&M-EST. PATIENT-LVL IV: CPT | Mod: PBBFAC,,, | Performed by: NURSE PRACTITIONER

## 2025-01-27 PROCEDURE — 99213 OFFICE O/P EST LOW 20 MIN: CPT | Mod: S$PBB,,, | Performed by: NURSE PRACTITIONER

## 2025-01-27 PROCEDURE — 3074F SYST BP LT 130 MM HG: CPT | Mod: CPTII,,, | Performed by: NURSE PRACTITIONER

## 2025-01-27 PROCEDURE — 1160F RVW MEDS BY RX/DR IN RCRD: CPT | Mod: CPTII,,, | Performed by: NURSE PRACTITIONER

## 2025-01-27 PROCEDURE — 99214 OFFICE O/P EST MOD 30 MIN: CPT | Mod: PBBFAC | Performed by: NURSE PRACTITIONER

## 2025-02-04 DIAGNOSIS — Z12.11 SCREEN FOR COLON CANCER: Primary | ICD-10-CM

## 2025-02-04 RX ORDER — POLYETHYLENE GLYCOL 3350, SODIUM SULFATE ANHYDROUS, SODIUM BICARBONATE, SODIUM CHLORIDE, POTASSIUM CHLORIDE 236; 22.74; 6.74; 5.86; 2.97 G/4L; G/4L; G/4L; G/4L; G/4L
4 POWDER, FOR SOLUTION ORAL ONCE
Qty: 4000 ML | Refills: 0 | Status: SHIPPED | OUTPATIENT
Start: 2025-02-04 | End: 2025-02-04

## 2025-02-05 ENCOUNTER — HOSPITAL ENCOUNTER (OUTPATIENT)
Dept: RADIOLOGY | Facility: HOSPITAL | Age: 65
Discharge: HOME | End: 2025-02-05
Payer: MEDICAID

## 2025-02-05 VITALS — HEIGHT: 71 IN | WEIGHT: 282 LBS | BODY MASS INDEX: 39.48 KG/M2

## 2025-02-05 DIAGNOSIS — Z12.31 OTHER SCREENING MAMMOGRAM: ICD-10-CM

## 2025-02-05 PROCEDURE — 77063 BREAST TOMOSYNTHESIS BI: CPT | Mod: 26,,, | Performed by: RADIOLOGY

## 2025-02-05 PROCEDURE — 77067 SCR MAMMO BI INCL CAD: CPT | Mod: TC

## 2025-02-05 PROCEDURE — 77067 SCR MAMMO BI INCL CAD: CPT | Mod: 26,,, | Performed by: RADIOLOGY

## 2025-02-13 NOTE — PROGRESS NOTES
TOMASA Maldonado   [facility]  [address] [place] [address]  [phone]      PATIENT NAME: Deborah Sotelo  : 1960  DATE: 25  MRN: [id number]      Billing Provider: TOMASA Maldonado  Level of Service:   Patient PCP Information       Provider PCP Type    TOMASA Yan General            Reason for Visit / Chief Complaint: Diabetic ulcer of right midfoot associated with type 2 diab       History of Present Illness / Problem Focused Workflow     Deborah Sotelo is a 64 yo female presents for follow up on chronic non healing wound to right TMA. Wound remains stable. Callus trimmed at bedside. Continue with aquacel ag to wound.       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 Wound continues to improve. Continue with current plan of care. Purple discoloration to lateral aspect of foot. Discussed applying foam border for protect and avoiding pressure to foot when in bed.     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 Since last visit, she was admitted and underwent debridement per Dr. Pizano. Wound has improved and patient reports significant improvement in pain. Wound packed with vashe moistened nuguaze and mepilex up applied. Set up with [facility].     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 Wound is worse today and continues to have hypergranulation tissue. Right foot with edema and erythema with blisters to lateral aspect  of foot. Patient reports increased pain and burning in her foot. Cultures today. Rocephin given IM in clinic and cipro to pharmacy. X-ray today. Acetic acid moistened drawtex to wound bed. Continue to keep pressure off wound. Prescription for wheelchair given to patient. Prescription for custom off-loading shoe faxed to [facility].        63 y.o. female  presents to clinic for follow up on chronic-non healing wound on right TMA.Wound is healing well. Reports pain and tenderness to lateral ankle and cramps and spasms to lower leg. Labs ordered today. Continue with Aquacel ag to wound bed and ammonium lacate to soften callus.   Reinforced importance of local wound care, keeping pressure off foot, off-loading shoe, elevating legs, adherence to diabetic diet and strict glycemic control, and increasing protein intake.     Significant PMH diabetes, anemia, and CVA. Last HgA1C 7 in July, diabetes managed by PCP. Pertinent factors that delay wound healing include multiple co-morbidities, poor vascular supply, diabetes, edema, heavy drainage, decreased granulation tissue, tract(s), undermining and no protective sensation.       Review of Systems     Review of Systems   Constitutional:  Positive for activity change. Negative for appetite change, chills, diaphoresis and fever.   HENT: Negative.  Negative for congestion, ear pain, hearing loss and postnasal drip.    Eyes:  Negative for itching.   Respiratory:  Negative for chest tightness and shortness of breath.    Cardiovascular:  Positive for leg swelling. Negative for chest pain and palpitations.   Gastrointestinal:  Negative for abdominal pain.   Endocrine: Negative for polydipsia.   Genitourinary:  Negative for frequency.   Musculoskeletal:  Positive for arthralgias, back pain and joint swelling.        Severe shoulder pain, 10 of 10 on pain scale   Skin:  Positive for color change and wound.        See wound assessment   Neurological:  Positive for weakness and numbness. Negative for headaches.   Psychiatric/Behavioral:  Negative for agitation, behavioral problems, confusion and self-injury.        Medical / Social / Family History     Past Medical History:   Diagnosis Date    Anemia 7/5/2022    Diabetes mellitus, type 2     Hyperlipidemia     Hypertension     Obesity     Primary osteoarthritis of left shoulder  5/24/2022    Stroke        Past Surgical History:   Procedure Laterality Date    AMPUTATION      APPENDECTOMY      DEBRIDEMENT OF FOOT Right 01/12/2023    Procedure: DEBRIDEMENT, FOOT;  Surgeon: Ravi Ramirez MD;  Location: [facility];  Service: General;  Laterality: Right;    DEBRIDEMENT OF FOOT Right 12/10/2024    Procedure: DEBRIDEMENT, FOOT;  Surgeon: Dhaval Pizano MD;  Location: [facility] OR;  Service: General;  Laterality: Right;    EYE SURGERY      TOTAL REDUCTION MAMMOPLASTY         Social History  Ms. Deborah Sotelo  reports that she has never smoked. She has never used smokeless tobacco. She reports current alcohol use. She reports that she does not use drugs.    Family History  Ms.'srinath Sotelo family history includes Appendicitis in her father; Asthma in her sister; Cancer in her mother; Diabetes in her brother.    Medications and Allergies     Medications  No outpatient medications have been marked as taking for the 2/19/25 encounter (Office Visit) with Felisha Love FNP.       Allergies  Review of patient's allergies indicates:   Allergen Reactions    Aspirin      Other reaction(s): UPSET STOMACH   Other reaction(s): Unknown    Bactrim ds [sulfamethoxazole-trimethoprim] Itching       Physical Examination     Vitals:    02/19/25 1306   BP: (!) 150/83   Pulse: 83   Resp: 18   Temp: 98.5 °F (36.9 °C)                   Physical Exam  Vitals and nursing note reviewed.   Constitutional:       Comments: Tearful during exam   HENT:      Head: Normocephalic.   Cardiovascular:      Rate and Rhythm: Normal rate and regular rhythm.      Pulses: Normal pulses.      Heart sounds: Normal heart sounds.   Pulmonary:      Effort: Pulmonary effort is normal. No respiratory distress.   Chest:      Chest wall: No tenderness.   Musculoskeletal:         General: Swelling, tenderness and deformity present.      Right lower leg: Edema present.      Comments: Left shoulder decreased ROM with weakness to  left upper extremity   Skin:     General: Skin is warm.      Capillary Refill: Capillary refill takes 2 to 3 seconds.      Findings: Erythema and lesion present.      Comments: Wound, see LDA for measurements and picture   Neurological:      Mental Status: She is alert and oriented to person, place, and time.   Psychiatric:         Mood and Affect: Mood normal.         Behavior: Behavior normal.         Thought Content: Thought content normal.         Judgment: Judgment normal.     Assessment and Plan             Wound 01/18/21 1051 Diabetic Ulcer Right lateral Plantar #1 (Active)   01/18/21 1051    Pre-existing: Yes   Primary Wound Type: Diabetic ulc   Side: Right   Orientation: lateral   Location: Plantar   Wound Number: #1   Ankle-Brachial Index:    Pulses: normal   Removal Indication and Assessment:    Wound Outcome:    (Retired) Wound Type:    (Retired) Wound Length (cm):    (Retired) Wound Width (cm):    (Retired) Depth (cm):    Wound Description (Comments):    Removal Indications:    Wound Image     02/19/25 1315   Dressing Appearance Moist drainage 02/19/25 1315   Drainage Amount Moderate 02/19/25 1315   Drainage Characteristics/Odor Serosanguineous;Tan 02/19/25 1315   Appearance Pink;Moist;Granulating 02/19/25 1315   Tissue loss description Full thickness 02/19/25 1315   Black (%), Wound Tissue Color 0 % 02/19/25 1315   Red (%), Wound Tissue Color 100 % 02/19/25 1315   Yellow (%), Wound Tissue Color 0 % 02/19/25 1315   Periwound Area Intact;Dry 02/19/25 1315   Wound Edges Callused 02/19/25 1315   Wound Length (cm) 1.1 cm 02/19/25 1315   Wound Width (cm) 1.2 cm 02/19/25 1315   Wound Depth (cm) 0.3 cm 02/19/25 1315   Wound Volume (cm^3) 0.207 cm^3 02/19/25 1315   Wound Surface Area (cm^2) 1.04 cm^2 02/19/25 1315   Care Cleansed with:;Antimicrobial agent;Soap and water 02/19/25 1315   Dressing Applied;Calcium alginate;Silver;Gauze;Rolled gauze 02/19/25 1315   Periwound Care Moisturizer applied 02/19/25 1315    Dressing Change Due 02/20/25 02/19/25 131                   Problem List Items Addressed This Visit          Endocrine    Diabetic foot ulcer - Primary    Overview                                                                              Current Assessment & Plan   Clean with vashe or baby shampoo and water    Apply aquacel ag  and dry dressing,wrap with meng,secure with paper tape. Change daily and as needed       Monitor closely for s/s of infection including fever, chills, increase in pain, odor from wound, and increased redness from foot. Go to ER if any complications develop.   Keep leg elevated and avoid pressure on wound  Diabetes:  Monitor glucose closely. Check fasting glucose and 2 hours after meals. HgA1C goal <7, fasting glucose , and 2 hours after meals <180  Hypertension:  Check blood pressure twice daily, goal <120/80  Diet:   Increase protein intake, avoid fried, fatty foods and foods high in simple carbs.   Vitamins:  Take vitamin C 1000 mg, zinc 50mg, vitamin d 5000 units, and a daily multivitamin. Dawson is a good source of protein and nutrients to aid in wound healing.             Future Appointments   Date Time Provider Department Center   2/21/2025 10:30 AM [facility] GI ROOM 01 [facility]   3/18/2025 10:00 AM Felisha Love FNP [facility]    2/11/2026 10:40 AM [facility]            Signature:  TOMASA Maldonado  [facility]  [address]  [place] [address]  [phone]    Date of encounter: 2/19/25

## 2025-02-19 ENCOUNTER — OFFICE VISIT (OUTPATIENT)
Dept: WOUND CARE | Facility: CLINIC | Age: 65
End: 2025-02-19
Payer: MEDICAID

## 2025-02-19 VITALS
HEART RATE: 83 BPM | DIASTOLIC BLOOD PRESSURE: 83 MMHG | TEMPERATURE: 99 F | RESPIRATION RATE: 18 BRPM | SYSTOLIC BLOOD PRESSURE: 150 MMHG

## 2025-02-19 DIAGNOSIS — E11.621 DIABETIC ULCER OF RIGHT MIDFOOT ASSOCIATED WITH TYPE 2 DIABETES MELLITUS, WITH NECROSIS OF MUSCLE: Primary | ICD-10-CM

## 2025-02-19 DIAGNOSIS — L97.413 DIABETIC ULCER OF RIGHT MIDFOOT ASSOCIATED WITH TYPE 2 DIABETES MELLITUS, WITH NECROSIS OF MUSCLE: Primary | ICD-10-CM

## 2025-02-19 PROCEDURE — 99214 OFFICE O/P EST MOD 30 MIN: CPT | Mod: PBBFAC | Performed by: NURSE PRACTITIONER

## 2025-02-21 ENCOUNTER — ANESTHESIA EVENT (OUTPATIENT)
Dept: GASTROENTEROLOGY | Facility: HOSPITAL | Age: 65
End: 2025-02-21
Payer: MEDICAID

## 2025-02-21 ENCOUNTER — ANESTHESIA (OUTPATIENT)
Dept: GASTROENTEROLOGY | Facility: HOSPITAL | Age: 65
End: 2025-02-21
Payer: MEDICAID

## 2025-02-21 ENCOUNTER — HOSPITAL ENCOUNTER (OUTPATIENT)
Dept: GASTROENTEROLOGY | Facility: HOSPITAL | Age: 65
Discharge: HOME | End: 2025-02-21
Attending: INTERNAL MEDICINE | Admitting: INTERNAL MEDICINE
Payer: MEDICAID

## 2025-02-21 VITALS
HEART RATE: 84 BPM | DIASTOLIC BLOOD PRESSURE: 39 MMHG | SYSTOLIC BLOOD PRESSURE: 110 MMHG | HEIGHT: 68 IN | BODY MASS INDEX: 42.44 KG/M2 | TEMPERATURE: 98 F | OXYGEN SATURATION: 99 % | RESPIRATION RATE: 16 BRPM | WEIGHT: 280 LBS

## 2025-02-21 DIAGNOSIS — Z12.11 COLON CANCER SCREENING: ICD-10-CM

## 2025-02-21 LAB
GLUCOSE SERPL-MCNC: 103 MG/DL (ref 70–105)
GLUCOSE SERPL-MCNC: 66 MG/DL (ref 70–105)

## 2025-02-21 PROCEDURE — 82962 GLUCOSE BLOOD TEST: CPT

## 2025-02-21 PROCEDURE — 63600175 PHARM REV CODE 636 W HCPCS: Performed by: NURSE ANESTHETIST, CERTIFIED REGISTERED

## 2025-02-21 PROCEDURE — 45385 COLONOSCOPY W/LESION REMOVAL: CPT | Mod: 33 | Performed by: INTERNAL MEDICINE

## 2025-02-21 PROCEDURE — 88305 TISSUE EXAM BY PATHOLOGIST: CPT | Mod: 26,,, | Performed by: PATHOLOGY

## 2025-02-21 PROCEDURE — 45385 COLONOSCOPY W/LESION REMOVAL: CPT | Mod: 33,,, | Performed by: INTERNAL MEDICINE

## 2025-02-21 PROCEDURE — 45380 COLONOSCOPY AND BIOPSY: CPT | Mod: 33,59,, | Performed by: INTERNAL MEDICINE

## 2025-02-21 PROCEDURE — 37000008 HC ANESTHESIA 1ST 15 MINUTES

## 2025-02-21 PROCEDURE — 45380 COLONOSCOPY AND BIOPSY: CPT | Mod: 33,59 | Performed by: INTERNAL MEDICINE

## 2025-02-21 PROCEDURE — 25000003 PHARM REV CODE 250: Performed by: NURSE ANESTHETIST, CERTIFIED REGISTERED

## 2025-02-21 PROCEDURE — 27201423 OPTIME MED/SURG SUP & DEVICES STERILE SUPPLY

## 2025-02-21 PROCEDURE — 88305 TISSUE EXAM BY PATHOLOGIST: CPT | Mod: TC,SUR | Performed by: INTERNAL MEDICINE

## 2025-02-21 RX ORDER — LIDOCAINE HYDROCHLORIDE 20 MG/ML
INJECTION, SOLUTION EPIDURAL; INFILTRATION; INTRACAUDAL; PERINEURAL
Status: DISCONTINUED | OUTPATIENT
Start: 2025-02-21 | End: 2025-02-21

## 2025-02-21 RX ORDER — DEXTROSE, SODIUM CHLORIDE, SODIUM LACTATE, POTASSIUM CHLORIDE, AND CALCIUM CHLORIDE 5; .6; .31; .03; .02 G/100ML; G/100ML; G/100ML; G/100ML; G/100ML
INJECTION, SOLUTION INTRAVENOUS CONTINUOUS PRN
Status: DISCONTINUED | OUTPATIENT
Start: 2025-02-21 | End: 2025-02-21

## 2025-02-21 RX ORDER — SODIUM CHLORIDE 0.9 % (FLUSH) 0.9 %
10 SYRINGE (ML) INJECTION
Status: DISCONTINUED | OUTPATIENT
Start: 2025-02-21 | End: 2025-02-22 | Stop reason: HOSPADM

## 2025-02-21 RX ORDER — PROPOFOL 10 MG/ML
VIAL (ML) INTRAVENOUS
Status: DISCONTINUED | OUTPATIENT
Start: 2025-02-21 | End: 2025-02-21

## 2025-02-21 RX ORDER — DEXMEDETOMIDINE HYDROCHLORIDE 100 UG/ML
INJECTION, SOLUTION INTRAVENOUS
Status: DISCONTINUED | OUTPATIENT
Start: 2025-02-21 | End: 2025-02-21

## 2025-02-21 RX ADMIN — PROPOFOL 50 MG: 10 INJECTION, EMULSION INTRAVENOUS at 10:02

## 2025-02-21 RX ADMIN — DEXMEDETOMIDINE 10 MCG: 100 INJECTION, SOLUTION, CONCENTRATE INTRAVENOUS at 10:02

## 2025-02-21 RX ADMIN — SODIUM CHLORIDE, SODIUM LACTATE, POTASSIUM CHLORIDE, CALCIUM CHLORIDE AND DEXTROSE MONOHYDRATE: 5; 600; 310; 30; 20 INJECTION, SOLUTION INTRAVENOUS at 10:02

## 2025-02-21 RX ADMIN — PROPOFOL 75 MG: 10 INJECTION, EMULSION INTRAVENOUS at 10:02

## 2025-02-21 RX ADMIN — LIDOCAINE HYDROCHLORIDE 80 MG: 20 INJECTION, SOLUTION EPIDURAL; INFILTRATION; INTRACAUDAL; PERINEURAL at 10:02

## 2025-02-21 NOTE — H&P
Gastroenterology Pre-procedure H&P    History of Present Illness    Deborah Sotelo is a 64 y.o. female that  has a past medical history of Anemia (7/5/2022), Diabetes mellitus, type 2, Hyperlipidemia, Hypertension, Obesity, Primary osteoarthritis of left shoulder (5/24/2022), and Stroke.     Patient here for routine screening     Patient denies wt loss, abdominal pain, diarrhea, melena/hematochezia, change in stool caliber, no anticoagulants, FMHx of GI related malignancies.      Past Medical History:   Diagnosis Date    Anemia 7/5/2022    Diabetes mellitus, type 2     Hyperlipidemia     Hypertension     Obesity     Primary osteoarthritis of left shoulder 5/24/2022    Stroke        Past Surgical History:   Procedure Laterality Date    AMPUTATION      APPENDECTOMY      DEBRIDEMENT OF FOOT Right 01/12/2023    Procedure: DEBRIDEMENT, FOOT;  Surgeon: Ravi Ramirez MD;  Location: [facility];  Service: General;  Laterality: Right;    DEBRIDEMENT OF FOOT Right 12/10/2024    Procedure: DEBRIDEMENT, FOOT;  Surgeon: Dhaval Pizano MD;  Location: [facility];  Service: General;  Laterality: Right;    EYE SURGERY      TOTAL REDUCTION MAMMOPLASTY         Family History   Problem Relation Name Age of Onset    Cancer Mother      Appendicitis Father      Asthma Sister      Diabetes Brother         Social History     Socioeconomic History    Marital status:    Occupational History    Occupation: disabled   Tobacco Use    Smoking status: Never    Smokeless tobacco: Never   Substance and Sexual Activity    Alcohol use: Yes    Drug use: Never    Sexual activity: Never     Social Drivers of Health     Financial Resource Strain: Low Risk  (12/10/2024)    Overall Financial Resource Strain (CARDIA)     Difficulty of Paying Living Expenses: Not hard at all   Food Insecurity: No Food Insecurity (12/10/2024)    Hunger Vital Sign     Worried About Running Out of Food in the Last Year: Never true     Ran Out of Food in the Last  Year: Never true   Transportation Needs: No Transportation Needs (12/10/2024)    TRANSPORTATION NEEDS     Transportation : No   Physical Activity: Inactive (1/13/2023)    Exercise Vital Sign     Days of Exercise per Week: 0 days     Minutes of Exercise per Session: 0 min   Stress: No Stress Concern Present (12/10/2024)    [employer] [place] [employer] - Occupational Stress Questionnaire     Feeling of Stress : Not at all   Housing Stability: Unknown (12/10/2024)    Housing Stability Vital Sign     Unable to Pay for Housing in the Last Year: No     Homeless in the Last Year: No       Current Medications[1]    Review of patient's allergies indicates:   Allergen Reactions    Aspirin      Other reaction(s): UPSET STOMACH   Other reaction(s): Unknown    Bactrim ds [sulfamethoxazole-trimethoprim] Itching       Objective:  There were no vitals filed for this visit.     GEN: normal appearing, NAD, AAO x3  HENT: NCAT, anicteric, OP benign  CV: normal rate, regular rhythm  RESP: NABS, symmetric rise, unlabored  ABD: soft, ND, no guarding or TTP  SKIN: warm and dry  NEURO: grossly afocal    Assessment and Plan:    Proceed with:    Colonoscopy for screening for colon cancer    Jeffery Naqvi MD  Gastroenterology       [1]   Current Outpatient Medications   Medication Sig Dispense Refill    amLODIPine (NORVASC) 5 MG tablet Take 1 tablet (5 mg total) by mouth once daily. 30 tablet 5    ammonium lactate 12 % Crea Apply 1 g topically Daily. 385 g 2    atorvastatin (LIPITOR) 80 MG tablet TAKE 1 TABLET BY MOUTH EACH NIGHT AT BEDTIME FOR CHOLESTEROL ~~DO NOT EAT GRAPEFRUIT OR DRINK GRAPEFRUIT JUICE WHILE TAKING THIS MEDICATION~~ 30 tablet 5    diclofenac sodium (VOLTAREN) 1 % Gel Apply 2 g topically 4 (four) times daily. 450 g 2    FEROSUL 325 mg (65 mg iron) Tab tablet       HYDROcodone-acetaminophen (NORCO) 7.5-325 mg per tablet Take 1 tablet by mouth every 6 (six) hours as needed for Pain. 15 tablet 0    insulin asp  prt-insulin aspart, NovoLOG 70/30, (NOVOLOG 70/30) 100 unit/mL (70-30) Soln INJECT 50 UNITS SUB-Q EACH MORNING AND 35  UNITS EACH EVENING ...KEEP IN REFRIGERATOR ...USE WITHIN 28 DAYS AFTER OPENING EACH VIAL 30 mL 11    pantoprazole (PROTONIX) 40 MG tablet Take 40 mg by mouth once daily.      TRULICITY 1.5 mg/0.5 mL pen injector inject 0.5ml (1.5mg) UNDER THE SKIN EACH WEEK ...KEEP REFRIGERATED       Current Facility-Administered Medications   Medication Dose Route Frequency Provider Last Rate Last Admin    sodium chloride 0.9% flush 10 mL  10 mL Intravenous PRN Jeffery Naqvi MD

## 2025-02-21 NOTE — ANESTHESIA POSTPROCEDURE EVALUATION
Anesthesia Post Evaluation    Patient: Deborah Sotelo    Procedure(s) Performed: * No procedures listed *    Final Anesthesia Type: general (The pt was Not Arousable even with painful stimulation during procedure)      Patient location during evaluation: GI PACU ([facility] Tx [facility])  Patient participation: Yes- Able to Participate  Level of consciousness: awake and alert  Post-procedure vital signs: reviewed and stable  Pain management: adequate  Airway patency: patent    PONV status at discharge: No PONV  Anesthetic complications: no      Cardiovascular status: blood pressure returned to baseline, hemodynamically stable and stable  Respiratory status: unassisted  Hydration status: euvolemic  Follow-up not needed.  Comments: Refer to nursing note for pain/alessia score upon discharge from recovery.               Vitals Value Taken Time   /39 02/21/25 11:31   Temp 36.6 °C (97.8 °F) 02/21/25 11:25   Pulse 82 02/21/25 11:33   Resp 15 02/21/25 11:33   SpO2 99 % 02/21/25 11:33   Vitals shown include unfiled device data.      Event Time   Out of Recovery 11:33:47         Pain/Alessia Score: Alessia Score: 10 (2/21/2025 11:12 AM)

## 2025-02-21 NOTE — ANESTHESIA PREPROCEDURE EVALUATION
02/21/2025  Deborah Sotelo is a 64 y.o., female.      Pre-op Assessment    I have reviewed the Patient Summary Reports.     I have reviewed the Nursing Notes. I have reviewed the NPO Status.   I have reviewed the Medications.     Review of Systems  Anesthesia Hx:  No problems with previous Anesthesia   History of prior surgery of interest to airway management or planning:          Denies Family Hx of Anesthesia complications.    Denies Personal Hx of Anesthesia complications.                    Cardiovascular:     Hypertension                                    Hypertension         Hepatic/GI:     GERD         Gerd          Musculoskeletal:  Arthritis        Arthritis          Neurological:   CVA Neuromuscular Disease,           Arthritis              CVA - Cerebrovasular Accident               Neuromuscular Disease   Endocrine:  Diabetes    Diabetes                        Active Ambulatory Problems     Diagnosis Date Noted    Embolic stroke involving left middle cerebral artery 02/12/2021    Type 2 diabetes mellitus with other specified complication 03/19/2021    Lower extremity edema 03/19/2021    Hypertension 03/19/2021    Hyperlipidemia 03/19/2021    Gastroesophageal reflux disease 11/09/2021    Needs flu shot 11/09/2021    Muscle spasm of left shoulder 01/18/2022    Shoulder pain 01/26/2022    Decreased range of motion of left shoulder 01/26/2022    Weakness of left arm 01/26/2022    Acute cystitis without hematuria 03/08/2022    Allergy to environmental factors 03/08/2022    Elevated alkaline phosphatase level 03/08/2022    Primary osteoarthritis of left shoulder 05/24/2022    Anemia 07/05/2022    Gastroenteritis 07/08/2022    Diabetic foot ulcer 07/08/2022    Screening for colon cancer 09/13/2022    Screening examination for STD (sexually transmitted disease) 09/13/2022    Pneumococcal vaccine  administered 09/13/2022    Encounter for diabetic foot exam 09/13/2022    Diabetic eye exam 09/13/2022    Diabetic foot infection 01/11/2023    Left shoulder pain 02/16/2023    Other chronic pain 02/28/2023    Screening mammogram for breast cancer 02/28/2023    Cellulitis of toe of right foot 12/09/2024    Vaginal candidiasis 12/09/2024     Resolved Ambulatory Problems     Diagnosis Date Noted    Ulcer of right foot with fat layer exposed 06/28/2021    Screening for thyroid disorder 11/09/2021    Sepsis 07/08/2022    Elevated serum creatinine 07/10/2022    Infected ulcer of skin     Urinary tract infection without hematuria 09/13/2022     Past Medical History:   Diagnosis Date    Diabetes mellitus, type 2     Obesity     Stroke      Review of patient's allergies indicates:   Allergen Reactions    Aspirin      Other reaction(s): UPSET STOMACH   Other reaction(s): Unknown    Bactrim ds [sulfamethoxazole-trimethoprim] Itching     Medications Ordered Prior to Encounter[1]  Past Surgical History:   Procedure Laterality Date    AMPUTATION      APPENDECTOMY      DEBRIDEMENT OF FOOT Right 01/12/2023    Procedure: DEBRIDEMENT, FOOT;  Surgeon: Ravi Ramirez MD;  Location: [facility];  Service: General;  Laterality: Right;    DEBRIDEMENT OF FOOT Right 12/10/2024    Procedure: DEBRIDEMENT, FOOT;  Surgeon: Dhaval Pizano MD;  Location: [facility] OR;  Service: General;  Laterality: Right;    EYE SURGERY      TOTAL REDUCTION MAMMOPLASTY           Physical Exam  General: Well nourished    Airway:  Mallampati: II   Mouth Opening: Normal  TM Distance: Normal, at least 6 cm  Tongue: Normal  Neck ROM: Normal ROM        Anesthesia Plan  Type of Anesthesia, risks & benefits discussed:    Anesthesia Type: MAC  Intra-op Monitoring Plan: Standard ASA Monitors  Post Op Pain Control Plan: multimodal analgesia  Induction:  IV  Informed Consent: Informed consent signed with the Patient and all parties understand the risks and agree with  anesthesia plan.  All questions answered. Patient consented to blood products? No  ASA Score: 3  Day of Surgery Review of History & Physical: H&P Update referred to the surgeon/provider.I have interviewed and examined the patient. I have reviewed the patient's H&P dated: There are no significant changes.     Ready For Surgery From Anesthesia Perspective.     .           [1]   Current Outpatient Medications on File Prior to Visit   Medication Sig Dispense Refill    amLODIPine (NORVASC) 5 MG tablet Take 1 tablet (5 mg total) by mouth once daily. 30 tablet 5    ammonium lactate 12 % Crea Apply 1 g topically Daily. 385 g 2    atorvastatin (LIPITOR) 80 MG tablet TAKE 1 TABLET BY MOUTH EACH NIGHT AT BEDTIME FOR CHOLESTEROL ~~DO NOT EAT GRAPEFRUIT OR DRINK GRAPEFRUIT JUICE WHILE TAKING THIS MEDICATION~~ 30 tablet 5    diclofenac sodium (VOLTAREN) 1 % Gel Apply 2 g topically 4 (four) times daily. 450 g 2    FEROSUL 325 mg (65 mg iron) Tab tablet       HYDROcodone-acetaminophen (NORCO) 7.5-325 mg per tablet Take 1 tablet by mouth every 6 (six) hours as needed for Pain. 15 tablet 0    insulin asp prt-insulin aspart, NovoLOG 70/30, (NOVOLOG 70/30) 100 unit/mL (70-30) Soln INJECT 50 UNITS SUB-Q EACH MORNING AND 35  UNITS EACH EVENING ...KEEP IN REFRIGERATOR ...USE WITHIN 28 DAYS AFTER OPENING EACH VIAL 30 mL 11    pantoprazole (PROTONIX) 40 MG tablet Take 40 mg by mouth once daily.      TRULICITY 1.5 mg/0.5 mL pen injector inject 0.5ml (1.5mg) UNDER THE SKIN EACH WEEK ...KEEP REFRIGERATED       No current facility-administered medications on file prior to visit.

## 2025-02-21 NOTE — DISCHARGE INSTRUCTIONS
Procedure Date  2/21/25     Impression  Overall Impression:   3 subcentimeter polyps  Scattered diverticulosis of mild severity in the descending colon and sigmoid colon  The ileocecal valve, appendiceal orifice, cecum and ascending colon appeared normal.  (grade 2) hemorrhoids        Recommendation  Await pathology results   Repeat colonoscopy in 5 years         Outcome of procedure: successful Colonoscopy  Disposition: patient to recovery following procedure; discharge to home when appropriate parameters met  Provisions for follow up: please call my office for any unexpected symptoms like chest or abdominal pain or bleeding following your procedure.    NO DRIVING, OPERATING EQUIPMENT, OR SIGNING LEGAL DOCUMENTS FOR 24 HOURS.THE NURSE WILL CALL YOU WITH YOUR BIOPSY RESULTS IN A FEW DAYS. IF YOU HAVE  [facility] MYCHART YOUR RESULTS WILL APPEAR THERE AS WELL.Please call the GI Lab if you have any nausea, vomiting, or abdominal pain.

## 2025-02-21 NOTE — TRANSFER OF CARE
"Anesthesia Transfer of Care Note    Patient: Deborah Sotelo    Procedure(s) Performed: * No procedures listed *    Patient location: GI    Anesthesia Type: general (pt was not Arousable even with painful stimulation during the procedure)    Transport from OR: Transported from OR on room air with adequate spontaneous ventilation    Post pain: adequate analgesia    Post assessment: no apparent anesthetic complications    Post vital signs: stable    Level of consciousness: sedated and responds to stimulation    Nausea/Vomiting: no nausea/vomiting    Complications: none    Transfer of care protocol was followedComments: Good SV continue, NAD noted, VSS, RTRN       Last vitals: Visit Vitals  /61 (BP Location: Right arm, Patient Position: Lying)   Pulse 88   Temp 36.6 °C (97.8 °F) (Oral)   Resp 13   Ht 5' 8" (1.727 m)   Wt 127 kg (280 lb)   SpO2 98%   Breastfeeding No   BMI 42.57 kg/m²     "

## 2025-02-24 LAB
ESTROGEN SERPL-MCNC: NORMAL PG/ML
INSULIN SERPL-ACNC: NORMAL U[IU]/ML
LAB AP GROSS DESCRIPTION: NORMAL
LAB AP LABORATORY NOTES: NORMAL
T3RU NFR SERPL: NORMAL %

## 2025-02-25 ENCOUNTER — RESULTS FOLLOW-UP (OUTPATIENT)
Dept: GASTROENTEROLOGY | Facility: HOSPITAL | Age: 65
End: 2025-02-25

## 2025-03-18 NOTE — PROGRESS NOTES
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 Wound remains stable. Continues to have callus jonathon-wound and hypergranulation tissue, bedside debridement today. Betadine and opticell ag to wound bed.     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 Wound continues to improve. Continue with current plan of care. Purple discoloration to lateral aspect of foot. Discussed applying foam border for protect and avoiding pressure to foot when in bed.     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 Since last visit, she was admitted and underwent debridement per Dr. Pizano. Wound has improved and patient reports significant improvement in pain. Wound packed with vashe moistened nuguaze and mepilex up applied. Set up with [facility].     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 Wound is worse today and continues to have hypergranulation tissue. Right foot with edema and erythema with blisters to lateral aspect  of foot. Patient reports increased pain and burning in her foot. Cultures today. Rocephin given IM in clinic and cipro to pharmacy. X-ray today. Acetic acid moistened drawtex to wound bed. Continue to keep pressure off wound. Prescription for wheelchair given to patient. Prescription for custom off-loading shoe  faxed to [facility].      5/ 22/2024  63 y.o. female presents to clinic for follow up on chronic-non healing wound on right TMA.Wound is healing well. Reports pain and tenderness to lateral ankle and cramps and spasms to lower leg. Labs ordered today. Continue with Aquacel ag to wound bed and ammonium lacate to soften callus.   Reinforced importance of local wound care, keeping pressure off foot, off-loading shoe, elevating legs, adherence to diabetic diet and strict glycemic control, and increasing protein intake.     Significant PMH diabetes, anemia, and CVA. Last HgA1C 7 in July, diabetes managed by PCP. Pertinent factors that delay wound healing include multiple co-morbidities, poor vascular supply, diabetes, edema, heavy drainage, decreased granulation tissue, tract(s), undermining and no protective sensation.       Review of Systems     Review of Systems   Constitutional:  Positive for activity change. Negative for appetite change, chills, diaphoresis and fever.   HENT: Negative.  Negative for congestion, ear pain, hearing loss and postnasal drip.    Eyes:  Negative for itching.   Respiratory:  Negative for chest tightness and shortness of breath.    Cardiovascular:  Positive for leg swelling. Negative for chest pain and palpitations.   Gastrointestinal:  Negative for abdominal pain.   Endocrine: Negative for polydipsia.   Genitourinary:  Negative for frequency.   Musculoskeletal:  Positive for arthralgias, back pain and joint swelling.        Severe shoulder pain, 10 of 10 on pain scale   Skin:  Positive for color change and wound.        See wound assessment   Neurological:  Positive for weakness and numbness. Negative for headaches.   Psychiatric/Behavioral:  Negative for agitation, behavioral problems, confusion and self-injury.        Medical / Social / Family History     Past Medical History:   Diagnosis Date    Anemia 7/5/2022    Diabetes mellitus, type 2     Hyperlipidemia     Hypertension      Obesity     Primary osteoarthritis of left shoulder 5/24/2022    Stroke        Past Surgical History:   Procedure Laterality Date    AMPUTATION      APPENDECTOMY      DEBRIDEMENT OF FOOT Right 01/12/2023    Procedure: DEBRIDEMENT, FOOT;  Surgeon: Ravi Ramirez MD;  Location: [facility] OR;  Service: General;  Laterality: Right;    DEBRIDEMENT OF FOOT Right 12/10/2024    Procedure: DEBRIDEMENT, FOOT;  Surgeon: Dhaval Pizano MD;  Location: [facility] OR;  Service: General;  Laterality: Right;    EYE SURGERY      TOTAL REDUCTION MAMMOPLASTY         Social History  Ms. Deborah Sotelo  reports that she has never smoked. She has never used smokeless tobacco. She reports current alcohol use. She reports that she does not use drugs.    Family History  Ms.'s Deborah Sotelo family history includes Appendicitis in her father; Asthma in her sister; Cancer in her mother; Diabetes in her brother.    Medications and Allergies     Medications  Outpatient Medications Marked as Taking for the 3/27/25 encounter (Office Visit) with Felisha Love FNP   Medication Sig Dispense Refill    amLODIPine (NORVASC) 5 MG tablet Take 1 tablet (5 mg total) by mouth once daily. 30 tablet 5    ammonium lactate 12 % Crea Apply 1 g topically Daily. 385 g 2    atorvastatin (LIPITOR) 80 MG tablet TAKE 1 TABLET BY MOUTH EACH NIGHT AT BEDTIME FOR CHOLESTEROL ~~DO NOT EAT GRAPEFRUIT OR DRINK GRAPEFRUIT JUICE WHILE TAKING THIS MEDICATION~~ 30 tablet 5    diclofenac sodium (VOLTAREN) 1 % Gel Apply 2 g topically 4 (four) times daily. 450 g 2    FEROSUL 325 mg (65 mg iron) Tab tablet       HYDROcodone-acetaminophen (NORCO) 7.5-325 mg per tablet Take 1 tablet by mouth every 6 (six) hours as needed for Pain. 15 tablet 0    insulin asp prt-insulin aspart, NovoLOG 70/30, (NOVOLOG 70/30) 100 unit/mL (70-30) Soln INJECT 50 UNITS SUB-Q EACH MORNING AND 35  UNITS EACH EVENING ...KEEP IN REFRIGERATOR ...USE WITHIN 28 DAYS AFTER OPENING EACH  VIAL 30 mL 11    TRULICITY 1.5 mg/0.5 mL pen injector inject 0.5ml (1.5mg) UNDER THE SKIN EACH WEEK ...KEEP REFRIGERATED         Allergies  Review of patient's allergies indicates:   Allergen Reactions    Aspirin      Other reaction(s): UPSET STOMACH   Other reaction(s): Unknown    Bactrim ds [sulfamethoxazole-trimethoprim] Itching       Physical Examination     Vitals:    03/27/25 1013   BP: (!) 144/74   Pulse: 83   Resp: 20   Temp: 97.5 °F (36.4 °C)                     Physical Exam  Vitals and nursing note reviewed.   Constitutional:       Comments: Tearful during exam   HENT:      Head: Normocephalic.   Cardiovascular:      Rate and Rhythm: Normal rate and regular rhythm.      Pulses: Normal pulses.      Heart sounds: Normal heart sounds.   Pulmonary:      Effort: Pulmonary effort is normal. No respiratory distress.   Chest:      Chest wall: No tenderness.   Musculoskeletal:         General: Swelling, tenderness and deformity present.      Right lower leg: Edema present.      Comments: Left shoulder decreased ROM with weakness to left upper extremity   Skin:     General: Skin is warm.      Capillary Refill: Capillary refill takes 2 to 3 seconds.      Findings: Erythema and lesion present.      Comments: Wound, see LDA for measurements and picture   Neurological:      Mental Status: She is alert and oriented to person, place, and time.   Psychiatric:         Mood and Affect: Mood normal.         Behavior: Behavior normal.         Thought Content: Thought content normal.         Judgment: Judgment normal.     Assessment and Plan             Wound 01/18/21 1051 Diabetic Ulcer Right lateral Plantar #1 (Active)   01/18/21 1051    Pre-existing: Yes   Primary Wound Type: Diabetic ulc   Side: Right   Orientation: lateral   Location: Plantar   Wound Number: #1   Ankle-Brachial Index:    Pulses: normal   Removal Indication and Assessment:    Wound Outcome:    (Retired) Wound Type:    (Retired) Wound Length (cm):     (Retired) Wound Width (cm):    (Retired) Depth (cm):    Wound Description (Comments):    Removal Indications:    Wound Image   03/27/25 1054   Wound WDL WDL 03/27/25 1013   Dressing Appearance Moist drainage;Intact 03/27/25 1013   Drainage Amount Moderate 03/27/25 1013   Drainage Characteristics/Odor Serosanguineous 03/27/25 1013   Appearance Red;Moist;Granulating 03/27/25 1013   Tissue loss description Full thickness 03/27/25 1013   Black (%), Wound Tissue Color 0 % 03/27/25 1013   Red (%), Wound Tissue Color 100 % 03/27/25 1013   Yellow (%), Wound Tissue Color 0 % 03/27/25 1013   Periwound Area Intact;Moist 03/27/25 1013   Wound Edges Callused 03/27/25 1013   Johnson Classification (diabetic foot ulcers only) Grade 1 03/27/25 1013   Wound Length (cm) 1.3 cm 03/27/25 1013   Wound Width (cm) 1.8 cm 03/27/25 1013   Wound Depth (cm) 0.4 cm 03/27/25 1013   Wound Volume (cm^3) 0.49 cm^3 03/27/25 1013   Wound Surface Area (cm^2) 1.84 cm^2 03/27/25 1013   Care Cleansed with:;Soap and water 03/27/25 1013   Dressing Applied;Calcium alginate;Gauze;Rolled gauze;Silver 03/27/25 1013                     Problem List Items Addressed This Visit          Endocrine    Diabetic foot ulcer - Primary    Overview                                                                                Current Assessment & Plan   Clean with vashe or baby shampoo and water    Apply betadine to wound, then cover with aquacel ag and dry dressing,wrap with meng,secure with paper tape. Change daily and as needed       Monitor closely for s/s of infection including fever, chills, increase in pain, odor from wound, and increased redness from foot. Go to ER if any complications develop.   Keep leg elevated and avoid pressure on wound  Diabetes:  Monitor glucose closely. Check fasting glucose and 2 hours after meals. HgA1C goal <7, fasting glucose , and 2 hours after meals <180  Hypertension:  Check blood pressure twice daily, goal <120/80  Diet:    Increase protein intake, avoid fried, fatty foods and foods high in simple carbs.   Vitamins:  Take vitamin C 1000 mg, zinc 50mg, vitamin d 5000 units, and a daily multivitamin. Dawson is a good source of protein and nutrients to aid in wound healing.             Future Appointments   Date Time Provider Department Center   4/10/2025 10:00 AM Felisha Love FNP [facility]    4/24/2025  9:40 AM Kylee Blood FNP [facility]   2/11/2026 10:40 AM [facility] [place]            Signature:  TOMASA Maldonado  [facility]  [address] [facility] [place] [address]  [phone]    Date of encounter: 3/27/25

## 2025-03-18 NOTE — PATIENT INSTRUCTIONS
Clean with vashe or baby shampoo and water    Apply betadine to wound, then cover with aquacel ag and dry dressing,wrap with meng,secure with paper tape. Change daily and as needed       Monitor closely for s/s of infection including fever, chills, increase in pain, odor from wound, and increased redness from foot. Go to ER if any complications develop.   Keep leg elevated and avoid pressure on wound  Diabetes:  Monitor glucose closely. Check fasting glucose and 2 hours after meals. HgA1C goal <7, fasting glucose , and 2 hours after meals <180  Hypertension:  Check blood pressure twice daily, goal <120/80  Diet:   Increase protein intake, avoid fried, fatty foods and foods high in simple carbs.   Vitamins:  Take vitamin C 1000 mg, zinc 50mg, vitamin d 5000 units, and a daily multivitamin. Dawson is a good source of protein and nutrients to aid in wound healing.

## 2025-03-27 ENCOUNTER — OFFICE VISIT (OUTPATIENT)
Dept: WOUND CARE | Facility: CLINIC | Age: 65
End: 2025-03-27
Payer: MEDICAID

## 2025-03-27 VITALS
DIASTOLIC BLOOD PRESSURE: 74 MMHG | RESPIRATION RATE: 20 BRPM | HEART RATE: 83 BPM | TEMPERATURE: 98 F | SYSTOLIC BLOOD PRESSURE: 144 MMHG

## 2025-03-27 DIAGNOSIS — L97.413 DIABETIC ULCER OF RIGHT MIDFOOT ASSOCIATED WITH TYPE 2 DIABETES MELLITUS, WITH NECROSIS OF MUSCLE: Primary | ICD-10-CM

## 2025-03-27 DIAGNOSIS — E11.621 DIABETIC ULCER OF RIGHT MIDFOOT ASSOCIATED WITH TYPE 2 DIABETES MELLITUS, WITH NECROSIS OF MUSCLE: Primary | ICD-10-CM

## 2025-03-27 PROCEDURE — 11042 DBRDMT SUBQ TIS 1ST 20SQCM/<: CPT | Mod: PBBFAC | Performed by: NURSE PRACTITIONER

## 2025-03-27 PROCEDURE — 99499 UNLISTED E&M SERVICE: CPT | Mod: S$PBB,,, | Performed by: NURSE PRACTITIONER

## 2025-03-27 PROCEDURE — 99999 PR PBB SHADOW E&M-EST. PATIENT-LVL IV: CPT | Mod: PBBFAC,,, | Performed by: NURSE PRACTITIONER

## 2025-03-27 PROCEDURE — 99214 OFFICE O/P EST MOD 30 MIN: CPT | Mod: PBBFAC | Performed by: NURSE PRACTITIONER

## 2025-03-27 NOTE — PROGRESS NOTES
Debridement Performed for Assessment: Wound# 1  Performed By: Provider: Felisha Sun NP  Assistant: TITO Vega, RN    Debridement: Surgical    Photo taken post procedure:    Time-Out Taken: Yes  Level: Skin/Subcutaneous Tissue  Post Debridement Measurements  Length: (cm) 1.4  Width: (cm) 1.9  Depth: (cm)       Area: (cm²) 2.66  Percent Debrided: 100%  Total Area Debrided: (sq cm) 2.66    Tissue and other material debrided:  Adipose, Dermis, Epidermis, Subcutaneous  Devitalized Tissue Debrided:Biofilm, Slough, Fibrin  Instrument: Curette  Bleeding: Minimal  Hemostasis Achieved: Pressure  Procedural Pain: Insensate  Post Procedural Pain: Insensate  Response to Treatment: Procedure was tolerated well    Devitalized materials/tissue Removed  the following was removed during debridement  subcutaneous      Post Debridement Diagnosis  Post debridement diagnosis  Same as Pre-operative debridement diagnosis, No Complications noted.      Grafts or implants applied  Was a graft or implant applied?  No      Procedure assistant  Procedure assisted by:  Assistant is the same as nurse listed above      Complications related to procedure  Did any complication occure during procedure?  No complications noted during or after procedure.      Specimen  Specimen collect during procedure?  No specimen collected      Anaesthesia:  Anesthesia used  None      Blood Loss:  Blood loss during procedure  less than 5 cc

## 2025-03-27 NOTE — PROCEDURES
"Debridement    Date/Time: 3/27/2025 10:00 AM    Performed by: Felisha Love FNP  Authorized by: Felisha Love FNP    Time out: Immediately prior to procedure a "time out" was called to verify the correct patient, procedure, equipment, support staff and site/side marked as required.    Consent Done?:  Yes (Written)    Wound Details:    Location:  Right foot    Location:  Right Midfoot    Type of Debridement:  Excisional       Length (cm):  1.4       Width (cm):  1.9       Depth (cm):  0.5       Area (sq cm):  2.09       Percent Debrided (%):  100       Total Area Debrided (sq cm):  2.09    Depth of debridement:  Subcutaneous tissue    Tissue debrided:  Adipose, Dermis, Epidermis and Subcutaneous    Devitalized tissue debrided:  Biofilm, Callus, Clots, Exudate and Fibrin    Instruments:  Curette  Bleeding:  Moderate  Hemostasis Achieved: Yes  Method Used:  Pressure  Patient tolerance:  Patient tolerated the procedure well with no immediate complications  1st Wound Pain Assessment: 0     Assistant KOJO Mello RN    "

## 2025-04-01 NOTE — PROGRESS NOTES
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 Wound  is smaller today with improvement in drainage. Continues to have callus jonathon-wound and biofilm to wound bed, bedside debridement today. Betadine and opticell ag to wound bed.     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 Wound continues to improve. Continue with current plan of care. Purple discoloration to lateral aspect of foot. Discussed applying foam border for protect and avoiding pressure to foot when in bed.     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 Since last visit, she was admitted and underwent debridement per Dr. Pizano. Wound has improved and patient reports significant improvement in pain. Wound packed with vashe moistened nuguaze and mepilex up applied. Set up with [facility].     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 Wound is worse today and continues to have hypergranulation tissue. Right foot with edema and erythema with blisters to lateral aspect  of foot. Patient reports increased pain and burning in her foot. Cultures today. Rocephin given IM in clinic and cipro to pharmacy. X-ray today. Acetic acid moistened drawtex to wound bed. Continue to keep pressure off wound. Prescription for wheelchair given to patient. Prescription for custom off-loading  shoe faxed to [facility].      5/ 22/2024  63 y.o. female presents to clinic for follow up on chronic-non healing wound on right TMA.Wound is healing well. Reports pain and tenderness to lateral ankle and cramps and spasms to lower leg. Labs ordered today. Continue with Aquacel ag to wound bed and ammonium lacate to soften callus.   Reinforced importance of local wound care, keeping pressure off foot, off-loading shoe, elevating legs, adherence to diabetic diet and strict glycemic control, and increasing protein intake.     Significant PMH diabetes, anemia, and CVA. Last HgA1C 7 in July, diabetes managed by PCP. Pertinent factors that delay wound healing include multiple co-morbidities, poor vascular supply, diabetes, edema, heavy drainage, decreased granulation tissue, tract(s), undermining and no protective sensation.       Review of Systems     Review of Systems   Constitutional:  Positive for activity change. Negative for appetite change, chills, diaphoresis and fever.   HENT: Negative.  Negative for congestion, ear pain, hearing loss and postnasal drip.    Eyes:  Negative for itching.   Respiratory:  Negative for chest tightness and shortness of breath.    Cardiovascular:  Positive for leg swelling. Negative for chest pain and palpitations.   Gastrointestinal:  Negative for abdominal pain.   Endocrine: Negative for polydipsia.   Genitourinary:  Negative for frequency.   Musculoskeletal:  Positive for arthralgias, back pain and joint swelling.        Severe shoulder pain, 10 of 10 on pain scale   Skin:  Positive for color change and wound.        See wound assessment   Neurological:  Positive for weakness and numbness. Negative for headaches.   Psychiatric/Behavioral:  Negative for agitation, behavioral problems, confusion and self-injury.        Medical / Social / Family History     Past Medical History:   Diagnosis Date    Anemia 7/5/2022    Diabetes mellitus, type 2     Hyperlipidemia      Hypertension     Obesity     Primary osteoarthritis of left shoulder 5/24/2022    Stroke        Past Surgical History:   Procedure Laterality Date    AMPUTATION      APPENDECTOMY      DEBRIDEMENT OF FOOT Right 01/12/2023    Procedure: DEBRIDEMENT, FOOT;  Surgeon: Ravi Ramirez MD;  Location: [facility] OR;  Service: General;  Laterality: Right;    DEBRIDEMENT OF FOOT Right 12/10/2024    Procedure: DEBRIDEMENT, FOOT;  Surgeon: Dhaval Pizano MD;  Location: [facility] OR;  Service: General;  Laterality: Right;    EYE SURGERY      TOTAL REDUCTION MAMMOPLASTY         Social History  Ms. Deborah Sotelo  reports that she has never smoked. She has never used smokeless tobacco. She reports current alcohol use. She reports that she does not use drugs.    Family History  Ms.'s Deborah Sotelo family history includes Appendicitis in her father; Asthma in her sister; Cancer in her mother; Diabetes in her brother.    Medications and Allergies     Medications  Outpatient Medications Marked as Taking for the 4/10/25 encounter (Office Visit) with Felisha Love FNP   Medication Sig Dispense Refill    amLODIPine (NORVASC) 5 MG tablet Take 1 tablet (5 mg total) by mouth once daily. 30 tablet 5    ammonium lactate 12 % Crea Apply 1 g topically Daily. 385 g 2    atorvastatin (LIPITOR) 80 MG tablet TAKE 1 TABLET BY MOUTH EACH NIGHT AT BEDTIME FOR CHOLESTEROL ~~DO NOT EAT GRAPEFRUIT OR DRINK GRAPEFRUIT JUICE WHILE TAKING THIS MEDICATION~~ 30 tablet 5    diclofenac sodium (VOLTAREN) 1 % Gel Apply 2 g topically 4 (four) times daily. 450 g 2    FEROSUL 325 mg (65 mg iron) Tab tablet          Allergies  Review of patient's allergies indicates:   Allergen Reactions    Aspirin      Other reaction(s): UPSET STOMACH   Other reaction(s): Unknown    Bactrim ds [sulfamethoxazole-trimethoprim] Itching       Physical Examination     Vitals:    04/10/25 1017   BP: 132/73   Pulse: 97   Resp: 20   Temp: 97.1 °F (36.2 °C)                        Physical Exam  Vitals and nursing note reviewed.   Constitutional:       Comments: Tearful during exam   HENT:      Head: Normocephalic.   Cardiovascular:      Rate and Rhythm: Normal rate and regular rhythm.      Pulses: Normal pulses.      Heart sounds: Normal heart sounds.   Pulmonary:      Effort: Pulmonary effort is normal. No respiratory distress.   Chest:      Chest wall: No tenderness.   Musculoskeletal:         General: Swelling, tenderness and deformity present.      Right lower leg: Edema present.      Comments: Left shoulder decreased ROM with weakness to left upper extremity   Skin:     General: Skin is warm.      Capillary Refill: Capillary refill takes 2 to 3 seconds.      Findings: Erythema and lesion present.      Comments: Wound, see LDA for measurements and picture   Neurological:      Mental Status: She is alert and oriented to person, place, and time.   Psychiatric:         Mood and Affect: Mood normal.         Behavior: Behavior normal.         Thought Content: Thought content normal.         Judgment: Judgment normal.     Assessment and Plan             Incision/Site 01/12/23 1251 Right Foot (Active)   01/12/23 1251   Present Prior to Hospital Arrival?:    Side: Right   Location: Foot   Orientation:    Incision Type:    Closure Method:    Additional Comments:    Removal Indication and Assessment:    Wound Outcome:    Removal Indications:    Wound Image   04/10/25 1043   Dressing Appearance Moist drainage 04/10/25 1021   Drainage Amount Moderate 04/10/25 1021   Drainage Characteristics/Odor Serosanguineous 04/10/25 1021   Appearance Pink;Moist;Granulating 04/10/25 1021   Black (%), Wound Tissue Color 0 % 04/10/25 1021   Red (%), Wound Tissue Color 100 % 04/10/25 1021   Yellow (%), Wound Tissue Color 0 % 04/10/25 1021   Periwound Area Intact;Dry 04/10/25 1021   Wound Edges Callused 04/10/25 1021   Wound Length (cm) 0.5 cm 04/10/25 1021   Wound Width (cm) 0.6 cm 04/10/25  1021   Wound Depth (cm) 0.3 cm 04/10/25 1021   Wound Volume (cm^3) 0.047 cm^3 04/10/25 1021   Wound Surface Area (cm^2) 0.24 cm^2 04/10/25 1021   Care Cleansed with:;Antimicrobial agent;Soap and water 04/10/25 1021   Dressing Applied;Gauze;Rolled gauze 04/10/25 1021   Periwound Care Moisturizer applied 04/10/25 1021   Dressing Change Due 04/11/25 04/10/25 1021                       Problem List Items Addressed This Visit          Endocrine    Diabetic foot ulcer - Primary    Overview                                                                                  Current Assessment & Plan   Clean with vashe or baby shampoo and water    Apply betadine to wound, then cover with aquacel ag and dry dressing,wrap with meng,secure with paper tape. Change daily and as needed       Monitor closely for s/s of infection including fever, chills, increase in pain, odor from wound, and increased redness from foot. Go to ER if any complications develop.   Keep leg elevated and avoid pressure on wound  Diabetes:  Monitor glucose closely. Check fasting glucose and 2 hours after meals. HgA1C goal <7, fasting glucose , and 2 hours after meals <180  Hypertension:  Check blood pressure twice daily, goal <120/80  Diet:   Increase protein intake, avoid fried, fatty foods and foods high in simple carbs.   Vitamins:  Take vitamin C 1000 mg, zinc 50mg, vitamin d 5000 units, and a daily multivitamin. Dawson is a good source of protein and nutrients to aid in wound healing.             Future Appointments   Date Time Provider Department Center   4/24/2025  9:40 AM Kylee Blood FNP [facility]   5/2/2025 10:00 AM [facility]    2/11/2026 10:40 AM [facility] [place]            Signature:  TOMASA Maldonado  [facility]  [address]  [place] [address]  [phone]    Date of encounter:  4/10/25

## 2025-04-10 ENCOUNTER — OFFICE VISIT (OUTPATIENT)
Dept: WOUND CARE | Facility: CLINIC | Age: 65
End: 2025-04-10
Payer: MEDICAID

## 2025-04-10 VITALS
RESPIRATION RATE: 20 BRPM | SYSTOLIC BLOOD PRESSURE: 132 MMHG | HEART RATE: 97 BPM | TEMPERATURE: 97 F | DIASTOLIC BLOOD PRESSURE: 73 MMHG

## 2025-04-10 DIAGNOSIS — E11.621 DIABETIC ULCER OF RIGHT MIDFOOT ASSOCIATED WITH TYPE 2 DIABETES MELLITUS, WITH NECROSIS OF MUSCLE: Primary | ICD-10-CM

## 2025-04-10 DIAGNOSIS — L97.413 DIABETIC ULCER OF RIGHT MIDFOOT ASSOCIATED WITH TYPE 2 DIABETES MELLITUS, WITH NECROSIS OF MUSCLE: Primary | ICD-10-CM

## 2025-04-10 PROCEDURE — 99214 OFFICE O/P EST MOD 30 MIN: CPT | Mod: PBBFAC | Performed by: NURSE PRACTITIONER

## 2025-04-10 PROCEDURE — 11042 DBRDMT SUBQ TIS 1ST 20SQCM/<: CPT | Mod: PBBFAC | Performed by: NURSE PRACTITIONER

## 2025-04-10 PROCEDURE — 99999 PR PBB SHADOW E&M-EST. PATIENT-LVL IV: CPT | Mod: PBBFAC,,, | Performed by: NURSE PRACTITIONER

## 2025-04-10 PROCEDURE — 99499 UNLISTED E&M SERVICE: CPT | Mod: S$PBB,,, | Performed by: NURSE PRACTITIONER

## 2025-04-10 RX ORDER — NITROFURANTOIN 25; 75 MG/1; MG/1
100 CAPSULE ORAL 2 TIMES DAILY
Qty: 20 CAPSULE | Refills: 0 | Status: SHIPPED | OUTPATIENT
Start: 2025-04-10 | End: 2025-04-20

## 2025-04-10 NOTE — PROCEDURES
"Debridement    Date/Time: 4/10/2025 10:00 AM    Performed by: Felisha Love FNP  Authorized by: Felisha Love FNP    Time out: Immediately prior to procedure a "time out" was called to verify the correct patient, procedure, equipment, support staff and site/side marked as required.    Consent Done?:  Yes (Written)    Wound Details:    Location:  Right foot    Location:  Right Midfoot    Type of Debridement:  Excisional       Length (cm):  1.1       Width (cm):  0.8       Depth (cm):  0.3       Area (sq cm):  0.69       Percent Debrided (%):  100       Total Area Debrided (sq cm):  0.69    Depth of debridement:  Subcutaneous tissue    Tissue debrided:  Adipose, Dermis, Epidermis and Subcutaneous    Devitalized tissue debrided:  Biofilm, Clots, Callus, Exudate, Fibrin and Slough    Instruments:  Curette  Bleeding:  Moderate  Hemostasis Achieved: Yes  Method Used:  Pressure  Patient tolerance:  Patient tolerated the procedure well with no immediate complications  1st Wound Pain Assessment: 0     Assistant TIFFANI Duvall LPN     "

## 2025-04-10 NOTE — PROGRESS NOTES
Debridement Performed for Assessment: Wound#  Right foot  Performed By: Provider: Felisha Sun NP  Assistant: Cici Duvall LPN    Debridement: Surgical    Photo taken post procedure:    Time-Out Taken: Yes  Level: Skin/Subcutaneous Tissue  Post Debridement Measurements  Length: (cm) 1.1  Width: (cm) 0.8  Depth: (cm) 0.3      Area: (cm²) 0.88  Percent Debrided: 100%  Total Area Debrided: (sq cm)     Tissue and other material debrided:  Adipose, Dermis, Epidermis, Subcutaneous  Devitalized Tissue Debrided:Biofilm, Slough, Fibrin  Instrument: Curette  Bleeding: Moderate  Hemostasis Achieved: Pressure  Procedural Pain: Insensate  Post Procedural Pain: Insensate  Response to Treatment: Procedure was tolerated well    Devitalized materials/tissue Removed  the following was removed during debridement  subcutaneous      Post Debridement Diagnosis  chronic right diabetic foot ulcer  Post debridement diagnosis  Same as Pre-operative debridement diagnosis, No Complications noted.      Grafts or implants applied  Was a graft or implant applied?  No      Procedure assistant  Procedure assisted by:  Assistant is the same as nurse listed above      Complications related to procedure  Did any complication occure during procedure?  No complications noted during or after procedure.      Specimen  Specimen collect during procedure?  No specimen collected      Anaesthesia:  Anesthesia used  None      Blood Loss:  Blood loss during procedure  less than 5 cc

## 2025-04-24 ENCOUNTER — OFFICE VISIT (OUTPATIENT)
Dept: GASTROENTEROLOGY | Facility: CLINIC | Age: 65
End: 2025-04-24
Payer: MEDICAID

## 2025-04-24 VITALS
HEART RATE: 84 BPM | HEIGHT: 68 IN | DIASTOLIC BLOOD PRESSURE: 69 MMHG | BODY MASS INDEX: 44.41 KG/M2 | WEIGHT: 293 LBS | SYSTOLIC BLOOD PRESSURE: 106 MMHG | OXYGEN SATURATION: 99 %

## 2025-04-24 DIAGNOSIS — R10.32 LEFT LOWER QUADRANT PAIN: Primary | ICD-10-CM

## 2025-04-24 DIAGNOSIS — K59.04 CHRONIC IDIOPATHIC CONSTIPATION: ICD-10-CM

## 2025-04-24 PROCEDURE — 99214 OFFICE O/P EST MOD 30 MIN: CPT | Mod: PBBFAC

## 2025-04-24 PROCEDURE — 3008F BODY MASS INDEX DOCD: CPT | Mod: CPTII,,,

## 2025-04-24 PROCEDURE — 1159F MED LIST DOCD IN RCRD: CPT | Mod: CPTII,,,

## 2025-04-24 PROCEDURE — 3078F DIAST BP <80 MM HG: CPT | Mod: CPTII,,,

## 2025-04-24 PROCEDURE — 4010F ACE/ARB THERAPY RXD/TAKEN: CPT | Mod: CPTII,,,

## 2025-04-24 PROCEDURE — 99214 OFFICE O/P EST MOD 30 MIN: CPT | Mod: S$PBB,,,

## 2025-04-24 PROCEDURE — 1160F RVW MEDS BY RX/DR IN RCRD: CPT | Mod: CPTII,,,

## 2025-04-24 PROCEDURE — 99999 PR PBB SHADOW E&M-EST. PATIENT-LVL IV: CPT | Mod: PBBFAC,,,

## 2025-04-24 PROCEDURE — 3074F SYST BP LT 130 MM HG: CPT | Mod: CPTII,,,

## 2025-04-24 NOTE — PROGRESS NOTES
Gastroenterology Clinic Note    Patient ID: [id number]   Referring MD: No ref. provider found   Chief Complaint:   Chief Complaint   Patient presents with    Follow-up       History of Present Illness   Deborah Sotelo is an 64 y.o. AAF who is referred for abdominal pain.  Patient endorses left lower quadrant abdominal pain.  Symptoms have been present daily since onset approximately 2 months ago.  She was previously taking Linzess daily for her bowels and this was working well for her until she could no longer get it filled at the pharmacy.  She associates her symptoms starting around the time that she was out of her Linzess.  She denies hematochezia or melena.  She is up-to-date on colonoscopy.  No recent abdominal imaging.    Last colonoscopy was 02/21/2025 with 5 year recall for screening purposes.    Review of Systems   Constitutional:  Negative for weight loss.   Gastrointestinal:  Positive for abdominal pain and constipation. Negative for blood in stool, diarrhea, heartburn, melena and nausea.       Past Medical History      Past Medical History:   Diagnosis Date    Anemia 7/5/2022    Diabetes mellitus, type 2     Hyperlipidemia     Hypertension     Obesity     Primary osteoarthritis of left shoulder 5/24/2022    Stroke        Past Surgical History     Past Surgical History:   Procedure Laterality Date    AMPUTATION      APPENDECTOMY      DEBRIDEMENT OF FOOT Right 01/12/2023    Procedure: DEBRIDEMENT, FOOT;  Surgeon: Ravi Ramirez MD;  Location: [facility] OR;  Service: General;  Laterality: Right;    DEBRIDEMENT OF FOOT Right 12/10/2024    Procedure: DEBRIDEMENT, FOOT;  Surgeon: Dhaval Pizano MD;  Location: [facility] OR;  Service: General;  Laterality: Right;    EYE SURGERY      TOTAL REDUCTION MAMMOPLASTY         Allergies     Review of patient's allergies indicates:   Allergen Reactions    Aspirin      Other reaction(s): UPSET STOMACH   Other reaction(s): Unknown    Bactrim ds  [sulfamethoxazole-trimethoprim] Itching       Immunization History     Immunization History   Administered Date(s) Administered    COVID-19, MRNA, LN-S, PF (Pfizer) (Purple Cap) 04/05/2021, 04/28/2021    Influenza - Quadrivalent - PF *Preferred* (6 months and older) 11/10/2021    Pneumococcal Conjugate - 20 Valent 09/13/2022    Pneumococcal Polysaccharide - 23 Valent 12/08/2003       Past Family History      Family History   Problem Relation Name Age of Onset    Cancer Mother      Appendicitis Father      Asthma Sister      Diabetes Brother         Past Social History    Social History[1]    Current Medications     Outpatient Medications Marked as Taking for the 4/24/25 encounter (Office Visit) with Kylee Blood FNP   Medication Sig Dispense Refill    amLODIPine (NORVASC) 5 MG tablet Take 1 tablet (5 mg total) by mouth once daily. 30 tablet 5    ammonium lactate 12 % Crea Apply 1 g topically Daily. 385 g 2    atorvastatin (LIPITOR) 80 MG tablet TAKE 1 TABLET BY MOUTH EACH NIGHT AT BEDTIME FOR CHOLESTEROL ~~DO NOT EAT GRAPEFRUIT OR DRINK GRAPEFRUIT JUICE WHILE TAKING THIS MEDICATION~~ 30 tablet 5    diclofenac sodium (VOLTAREN) 1 % Gel Apply 2 g topically 4 (four) times daily. 450 g 2    FEROSUL 325 mg (65 mg iron) Tab tablet       HYDROcodone-acetaminophen (NORCO) 7.5-325 mg per tablet Take 1 tablet by mouth every 6 (six) hours as needed for Pain. 15 tablet 0    insulin asp prt-insulin aspart, NovoLOG 70/30, (NOVOLOG 70/30) 100 unit/mL (70-30) Soln INJECT 50 UNITS SUB-Q EACH MORNING AND 35  UNITS EACH EVENING ...KEEP IN REFRIGERATOR ...USE WITHIN 28 DAYS AFTER OPENING EACH VIAL 30 mL 11    pantoprazole (PROTONIX) 40 MG tablet Take 40 mg by mouth once daily.      TRULICITY 1.5 mg/0.5 mL pen injector inject 0.5ml (1.5mg) UNDER THE SKIN EACH WEEK ...KEEP REFRIGERATED          I have reviewed the current medications, allergies, vital signs, past medical and surgical history, family medical history, and social history  "for this encounter and agree with all findings.    OBJECTIVE    Physical Exam    /69   Pulse 84   Ht 5' 8" (1.727 m)   Wt (!) 142.9 kg (315 lb)   SpO2 99%   BMI 47.90 kg/m²   GEN: Well appearing, cooperative, NAD  NECK: Supple, no LAD  CV: Normal rate  RESP: Unlabored  ABD: ND, NT, soft, no guarding  EXT: No clubbing, cyanosis, or edema  SKIN: Warm and dry  NEURO: AAO x4.     LABS    CBC (with or without Differential):   Lab Results   Component Value Date    WBC 5.73 12/11/2024    HGB 9.2 (L) 12/11/2024    HCT 27.5 (L) 12/11/2024    MCV 76.6 (L) 12/11/2024    MCH 25.6 (L) 12/11/2024    MCHC 33.5 12/11/2024    RDW 14.1 12/11/2024     12/11/2024    MPV 11.3 12/11/2024    NEUTOPHILPCT 58.3 12/11/2024    DIFFTYPE Auto 12/11/2024     BMP/CMP:   Lab Results   Component Value Date     (L) 12/11/2024    K 4.6 12/11/2024     (H) 12/11/2024    CO2 19 (L) 12/11/2024    BUN 25 (H) 12/11/2024    CREATININE 1.33 (H) 12/11/2024     (H) 12/11/2024    CALCIUM 8.0 (L) 12/11/2024    ALBUMIN 3.4 (L) 06/11/2023    AST 26 06/11/2023    ALT 39 06/11/2023    ALKPHOS 152 (H) 06/11/2023    MG 1.5 (L) 01/13/2023        IMAGING  No pertinent imaging available.    ASSESSMENT  Deborah Sotelo is a 64 y.o. AAF with history of CVA, hypertension, hyperlipidemia, anemia, type 2 diabetes, GERD, and chronic constipation who is referred for abdominal pain.    1. Left lower quadrant pain    2. Chronic idiopathic constipation           PLAN    - CT abdomen pelvis with IV contrast for left lower quadrant abdominal pain  - Linzess 145 mcg daily for chronic constipation  - return to GI clinic for follow-up in 6 months, sooner as needed    There are no Patient Instructions on file for this visit.      Orders Placed This Encounter   Procedures    CT Abdomen Pelvis With IV Contrast NO Oral Contrast     Standing Status:   Future     Expected Date:   4/24/2025     Expiration Date:   4/24/2026     Does this patient have impaired " renal function?:   No     May the Radiologist modify the order per protocol to meet the clinical needs of the patient?:   Yes     Oral/Rectal Contrast instructions::   NO Oral Contrast    Creatinine, serum     Standing Status:   Future     Expected Date:   4/24/2025     Expiration Date:   7/23/2026         The risks and benefits of my recommendations, as well as other treatment options were discussed with the patient today. All questions were answered.    35 minutes of total time spent on the encounter, which includes face to face time and non-face to face time preparing to see the patient (eg, review of tests), obtaining and/or reviewing separately obtained history, documenting clinical information in the electronic or other health record, Independently interpreting results (not separately reported) and communicating results to the patient/family/caregiver, or care coordination (not separately reported).        TOMASA Blevins/ACNP  [facility]         [1]   Social History  Socioeconomic History    Marital status:    Occupational History    Occupation: disabled   Tobacco Use    Smoking status: Never    Smokeless tobacco: Never   Substance and Sexual Activity    Alcohol use: Yes    Drug use: Never    Sexual activity: Never     Social Drivers of Health     Financial Resource Strain: Low Risk  (12/10/2024)    Overall Financial Resource Strain (CARDIA)     Difficulty of Paying Living Expenses: Not hard at all   Food Insecurity: No Food Insecurity (12/10/2024)    Hunger Vital Sign     Worried About Running Out of Food in the Last Year: Never true     Ran Out of Food in the Last Year: Never true   Transportation Needs: No Transportation Needs (12/10/2024)    TRANSPORTATION NEEDS     Transportation : No   Physical Activity: Inactive (1/13/2023)    Exercise Vital Sign     Days of Exercise per Week: 0 days     Minutes of Exercise per Session: 0 min   Stress: No Stress Concern Present  (12/10/2024)    [employer] - Occupational Stress Questionnaire     Feeling of Stress : Not at all   Housing Stability: Unknown (12/10/2024)    Housing Stability Vital Sign     Unable to Pay for Housing in the Last Year: No     Homeless in the Last Year: No

## 2025-05-20 NOTE — PATIENT INSTRUCTIONS
Clean with vashe or baby shampoo and water  Mix A&D ointment with Clotrimazole/betamethasone cream and apply to dry skin on foot.   Cover with aquacel ag and dry dressing,wrap with meng,secure with paper tape. Change daily and as needed       Monitor closely for s/s of infection including fever, chills, increase in pain, odor from wound, and increased redness from foot. Go to ER if any complications develop.   Keep leg elevated and avoid pressure on wound  Diabetes:  Monitor glucose closely. Check fasting glucose and 2 hours after meals. HgA1C goal <7, fasting glucose , and 2 hours after meals <180  Hypertension:  Check blood pressure twice daily, goal <120/80  Diet:   Increase protein intake, avoid fried, fatty foods and foods high in simple carbs.   Vitamins:  Take vitamin C 1000 mg, zinc 50mg, vitamin d 5000 units, and a daily multivitamin. Dawson is a good source of protein and nutrients to aid in wound healing.

## 2025-05-20 NOTE — PROGRESS NOTES
TOMASA Maldonado   [facility]  [address]  [facility] [place] [address]  [phone]      PATIENT NAME: Deborah Sotelo  : 1960  DATE: 25  MRN: [id number]      Billing Provider: TOMASA Maldonado  Level of Service:   Patient PCP Information       Provider PCP Type    TOMASA Yan General            Reason for Visit / Chief Complaint: Diabetic Foot Ulcer and Wound Check (Right foot)       History of Present Illness / Problem Focused Workflow     Deborah Sotelo is a 63 yo female presents for follow up on chronic non healing wound to right TMA. Wound is healing well. Small open area noted today. Callus trimmed at bedside. Antifungal applied jonathon-wound. Aquacel to open area.         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 Wound continues to improve. Continue with current plan of care. Purple discoloration to lateral aspect of foot. Discussed applying foam border for protect and avoiding pressure to foot when in bed.     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 Since last visit, she was admitted and underwent debridement per Dr. Pizano. Wound has improved and patient reports significant improvement in pain. Wound packed with vashe moistened nuguaze and mepilex up applied. Set up with [facility].     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 Wound is worse today and continues to have hypergranulation tissue. Right foot with edema and erythema with blisters to lateral aspect  of foot. Patient reports increased pain and burning in her foot. Cultures today. Rocephin given IM in clinic and cipro to pharmacy. X-ray today. Acetic acid moistened drawtex to wound bed. Continue to keep pressure off wound. Prescription for wheelchair given to patient. Prescription for custom off-loading shoe faxed to [facility].      5/ 22/2024  63 y.o. female presents to clinic for follow up on chronic-non healing wound on right TMA.Wound is healing well. Reports pain and tenderness to lateral ankle and cramps and spasms to lower leg. Labs ordered today. Continue with Aquacel ag to wound bed and ammonium lacate to soften callus.   Reinforced importance of local wound care, keeping pressure off foot, off-loading shoe, elevating legs, adherence to diabetic diet and strict glycemic control, and increasing protein intake.     Significant PMH diabetes, anemia, and CVA. Last HgA1C 7 in July, diabetes managed by PCP. Pertinent factors that delay wound healing include multiple co-morbidities, poor vascular supply, diabetes, edema, heavy drainage, decreased granulation tissue, tract(s), undermining and no protective sensation.       Review of Systems     Review of Systems   Constitutional:  Positive for activity change. Negative for appetite change, chills, diaphoresis and fever.   HENT: Negative.  Negative for congestion, ear pain, hearing loss and postnasal drip.    Eyes:  Negative for itching.   Respiratory:  Negative for chest tightness and shortness of breath.    Cardiovascular:  Positive for leg swelling. Negative for chest pain and palpitations.   Gastrointestinal:  Negative for abdominal pain.   Endocrine: Negative for polydipsia.   Genitourinary:  Negative for frequency.   Musculoskeletal:  Positive for arthralgias, back pain and joint swelling.        Severe shoulder pain, 10 of 10 on pain scale   Skin:  Positive for color change and wound.        See wound assessment   Neurological:  Positive for weakness and numbness. Negative for headaches.   Psychiatric/Behavioral:  Negative for agitation, behavioral problems, confusion and self-injury.        Medical / Social / Family History     Past Medical History:   Diagnosis Date    Anemia 7/5/2022    Diabetes mellitus, type 2     Hyperlipidemia     Hypertension     Obesity      Primary osteoarthritis of left shoulder 5/24/2022    Stroke        Past Surgical History:   Procedure Laterality Date    AMPUTATION      APPENDECTOMY      DEBRIDEMENT OF FOOT Right 01/12/2023    Procedure: DEBRIDEMENT, FOOT;  Surgeon: Ravi Ramirez MD;  Location: [facility] OR;  Service: General;  Laterality: Right;    DEBRIDEMENT OF FOOT Right 12/10/2024    Procedure: DEBRIDEMENT, FOOT;  Surgeon: Dhaval Pizano MD;  Location: [facility] OR;  Service: General;  Laterality: Right;    EYE SURGERY      TOTAL REDUCTION MAMMOPLASTY         Social History  Ms. Deborah Sotelo  reports that she has never smoked. She has never used smokeless tobacco. She reports current alcohol use. She reports that she does not use drugs.    Family History  Ms.'s Deborah Sotelo family history includes Appendicitis in her father; Asthma in her sister; Cancer in her mother; Diabetes in her brother.    Medications and Allergies     Medications  Outpatient Medications Marked as Taking for the 5/22/25 encounter (Office Visit) with Felisha Love FNP   Medication Sig Dispense Refill    amLODIPine (NORVASC) 5 MG tablet Take 1 tablet (5 mg total) by mouth once daily. 30 tablet 5    ammonium lactate 12 % Crea Apply 1 g topically Daily. 385 g 2    atorvastatin (LIPITOR) 80 MG tablet TAKE 1 TABLET BY MOUTH EACH NIGHT AT BEDTIME FOR CHOLESTEROL ~~DO NOT EAT GRAPEFRUIT OR DRINK GRAPEFRUIT JUICE WHILE TAKING THIS MEDICATION~~ 30 tablet 5    diclofenac sodium (VOLTAREN) 1 % Gel Apply 2 g topically 4 (four) times daily. 450 g 2    FEROSUL 325 mg (65 mg iron) Tab tablet       HYDROcodone-acetaminophen (NORCO) 7.5-325 mg per tablet Take 1 tablet by mouth every 6 (six) hours as needed for Pain. 15 tablet 0    insulin asp prt-insulin aspart, NovoLOG 70/30, (NOVOLOG 70/30) 100 unit/mL (70-30) Soln INJECT 50 UNITS SUB-Q EACH MORNING AND 35  UNITS EACH EVENING ...KEEP IN REFRIGERATOR ...USE WITHIN 28 DAYS AFTER OPENING EACH VIAL 30 mL 11     TRULICITY 1.5 mg/0.5 mL pen injector inject 0.5ml (1.5mg) UNDER THE SKIN EACH WEEK ...KEEP REFRIGERATED         Allergies  Review of patient's allergies indicates:   Allergen Reactions    Aspirin      Other reaction(s): UPSET STOMACH   Other reaction(s): Unknown    Bactrim ds [sulfamethoxazole-trimethoprim] Itching       Physical Examination     Vitals:    05/22/25 0858   BP: 110/72   Pulse: 93   Resp: 18   Temp: 97.7 °F (36.5 °C)                         Physical Exam  Vitals and nursing note reviewed.   Constitutional:       Comments: Tearful during exam   HENT:      Head: Normocephalic.   Cardiovascular:      Rate and Rhythm: Normal rate and regular rhythm.      Pulses: Normal pulses.      Heart sounds: Normal heart sounds.   Pulmonary:      Effort: Pulmonary effort is normal. No respiratory distress.   Chest:      Chest wall: No tenderness.   Musculoskeletal:         General: Swelling, tenderness and deformity present.      Right lower leg: Edema present.      Comments: Left shoulder decreased ROM with weakness to left upper extremity   Skin:     General: Skin is warm.      Capillary Refill: Capillary refill takes 2 to 3 seconds.      Findings: Erythema and lesion present.      Comments: Wound, see LDA for measurements and picture   Neurological:      Mental Status: She is alert and oriented to person, place, and time.   Psychiatric:         Mood and Affect: Mood normal.         Behavior: Behavior normal.         Thought Content: Thought content normal.         Judgment: Judgment normal.     Assessment and Plan             Incision/Site 01/12/23 1251 Right Foot (Active)   01/12/23 1251   Present Prior to Hospital Arrival?:    Side: Right   Location: Foot   Orientation:    Incision Type:    Closure Method:    Additional Comments:    Removal Indication and Assessment:    Wound Outcome:    Removal Indications:    Wound Image   05/22/25 0927   Incision WDL WDL 05/22/25 0901   Dressing Appearance Dry;Intact 05/22/25  0901   Drainage Amount None 05/22/25 0901   Appearance Red;Granulating 05/22/25 0901   Black (%), Wound Tissue Color 0 % 05/22/25 0901   Red (%), Wound Tissue Color 100 % 05/22/25 0901   Yellow (%), Wound Tissue Color 0 % 05/22/25 0901   Periwound Area Intact;Dry 05/22/25 0901   Wound Edges Callused 05/22/25 0901   Wound Length (cm) 0.5 cm 05/22/25 0901   Wound Width (cm) 0.2 cm 05/22/25 0901   Wound Depth (cm) 0.2 cm 05/22/25 0901   Wound Volume (cm^3) 0.01 cm^3 05/22/25 0901   Wound Surface Area (cm^2) 0.08 cm^2 05/22/25 0901   Care Cleansed with:;Soap and water 05/22/25 0901   Dressing Applied;Calcium alginate;Gauze;Island/border;Rolled gauze;Silver 05/22/25 0901   Periwound Care Moisturizer applied 05/22/25 0901                         Problem List Items Addressed This Visit          Endocrine    Diabetic foot ulcer - Primary    Overview                                                                                    Current Assessment & Plan   Clean with vashe or baby shampoo and water  Mix A&D ointment with Clotrimazole/betamethasone cream and apply to dry skin on foot.  Cover with aquacel ag and dry dressing,wrap with meng,secure with paper tape. Change daily and as needed       Monitor closely for s/s of infection including fever, chills, increase in pain, odor from wound, and increased redness from foot. Go to ER if any complications develop.   Keep leg elevated and avoid pressure on wound  Diabetes:  Monitor glucose closely. Check fasting glucose and 2 hours after meals. HgA1C goal <7, fasting glucose , and 2 hours after meals <180  Hypertension:  Check blood pressure twice daily, goal <120/80  Diet:   Increase protein intake, avoid fried, fatty foods and foods high in simple carbs.   Vitamins:  Take vitamin C 1000 mg, zinc 50mg, vitamin d 5000 units, and a daily multivitamin. Dawson is a good source of protein and nutrients to aid in wound healing.             Future Appointments   Date Time  Provider Department Center   6/12/2025 10:00 AM Felisha Love FNP [facility]   10/24/2025  9:00 AM Kylee Blood FNP [facility]   2/11/2026 10:40 AM [facility]            Signature:  TOMASA Maldonado  [facility]  [address] [facility] [address]  [phone]    Date of encounter: 5/22/25

## 2025-05-22 ENCOUNTER — OFFICE VISIT (OUTPATIENT)
Dept: WOUND CARE | Facility: CLINIC | Age: 65
End: 2025-05-22
Payer: MEDICAID

## 2025-05-22 VITALS
TEMPERATURE: 98 F | HEART RATE: 93 BPM | DIASTOLIC BLOOD PRESSURE: 72 MMHG | RESPIRATION RATE: 18 BRPM | SYSTOLIC BLOOD PRESSURE: 110 MMHG

## 2025-05-22 DIAGNOSIS — L97.413 DIABETIC ULCER OF RIGHT MIDFOOT ASSOCIATED WITH TYPE 2 DIABETES MELLITUS, WITH NECROSIS OF MUSCLE: Primary | ICD-10-CM

## 2025-05-22 DIAGNOSIS — E11.621 DIABETIC ULCER OF RIGHT MIDFOOT ASSOCIATED WITH TYPE 2 DIABETES MELLITUS, WITH NECROSIS OF MUSCLE: Primary | ICD-10-CM

## 2025-05-22 PROCEDURE — 1159F MED LIST DOCD IN RCRD: CPT | Mod: CPTII,,, | Performed by: NURSE PRACTITIONER

## 2025-05-22 PROCEDURE — 3078F DIAST BP <80 MM HG: CPT | Mod: CPTII,,, | Performed by: NURSE PRACTITIONER

## 2025-05-22 PROCEDURE — 25000003 PHARM REV CODE 250

## 2025-05-22 PROCEDURE — 3074F SYST BP LT 130 MM HG: CPT | Mod: CPTII,,, | Performed by: NURSE PRACTITIONER

## 2025-05-22 PROCEDURE — 4010F ACE/ARB THERAPY RXD/TAKEN: CPT | Mod: CPTII,,, | Performed by: NURSE PRACTITIONER

## 2025-05-22 PROCEDURE — 99213 OFFICE O/P EST LOW 20 MIN: CPT | Mod: S$PBB,,, | Performed by: NURSE PRACTITIONER

## 2025-05-22 PROCEDURE — 1160F RVW MEDS BY RX/DR IN RCRD: CPT | Mod: CPTII,,, | Performed by: NURSE PRACTITIONER

## 2025-05-22 PROCEDURE — 99999 PR PBB SHADOW E&M-EST. PATIENT-LVL IV: CPT | Mod: PBBFAC,,, | Performed by: NURSE PRACTITIONER

## 2025-05-22 PROCEDURE — 99214 OFFICE O/P EST MOD 30 MIN: CPT | Mod: PBBFAC | Performed by: NURSE PRACTITIONER

## 2025-05-22 RX ORDER — FLUCONAZOLE 150 MG/1
150 TABLET ORAL
Qty: 5 TABLET | Refills: 0 | Status: SHIPPED | OUTPATIENT
Start: 2025-05-22 | End: 2025-06-04

## 2025-05-22 RX ORDER — NITROFURANTOIN 25; 75 MG/1; MG/1
100 CAPSULE ORAL 2 TIMES DAILY
Qty: 20 CAPSULE | Refills: 0 | Status: SHIPPED | OUTPATIENT
Start: 2025-05-22 | End: 2025-06-01

## 2025-06-27 ENCOUNTER — HOSPITAL ENCOUNTER (OUTPATIENT)
Dept: RADIOLOGY | Facility: HOSPITAL | Age: 65
Discharge: HOME | End: 2025-06-27
Attending: NURSE PRACTITIONER
Payer: MEDICAID

## 2025-06-27 DIAGNOSIS — M25.552 LEFT HIP PAIN: ICD-10-CM

## 2025-06-27 DIAGNOSIS — M25.532 PAIN IN LEFT WRIST: ICD-10-CM

## 2025-06-27 PROCEDURE — 73110 X-RAY EXAM OF WRIST: CPT | Mod: 26,LT,, | Performed by: RADIOLOGY

## 2025-06-27 PROCEDURE — 73502 X-RAY EXAM HIP UNI 2-3 VIEWS: CPT | Mod: TC,LT

## 2025-06-27 PROCEDURE — 73502 X-RAY EXAM HIP UNI 2-3 VIEWS: CPT | Mod: 26,LT,, | Performed by: RADIOLOGY

## 2025-06-27 PROCEDURE — 73110 X-RAY EXAM OF WRIST: CPT | Mod: TC,LT

## 2025-07-23 NOTE — PROGRESS NOTES
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 Wound is healing well. Small open area noted today. Callus trimmed at bedside. Antifungal applied jonathon-wound. Continue to protect foot with clean white sock.       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 Wound continues to improve. Continue with current plan of care. Purple discoloration to lateral aspect of foot. Discussed applying foam border for protect and avoiding pressure to foot when in bed.     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 Since last visit, she was admitted and underwent debridement per Dr. Pizano. Wound has improved and patient reports significant improvement in pain. Wound packed with vashe moistened nuguaze and mepilex up applied. Set up with [facility].     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 Wound is worse today and continues to have hypergranulation tissue. Right foot with edema and erythema with blisters to lateral aspect  of foot. Patient reports increased pain and burning in her foot. Cultures today. Rocephin given IM in clinic and cipro to pharmacy. X-ray today. Acetic acid moistened drawtex to wound bed. Continue to keep pressure off wound. Prescription for wheelchair given to patient. Prescription for custom off-loading shoe  faxed to [facility].      5/ 22/2024  63 y.o. female presents to clinic for follow up on chronic-non healing wound on right TMA.Wound is healing well. Reports pain and tenderness to lateral ankle and cramps and spasms to lower leg. Labs ordered today. Continue with Aquacel ag to wound bed and ammonium lacate to soften callus.   Reinforced importance of local wound care, keeping pressure off foot, off-loading shoe, elevating legs, adherence to diabetic diet and strict glycemic control, and increasing protein intake.     Significant PMH diabetes, anemia, and CVA. Last HgA1C 7 in July, diabetes managed by PCP. Pertinent factors that delay wound healing include multiple co-morbidities, poor vascular supply, diabetes, edema, heavy drainage, decreased granulation tissue, tract(s), undermining and no protective sensation.       Review of Systems     Review of Systems   Constitutional:  Positive for activity change. Negative for appetite change, chills, diaphoresis and fever.   HENT: Negative.  Negative for congestion, ear pain, hearing loss and postnasal drip.    Eyes:  Negative for itching.   Respiratory:  Negative for chest tightness and shortness of breath.    Cardiovascular:  Positive for leg swelling. Negative for chest pain and palpitations.   Gastrointestinal:  Negative for abdominal pain.   Endocrine: Negative for polydipsia.   Genitourinary:  Negative for frequency.   Musculoskeletal:  Positive for arthralgias, back pain and joint swelling.        Severe shoulder pain, 10 of 10 on pain scale   Skin:  Positive for color change and wound.        See wound assessment   Neurological:  Positive for weakness and numbness. Negative for headaches.   Psychiatric/Behavioral:  Negative for agitation, behavioral problems, confusion and self-injury.        Medical / Social / Family History     Past Medical History:   Diagnosis Date    Anemia 7/5/2022    Diabetes mellitus, type 2     Hyperlipidemia     Hypertension      Obesity     Primary osteoarthritis of left shoulder 5/24/2022    Stroke        Past Surgical History:   Procedure Laterality Date    AMPUTATION      APPENDECTOMY      DEBRIDEMENT OF FOOT Right 01/12/2023    Procedure: DEBRIDEMENT, FOOT;  Surgeon: Ravi Ramirez MD;  Location: [facility] OR;  Service: General;  Laterality: Right;    DEBRIDEMENT OF FOOT Right 12/10/2024    Procedure: DEBRIDEMENT, FOOT;  Surgeon: Dhaval Pizano MD;  Location: [facility] OR;  Service: General;  Laterality: Right;    EYE SURGERY      TOTAL REDUCTION MAMMOPLASTY         Social History  Ms. Deborah Sotelo  reports that she has never smoked. She has never used smokeless tobacco. She reports current alcohol use. She reports that she does not use drugs.    Family History  Ms.'s Deborah Sotelo family history includes Appendicitis in her father; Asthma in her sister; Cancer in her mother; Diabetes in her brother.    Medications and Allergies     Medications  Outpatient Medications Marked as Taking for the 7/29/25 encounter (Office Visit) with Felisha Love FNP   Medication Sig Dispense Refill    amLODIPine (NORVASC) 5 MG tablet Take 1 tablet (5 mg total) by mouth once daily. 30 tablet 5    ammonium lactate 12 % Crea Apply 1 g topically Daily. 385 g 2    atorvastatin (LIPITOR) 80 MG tablet TAKE 1 TABLET BY MOUTH EACH NIGHT AT BEDTIME FOR CHOLESTEROL ~~DO NOT EAT GRAPEFRUIT OR DRINK GRAPEFRUIT JUICE WHILE TAKING THIS MEDICATION~~ 30 tablet 5    diclofenac sodium (VOLTAREN) 1 % Gel Apply 2 g topically 4 (four) times daily. 450 g 2    FEROSUL 325 mg (65 mg iron) Tab tablet       HYDROcodone-acetaminophen (NORCO) 7.5-325 mg per tablet Take 1 tablet by mouth every 6 (six) hours as needed for Pain. 15 tablet 0    insulin asp prt-insulin aspart, NovoLOG 70/30, (NOVOLOG 70/30) 100 unit/mL (70-30) Soln INJECT 50 UNITS SUB-Q EACH MORNING AND 35  UNITS EACH EVENING ...KEEP IN REFRIGERATOR ...USE WITHIN 28 DAYS AFTER OPENING EACH  VIAL 30 mL 11    linaCLOtide (LINZESS) 145 mcg Cap capsule Take 1 capsule (145 mcg total) by mouth before breakfast. 90 capsule 3    pantoprazole (PROTONIX) 40 MG tablet Take 40 mg by mouth once daily.      TRULICITY 1.5 mg/0.5 mL pen injector inject 0.5ml (1.5mg) UNDER THE SKIN EACH WEEK ...KEEP REFRIGERATED         Allergies  Review of patient's allergies indicates:   Allergen Reactions    Aspirin      Other reaction(s): UPSET STOMACH   Other reaction(s): Unknown    Bactrim ds [sulfamethoxazole-trimethoprim] Itching       Physical Examination     Vitals:    07/29/25 0945   BP: 113/69   Pulse: 84   Resp: 18   Temp: 97.7 °F (36.5 °C)                           Physical Exam  Vitals and nursing note reviewed.   Constitutional:       Comments: Tearful during exam   HENT:      Head: Normocephalic.   Cardiovascular:      Rate and Rhythm: Normal rate and regular rhythm.      Pulses: Normal pulses.      Heart sounds: Normal heart sounds.   Pulmonary:      Effort: Pulmonary effort is normal. No respiratory distress.   Chest:      Chest wall: No tenderness.   Musculoskeletal:         General: Swelling, tenderness and deformity present.      Right lower leg: Edema present.      Comments: Left shoulder decreased ROM with weakness to left upper extremity   Skin:     General: Skin is warm.      Capillary Refill: Capillary refill takes 2 to 3 seconds.      Findings: Erythema and lesion present.      Comments: Wound, see LDA for measurements and picture   Neurological:      Mental Status: She is alert and oriented to person, place, and time.   Psychiatric:         Mood and Affect: Mood normal.         Behavior: Behavior normal.         Thought Content: Thought content normal.         Judgment: Judgment normal.     Assessment and Plan             Incision/Site 01/12/23 1251 Right Foot (Active)   01/12/23 1251   Present Prior to Hospital Arrival?:    Side: Right   Location: Foot   Orientation:    Incision Type:    Closure  Method:    Additional Comments:    Removal Indication and Assessment:    Wound Outcome:    Removal Indications:    Wound Image   07/29/25 0953   Incision WDL WDL 07/29/25 0915   Dressing Appearance Open to air;No dressing 07/29/25 0915   Drainage Amount None 07/29/25 0915   Appearance Intact;Dry 07/29/25 0915   Black (%), Wound Tissue Color 0 % 07/29/25 0915   Red (%), Wound Tissue Color 0 % 07/29/25 0915   Yellow (%), Wound Tissue Color 0 % 07/29/25 0915   Periwound Area Dry 07/29/25 0915   Wound Edges Callused 07/29/25 0915   Wound Length (cm) 0 cm 07/29/25 0915   Wound Width (cm) 0 cm 07/29/25 0915   Wound Depth (cm) 0 cm 07/29/25 0915   Wound Volume (cm^3) 0 cm^3 07/29/25 0915   Wound Surface Area (cm^2) 0 cm^2 07/29/25 0915   Care Cleansed with:;Soap and water 07/29/25 0915   Periwound Care Moisturizer applied 07/29/25 0915                           Problem List Items Addressed This Visit          Endocrine    Diabetic foot ulcer - Primary    Overview                                                                                Current Assessment & Plan   Clean with baby shampoo and water  Apply moisturizer to feet daily and as needed       Monitor closely for s/s of infection including fever, chills, increase in pain, odor from wound, and increased redness from foot. Go to ER if any complications develop.   Keep leg elevated and avoid pressure on wound  Diabetes:  Monitor glucose closely. Check fasting glucose and 2 hours after meals. HgA1C goal <7, fasting glucose , and 2 hours after meals <180  Hypertension:  Check blood pressure twice daily, goal <120/80  Diet:   Increase protein intake, avoid fried, fatty foods and foods high in simple carbs.   Vitamins:  Take vitamin C 1000 mg, zinc 50mg, vitamin d 5000 units, and a daily multivitamin. Dawson is a good source of protein and nutrients to aid in wound healing.             Future Appointments   Date Time Provider Department Center   9/9/2025 10:00 AM  [facility]   10/24/2025 10:20 AM Kylee Blood FNP [facility]   2/11/2026 10:40 AM [facility] [place]            Signature:  TOMASA Maldonado  [facility]  [address]  [place] [address]  [phone]    Date of encounter: 7/29/25

## 2025-07-23 NOTE — PATIENT INSTRUCTIONS
Clean with baby shampoo and water  Apply moisturizer to feet daily and as needed       Monitor closely for s/s of infection including fever, chills, increase in pain, odor from wound, and increased redness from foot. Go to ER if any complications develop.   Keep leg elevated and avoid pressure on wound  Diabetes:  Monitor glucose closely. Check fasting glucose and 2 hours after meals. HgA1C goal <7, fasting glucose , and 2 hours after meals <180  Hypertension:  Check blood pressure twice daily, goal <120/80  Diet:   Increase protein intake, avoid fried, fatty foods and foods high in simple carbs.   Vitamins:  Take vitamin C 1000 mg, zinc 50mg, vitamin d 5000 units, and a daily multivitamin. Dawson is a good source of protein and nutrients to aid in wound healing.

## 2025-07-29 ENCOUNTER — OFFICE VISIT (OUTPATIENT)
Dept: WOUND CARE | Facility: CLINIC | Age: 65
End: 2025-07-29
Payer: MEDICAID

## 2025-07-29 VITALS
SYSTOLIC BLOOD PRESSURE: 113 MMHG | DIASTOLIC BLOOD PRESSURE: 69 MMHG | TEMPERATURE: 98 F | HEART RATE: 84 BPM | RESPIRATION RATE: 18 BRPM

## 2025-07-29 DIAGNOSIS — E11.621 DIABETIC ULCER OF RIGHT MIDFOOT ASSOCIATED WITH TYPE 2 DIABETES MELLITUS, WITH NECROSIS OF MUSCLE: Primary | ICD-10-CM

## 2025-07-29 DIAGNOSIS — L97.413 DIABETIC ULCER OF RIGHT MIDFOOT ASSOCIATED WITH TYPE 2 DIABETES MELLITUS, WITH NECROSIS OF MUSCLE: Primary | ICD-10-CM

## 2025-07-29 PROCEDURE — 99999 PR PBB SHADOW E&M-EST. PATIENT-LVL IV: CPT | Mod: PBBFAC,,, | Performed by: NURSE PRACTITIONER

## 2025-07-29 PROCEDURE — 99214 OFFICE O/P EST MOD 30 MIN: CPT | Mod: PBBFAC | Performed by: NURSE PRACTITIONER

## 2025-07-29 PROCEDURE — 99212 OFFICE O/P EST SF 10 MIN: CPT | Mod: S$PBB,,, | Performed by: NURSE PRACTITIONER

## 2025-07-29 PROCEDURE — 1159F MED LIST DOCD IN RCRD: CPT | Mod: CPTII,,, | Performed by: NURSE PRACTITIONER

## 2025-07-29 PROCEDURE — 3074F SYST BP LT 130 MM HG: CPT | Mod: CPTII,,, | Performed by: NURSE PRACTITIONER

## 2025-07-29 PROCEDURE — 3078F DIAST BP <80 MM HG: CPT | Mod: CPTII,,, | Performed by: NURSE PRACTITIONER

## 2025-07-29 PROCEDURE — 1160F RVW MEDS BY RX/DR IN RCRD: CPT | Mod: CPTII,,, | Performed by: NURSE PRACTITIONER

## 2025-07-29 PROCEDURE — 4010F ACE/ARB THERAPY RXD/TAKEN: CPT | Mod: CPTII,,, | Performed by: NURSE PRACTITIONER

## 2025-08-21 RX ORDER — NITROFURANTOIN 25; 75 MG/1; MG/1
100 CAPSULE ORAL 2 TIMES DAILY
Qty: 20 CAPSULE | Refills: 0 | Status: SHIPPED | OUTPATIENT
Start: 2025-08-21 | End: 2025-08-31

## 2025-08-21 RX ORDER — FLUCONAZOLE 150 MG/1
150 TABLET ORAL
Qty: 2 TABLET | Refills: 0 | Status: SHIPPED | OUTPATIENT
Start: 2025-08-21 | End: 2025-08-25

## (undated) DEVICE — GLOVE SENSICARE PI SURG 7

## (undated) DEVICE — SYR 10CC LUER LOCK

## (undated) DEVICE — GLOVE PROTEXIS PI SYN SURG 6.0

## (undated) DEVICE — GLOVE SENSICARE PI SURG 6.5

## (undated) DEVICE — GLOVE 6.5 PROTEXIS PI BLUE

## (undated) DEVICE — Device

## (undated) DEVICE — DERM CURETTE 5MM DISP

## (undated) DEVICE — BANDAGE KERLIX AMD

## (undated) DEVICE — SOL NACL IRR 1000ML BTL

## (undated) DEVICE — TRAY SKIN SCRUB WET PREMIUM

## (undated) DEVICE — GLOVE SENSICARE PI GRN 7

## (undated) DEVICE — HEMOSTAT SURGICEL 4X8IN

## (undated) DEVICE — SPONGE COTTON WOVEN 4X4IN

## (undated) DEVICE — ELECTRODE BLADE INSULATED 1 IN

## (undated) DEVICE — GLOVE PROTEXIS PI SYN SURG 8.0